# Patient Record
Sex: FEMALE | Race: WHITE | URBAN - METROPOLITAN AREA
[De-identification: names, ages, dates, MRNs, and addresses within clinical notes are randomized per-mention and may not be internally consistent; named-entity substitution may affect disease eponyms.]

---

## 2017-11-08 ENCOUNTER — INPATIENT (INPATIENT)
Facility: HOSPITAL | Age: 77
LOS: 5 days | Discharge: HOME HEALTH | End: 2017-11-14
Attending: FAMILY MEDICINE | Admitting: FAMILY MEDICINE
Payer: MEDICARE

## 2017-11-08 VITALS — WEIGHT: 195.99 LBS

## 2017-11-08 DIAGNOSIS — Z90.49 ACQUIRED ABSENCE OF OTHER SPECIFIED PARTS OF DIGESTIVE TRACT: Chronic | ICD-10-CM

## 2017-11-08 DIAGNOSIS — Z90.710 ACQUIRED ABSENCE OF BOTH CERVIX AND UTERUS: Chronic | ICD-10-CM

## 2017-11-08 LAB
ALBUMIN SERPL ELPH-MCNC: 3.5 G/DL — SIGNIFICANT CHANGE UP (ref 3.3–5)
ALP SERPL-CCNC: 82 U/L — SIGNIFICANT CHANGE UP (ref 40–120)
ALT FLD-CCNC: 14 U/L — SIGNIFICANT CHANGE UP (ref 12–78)
ANION GAP SERPL CALC-SCNC: 7 MMOL/L — SIGNIFICANT CHANGE UP (ref 5–17)
ANISOCYTOSIS BLD QL: SLIGHT — SIGNIFICANT CHANGE UP
APTT BLD: 47.7 SEC — HIGH (ref 27.5–37.4)
AST SERPL-CCNC: 19 U/L — SIGNIFICANT CHANGE UP (ref 15–37)
BASOPHILS # BLD AUTO: 0.1 K/UL — SIGNIFICANT CHANGE UP (ref 0–0.2)
BASOPHILS # BLD AUTO: 0.1 K/UL — SIGNIFICANT CHANGE UP (ref 0–0.2)
BASOPHILS NFR BLD AUTO: 1.2 % — SIGNIFICANT CHANGE UP (ref 0–2)
BASOPHILS NFR BLD AUTO: 1.4 % — SIGNIFICANT CHANGE UP (ref 0–2)
BILIRUB SERPL-MCNC: 0.6 MG/DL — SIGNIFICANT CHANGE UP (ref 0.2–1.2)
BUN SERPL-MCNC: 19 MG/DL — SIGNIFICANT CHANGE UP (ref 7–23)
CALCIUM SERPL-MCNC: 9.5 MG/DL — SIGNIFICANT CHANGE UP (ref 8.5–10.1)
CHLORIDE SERPL-SCNC: 100 MMOL/L — SIGNIFICANT CHANGE UP (ref 96–108)
CO2 SERPL-SCNC: 32 MMOL/L — HIGH (ref 22–31)
CREAT SERPL-MCNC: 1.28 MG/DL — SIGNIFICANT CHANGE UP (ref 0.5–1.3)
EOSINOPHIL # BLD AUTO: 0.2 K/UL — SIGNIFICANT CHANGE UP (ref 0–0.5)
EOSINOPHIL # BLD AUTO: 0.2 K/UL — SIGNIFICANT CHANGE UP (ref 0–0.5)
EOSINOPHIL NFR BLD AUTO: 3.2 % — SIGNIFICANT CHANGE UP (ref 0–6)
EOSINOPHIL NFR BLD AUTO: 3.6 % — SIGNIFICANT CHANGE UP (ref 0–6)
GLUCOSE SERPL-MCNC: 65 MG/DL — LOW (ref 70–99)
HCT VFR BLD CALC: 46.5 % — HIGH (ref 34.5–45)
HCT VFR BLD CALC: 46.6 % — HIGH (ref 34.5–45)
HGB BLD-MCNC: 14.8 G/DL — SIGNIFICANT CHANGE UP (ref 11.5–15.5)
HGB BLD-MCNC: 15.4 G/DL — SIGNIFICANT CHANGE UP (ref 11.5–15.5)
INR BLD: 1.64 RATIO — HIGH (ref 0.88–1.16)
LYMPHOCYTES # BLD AUTO: 2.3 K/UL — SIGNIFICANT CHANGE UP (ref 1–3.3)
LYMPHOCYTES # BLD AUTO: 2.3 K/UL — SIGNIFICANT CHANGE UP (ref 1–3.3)
LYMPHOCYTES # BLD AUTO: 34.3 % — SIGNIFICANT CHANGE UP (ref 13–44)
LYMPHOCYTES # BLD AUTO: 38 % — SIGNIFICANT CHANGE UP (ref 13–44)
MACROCYTES BLD QL: SLIGHT — SIGNIFICANT CHANGE UP
MANUAL DIF COMMENT BLD-IMP: SIGNIFICANT CHANGE UP
MCHC RBC-ENTMCNC: 31.7 GM/DL — LOW (ref 32–36)
MCHC RBC-ENTMCNC: 33 GM/DL — SIGNIFICANT CHANGE UP (ref 32–36)
MCHC RBC-ENTMCNC: 33.2 PG — SIGNIFICANT CHANGE UP (ref 27–34)
MCHC RBC-ENTMCNC: 34.1 PG — HIGH (ref 27–34)
MCV RBC AUTO: 103.3 FL — HIGH (ref 80–100)
MCV RBC AUTO: 104.9 FL — HIGH (ref 80–100)
MONOCYTES # BLD AUTO: 0.4 K/UL — SIGNIFICANT CHANGE UP (ref 0–0.9)
MONOCYTES # BLD AUTO: 0.7 K/UL — SIGNIFICANT CHANGE UP (ref 0–0.9)
MONOCYTES NFR BLD AUTO: 11.2 % — SIGNIFICANT CHANGE UP (ref 2–14)
MONOCYTES NFR BLD AUTO: 7.1 % — SIGNIFICANT CHANGE UP (ref 2–14)
NEUTROPHILS # BLD AUTO: 3 K/UL — SIGNIFICANT CHANGE UP (ref 1.8–7.4)
NEUTROPHILS # BLD AUTO: 3.3 K/UL — SIGNIFICANT CHANGE UP (ref 1.8–7.4)
NEUTROPHILS NFR BLD AUTO: 49.5 % — SIGNIFICANT CHANGE UP (ref 43–77)
NEUTROPHILS NFR BLD AUTO: 50.5 % — SIGNIFICANT CHANGE UP (ref 43–77)
NT-PROBNP SERPL-SCNC: 2386 PG/ML — HIGH (ref 0–450)
PLAT MORPH BLD: NORMAL — SIGNIFICANT CHANGE UP
PLATELET # BLD AUTO: 208 K/UL — SIGNIFICANT CHANGE UP (ref 150–400)
PLATELET # BLD AUTO: 217 K/UL — SIGNIFICANT CHANGE UP (ref 150–400)
POTASSIUM SERPL-MCNC: 4 MMOL/L — SIGNIFICANT CHANGE UP (ref 3.5–5.3)
POTASSIUM SERPL-SCNC: 4 MMOL/L — SIGNIFICANT CHANGE UP (ref 3.5–5.3)
PROT SERPL-MCNC: 7.7 GM/DL — SIGNIFICANT CHANGE UP (ref 6–8.3)
PROTHROM AB SERPL-ACNC: 17.9 SEC — HIGH (ref 9.8–12.7)
RBC # BLD: 4.44 M/UL — SIGNIFICANT CHANGE UP (ref 3.8–5.2)
RBC # BLD: 4.5 M/UL — SIGNIFICANT CHANGE UP (ref 3.8–5.2)
RBC # FLD: 11.9 % — SIGNIFICANT CHANGE UP (ref 10.3–14.5)
RBC # FLD: 12 % — SIGNIFICANT CHANGE UP (ref 10.3–14.5)
RBC BLD AUTO: (no result)
SODIUM SERPL-SCNC: 139 MMOL/L — SIGNIFICANT CHANGE UP (ref 135–145)
TROPONIN I SERPL-MCNC: <0.015 NG/ML — SIGNIFICANT CHANGE UP (ref 0.01–0.04)
TSH SERPL-MCNC: 3.19 UIU/ML — SIGNIFICANT CHANGE UP (ref 0.36–3.74)
WBC # BLD: 6 K/UL — SIGNIFICANT CHANGE UP (ref 3.8–10.5)
WBC # BLD: 6.6 K/UL — SIGNIFICANT CHANGE UP (ref 3.8–10.5)
WBC # FLD AUTO: 6 K/UL — SIGNIFICANT CHANGE UP (ref 3.8–10.5)
WBC # FLD AUTO: 6.6 K/UL — SIGNIFICANT CHANGE UP (ref 3.8–10.5)

## 2017-11-08 PROCEDURE — 71020: CPT | Mod: 26

## 2017-11-08 PROCEDURE — 93010 ELECTROCARDIOGRAM REPORT: CPT

## 2017-11-08 PROCEDURE — 99285 EMERGENCY DEPT VISIT HI MDM: CPT

## 2017-11-08 PROCEDURE — 93970 EXTREMITY STUDY: CPT | Mod: 26

## 2017-11-08 RX ORDER — SPIRONOLACTONE 25 MG/1
50 TABLET, FILM COATED ORAL DAILY
Qty: 0 | Refills: 0 | Status: DISCONTINUED | OUTPATIENT
Start: 2017-11-08 | End: 2017-11-14

## 2017-11-08 RX ORDER — PREGABALIN 225 MG/1
1000 CAPSULE ORAL DAILY
Qty: 0 | Refills: 0 | Status: DISCONTINUED | OUTPATIENT
Start: 2017-11-08 | End: 2017-11-14

## 2017-11-08 RX ORDER — APIXABAN 2.5 MG/1
5 TABLET, FILM COATED ORAL EVERY 12 HOURS
Qty: 0 | Refills: 0 | Status: DISCONTINUED | OUTPATIENT
Start: 2017-11-08 | End: 2017-11-09

## 2017-11-08 RX ORDER — CHOLECALCIFEROL (VITAMIN D3) 125 MCG
1000 CAPSULE ORAL DAILY
Qty: 0 | Refills: 0 | Status: DISCONTINUED | OUTPATIENT
Start: 2017-11-08 | End: 2017-11-14

## 2017-11-08 RX ORDER — METOPROLOL TARTRATE 50 MG
50 TABLET ORAL DAILY
Qty: 0 | Refills: 0 | Status: DISCONTINUED | OUTPATIENT
Start: 2017-11-08 | End: 2017-11-14

## 2017-11-08 RX ORDER — FOLIC ACID 0.8 MG
1 TABLET ORAL DAILY
Qty: 0 | Refills: 0 | Status: DISCONTINUED | OUTPATIENT
Start: 2017-11-08 | End: 2017-11-14

## 2017-11-08 RX ORDER — DILTIAZEM HCL 120 MG
120 CAPSULE, EXT RELEASE 24 HR ORAL DAILY
Qty: 0 | Refills: 0 | Status: DISCONTINUED | OUTPATIENT
Start: 2017-11-08 | End: 2017-11-14

## 2017-11-08 RX ORDER — FUROSEMIDE 40 MG
40 TABLET ORAL
Qty: 0 | Refills: 0 | Status: DISCONTINUED | OUTPATIENT
Start: 2017-11-08 | End: 2017-11-11

## 2017-11-08 RX ORDER — ACETAMINOPHEN 500 MG
650 TABLET ORAL EVERY 6 HOURS
Qty: 0 | Refills: 0 | Status: DISCONTINUED | OUTPATIENT
Start: 2017-11-08 | End: 2017-11-14

## 2017-11-08 RX ADMIN — APIXABAN 5 MILLIGRAM(S): 2.5 TABLET, FILM COATED ORAL at 23:00

## 2017-11-08 RX ADMIN — Medication 50 MILLIGRAM(S): at 23:00

## 2017-11-08 RX ADMIN — Medication 40 MILLIGRAM(S): at 17:45

## 2017-11-08 NOTE — H&P ADULT - NSHPPHYSICALEXAM_GEN_ALL_CORE
Vital Signs Last 24 Hrs  T(C): 36.7 (08 Nov 2017 15:19), Max: 36.7 (08 Nov 2017 12:36)  T(F): 98.1 (08 Nov 2017 15:19), Max: 98.1 (08 Nov 2017 15:19)  HR: 70 (08 Nov 2017 15:19) (70 - 78)  BP: 141/78 (08 Nov 2017 15:19) (141/78 - 148/97)  BP(mean): --  RR: 17 (08 Nov 2017 15:19) (17 - 18)  SpO2: 97% (08 Nov 2017 15:19) (96% - 97%)

## 2017-11-08 NOTE — H&P ADULT - PMH
Atrial fibrillation, unspecified type    CHF (congestive heart failure)    Hypertension    Squamous cell carcinoma

## 2017-11-08 NOTE — ED PROVIDER NOTE - OBJECTIVE STATEMENT
77 y/o F with a PMHx of HTN presents to the ED being sent in by Yoseph for possible CHF exacerbation along with L pleural effusion. Pt states that she has not been able to walk due to lower extremity edema and has recently moved in with son who is at bedside. Pt currently calm, no respiratory distress and denies fever, NVD, CP, SOB, spinning sensation or any other acute c/o at this time.

## 2017-11-08 NOTE — ED ADULT TRIAGE NOTE - CHIEF COMPLAINT QUOTE
pt sent by MD for CHF exacerbation with Left lung pleural efffusion and Afib. pt denies SOB chest pain, dizziness, pt had routine workup and these dx were discovered per pt son

## 2017-11-08 NOTE — H&P ADULT - ASSESSMENT
77 yo female with PMH of diastolic CHF, HTN, A. fib (on eliquis), depression, chronic pedal edema secondary to venous stasis, h/o compression fx L1, squamous cell skin cancer sent in to ED from PMD's office for CHF exacerbation. Pt recently moved to [place] from [place]. 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 Echo showed enlarged left atrium with preserved LVEF and pulm HTN. She was seen in PMDs office today and sent in for further management. As per pt she has felt SOB on exertion. No orthopnea She has had chronic lower ext edema which has worsened over the past few weeks. No chest pain. No orthopnea or PND. She is compliant with her medications. She has a known compression fx of L1 s/p fall earlier this year. Denies fevers, chills, headaches, chest pain, palpitations, abd pain, N/V, diarrhea.    #Acute diastolic CHF exacerbation  - admit to tele  - lasix IV BID  - continue spironolactone  - daily weights, strict I/Os  - continue toprol  - cardio consult  - outpatient echo with preserved LVEF  - CXR and outpatient CT with right moderate pleural effusion, may need thoracocentesis if not improved with lasix    #A. fib now with tachy suzette syndrome on outpatient holter  - will continue cardizem and toprol with parameters for now unless becomes bradycardic on monitor  - continue eliquis  - EP consult for possible PPM placement    #Lower extremity edema  - check dopplers  - continue diuresis    #HTN  - BP stable  - continue home meds    #h/o L1 compression fx  - outpatient follow up    #DVT prophylaxis  - on eliquis

## 2017-11-08 NOTE — H&P ADULT - HISTORY OF PRESENT ILLNESS
77 yo female with PMH of diastolic CHF, HTN, A. fib (on eliquis), depression, chronic pedal edema secondary to venous stasis, h/o compression fx L1, squamous cell skin cancer sent in to ED from PMD's office for CHF exacerbation. Pt recently moved to [place] from [place]. 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 Echo showed enlarged left atrium with preserved LVEF and pulm HTN. She was seen in PMDs office today and sent in for further management. As per pt she has felt SOB on exertion. No orthopnea She has had chronic lower ext edema which has worsened over the past few weeks. No chest pain. No orthopnea or PND. She is compliant with her medications. She has a known compression fx of L1 s/p fall earlier this year. Denies fevers, chills, headaches, chest pain, palpitations, abd pain, N/V, diarrhea.

## 2017-11-09 LAB
ANION GAP SERPL CALC-SCNC: 7 MMOL/L — SIGNIFICANT CHANGE UP (ref 5–17)
BUN SERPL-MCNC: 19 MG/DL — SIGNIFICANT CHANGE UP (ref 7–23)
CALCIUM SERPL-MCNC: 9.2 MG/DL — SIGNIFICANT CHANGE UP (ref 8.5–10.1)
CHLORIDE SERPL-SCNC: 97 MMOL/L — SIGNIFICANT CHANGE UP (ref 96–108)
CHOLEST SERPL-MCNC: 180 MG/DL — SIGNIFICANT CHANGE UP (ref 10–199)
CO2 SERPL-SCNC: 33 MMOL/L — HIGH (ref 22–31)
CREAT SERPL-MCNC: 1.06 MG/DL — SIGNIFICANT CHANGE UP (ref 0.5–1.3)
GLUCOSE SERPL-MCNC: 82 MG/DL — SIGNIFICANT CHANGE UP (ref 70–99)
HDLC SERPL-MCNC: 51 MG/DL — SIGNIFICANT CHANGE UP (ref 40–125)
LIPID PNL WITH DIRECT LDL SERPL: 116 MG/DL — SIGNIFICANT CHANGE UP
MAGNESIUM SERPL-MCNC: 1.8 MG/DL — SIGNIFICANT CHANGE UP (ref 1.6–2.6)
POTASSIUM SERPL-MCNC: 3.7 MMOL/L — SIGNIFICANT CHANGE UP (ref 3.5–5.3)
POTASSIUM SERPL-SCNC: 3.7 MMOL/L — SIGNIFICANT CHANGE UP (ref 3.5–5.3)
SODIUM SERPL-SCNC: 137 MMOL/L — SIGNIFICANT CHANGE UP (ref 135–145)
TOTAL CHOLESTEROL/HDL RATIO MEASUREMENT: 3.5 RATIO — SIGNIFICANT CHANGE UP (ref 3.3–7.1)
TRIGL SERPL-MCNC: 65 MG/DL — SIGNIFICANT CHANGE UP (ref 10–149)

## 2017-11-09 PROCEDURE — 99221 1ST HOSP IP/OBS SF/LOW 40: CPT | Mod: 25

## 2017-11-09 PROCEDURE — 93010 ELECTROCARDIOGRAM REPORT: CPT

## 2017-11-09 RX ADMIN — Medication 120 MILLIGRAM(S): at 06:46

## 2017-11-09 RX ADMIN — Medication 20 MILLIGRAM(S): at 12:20

## 2017-11-09 RX ADMIN — APIXABAN 5 MILLIGRAM(S): 2.5 TABLET, FILM COATED ORAL at 06:47

## 2017-11-09 RX ADMIN — Medication 1 MILLIGRAM(S): at 06:46

## 2017-11-09 RX ADMIN — Medication 650 MILLIGRAM(S): at 20:39

## 2017-11-09 RX ADMIN — Medication 50 MILLIGRAM(S): at 06:45

## 2017-11-09 RX ADMIN — PREGABALIN 1000 MICROGRAM(S): 225 CAPSULE ORAL at 12:21

## 2017-11-09 RX ADMIN — Medication 650 MILLIGRAM(S): at 22:14

## 2017-11-09 RX ADMIN — Medication 1000 UNIT(S): at 06:47

## 2017-11-09 RX ADMIN — Medication 40 MILLIGRAM(S): at 17:57

## 2017-11-09 RX ADMIN — SPIRONOLACTONE 50 MILLIGRAM(S): 25 TABLET, FILM COATED ORAL at 06:46

## 2017-11-09 RX ADMIN — Medication 40 MILLIGRAM(S): at 06:46

## 2017-11-09 NOTE — PROGRESS NOTE ADULT - SUBJECTIVE AND OBJECTIVE BOX
75 y/o female with PMHx of diastolic CHF, HTN, A. fib (on eliquis), depression, chronic pedal edema secondary to venous stasis, h/o compression fx L1, squamous cell skin cancer sent in to ED from PMD's office for CHF exacerbation. Pt recently moved to [place] from [place]. 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 Echo showed enlarged left atrium with preserved LVEF and pulm HTN. She was seen in PMDs office today and sent in for further management. As per pt she has felt SOB on exertion. No orthopnea. She has had chronic lower ext edema which has worsened over the past few weeks. No chest pain. No orthopnea or PND. She is compliant with her medications. She has a known compression fx of L1 s/p fall earlier this year. Denies fevers, chills, headaches, chest pain, palpitations, abd pain, N/V, diarrhea.      11/9 - Pt. seen and examined in bed, feels well. Offers no complaints. Denies SOB/CP. Afib on monitor (40s-70s).        REVIEW OF SYSTEMS: all negative except for comments above.         Physical Exam:   Vital Signs Last 24 Hrs T(C): 36.9 (09 Nov 2017 05:42), Max: 36.9 (09 Nov 2017 05:42) T(F): 98.4 (09 Nov 2017 05:42), Max: 98.4 (09 Nov 2017 05:42) HR: 72 (09 Nov 2017 06:44) (70 - 78) BP: 115/68 (09 Nov 2017 06:44) (107/54 - 141/78) BP(mean): -- RR: 17 (08 Nov 2017 21:09) (17 - 17) SpO2: 90% (09 Nov 2017 05:42) (90% - 97%)  	        PHYSICAL EXAM:    Constitutional: NAD, well-nourished, well-developed  Neck: Soft and supple  Respiratory: Breath sounds are clear bilaterally, No wheezing, rales or rhonchi  Cardiovascular: S1 and S2, regular rate and rhythm  Gastrointestinal: Bowel Sounds present, soft, nontender, nondistended  Extremities: No peripheral edema  Neurological: A/O x 3, no focal deficits  Skin: No rashes    Labs:                          14.8   6.0   )-----------( 217      ( 08 Nov 2017 19:24 )             46.6     11-09    137  |  97  |  19  ----------------------------<  82  3.7   |  33<H>  |  1.06    Ca    9.2      09 Nov 2017 06:51  Mg     1.8     11-09    TPro  7.7  /  Alb  3.5  /  TBili  0.6  /  DBili  x   /  AST  19  /  ALT  14  /  AlkPhos  82  11-08      MEDICATIONS  (STANDING):  apixaban 5 milliGRAM(s) Oral every 12 hours  cholecalciferol 1000 Unit(s) Oral daily  cyanocobalamin 1000 MICROGram(s) Oral daily  diltiazem    milliGRAM(s) Oral daily  folic acid 1 milliGRAM(s) Oral daily  furosemide   Injectable 40 milliGRAM(s) IV Push two times a day  metoprolol succinate ER 50 milliGRAM(s) Oral daily  PARoxetine 20 milliGRAM(s) Oral daily  spironolactone 50 milliGRAM(s) Oral daily    MEDICATIONS  (PRN):  acetaminophen   Tablet. 650 milliGRAM(s) Oral every 6 hours PRN Mild Pain (1 - 3)                  Assessment and Plan:       77 yo female with PMHx as above who is admitted with:     # Acute diastolic CHF exacerbation  - admit to tele  - lasix IV BID  - continue spironolactone  - daily weights, strict I/Os  - continue toprol  - cardio consult appreciated -   - BNP 2000's  - as per cardio, repeat ECHO has significantly enlarged L/R atria with somewhat preserved LV function in past  - Will consider possible ACE/ARB after ECHO  - B/L LE edema-some evidence of vascular insufficiency.    - EPS for possible PPM  - CXR and outpatient CT with right moderate pleural effusion, may need thoracocentesis if not improved with lasix    # A. fib  - hx tachy suzette syndrome on outpatient holter  - will continue cardizem and toprol with parameters for now unless becomes bradycardic on monitor  - continue eliquis  - EP consult for possible PPM placement    # Lower extremity edema/venous insuff  - dopplers neg  - continue diuresis    # HTN  - BP stable  - continue home meds    # h/o L1 compression fx  - outpatient follow up    # DVT prophylaxis  - on eliquis

## 2017-11-09 NOTE — PROGRESS NOTE ADULT - ATTENDING COMMENTS
Pt. seen and examined with SUSIE Rush. Agree with assessment and plan as above. Changes made where appropriate. Pt. seen and examined with SUSIE Rush. Agree with assessment and plan as above. Changes made where appropriate.  - plan for EP eval for possible PPM for tachy suzette  - continue IV lasix  - continue tele monitoring

## 2017-11-10 DIAGNOSIS — J90 PLEURAL EFFUSION, NOT ELSEWHERE CLASSIFIED: ICD-10-CM

## 2017-11-10 DIAGNOSIS — I48.91 UNSPECIFIED ATRIAL FIBRILLATION: ICD-10-CM

## 2017-11-10 DIAGNOSIS — R06.00 DYSPNEA, UNSPECIFIED: ICD-10-CM

## 2017-11-10 DIAGNOSIS — I50.9 HEART FAILURE, UNSPECIFIED: ICD-10-CM

## 2017-11-10 DIAGNOSIS — C44.92 SQUAMOUS CELL CARCINOMA OF SKIN, UNSPECIFIED: ICD-10-CM

## 2017-11-10 LAB
ANION GAP SERPL CALC-SCNC: 6 MMOL/L — SIGNIFICANT CHANGE UP (ref 5–17)
BUN SERPL-MCNC: 24 MG/DL — HIGH (ref 7–23)
CALCIUM SERPL-MCNC: 9.2 MG/DL — SIGNIFICANT CHANGE UP (ref 8.5–10.1)
CHLORIDE SERPL-SCNC: 95 MMOL/L — LOW (ref 96–108)
CO2 SERPL-SCNC: 34 MMOL/L — HIGH (ref 22–31)
CREAT SERPL-MCNC: 1.15 MG/DL — SIGNIFICANT CHANGE UP (ref 0.5–1.3)
GLUCOSE SERPL-MCNC: 91 MG/DL — SIGNIFICANT CHANGE UP (ref 70–99)
HCT VFR BLD CALC: 44.2 % — SIGNIFICANT CHANGE UP (ref 34.5–45)
HGB BLD-MCNC: 14.8 G/DL — SIGNIFICANT CHANGE UP (ref 11.5–15.5)
MCHC RBC-ENTMCNC: 33.6 GM/DL — SIGNIFICANT CHANGE UP (ref 32–36)
MCHC RBC-ENTMCNC: 34.5 PG — HIGH (ref 27–34)
MCV RBC AUTO: 102.8 FL — HIGH (ref 80–100)
PLATELET # BLD AUTO: 166 K/UL — SIGNIFICANT CHANGE UP (ref 150–400)
POTASSIUM SERPL-MCNC: 3.6 MMOL/L — SIGNIFICANT CHANGE UP (ref 3.5–5.3)
POTASSIUM SERPL-SCNC: 3.6 MMOL/L — SIGNIFICANT CHANGE UP (ref 3.5–5.3)
RBC # BLD: 4.3 M/UL — SIGNIFICANT CHANGE UP (ref 3.8–5.2)
RBC # FLD: 11.8 % — SIGNIFICANT CHANGE UP (ref 10.3–14.5)
SODIUM SERPL-SCNC: 135 MMOL/L — SIGNIFICANT CHANGE UP (ref 135–145)
WBC # BLD: 6.3 K/UL — SIGNIFICANT CHANGE UP (ref 3.8–10.5)
WBC # FLD AUTO: 6.3 K/UL — SIGNIFICANT CHANGE UP (ref 3.8–10.5)

## 2017-11-10 PROCEDURE — 93306 TTE W/DOPPLER COMPLETE: CPT | Mod: 26

## 2017-11-10 PROCEDURE — 71010: CPT | Mod: 26

## 2017-11-10 RX ORDER — HEPARIN SODIUM 5000 [USP'U]/ML
5000 INJECTION INTRAVENOUS; SUBCUTANEOUS EVERY 8 HOURS
Qty: 0 | Refills: 0 | Status: DISCONTINUED | OUTPATIENT
Start: 2017-11-10 | End: 2017-11-13

## 2017-11-10 RX ADMIN — Medication 650 MILLIGRAM(S): at 22:09

## 2017-11-10 RX ADMIN — Medication 1000 UNIT(S): at 13:57

## 2017-11-10 RX ADMIN — Medication 40 MILLIGRAM(S): at 17:58

## 2017-11-10 RX ADMIN — SPIRONOLACTONE 50 MILLIGRAM(S): 25 TABLET, FILM COATED ORAL at 06:20

## 2017-11-10 RX ADMIN — Medication 120 MILLIGRAM(S): at 06:20

## 2017-11-10 RX ADMIN — Medication 40 MILLIGRAM(S): at 06:20

## 2017-11-10 RX ADMIN — Medication 1 MILLIGRAM(S): at 13:57

## 2017-11-10 RX ADMIN — Medication 650 MILLIGRAM(S): at 20:07

## 2017-11-10 RX ADMIN — HEPARIN SODIUM 5000 UNIT(S): 5000 INJECTION INTRAVENOUS; SUBCUTANEOUS at 21:19

## 2017-11-10 RX ADMIN — PREGABALIN 1000 MICROGRAM(S): 225 CAPSULE ORAL at 13:59

## 2017-11-10 RX ADMIN — Medication 20 MILLIGRAM(S): at 13:57

## 2017-11-10 RX ADMIN — Medication 50 MILLIGRAM(S): at 06:20

## 2017-11-10 NOTE — CONSULT NOTE ADULT - CONSULT REASON
tachy-suzette, atrial fibrillation
CHF, arrhythmia, pleural effusion
shortness of breath; right effussion

## 2017-11-10 NOTE — PROGRESS NOTE ADULT - SUBJECTIVE AND OBJECTIVE BOX
CHIEF COMPLAINT: Patient is a 76y old  Female who presents with a chief complaint of fluid in lung (08 Nov 2017 17:05)      HPI:  75 yo female with PMH of diastolic CHF, HTN, A. fib (on eliquis), depression, chronic pedal edema secondary to venous stasis, h/o compression fx L1, squamous cell skin cancer sent in to ED from PMD's office for CHF exacerbation. Pt recently moved to [place] from [place]. 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 Echo showed enlarged left atrium with preserved LVEF and pulm HTN. She was seen in PMDs office today and sent in for further management. As per pt she has felt SOB on exertion. No orthopnea She has had chronic lower ext edema which has worsened over the past few weeks. No chest pain. No orthopnea or PND. She is compliant with her medications. She has a known compression fx of L1 s/p fall earlier this year. Denies fevers, chills, headaches, chest pain, palpitations, abd pain, N/V, diarrhea. (08 Nov 2017 17:05)    11/9-Patient started on IV lasix for edema.  Telemetry has shown HR to be between .  Holter in office with HR less than 30.  Weeping edema    11/10-significant decrease in size of legs.  EPS-consulted.  Holter from office showing significant tachy-suzette.        PMHx: PAST MEDICAL & SURGICAL HISTORY:  Squamous cell carcinoma  Hypertension  Atrial fibrillation, unspecified type  CHF (congestive heart failure)  S/P cholecystectomy  History of appendectomy  H/O total hysterectomy      Allergies: Allergies    Darvocet-N 50 (Unknown)  sulfa drugs (Other)    Intolerances          REVIEW OF SYSTEMS:    CONSTITUTIONAL: No weakness, fevers or chills  EYES/ENT: No visual changes;  No vertigo or throat pain   NECK: No pain or stiffness  RESPIRATORY: No cough, wheezing, hemoptysis; No shortness of breath  CARDIOVASCULAR: No chest pain or palpitations  GASTROINTESTINAL: No abdominal or epigastric pain. No nausea, vomiting, or hematemesis; No diarrhea or constipation. No melena or hematochezia.  GENITOURINARY: No dysuria, frequency or hematuria  NEUROLOGICAL: No numbness or weakness  SKIN: No itching, burning, rashes, or lesions   All other review of systems is negative unless indicated above    Vital Signs Last 24 Hrs  T(C): 36.4 (10 Nov 2017 05:10), Max: 36.5 (09 Nov 2017 16:48)  T(F): 97.5 (10 Nov 2017 05:10), Max: 97.7 (09 Nov 2017 16:48)  HR: 85 (10 Nov 2017 05:10) (79 - 85)  BP: 118/83 (10 Nov 2017 05:10) (118/71 - 126/71)  BP(mean): --  RR: 17 (09 Nov 2017 21:16) (17 - 17)  SpO2: 93% (10 Nov 2017 05:10) (93% - 95%)    I&O's Summary          PHYSICAL EXAM:   Constitutional: NAD, awake and alert, well-developed  HEENT: PERR, EOMI, Normal Hearing, MMM  Neck: JVD  Respiratory: Breath sounds are clear bilaterally, No wheezing, rales or rhonchi  Cardiovascular: S1 and S2, regular rate and rhythm, no Murmurs, gallops or rubs  Gastrointestinal: Bowel Sounds present, soft, nontender, nondistended, no guarding, no rebound  Extremities: peripheral edema      MEDICATIONS:  MEDICATIONS  (STANDING):  cholecalciferol 1000 Unit(s) Oral daily  cyanocobalamin 1000 MICROGram(s) Oral daily  diltiazem    milliGRAM(s) Oral daily  folic acid 1 milliGRAM(s) Oral daily  furosemide   Injectable 40 milliGRAM(s) IV Push two times a day  metoprolol succinate ER 50 milliGRAM(s) Oral daily  PARoxetine 20 milliGRAM(s) Oral daily  spironolactone 50 milliGRAM(s) Oral daily      LABS: All Labs Reviewed:                        14.8   6.3   )-----------( 166      ( 10 Nov 2017 06:09 )             44.2     11-09    137  |  97  |  19  ----------------------------<  82  3.7   |  33<H>  |  1.06    Ca    9.2      09 Nov 2017 06:51  Mg     1.8     11-09    TPro  7.7  /  Alb  3.5  /  TBili  0.6  /  DBili  x   /  AST  19  /  ALT  14  /  AlkPhos  82  11-08    PT/INR - ( 08 Nov 2017 14:13 )   PT: 17.9 sec;   INR: 1.64 ratio         PTT - ( 08 Nov 2017 14:13 )  PTT:47.7 sec  CARDIAC MARKERS ( 08 Nov 2017 12:36 )  <0.015 ng/mL / x     / x     / x     / x          Blood Culture:     lipid profile          11-08 @ 19:24  cholesterol 180 mg/dL  direct  mg/dL  HDL 51 mg/dL  HDL/total cholesterol --  Triglyceride, serum 65 mg/dL    BNP Serum Pro-Brain Natriuretic Peptide: 2386 pg/mL (11-08-17 @ 12:36)      RADIOLOGY:    EKG:      Telemetry:  afib with HR up to 130's, NSVT    ECHO:  pending

## 2017-11-10 NOTE — CONSULT NOTE ADULT - ASSESSMENT
patient with BNP 2000's with SOB, pleural effusion and severe edema.  5-YEG-wshwjz ECHO-has significantly enlarged L/R atria with somewhat preserved LV function in past.  Started IV lasix,  on spironolactone  Will consider possible ACE/ARB after ECHO  2-edema-some evidence of vascular insufficiency.    3-pleural effusion-pulmonary or IR to tap  4-EPS for possible PPM
76 yr old female admitted with SOB, right pleaural effusion, LE edema.  PMHx of atrial fibrillation on eliquis.    Tachy-suzette on outpatient echo (rates 30's to 150's)  Continue tele monitoring.  Hold eliquis, she had dose this morning, need to wait 48 hrs.  Continue cardizem and toprol for now, no significant pauses on telemetry.  Possible PPM, may have to wait until Monday.  D/W DR. Mcmahon
Patient will be scheduled for thoracentesis by interventional radiology.  Cardiology followup noted.  Continue diuresis.

## 2017-11-10 NOTE — PROGRESS NOTE ADULT - ATTENDING COMMENTS
Pt. seen and examined with SUSIE Rush. Agree with assessment and plan as above. Changes made where appropriate. Pt. seen and examined with SUSIE Rush. Agree with assessment and plan as above. Changes made where appropriate.  - cxr shows improving pl effusions and no plan for thoracentesis at this time  - plan for PPM on monday for tachy suzette  - LE dopplers negative

## 2017-11-10 NOTE — CONSULT NOTE ADULT - SUBJECTIVE AND OBJECTIVE BOX
CHIEF COMPLAINT: Patient is a 76y old  Female who presents with a chief complaint of fluid in lung (08 Nov 2017 17:05)      HPI:  77 yo female with PMH of diastolic CHF, HTN, A. fib (on eliquis), depression, chronic pedal edema secondary to venous stasis, h/o compression fx L1, squamous cell skin cancer sent in to ED from PMD's office for CHF exacerbation. Pt recently moved to [place] from [place]. 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 Echo showed enlarged left atrium with preserved LVEF and pulm HTN. She was seen in PMDs office today and sent in for further management. As per pt she has felt SOB on exertion. No orthopnea She has had chronic lower ext edema which has worsened over the past few weeks. No chest pain. No orthopnea or PND. She is compliant with her medications. She has a known compression fx of L1 s/p fall earlier this year. Denies fevers, chills, headaches, chest pain, palpitations, abd pain, N/V, diarrhea. (08 Nov 2017 17:05)    Patient started on IV lasix for edema.  Telemetry has shown HR to be between .  Holter in office with HR less than 30.  Weeping edema      PMHx: PAST MEDICAL & SURGICAL HISTORY:  Squamous cell carcinoma  Hypertension  Atrial fibrillation, unspecified type  CHF (congestive heart failure)  S/P cholecystectomy  History of appendectomy  H/O total hysterectomy        Soc Hx:       Allergies: Allergies    Darvocet-N 50 (Unknown)  sulfa drugs (Other)    Intolerances          REVIEW OF SYSTEMS:    CONSTITUTIONAL: No weakness, fevers or chills  EYES/ENT: No visual changes;  No vertigo or throat pain   NECK: No pain or stiffness  RESPIRATORY:shortness of breath  CARDIOVASCULAR: palpitations  GASTROINTESTINAL: No abdominal or epigastric pain. No nausea, vomiting, or hematemesis; No diarrhea or constipation. No melena or hematochezia.      Vital Signs Last 24 Hrs  T(C): 36.9 (09 Nov 2017 05:42), Max: 36.9 (09 Nov 2017 05:42)  T(F): 98.4 (09 Nov 2017 05:42), Max: 98.4 (09 Nov 2017 05:42)  HR: 72 (09 Nov 2017 06:44) (70 - 78)  BP: 115/68 (09 Nov 2017 06:44) (107/54 - 148/97)  BP(mean): --  RR: 17 (08 Nov 2017 21:09) (17 - 18)  SpO2: 90% (09 Nov 2017 05:42) (90% - 97%)    I&O's Summary      CAPILLARY BLOOD GLUCOSE          PHYSICAL EXAM:   Constitutional: NAD, awake and alert, well-developed  HEENT: PERR, EOMI, Normal Hearing, MMM  Neck: JVD  Respiratory: Breath sounds with decreased BS on right  Cardiovascular: S1 and S2, irregular rate and rhythm, Murmur  Gastrointestinal: Bowel Sounds present, soft, nontender, nondistended, no guarding, no rebound  Extremities: edema  Vascular: 2+ peripheral pulses      MEDICATIONS:  MEDICATIONS  (STANDING):  apixaban 5 milliGRAM(s) Oral every 12 hours  cholecalciferol 1000 Unit(s) Oral daily  cyanocobalamin 1000 MICROGram(s) Oral daily  diltiazem    milliGRAM(s) Oral daily  folic acid 1 milliGRAM(s) Oral daily  furosemide   Injectable 40 milliGRAM(s) IV Push two times a day  metoprolol succinate ER 50 milliGRAM(s) Oral daily  PARoxetine 20 milliGRAM(s) Oral daily  spironolactone 50 milliGRAM(s) Oral daily      LABS: All Labs Reviewed:                        14.8   6.0   )-----------( 217      ( 08 Nov 2017 19:24 )             46.6     11-08    139  |  100  |  19  ----------------------------<  65<L>  4.0   |  32<H>  |  1.28    Ca    9.5      08 Nov 2017 12:36    TPro  7.7  /  Alb  3.5  /  TBili  0.6  /  DBili  x   /  AST  19  /  ALT  14  /  AlkPhos  82  11-08    PT/INR - ( 08 Nov 2017 14:13 )   PT: 17.9 sec;   INR: 1.64 ratio         PTT - ( 08 Nov 2017 14:13 )  PTT:47.7 sec  CARDIAC MARKERS ( 08 Nov 2017 12:36 )  <0.015 ng/mL / x     / x     / x     / x          Serum Pro-Brain Natriuretic Peptide: 2386 pg/mL (11-08 @ 12:36)    Blood Culture:   lipid profile lipid profile                          11-08 @ 19:24  cholesterol           180 mg/dL  Direct LDL            116 mg/dL  HDL cholesterol       51 mg/dL  HDL/Total cholesterol --  Triglycerides, serum  65 mg/dL        RADIOLOGY:  < from: Xray Chest 2 Views PA/Lat (11.08.17 @ 14:07) >  The cardiomediastinal silhouette is normal. The sheng are not enlarged.   The trachea is midline. There is a moderate free flowing right pleural   effusion. The osseous structures are remarkable for degenerative changes   and severe compression fracture at the lower thoracic/upper lumbar level.    EKG:  suggestive of sinus rhythm, but difficult to see P waves, very regularized afib.  Q waves in II, III, aVF    Telemetry:    ECHO:
HPI:  77 yo female with PMH of diastolic CHF, HTN, A. fib (on eliquis), depression, chronic pedal edema secondary to venous stasis, h/o compression fx L1, squamous cell skin cancer sent in to ED from PMD's office for CHF exacerbation. Pt recently moved to [place] from [place]. 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 Echo showed enlarged left atrium with preserved LVEF and pulm HTN. She was seen in PMDs office today and sent in for further management. As per pt she has felt SOB on exertion. No orthopnea She has had chronic lower ext edema which has worsened over the past few weeks. No chest pain. No orthopnea or PND. She is compliant with her medications. She has a known compression fx of L1 s/p fall earlier this year. Denies fevers, chills, headaches, chest pain, palpitations, abd pain, N/V, diarrhea. (08 Nov 2017 17:05)    11/9/17: EP asked to consult for tachy suzette syndrome on outpatient Holter. LVEF preserved per outpatient echo.  TELE: atrial fib 40-60 bpm, no significant pauses seen    PAST MEDICAL & SURGICAL HISTORY:  Squamous cell carcinoma  Hypertension  Atrial fibrillation, unspecified type  CHF (congestive heart failure)  S/P cholecystectomy  History of appendectomy  H/O total hysterectomy      MEDICATIONS  (STANDING):  apixaban 5 milliGRAM(s) Oral every 12 hours  cholecalciferol 1000 Unit(s) Oral daily  cyanocobalamin 1000 MICROGram(s) Oral daily  diltiazem    milliGRAM(s) Oral daily  folic acid 1 milliGRAM(s) Oral daily  furosemide   Injectable 40 milliGRAM(s) IV Push two times a day  metoprolol succinate ER 50 milliGRAM(s) Oral daily  PARoxetine 20 milliGRAM(s) Oral daily  spironolactone 50 milliGRAM(s) Oral daily    MEDICATIONS  (PRN):  acetaminophen   Tablet. 650 milliGRAM(s) Oral every 6 hours PRN Mild Pain (1 - 3)      Allergies    Darvocet-N 50 (Unknown)  sulfa drugs (Other)    Intolerances        SOCIAL HISTORY: Denies tobacco, etoh abuse or illicit drug use    FAMILY HISTORY:  Family history of lung cancer (Mother)      Vital Signs Last 24 Hrs  T(C): 36.9 (09 Nov 2017 05:42), Max: 36.9 (09 Nov 2017 05:42)  T(F): 98.4 (09 Nov 2017 05:42), Max: 98.4 (09 Nov 2017 05:42)  HR: 72 (09 Nov 2017 06:44) (72 - 78)  BP: 115/68 (09 Nov 2017 06:44) (107/54 - 133/75)  BP(mean): --  RR: 17 (08 Nov 2017 21:09) (17 - 17)  SpO2: 90% (09 Nov 2017 05:42) (90% - 97%)    REVIEW OF SYSTEMS:    CONSTITUTIONAL:  As per HPI.  HEENT:  Eyes:  No diplopia or blurred vision. ENT:  No earache, sore throat or runny nose.  CARDIOVASCULAR:  No pressure, squeezing, strangling, tightness, heaviness or aching about the chest, neck, axilla or epigastrium.  RESPIRATORY:  No cough, shortness of breath, PND or orthopnea.  GASTROINTESTINAL:  No nausea, vomiting or diarrhea.  GENITOURINARY:  No dysuria, frequency or urgency.  MUSCULOSKELETAL:  As per HPI.  SKIN:  No change in skin, hair or nails.  NEUROLOGIC:  No paresthesias, fasciculations, seizures or weakness.  PSYCHIATRIC:  No disorder of thought or mood.  ENDOCRINE:  No heat or cold intolerance, polyuria or polydipsia.  HEMATOLOGICAL:  No easy bruising or bleedings:  .     PHYSICAL EXAMINATION:    GENERAL APPEARANCE:  Pt. is not currently dyspneic, in no distress. Pt. is alert, oriented, and pleasant.  HEENT:  Pupils are normal and react normally. No icterus. Mucous membranes well colored.  NECK:  Supple. No lymphadenopathy. Jugular venous pressure not elevated. Carotids equal.   HEART:   The cardiac impulse has a normal quality. There are no murmurs, rubs or gallops noted  CHEST:  Chest is clear to auscultation. Normal respiratory effort.  ABDOMEN:  Soft and nontender.   EXTREMITIES:  There is B/L LE edema, +2, redness and discoloration to LE's.   SKIN:  No rash or significant lesions are noted.        LABS:                        14.8   6.0   )-----------( 217      ( 08 Nov 2017 19:24 )             46.6     11-09    137  |  97  |  19  ----------------------------<  82  3.7   |  33<H>  |  1.06    Ca    9.2      09 Nov 2017 06:51  Mg     1.8     11-09    TPro  7.7  /  Alb  3.5  /  TBili  0.6  /  DBili  x   /  AST  19  /  ALT  14  /  AlkPhos  82  11-08    LIVER FUNCTIONS - ( 08 Nov 2017 12:36 )  Alb: 3.5 g/dL / Pro: 7.7 gm/dL / ALK PHOS: 82 U/L / ALT: 14 U/L / AST: 19 U/L / GGT: x           PT/INR - ( 08 Nov 2017 14:13 )   PT: 17.9 sec;   INR: 1.64 ratio         PTT - ( 08 Nov 2017 14:13 )  PTT:47.7 sec  CARDIAC MARKERS ( 08 Nov 2017 12:36 )  <0.015 ng/mL / x     / x     / x     / x
HPI:  77 yo female with PMH of diastolic CHF, HTN, A. fib (on eliquis), depression, chronic pedal edema secondary to venous stasis, h/o compression fx L1, squamous cell skin cancer sent in to ED from PMD's office for CHF exacerbation. Pt recently moved to [place] from [place]. 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 Echo showed enlarged left atrium with preserved LVEF and pulm HTN. She was seen in PMDs office today and sent in for further management. As per pt she has felt SOB on exertion. No orthopnea She has had chronic lower ext edema which has worsened over the past few weeks. No chest pain. No orthopnea or PND. She is compliant with her medications.      PAST MEDICAL & SURGICAL HISTORY:  Squamous cell carcinoma  Hypertension  Atrial fibrillation, unspecified type  CHF (congestive heart failure)  S/P cholecystectomy  History of appendectomy  H/O total hysterectomy      MEDICATIONS  (STANDING):  cholecalciferol 1000 Unit(s) Oral daily  cyanocobalamin 1000 MICROGram(s) Oral daily  diltiazem    milliGRAM(s) Oral daily  folic acid 1 milliGRAM(s) Oral daily  furosemide   Injectable 40 milliGRAM(s) IV Push two times a day  heparin  Injectable 5000 Unit(s) SubCutaneous every 8 hours  metoprolol succinate ER 50 milliGRAM(s) Oral daily  PARoxetine 20 milliGRAM(s) Oral daily  spironolactone 50 milliGRAM(s) Oral daily    MEDICATIONS  (PRN):  acetaminophen   Tablet. 650 milliGRAM(s) Oral every 6 hours PRN Mild Pain (1 - 3)      Allergies    Darvocet-N 50 (Unknown)  sulfa drugs (Other)    Intolerances        SOCIAL HISTORY: Denies tobacco, etoh abuse or illicit drug use    FAMILY HISTORY:  Family history of lung cancer (Mother)      Vital Signs Last 24 Hrs  T(C): 36.3 (10 Nov 2017 16:45), Max: 36.5 (09 Nov 2017 21:16)  T(F): 97.3 (10 Nov 2017 16:45), Max: 97.7 (09 Nov 2017 21:16)  HR: 76 (10 Nov 2017 16:45) (76 - 85)  BP: 130/60 (10 Nov 2017 16:45) (113/78 - 130/60)  BP(mean): --  RR: 17 (10 Nov 2017 16:45) (17 - 18)  SpO2: 96% (10 Nov 2017 16:45) (93% - 96%)    REVIEW OF SYSTEMS:    CONSTITUTIONAL:  As per HPI.  SKIN: no rashes  HEENT:  Eyes:  No diplopia or blurred vision. ENT:  No earache, sore throat or runny nose.  CARDIOVASCULAR:  No pressure, squeezing, tightness, heaviness or aching about the chest, neck, axilla or epigastrium.  RESPIRATORY:  No cough, shortness of breath, PND or orthopnea.  GASTROINTESTINAL:  No nausea, vomiting or diarrhea.  GENITOURINARY:  No dysuria, frequency or urgency.  MUSCULOSKELETAL:  As per HPI.  SKIN:  No change in skin, hair or nails.  NEUROLOGIC:  No paresthesias, fasciculations, seizures or weakness.  PSYCHIATRIC:  No disorder of thought or mood.  ENDOCRINE:  No heat or cold intolerance, polyuria or polydipsia.  HEMATOLOGICAL:  No easy bruising or bleedings:  .     PHYSICAL EXAMINATION:    GENERAL APPEARANCE:  Pt. is not currently dyspneic, in no distress. Pt. is alert, oriented, and pleasant.  HEENT:  Pupils are normal and react normally. No icterus. Mucous membranes well colored.  NECK:  Supple. No lymphadenopathy. Jugular venous pressure not elevated. Carotids equal.   HEART:   The cardiac impulse has a normal quality. Regular. Normal S1 and S2. 2/6 systolic murmur  CHEST: Diminished breath sounds right side and one half with dullness to percussion  ABDOMEN:  Soft and nontender.   EXTREMITIES:  There is no cyanosis, clubbing or edema.   SKIN:  No rash or significant lesions are noted.  CNS: Awake alert and oriented x3    LABS:                        14.8   6.3   )-----------( 166      ( 10 Nov 2017 06:09 )             44.2     11-10    135  |  95<L>  |  24<H>  ----------------------------<  91  3.6   |  34<H>  |  1.15    Ca    9.2      10 Nov 2017 06:09  Mg     1.8     11-09                    RADIOLOGY & ADDITIONAL STUDIES:

## 2017-11-10 NOTE — PROGRESS NOTE ADULT - SUBJECTIVE AND OBJECTIVE BOX
77 y/o female with PMHx of diastolic CHF, HTN, A. fib (on eliquis), depression, chronic pedal edema secondary to venous stasis, h/o compression fx L1, squamous cell skin cancer sent in to ED from PMD's office for CHF exacerbation. Pt recently moved to [place] from [place]. 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 Echo showed enlarged left atrium with preserved LVEF and pulm HTN. She was seen in PMDs office today and sent in for further management. As per pt she has felt SOB on exertion. No orthopnea. She has had chronic lower ext edema which has worsened over the past few weeks. No chest pain. No orthopnea or PND. She is compliant with her medications. She has a known compression fx of L1 s/p fall earlier this year. Denies fevers, chills, headaches, chest pain, palpitations, abd pain, N/V, diarrhea.      11/9 - Pt. seen and examined in bed, feels well. Offers no complaints. Denies SOB/CP. Afib on monitor (40s-70s).    11/10 - Pt. seen and examined in bed, feels well. Offers no complaints.         REVIEW OF SYSTEMS: all negative except for comments above.         Physical Exam:   Vital Signs Last 24 Hrs T(C): 36.5 (10 Nov 2017 09:38), Max: 36.5 (09 Nov 2017 16:48) T(F): 97.7 (10 Nov 2017 09:38), Max: 97.7 (09 Nov 2017 16:48) HR: 78 (10 Nov 2017 09:38) (78 - 85) BP: 113/78 (10 Nov 2017 09:38) (113/78 - 126/71) BP(mean): -- RR: 18 (10 Nov 2017 09:38) (17 - 18) SpO2: 94% (10 Nov 2017 09:38) (93% - 95%)  	        PHYSICAL EXAM:    Constitutional: NAD, well-nourished, well-developed  Neck: Soft and supple  Respiratory: Breath sounds are clear bilaterally, No wheezing, rales or rhonchi  Cardiovascular: S1 and S2, regular rate and rhythm  Gastrointestinal: Bowel Sounds present, soft, nontender, nondistended  Extremities: b/l LE edema with venous insuff  Neurological: A/O x 3, no focal deficits  Skin: No rashes    Labs:                                             14.8   6.3   )-----------( 166      ( 10 Nov 2017 06:09 )             44.2     11-10    135  |  95<L>  |  24<H>  ----------------------------<  91  3.6   |  34<H>  |  1.15    Ca    9.2      10 Nov 2017 06:09  Mg     1.8     11-09            MEDICATIONS  (STANDING):  apixaban 5 milliGRAM(s) Oral every 12 hours  cholecalciferol 1000 Unit(s) Oral daily  cyanocobalamin 1000 MICROGram(s) Oral daily  diltiazem    milliGRAM(s) Oral daily  folic acid 1 milliGRAM(s) Oral daily  furosemide   Injectable 40 milliGRAM(s) IV Push two times a day  metoprolol succinate ER 50 milliGRAM(s) Oral daily  PARoxetine 20 milliGRAM(s) Oral daily  spironolactone 50 milliGRAM(s) Oral daily    MEDICATIONS  (PRN):  acetaminophen   Tablet. 650 milliGRAM(s) Oral every 6 hours PRN Mild Pain (1 - 3)                  Assessment and Plan:       77 yo female with PMHx as above who is admitted with:     # Acute diastolic CHF exacerbation  - admit to tele  - lasix IV BID  - continue spironolactone  - daily weights, strict I/Os  - continue toprol  - cardio consult appreciated -   - BNP 2000's  - as per cardio, outpt ECHO has significantly enlarged L/R atria with somewhat preserved LV function in past  - Will consider possible ACE/ARB after ECHO  - echo results pending ---  - B/L LE edema-some evidence of vascular insufficiency.    - EPS for possible PPM - hold Eliquis, possible PPM on Monday  - CXR and outpatient CT with right moderate pleural effusion, may need thoracocentesis if not improved with lasix    # A. fib  - hx tachy suzette syndrome on outpatient holter  - will continue cardizem and toprol with parameters for now unless becomes bradycardic on monitor  - hold eliquis as per EP consult for possible PPM placement on Monday    # Lower extremity edema/venous insuff - IMPROVING   - dopplers neg  - continue diuresis    # HTN  - BP stable  - continue home meds    # h/o L1 compression fx  - outpatient follow up    # DVT prophylaxis  - Heparin SQ 77 y/o female with PMHx of diastolic CHF, HTN, A. fib (on eliquis), depression, chronic pedal edema secondary to venous stasis, h/o compression fx L1, squamous cell skin cancer sent in to ED from PMD's office for CHF exacerbation. Pt recently moved to [place] from [place]. 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 Echo showed enlarged left atrium with preserved LVEF and pulm HTN. She was seen in PMDs office today and sent in for further management. As per pt she has felt SOB on exertion. No orthopnea. She has had chronic lower ext edema which has worsened over the past few weeks. No chest pain. No orthopnea or PND. She is compliant with her medications. She has a known compression fx of L1 s/p fall earlier this year. Denies fevers, chills, headaches, chest pain, palpitations, abd pain, N/V, diarrhea.      11/9 - Pt. seen and examined in bed, feels well. Offers no complaints. Denies SOB/CP. Afib on monitor (40s-70s).    11/10 - Pt. seen and examined in bed, feels well. Offers no complaints.         REVIEW OF SYSTEMS: all negative except for comments above.         Physical Exam:   Vital Signs Last 24 Hrs T(C): 36.5 (10 Nov 2017 09:38), Max: 36.5 (09 Nov 2017 16:48) T(F): 97.7 (10 Nov 2017 09:38), Max: 97.7 (09 Nov 2017 16:48) HR: 78 (10 Nov 2017 09:38) (78 - 85) BP: 113/78 (10 Nov 2017 09:38) (113/78 - 126/71) BP(mean): -- RR: 18 (10 Nov 2017 09:38) (17 - 18) SpO2: 94% (10 Nov 2017 09:38) (93% - 95%)  	        PHYSICAL EXAM:    Constitutional: NAD, well-nourished, well-developed  Neck: Soft and supple  Respiratory: Breath sounds are clear bilaterally, No wheezing, rales or rhonchi  Cardiovascular: S1 and S2, regular rate and rhythm  Gastrointestinal: Bowel Sounds present, soft, nontender, nondistended  Extremities: b/l LE edema with venous insuff  Neurological: A/O x 3, no focal deficits  Skin: No rashes    Labs:                                             14.8   6.3   )-----------( 166      ( 10 Nov 2017 06:09 )             44.2     11-10    135  |  95<L>  |  24<H>  ----------------------------<  91  3.6   |  34<H>  |  1.15    Ca    9.2      10 Nov 2017 06:09  Mg     1.8     11-09            MEDICATIONS  (STANDING):  apixaban 5 milliGRAM(s) Oral every 12 hours  cholecalciferol 1000 Unit(s) Oral daily  cyanocobalamin 1000 MICROGram(s) Oral daily  diltiazem    milliGRAM(s) Oral daily  folic acid 1 milliGRAM(s) Oral daily  furosemide   Injectable 40 milliGRAM(s) IV Push two times a day  metoprolol succinate ER 50 milliGRAM(s) Oral daily  PARoxetine 20 milliGRAM(s) Oral daily  spironolactone 50 milliGRAM(s) Oral daily    MEDICATIONS  (PRN):  acetaminophen   Tablet. 650 milliGRAM(s) Oral every 6 hours PRN Mild Pain (1 - 3)                  Assessment and Plan:       77 yo female with PMHx as above who is admitted with:     # Acute diastolic CHF exacerbation  - admit to tele  - lasix IV BID  - continue spironolactone  - daily weights, strict I/Os  - continue toprol  - cardio consult appreciated -   - BNP 2000's  - as per cardio, outpt ECHO has significantly enlarged L/R atria with somewhat preserved LV function in past  - Will consider possible ACE/ARB after ECHO  - echo results pending (11/10)  - B/L LE edema-some evidence of vascular insufficiency.    - EPS for possible PPM - hold Eliquis, possible PPM on Monday  - CXR and outpatient CT with right moderate pleural effusion, may need thoracocentesis if not improved with lasix  - CXR 11/10 --- >Impression: Improving right pleural effusion and associated right basilar atelectasis    # A. fib  - hx tachy suzette syndrome on outpatient holter  - will continue cardizem and toprol with parameters for now unless becomes bradycardic on monitor  - hold eliquis as per EP consult for possible PPM placement on Monday    # Lower extremity edema/venous insuff - IMPROVING   - dopplers neg  - continue diuresis    # HTN  - BP stable  - continue home meds    # h/o L1 compression fx  - outpatient follow up    # DVT prophylaxis  - Heparin SQ

## 2017-11-11 RX ORDER — FUROSEMIDE 40 MG
40 TABLET ORAL DAILY
Qty: 0 | Refills: 0 | Status: DISCONTINUED | OUTPATIENT
Start: 2017-11-11 | End: 2017-11-14

## 2017-11-11 RX ADMIN — Medication 1000 UNIT(S): at 12:13

## 2017-11-11 RX ADMIN — Medication 120 MILLIGRAM(S): at 05:24

## 2017-11-11 RX ADMIN — Medication 50 MILLIGRAM(S): at 05:24

## 2017-11-11 RX ADMIN — PREGABALIN 1000 MICROGRAM(S): 225 CAPSULE ORAL at 12:13

## 2017-11-11 RX ADMIN — SPIRONOLACTONE 50 MILLIGRAM(S): 25 TABLET, FILM COATED ORAL at 05:23

## 2017-11-11 RX ADMIN — HEPARIN SODIUM 5000 UNIT(S): 5000 INJECTION INTRAVENOUS; SUBCUTANEOUS at 13:24

## 2017-11-11 RX ADMIN — Medication 40 MILLIGRAM(S): at 05:24

## 2017-11-11 RX ADMIN — HEPARIN SODIUM 5000 UNIT(S): 5000 INJECTION INTRAVENOUS; SUBCUTANEOUS at 05:24

## 2017-11-11 RX ADMIN — Medication 20 MILLIGRAM(S): at 12:14

## 2017-11-11 RX ADMIN — Medication 650 MILLIGRAM(S): at 22:08

## 2017-11-11 RX ADMIN — HEPARIN SODIUM 5000 UNIT(S): 5000 INJECTION INTRAVENOUS; SUBCUTANEOUS at 22:08

## 2017-11-11 RX ADMIN — Medication 40 MILLIGRAM(S): at 16:57

## 2017-11-11 RX ADMIN — Medication 1 MILLIGRAM(S): at 12:14

## 2017-11-11 NOTE — PHYSICAL THERAPY INITIAL EVALUATION ADULT - ADDITIONAL COMMENTS
Pt. stated was living in an apt attached to her son house since September/ Pt. was amb with [facility] as needed inside home and mostly for community. Pt. has Roll[employer] also and was using it when she was in [place]. Pt. has no step to enter her apt. She was Ind with most ADL's + drive

## 2017-11-11 NOTE — PHYSICAL THERAPY INITIAL EVALUATION ADULT - GENERAL OBSERVATIONS, REHAB EVAL
Pt. rece'ed sitting on bed side chair on 3N with + cardiac monitor. Pt. agreeable with PT. Transfer sit to stand and amb with  ft CG a. B/L LEs skin tear and discoloration presents.

## 2017-11-11 NOTE — PROGRESS NOTE ADULT - SUBJECTIVE AND OBJECTIVE BOX
CHIEF COMPLAINT: Patient is a 76y old  Female who presents with a chief complaint of fluid in lung (08 Nov 2017 17:05)      HPI:  77 yo female with PMH of diastolic CHF, HTN, A. fib (on eliquis), depression, chronic pedal edema secondary to venous stasis, h/o compression fx L1, squamous cell skin cancer sent in to ED from PMD's office for CHF exacerbation. Pt recently moved to [place] from [place]. 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 Echo showed enlarged left atrium with preserved LVEF and pulm HTN. She was seen in PMDs office today and sent in for further management. As per pt she has felt SOB on exertion. No orthopnea She has had chronic lower ext edema which has worsened over the past few weeks. No chest pain. No orthopnea or PND. She is compliant with her medications. She has a known compression fx of L1 s/p fall earlier this year. Denies fevers, chills, headaches, chest pain, palpitations, abd pain, N/V, diarrhea. (08 Nov 2017 17:05)    11/9-Patient started on IV lasix for edema.  Telemetry has shown HR to be between .  Holter in office with HR less than 30.  Weeping edema    11/10-significant decrease in size of legs.  EPS-consulted.  Holter from office showing significant tachy-suzette.      11/11  no acute   issues overnight    PMHx: PAST MEDICAL & SURGICAL HISTORY:  Squamous cell carcinoma  Hypertension  Atrial fibrillation, unspecified type  CHF (congestive heart failure)  S/P cholecystectomy  History of appendectomy  H/O total hysterectomy      Allergies: Allergies    Darvocet-N 50 (Unknown)  sulfa drugs (Other)    Intolerances          REVIEW OF SYSTEMS:    CONSTITUTIONAL: No weakness, fevers or chills  EYES/ENT: No visual changes;  No vertigo or throat pain   NECK: No pain or stiffness  RESPIRATORY: No cough, wheezing, hemoptysis; No shortness of breath  CARDIOVASCULAR: No chest pain or palpitations  GASTROINTESTINAL: No abdominal or epigastric pain. No nausea, vomiting, or hematemesis; No diarrhea or constipation. No melena or hematochezia.  GENITOURINARY: No dysuria, frequency or hematuria  NEUROLOGICAL: No numbness or weakness  SKIN: No itching, burning, rashes, or lesions   All other review of systems is negative unless indicated above    ICU Vital Signs Last 24 Hrs  T(C): 36.3 (11 Nov 2017 05:20), Max: 36.5 (10 Nov 2017 09:38)  T(F): 97.4 (11 Nov 2017 05:20), Max: 97.7 (10 Nov 2017 09:38)  HR: 81 (11 Nov 2017 05:20) (76 - 85)  BP: 135/79 (11 Nov 2017 05:20) (113/78 - 135/79)  BP(mean): 92 (11 Nov 2017 05:20) (92 - 92)  ABP: --  ABP(mean): --  RR: 17 (11 Nov 2017 05:20) (17 - 18)  SpO2: 95% (11 Nov 2017 05:20) (94% - 96%)        PHYSICAL EXAM:   Constitutional: NAD, awake and alert, well-developed  HEENT: PERR, EOMI, Normal Hearing, MMM  Neck: JVD  Respiratory: Breath sounds are clear bilaterally, No wheezing, rales or rhonchi  Cardiovascular: S1 and S2, regular rate and rhythm, no Murmurs, gallops or rubs  Gastrointestinal: Bowel Sounds present, soft, nontender, nondistended, no guarding, no rebound  Extremities: peripheral edema      MEDICATIONS  (STANDING):  cholecalciferol 1000 Unit(s) Oral daily  cyanocobalamin 1000 MICROGram(s) Oral daily  diltiazem    milliGRAM(s) Oral daily  folic acid 1 milliGRAM(s) Oral daily  furosemide   Injectable 40 milliGRAM(s) IV Push two times a day  heparin  Injectable 5000 Unit(s) SubCutaneous every 8 hours  metoprolol succinate ER 50 milliGRAM(s) Oral daily  PARoxetine 20 milliGRAM(s) Oral daily  spironolactone 50 milliGRAM(s) Oral daily        LABS: All Labs Reviewed:                                     14.8   6.3   )-----------( 166      ( 10 Nov 2017 06:09 )             44.2   11-10    135  |  95<L>  |  24<H>  ----------------------------<  91  3.6   |  34<H>  |  1.15    Ca    9.2      10 Nov 2017 06:09         PTT - ( 08 Nov 2017 14:13 )  PTT:47.7 sec  CARDIAC MARKERS ( 08 Nov 2017 12:36 )  <0.015 ng/mL / x     / x     / x     / x          Blood Culture:     lipid profile          11-08 @ 19:24  cholesterol 180 mg/dL  direct  mg/dL  HDL 51 mg/dL  HDL/total cholesterol --  Triglyceride, serum 65 mg/dL    BNP Serum Pro-Brain Natriuretic Peptide: 2386 pg/mL (11-08-17 @ 12:36)        Telemetry:  afib with HR up to 130's, NSVT    echo:< from: Transthoracic Echocardiogram (11.10.17 @ 11:18) >   Summary     The left ventricle is normal in size, wall thickness, wall motion and   contractility.   Estimated left ventricular ejection fraction is 55 %.   The left atrium is moderately dilated.   The right atrium appears severely dilated.   The right ventricle is mildly dilated.   Fibrocalcific changes noted to the Aortic valve leaflets with preserved   leaflet excursion.   The ascending aorta appears to be mildly dilated.   Fibrocalcific changes noted to the mitral valve leaflets withpreserved   leaflet excursion.   Moderate (2+) mitral regurgitation is present.   No evidence of pericardial effusion.     Signature     ----------------------------------------------------------------   Electronically signed by Emanuel Cuadra MD(Interpreting   physician) on 11/10/2017 11:57 AM    < end of copied text >

## 2017-11-11 NOTE — PROGRESS NOTE ADULT - SUBJECTIVE AND OBJECTIVE BOX
HPI:  77 yo female with PMH of diastolic CHF, HTN, A. fib (on eliquis), depression, chronic pedal edema secondary to venous stasis, h/o compression fx L1, squamous cell skin cancer sent in to ED from PMD's office for CHF exacerbation. Pt recently moved to [place] from [place]. 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 Echo showed enlarged left atrium with preserved LVEF and pulm HTN. She was seen in PMDs office today and sent in for further management. As per pt she has felt SOB on exertion. No orthopnea She has had chronic lower ext edema which has worsened over the past few weeks. No chest pain. No orthopnea or PND. She is compliant with her medications. She has a known compression fx of L1 s/p fall earlier this year. Denies fevers, chills, headaches, chest pain, palpitations, abd pain, N/V, diarrhea. (08 Nov 2017 17:05)    11/9/17: EP asked to consult for tachy suzette syndrome on outpatient Holter. LVEF preserved per outpatient echo.  TELE: atrial fib 40-60 bpm, no significant pauses seen    11/11/2017 : patient remains tachybrady with heart rates in the 160's with minimal exertion and heart rates in the 40's when sitting at rest.      PAST MEDICAL & SURGICAL HISTORY:  Squamous cell carcinoma  Hypertension  Atrial fibrillation, unspecified type  CHF (congestive heart failure)  S/P cholecystectomy  History of appendectomy  H/O total hysterectomy      MEDICATIONS  (STANDING):  cholecalciferol 1000 Unit(s) Oral daily  cyanocobalamin 1000 MICROGram(s) Oral daily  diltiazem    milliGRAM(s) Oral daily  folic acid 1 milliGRAM(s) Oral daily  furosemide   Injectable 40 milliGRAM(s) IV Push two times a day  heparin  Injectable 5000 Unit(s) SubCutaneous every 8 hours  metoprolol succinate ER 50 milliGRAM(s) Oral daily  PARoxetine 20 milliGRAM(s) Oral daily  spironolactone 50 milliGRAM(s) Oral daily    MEDICATIONS  (PRN):  acetaminophen   Tablet. 650 milliGRAM(s) Oral every 6 hours PRN Mild Pain (1 - 3)        Allergies    Darvocet-N 50 (Unknown)  sulfa drugs (Other)    Intolerances      Vital Signs Last 24 Hrs  T(C): 36.5 (11 Nov 2017 10:10), Max: 36.5 (11 Nov 2017 10:10)  T(F): 97.7 (11 Nov 2017 10:10), Max: 97.7 (11 Nov 2017 10:10)  HR: 84 (11 Nov 2017 10:10) (76 - 85)  BP: 122/74 (11 Nov 2017 10:10) (122/74 - 135/79)  BP(mean): 92 (11 Nov 2017 05:20) (92 - 92)  RR: 17 (11 Nov 2017 10:10) (17 - 17)  SpO2: 97% (11 Nov 2017 10:10) (95% - 97%)  REVIEW OF SYSTEMS:    .   PHYSICAL EXAMINATION:    GENERAL APPEARANCE:  Pt. is not currently dyspneic, in no distress. Pt. is alert, oriented, and pleasant.  HEENT:  Pupils are normal and react normally. No icterus. Mucous membranes well colored.  NECK:  Supple. No lymphadenopathy. Jugular venous pressure not elevated. Carotids equal.   HEART:   The cardiac impulse has a normal quality. There are no murmurs, rubs or gallops noted  CHEST:  Chest is clear to auscultation. Normal respiratory effort.  ABDOMEN:  Soft and nontender.   EXTREMITIES:  There is B/L LE edema, +2, redness and discoloration to LE's.   SKIN:  No rash or significant lesions are noted.        LABS:

## 2017-11-11 NOTE — PROGRESS NOTE ADULT - SUBJECTIVE AND OBJECTIVE BOX
75 y/o female with PMHx of diastolic CHF, HTN, A. fib (on eliquis), depression, chronic pedal edema secondary to venous stasis, h/o compression fx L1, squamous cell skin cancer sent in to ED from PMD's office for CHF exacerbation. Pt recently moved to [place] from [place]. 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 Echo showed enlarged left atrium with preserved LVEF and pulm HTN. She was seen in PMDs office today and sent in for further management. As per pt she has felt SOB on exertion. No orthopnea. She has had chronic lower ext edema which has worsened over the past few weeks. No chest pain. No orthopnea or PND. She is compliant with her medications. She has a known compression fx of L1 s/p fall earlier this year. Denies fevers, chills, headaches, chest pain, palpitations, abd pain, N/V, diarrhea.      11/9 - Pt. seen and examined in bed, feels well. Offers no complaints. Denies SOB/CP. Afib on monitor (40s-70s).    11/10 - Pt. seen and examined in bed, feels well. Offers no complaints.   11/11- Pt. seen and examined in bed, feels well. No CP, SOB. Leg edema less        REVIEW OF SYSTEMS: all negative except for comments above.         Physical Exam:   Vital Signs Last 24 Hrs T(C): 36.5 (11 Nov 2017 10:10), Max: 36.5 (11 Nov 2017 10:10) T(F): 97.7 (11 Nov 2017 10:10), Max: 97.7 (11 Nov 2017 10:10) HR: 84 (11 Nov 2017 10:10) (76 - 85) BP: 122/74 (11 Nov 2017 10:10) (122/74 - 135/79) BP(mean): 92 (11 Nov 2017 05:20) (92 - 92) RR: 17 (11 Nov 2017 10:10) (17 - 17) SpO2: 97% (11 Nov 2017 10:10) (95% - 97%)  	        PHYSICAL EXAM:    Constitutional: NAD, well-nourished, well-developed  Neck: Soft and supple  Respiratory: Breath sounds are clear bilaterally, No wheezing, rales or rhonchi  Cardiovascular: S1 and S2, regular rate and rhythm  Gastrointestinal: Bowel Sounds present, soft, nontender, nondistended  Extremities: b/l LE edema with venous insuff  Neurological: A/O x 3, no focal deficits  Skin: No rashes    Labs:                                      14.8   6.3   )-----------( 166      ( 10 Nov 2017 06:09 )             44.2       11-10    135  |  95<L>  |  24<H>  ----------------------------<  91  3.6   |  34<H>  |  1.15    Ca    9.2      10 Nov 2017 06:09            MEDICATIONS  (STANDING):  apixaban 5 milliGRAM(s) Oral every 12 hours  cholecalciferol 1000 Unit(s) Oral daily  cyanocobalamin 1000 MICROGram(s) Oral daily  diltiazem    milliGRAM(s) Oral daily  folic acid 1 milliGRAM(s) Oral daily  furosemide   Injectable 40 milliGRAM(s) IV Push two times a day  metoprolol succinate ER 50 milliGRAM(s) Oral daily  PARoxetine 20 milliGRAM(s) Oral daily  spironolactone 50 milliGRAM(s) Oral daily    MEDICATIONS  (PRN):  acetaminophen   Tablet. 650 milliGRAM(s) Oral every 6 hours PRN Mild Pain (1 - 3)                  Assessment and Plan:       77 yo female with PMHx as above who is admitted with:     # Acute diastolic CHF exacerbation  - admit to tele  - lasix IV BID  - continue spironolactone  - daily weights, strict I/Os  - continue toprol  - cardio consult appreciated -   - BNP 2000's  - as per cardio, outpt ECHO has significantly enlarged L/R atria with somewhat preserved LV function in past  - Will consider possible ACE/ARB after ECHO  - echo results--> Estimated left ventricular ejection fraction is 55 %.   The left atrium is moderately dilated.  - B/L LE edema improving-some evidence of vascular insufficiency.    - EPS for possible PPM - hold Eliquis, possible PPM on Monday  - CXR and outpatient CT with right moderate pleural effusion, Repeat CXR shows improvement  - CXR 11/10 --- >Impression: Improving right pleural effusion and associated right basilar atelectasis    # A. fib  - hx tachy suzette syndrome on outpatient holter  - will continue cardizem and toprol with parameters for now unless becomes bradycardic on monitor  - hold eliquis as per EP consult for possible PPM placement on Monday.    # Lower extremity edema/venous insuff - IMPROVING   - dopplers neg  - continue diuresis    # HTN  - BP stable  - continue home meds    # h/o L1 compression fx  - outpatient follow up    # DVT prophylaxis  - Heparin SQ 77 y/o female with PMHx of diastolic CHF, HTN, A. fib (on eliquis), depression, chronic pedal edema secondary to venous stasis, h/o compression fx L1, squamous cell skin cancer sent in to ED from PMD's office for CHF exacerbation. Pt recently moved to [place] from [place]. 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 Echo showed enlarged left atrium with preserved LVEF and pulm HTN. She was seen in PMDs office today and sent in for further management. As per pt she has felt SOB on exertion. No orthopnea. She has had chronic lower ext edema which has worsened over the past few weeks. No chest pain. No orthopnea or PND. She is compliant with her medications. She has a known compression fx of L1 s/p fall earlier this year. Denies fevers, chills, headaches, chest pain, palpitations, abd pain, N/V, diarrhea.      11/9 - Pt. seen and examined in bed, feels well. Offers no complaints. Denies SOB/CP. Afib on monitor (40s-70s).    11/10 - Pt. seen and examined in bed, feels well. Offers no complaints.   11/11- Pt. seen and examined in bed, feels well. No CP, SOB. Leg edema less        REVIEW OF SYSTEMS: all negative except for comments above.         Physical Exam:   Vital Signs Last 24 Hrs T(C): 36.5 (11 Nov 2017 10:10), Max: 36.5 (11 Nov 2017 10:10) T(F): 97.7 (11 Nov 2017 10:10), Max: 97.7 (11 Nov 2017 10:10) HR: 84 (11 Nov 2017 10:10) (76 - 85) BP: 122/74 (11 Nov 2017 10:10) (122/74 - 135/79) BP(mean): 92 (11 Nov 2017 05:20) (92 - 92) RR: 17 (11 Nov 2017 10:10) (17 - 17) SpO2: 97% (11 Nov 2017 10:10) (95% - 97%)  	        PHYSICAL EXAM:    Constitutional: NAD, well-nourished, well-developed  Neck: Soft and supple  Respiratory: Breath sounds are clear bilaterally, No wheezing, rales or rhonchi  Cardiovascular: S1 and S2, regular rate and rhythm  Gastrointestinal: Bowel Sounds present, soft, nontender, nondistended  Extremities: b/l LE edema with venous insuff  Neurological: A/O x 3, no focal deficits  Skin: No rashes    Labs:                                      14.8   6.3   )-----------( 166      ( 10 Nov 2017 06:09 )             44.2       11-10    135  |  95<L>  |  24<H>  ----------------------------<  91  3.6   |  34<H>  |  1.15    Ca    9.2      10 Nov 2017 06:09            MEDICATIONS  (STANDING):  apixaban 5 milliGRAM(s) Oral every 12 hours  cholecalciferol 1000 Unit(s) Oral daily  cyanocobalamin 1000 MICROGram(s) Oral daily  diltiazem    milliGRAM(s) Oral daily  folic acid 1 milliGRAM(s) Oral daily  furosemide   Injectable 40 milliGRAM(s) IV Push two times a day  metoprolol succinate ER 50 milliGRAM(s) Oral daily  PARoxetine 20 milliGRAM(s) Oral daily  spironolactone 50 milliGRAM(s) Oral daily    MEDICATIONS  (PRN):  acetaminophen   Tablet. 650 milliGRAM(s) Oral every 6 hours PRN Mild Pain (1 - 3)                  Assessment and Plan:       77 yo female with PMHx as above who is admitted with:     # Acute diastolic CHF exacerbation  - continue to monitor on tele   - decrease lasix to IV daily   - continue spironolactone  - daily weights, strict I/Os  - continue toprol  - cardio consult appreciated -   - BNP 2000's  - as per cardio, outpt ECHO has significantly enlarged L/R atria with somewhat preserved LV function in past  - Will consider possible ACE/ARB after ECHO  - echo results--> Estimated left ventricular ejection fraction is 55 %.   The left atrium is moderately dilated.  - B/L LE edema improving-some evidence of vascular insufficiency.    - EPS for possible PPM - hold Eliquis, possible PPM on Monday  - CXR and outpatient CT with right moderate pleural effusion, Repeat CXR shows improvement  - CXR 11/10 --- >Impression: Improving right pleural effusion and associated right basilar atelectasis    # A. fib  - hx tachy suzette syndrome on outpatient holter  - will continue cardizem and toprol with parameters for now unless becomes bradycardic on monitor  - plan for PPM monday as per dr horowitz  - NPO after MN on sunday     # Lower extremity edema/venous insuff - IMPROVING   - dopplers neg  - continue diuresis    # HTN  - BP stable  - continue home meds    # h/o L1 compression fx  - outpatient follow up    # DVT prophylaxis  - Heparin SQ

## 2017-11-11 NOTE — PROGRESS NOTE ADULT - SUBJECTIVE AND OBJECTIVE BOX
Subjective:    pat better, no new complaint.    Home Medications:  dilTIAZem 120 mg/24 hours oral capsule, extended release: 1 cap(s) orally once a day (08 Nov 2017 16:47)  Eliquis 5 mg oral tablet: 1 tab(s) orally 2 times a day (08 Nov 2017 16:47)  folic acid 1 mg oral tablet: 1 tab(s) orally once a day (08 Nov 2017 16:47)  Lasix 20 mg oral tablet: 1 tab(s) orally once a day (08 Nov 2017 16:47)  metoprolol succinate 50 mg oral tablet, extended release: 1 tab(s) orally once a day (08 Nov 2017 16:47)  PARoxetine 20 mg oral tablet: 1 tab(s) orally once a day (08 Nov 2017 16:47)  spironolactone 50 mg oral tablet: 1 tab(s) orally once a day (08 Nov 2017 16:47)  Vitamin B12 1000 mcg oral tablet: 1 tab(s) orally once a day (08 Nov 2017 16:47)  Vitamin D3 1000 intl units oral tablet: 1 tab(s) orally once a day (08 Nov 2017 16:47)    MEDICATIONS  (STANDING):  cholecalciferol 1000 Unit(s) Oral daily  cyanocobalamin 1000 MICROGram(s) Oral daily  diltiazem    milliGRAM(s) Oral daily  folic acid 1 milliGRAM(s) Oral daily  furosemide   Injectable 40 milliGRAM(s) IV Push two times a day  heparin  Injectable 5000 Unit(s) SubCutaneous every 8 hours  metoprolol succinate ER 50 milliGRAM(s) Oral daily  PARoxetine 20 milliGRAM(s) Oral daily  spironolactone 50 milliGRAM(s) Oral daily    MEDICATIONS  (PRN):  acetaminophen   Tablet. 650 milliGRAM(s) Oral every 6 hours PRN Mild Pain (1 - 3)      Allergies    Darvocet-N 50 (Unknown)  sulfa drugs (Other)    Intolerances        Vital Signs Last 24 Hrs  T(C): 36.5 (11 Nov 2017 10:10), Max: 36.5 (11 Nov 2017 10:10)  T(F): 97.7 (11 Nov 2017 10:10), Max: 97.7 (11 Nov 2017 10:10)  HR: 84 (11 Nov 2017 10:10) (76 - 85)  BP: 122/74 (11 Nov 2017 10:10) (122/74 - 135/79)  BP(mean): 92 (11 Nov 2017 05:20) (92 - 92)  RR: 17 (11 Nov 2017 10:10) (17 - 17)  SpO2: 97% (11 Nov 2017 10:10) (95% - 97%)      PHYSICAL EXAMINATION:    NECK:  Supple. No lymphadenopathy. Jugular venous pressure not elevated. Carotids equal.   HEART:   The cardiac impulse has a normal quality. Reg., Nl S1 and S2.  There are no murmurs, rubs or gallops noted  CHEST:  Chest is clear to auscultation. Normal respiratory effort.  ABDOMEN:  Soft and nontender.   EXTREMITIES:  There is no edema.       LABS:                        14.8   6.3   )-----------( 166      ( 10 Nov 2017 06:09 )             44.2     11-10    135  |  95<L>  |  24<H>  ----------------------------<  91  3.6   |  34<H>  |  1.15    Ca    9.2      10 Nov 2017 06:09

## 2017-11-11 NOTE — PHYSICAL THERAPY INITIAL EVALUATION ADULT - PERTINENT HX OF CURRENT PROBLEM, REHAB EVAL
Pt. is 77 yo female s/p CHF, arrhythmia, pleural effusion, with PMH of diastolic CHF, HTN, A. fib (on eliquis), depression, chronic pedal edema secondary to venous stasis, h/o compression fx L1, squamous cell skin cancer sent in to ED from PMD's office for CHF exacerbation. Pt recently moved to [place] from [place]. 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 is 75 yo female s/p CHF, arrhythmia, pleural effusion, with PMH of diastolic CHF, HTN, A. fib (on eliquis), depression, chronic pedal edema secondary to venous stasis, h/o compression fx L1, squamous cell skin cancer sent in to ED from PMD's office for CHF exacerbation. Pt recently moved to [place] from [place]. Pt.'s recent outpatient workup which revealed right moderate sized pleural effusion on CT scan, holter monitor revelaed tachy suzette with HR 30s-150s

## 2017-11-11 NOTE — PHYSICAL THERAPY INITIAL EVALUATION ADULT - IMPAIRMENTS FOUND, PT EVAL
neuromotor development and sensory integration/gait, locomotion, and balance/integumentary integrity/muscle strength/anthropometric characteristics/aerobic capacity/endurance

## 2017-11-11 NOTE — PHYSICAL THERAPY INITIAL EVALUATION ADULT - PLANNED THERAPY INTERVENTIONS, PT EVAL
gait training/strengthening/balance training/bed mobility training/transfer training/neuromuscular re-education

## 2017-11-12 RX ADMIN — Medication 1 MILLIGRAM(S): at 11:53

## 2017-11-12 RX ADMIN — HEPARIN SODIUM 5000 UNIT(S): 5000 INJECTION INTRAVENOUS; SUBCUTANEOUS at 05:59

## 2017-11-12 RX ADMIN — SPIRONOLACTONE 50 MILLIGRAM(S): 25 TABLET, FILM COATED ORAL at 06:00

## 2017-11-12 RX ADMIN — Medication 650 MILLIGRAM(S): at 19:04

## 2017-11-12 RX ADMIN — HEPARIN SODIUM 5000 UNIT(S): 5000 INJECTION INTRAVENOUS; SUBCUTANEOUS at 21:28

## 2017-11-12 RX ADMIN — PREGABALIN 1000 MICROGRAM(S): 225 CAPSULE ORAL at 11:53

## 2017-11-12 RX ADMIN — Medication 120 MILLIGRAM(S): at 05:59

## 2017-11-12 RX ADMIN — Medication 650 MILLIGRAM(S): at 19:02

## 2017-11-12 RX ADMIN — Medication 40 MILLIGRAM(S): at 05:59

## 2017-11-12 RX ADMIN — Medication 50 MILLIGRAM(S): at 05:59

## 2017-11-12 RX ADMIN — Medication 20 MILLIGRAM(S): at 11:53

## 2017-11-12 RX ADMIN — HEPARIN SODIUM 5000 UNIT(S): 5000 INJECTION INTRAVENOUS; SUBCUTANEOUS at 15:05

## 2017-11-12 RX ADMIN — Medication 1000 UNIT(S): at 11:53

## 2017-11-12 NOTE — PROGRESS NOTE ADULT - SUBJECTIVE AND OBJECTIVE BOX
75 y/o female with PMHx of diastolic CHF, HTN, A. fib (on eliquis), depression, chronic pedal edema secondary to venous stasis, h/o compression fx L1, squamous cell skin cancer sent in to ED from PMD's office for CHF exacerbation. Pt recently moved to [place] from [place]. 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 Echo showed enlarged left atrium with preserved LVEF and pulm HTN. She was seen in PMDs office today and sent in for further management. As per pt she has felt SOB on exertion. No orthopnea. She has had chronic lower ext edema which has worsened over the past few weeks. No chest pain. No orthopnea or PND. She is compliant with her medications. She has a known compression fx of L1 s/p fall earlier this year. Denies fevers, chills, headaches, chest pain, palpitations, abd pain, N/V, diarrhea.      11/9 - Pt. seen and examined in bed, feels well. Offers no complaints. Denies SOB/CP. Afib on monitor (40s-70s).    11/10 - Pt. seen and examined in bed, feels well. Offers no complaints.   11/11- Pt. seen and examined in bed, feels well. No CP, SOB. Leg edema less  11/12- Pt. seen and examined sitting in chair. Feels well. No CP, SOB, chills. Leg edema same as previous day. Awaiting PPM tomorrow.      REVIEW OF SYSTEMS: all negative except for comments above.         Physical Exam:   Vital Signs Last 24 Hrs T(C): 36.7 (12 Nov 2017 10:25), Max: 37 (11 Nov 2017 19:40) T(F): 98 (12 Nov 2017 10:25), Max: 98.6 (11 Nov 2017 19:40) HR: 76 (12 Nov 2017 10:25) (74 - 80) BP: 122/72 (12 Nov 2017 10:25) (122/72 - 129/90) BP(mean): -- RR: 17 (12 Nov 2017 10:25) (17 - 17) SpO2: 96% (12 Nov 2017 10:25) (95% - 96%)  	        PHYSICAL EXAM:    Constitutional: NAD, well-nourished, well-developed  Neck: Soft and supple  Respiratory: Breath sounds are clear bilaterally, No wheezing, rales or rhonchi  Cardiovascular: S1 and S2, regular rate and rhythm  Gastrointestinal: Bowel Sounds present, soft, nontender, nondistended  Extremities: b/l LE edema with venous insuff  Neurological: A/O x 3, no focal deficits  Skin: No rashes    Labs:                                      14.8   6.3   )-----------( 166      ( 10 Nov 2017 06:09 )             44.2       11-10    135  |  95<L>  |  24<H>  ----------------------------<  91  3.6   |  34<H>  |  1.15    Ca    9.2      10 Nov 2017 06:09            MEDICATIONS  (STANDING):  apixaban 5 milliGRAM(s) Oral every 12 hours  cholecalciferol 1000 Unit(s) Oral daily  cyanocobalamin 1000 MICROGram(s) Oral daily  diltiazem    milliGRAM(s) Oral daily  folic acid 1 milliGRAM(s) Oral daily  furosemide   Injectable 40 milliGRAM(s) IV Push two times a day  metoprolol succinate ER 50 milliGRAM(s) Oral daily  PARoxetine 20 milliGRAM(s) Oral daily  spironolactone 50 milliGRAM(s) Oral daily    MEDICATIONS  (PRN):  acetaminophen   Tablet. 650 milliGRAM(s) Oral every 6 hours PRN Mild Pain (1 - 3)                  Assessment and Plan:       75 yo female with PMHx as above who is admitted with:     # Acute diastolic CHF exacerbation  - continue to monitor on tele   - decrease lasix to IV daily   - continue spironolactone  - daily weights, strict I/Os  - continue toprol  - cardio consult appreciated -   - BNP 2000's  - as per cardio, outpt ECHO has significantly enlarged L/R atria with somewhat preserved LV function in past  - Will consider possible ACE/ARB after ECHO  - echo results--> Estimated left ventricular ejection fraction is 55 %.   The left atrium is moderately dilated.  - B/L LE edema improving-some evidence of vascular insufficiency.    - EPS for possible PPM - hold Eliquis, possible PPM on Monday  - CXR and outpatient CT with right moderate pleural effusion, Repeat CXR shows improvement  - CXR 11/10 --- >Impression: Improving right pleural effusion and associated right basilar atelectasis    # A. fib  - hx tachy suzette syndrome on outpatient holter  - will continue cardizem and toprol with parameters for now unless becomes bradycardic on monitor  - plan for PPM monday as per dr horowitz  - NPO after MN on sunday     # Lower extremity edema/venous insuff - IMPROVING   - dopplers neg  - continue diuresis    # HTN  - BP stable  - continue home meds    # h/o L1 compression fx  - outpatient follow up    # DVT prophylaxis  - Heparin SQ

## 2017-11-12 NOTE — PROGRESS NOTE ADULT - SUBJECTIVE AND OBJECTIVE BOX
Subjective:    pat better, no new complaint, waiting for PPM.    Home Medications:  dilTIAZem 120 mg/24 hours oral capsule, extended release: 1 cap(s) orally once a day (08 Nov 2017 16:47)  Eliquis 5 mg oral tablet: 1 tab(s) orally 2 times a day (08 Nov 2017 16:47)  folic acid 1 mg oral tablet: 1 tab(s) orally once a day (08 Nov 2017 16:47)  Lasix 20 mg oral tablet: 1 tab(s) orally once a day (08 Nov 2017 16:47)  metoprolol succinate 50 mg oral tablet, extended release: 1 tab(s) orally once a day (08 Nov 2017 16:47)  PARoxetine 20 mg oral tablet: 1 tab(s) orally once a day (08 Nov 2017 16:47)  spironolactone 50 mg oral tablet: 1 tab(s) orally once a day (08 Nov 2017 16:47)  Vitamin B12 1000 mcg oral tablet: 1 tab(s) orally once a day (08 Nov 2017 16:47)  Vitamin D3 1000 intl units oral tablet: 1 tab(s) orally once a day (08 Nov 2017 16:47)    MEDICATIONS  (STANDING):  cholecalciferol 1000 Unit(s) Oral daily  cyanocobalamin 1000 MICROGram(s) Oral daily  diltiazem    milliGRAM(s) Oral daily  folic acid 1 milliGRAM(s) Oral daily  furosemide   Injectable 40 milliGRAM(s) IV Push daily  heparin  Injectable 5000 Unit(s) SubCutaneous every 8 hours  metoprolol succinate ER 50 milliGRAM(s) Oral daily  PARoxetine 20 milliGRAM(s) Oral daily  spironolactone 50 milliGRAM(s) Oral daily    MEDICATIONS  (PRN):  acetaminophen   Tablet. 650 milliGRAM(s) Oral every 6 hours PRN Mild Pain (1 - 3)      Allergies    Darvocet-N 50 (Unknown)  sulfa drugs (Other)    Intolerances        Vital Signs Last 24 Hrs  T(C): 36.7 (12 Nov 2017 10:25), Max: 37 (11 Nov 2017 19:40)  T(F): 98 (12 Nov 2017 10:25), Max: 98.6 (11 Nov 2017 19:40)  HR: 76 (12 Nov 2017 10:25) (74 - 80)  BP: 122/72 (12 Nov 2017 10:25) (122/72 - 129/90)  BP(mean): --  RR: 17 (12 Nov 2017 10:25) (17 - 17)  SpO2: 96% (12 Nov 2017 10:25) (95% - 96%)      PHYSICAL EXAMINATION:    NECK:  Supple. No lymphadenopathy. Jugular venous pressure not elevated. Carotids equal.   HEART:   The cardiac impulse has a normal quality. Reg., Nl S1 and S2.  There are no murmurs, rubs or gallops noted  CHEST:  Chest crackles to auscultation. Normal respiratory effort.  ABDOMEN:  Soft and nontender.   EXTREMITIES:  There is no edema.       LABS:

## 2017-11-12 NOTE — PROGRESS NOTE ADULT - SUBJECTIVE AND OBJECTIVE BOX
CHIEF COMPLAINT: Patient is a 76y old  Female who presents with a chief complaint of fluid in lung (08 Nov 2017 17:05)      HPI:  75 yo female with PMH of diastolic CHF, HTN, A. fib (on eliquis), depression, chronic pedal edema secondary to venous stasis, h/o compression fx L1, squamous cell skin cancer sent in to ED from PMD's office for CHF exacerbation. Pt recently moved to [place] from [place]. 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 Echo showed enlarged left atrium with preserved LVEF and pulm HTN. She was seen in PMDs office today and sent in for further management. As per pt she has felt SOB on exertion. No orthopnea She has had chronic lower ext edema which has worsened over the past few weeks. No chest pain. No orthopnea or PND. She is compliant with her medications. She has a known compression fx of L1 s/p fall earlier this year. Denies fevers, chills, headaches, chest pain, palpitations, abd pain, N/V, diarrhea. (08 Nov 2017 17:05)    11/9-Patient started on IV lasix for edema.  Telemetry has shown HR to be between .  Holter in office with HR less than 30.  Weeping edema    11/10-significant decrease in size of legs.  EPS-consulted.  Holter from office showing significant tachy-suzette.      11/11  no acute   issues overnight    11/12 no issues overnight    PMHx: PAST MEDICAL & SURGICAL HISTORY:  Squamous cell carcinoma  Hypertension  Atrial fibrillation, unspecified type  CHF (congestive heart failure)  S/P cholecystectomy  History of appendectomy  H/O total hysterectomy      Allergies: Allergies    Darvocet-N 50 (Unknown)  sulfa drugs (Other)    Intolerances          REVIEW OF SYSTEMS:    CONSTITUTIONAL: No weakness, fevers or chills  EYES/ENT: No visual changes;  No vertigo or throat pain   NECK: No pain or stiffness  RESPIRATORY: No cough, wheezing, hemoptysis; No shortness of breath  CARDIOVASCULAR: No chest pain or palpitations  GASTROINTESTINAL: No abdominal or epigastric pain. No nausea, vomiting, or hematemesis; No diarrhea or constipation. No melena or hematochezia.  GENITOURINARY: No dysuria, frequency or hematuria  NEUROLOGICAL: No numbness or weakness  SKIN: No itching, burning, rashes, or lesions   All other review of systems is negative unless indicated above    ICU Vital Signs Last 24 Hrs  T(C): 36.3 (12 Nov 2017 05:53), Max: 37 (11 Nov 2017 19:40)  T(F): 97.3 (12 Nov 2017 05:53), Max: 98.6 (11 Nov 2017 19:40)  HR: 79 (12 Nov 2017 05:53) (74 - 84)  BP: 126/78 (12 Nov 2017 05:53) (122/74 - 129/90)  BP(mean): --  ABP: --  ABP(mean): --  RR: 17 (12 Nov 2017 05:53) (17 - 17)  SpO2: 96% (12 Nov 2017 05:53) (95% - 97%)      PHYSICAL EXAM:   Constitutional: NAD, awake and alert, well-developed  HEENT: PERR, EOMI, Normal Hearing, MMM  Neck: JVD  Respiratory: Breath sounds are clear bilaterally, No wheezing, rales or rhonchi  Cardiovascular: S1 and S2, regular rate and rhythm, no Murmurs, gallops or rubs  Gastrointestinal: Bowel Sounds present, soft, nontender, nondistended, no guarding, no rebound  Extremities: peripheral edema      MEDICATIONS  (STANDING):  cholecalciferol 1000 Unit(s) Oral daily  cyanocobalamin 1000 MICROGram(s) Oral daily  diltiazem    milliGRAM(s) Oral daily  folic acid 1 milliGRAM(s) Oral daily  furosemide   Injectable 40 milliGRAM(s) IV Push daily  heparin  Injectable 5000 Unit(s) SubCutaneous every 8 hours  metoprolol succinate ER 50 milliGRAM(s) Oral daily  PARoxetine 20 milliGRAM(s) Oral daily  spironolactone 50 milliGRAM(s) Oral daily      LABS: All Labs Reviewed:                               PTT - ( 08 Nov 2017 14:13 )  PTT:47.7 sec  CARDIAC MARKERS ( 08 Nov 2017 12:36 )  <0.015 ng/mL / x     / x     / x     / x          Blood Culture:     lipid profile          11-08 @ 19:24  cholesterol 180 mg/dL  direct  mg/dL  HDL 51 mg/dL  HDL/total cholesterol --  Triglyceride, serum 65 mg/dL    BNP Serum Pro-Brain Natriuretic Peptide: 2386 pg/mL (11-08-17 @ 12:36)        Telemetry:  afib with HR up to 130's, NSVT, SR    echo:< from: Transthoracic Echocardiogram (11.10.17 @ 11:18) >   Summary     The left ventricle is normal in size, wall thickness, wall motion and   contractility.   Estimated left ventricular ejection fraction is 55 %.   The left atrium is moderately dilated.   The right atrium appears severely dilated.   The right ventricle is mildly dilated.   Fibrocalcific changes noted to the Aortic valve leaflets with preserved   leaflet excursion.   The ascending aorta appears to be mildly dilated.   Fibrocalcific changes noted to the mitral valve leaflets withpreserved   leaflet excursion.   Moderate (2+) mitral regurgitation is present.   No evidence of pericardial effusion.     Signature     ----------------------------------------------------------------   Electronically signed by Emanuel Cuadra MD(Interpreting   physician) on 11/10/2017 11:57 AM    < end of copied text >

## 2017-11-13 LAB
ANION GAP SERPL CALC-SCNC: 5 MMOL/L — SIGNIFICANT CHANGE UP (ref 5–17)
BUN SERPL-MCNC: 37 MG/DL — HIGH (ref 7–23)
CALCIUM SERPL-MCNC: 10.1 MG/DL — SIGNIFICANT CHANGE UP (ref 8.5–10.1)
CHLORIDE SERPL-SCNC: 92 MMOL/L — LOW (ref 96–108)
CO2 SERPL-SCNC: 36 MMOL/L — HIGH (ref 22–31)
CREAT SERPL-MCNC: 1.5 MG/DL — HIGH (ref 0.5–1.3)
GLUCOSE SERPL-MCNC: 118 MG/DL — HIGH (ref 70–99)
HCT VFR BLD CALC: 52.3 % — HIGH (ref 34.5–45)
HGB BLD-MCNC: 16.5 G/DL — HIGH (ref 11.5–15.5)
MCHC RBC-ENTMCNC: 31.5 GM/DL — LOW (ref 32–36)
MCHC RBC-ENTMCNC: 32.9 PG — SIGNIFICANT CHANGE UP (ref 27–34)
MCV RBC AUTO: 104.4 FL — HIGH (ref 80–100)
PLATELET # BLD AUTO: 242 K/UL — SIGNIFICANT CHANGE UP (ref 150–400)
POTASSIUM SERPL-MCNC: 3.7 MMOL/L — SIGNIFICANT CHANGE UP (ref 3.5–5.3)
POTASSIUM SERPL-SCNC: 3.7 MMOL/L — SIGNIFICANT CHANGE UP (ref 3.5–5.3)
RBC # BLD: 5.01 M/UL — SIGNIFICANT CHANGE UP (ref 3.8–5.2)
RBC # FLD: 12.2 % — SIGNIFICANT CHANGE UP (ref 10.3–14.5)
SODIUM SERPL-SCNC: 133 MMOL/L — LOW (ref 135–145)
WBC # BLD: 8.4 K/UL — SIGNIFICANT CHANGE UP (ref 3.8–10.5)
WBC # FLD AUTO: 8.4 K/UL — SIGNIFICANT CHANGE UP (ref 3.8–10.5)

## 2017-11-13 PROCEDURE — 0387T: CPT

## 2017-11-13 PROCEDURE — 71010: CPT | Mod: 26

## 2017-11-13 RX ORDER — CEFAZOLIN SODIUM 1 G
2000 VIAL (EA) INJECTION ONCE
Qty: 0 | Refills: 0 | Status: COMPLETED | OUTPATIENT
Start: 2017-11-13 | End: 2017-11-13

## 2017-11-13 RX ORDER — CEFAZOLIN SODIUM 1 G
1000 VIAL (EA) INJECTION EVERY 8 HOURS
Qty: 0 | Refills: 0 | Status: COMPLETED | OUTPATIENT
Start: 2017-11-13 | End: 2017-11-14

## 2017-11-13 RX ORDER — APIXABAN 2.5 MG/1
5 TABLET, FILM COATED ORAL EVERY 12 HOURS
Qty: 0 | Refills: 0 | Status: DISCONTINUED | OUTPATIENT
Start: 2017-11-13 | End: 2017-11-14

## 2017-11-13 RX ORDER — CEFAZOLIN SODIUM 1 G
2000 VIAL (EA) INJECTION ONCE
Qty: 0 | Refills: 0 | Status: DISCONTINUED | OUTPATIENT
Start: 2017-11-13 | End: 2017-11-13

## 2017-11-13 RX ADMIN — PREGABALIN 1000 MICROGRAM(S): 225 CAPSULE ORAL at 12:44

## 2017-11-13 RX ADMIN — Medication 100 MILLIGRAM(S): at 17:36

## 2017-11-13 RX ADMIN — SPIRONOLACTONE 50 MILLIGRAM(S): 25 TABLET, FILM COATED ORAL at 05:30

## 2017-11-13 RX ADMIN — Medication 1000 UNIT(S): at 12:44

## 2017-11-13 RX ADMIN — Medication 650 MILLIGRAM(S): at 10:28

## 2017-11-13 RX ADMIN — Medication 50 MILLIGRAM(S): at 05:29

## 2017-11-13 RX ADMIN — Medication 20 MILLIGRAM(S): at 12:44

## 2017-11-13 RX ADMIN — Medication 120 MILLIGRAM(S): at 05:29

## 2017-11-13 RX ADMIN — HEPARIN SODIUM 5000 UNIT(S): 5000 INJECTION INTRAVENOUS; SUBCUTANEOUS at 05:29

## 2017-11-13 RX ADMIN — APIXABAN 5 MILLIGRAM(S): 2.5 TABLET, FILM COATED ORAL at 17:36

## 2017-11-13 RX ADMIN — Medication 40 MILLIGRAM(S): at 05:29

## 2017-11-13 RX ADMIN — Medication 100 MILLIGRAM(S): at 08:57

## 2017-11-13 RX ADMIN — Medication 1 MILLIGRAM(S): at 12:44

## 2017-11-13 NOTE — PACU DISCHARGE NOTE - COMMENTS
Report given to Bindu BUTT RN, verified with Jenny SIMMONS that the patient is on cardiac monitor. All belongings are with the patient. Report given to Bindu BUTT RN, verified with Jenny SIMMONS that the patient is on cardiac monitor. All belongings are with the patient. Left the unit with the transport staff back to  at 1049.

## 2017-11-13 NOTE — PROGRESS NOTE ADULT - SUBJECTIVE AND OBJECTIVE BOX
77 y/o female with PMHx of diastolic CHF, HTN, A. fib (on eliquis), depression, chronic pedal edema secondary to venous stasis, h/o compression fx L1, squamous cell skin cancer sent in to ED from PMD's office for CHF exacerbation. Pt recently moved to [place] from [place]. 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 Echo showed enlarged left atrium with preserved LVEF and pulm HTN. She was seen in PMDs office today and sent in for further management. As per pt she has felt SOB on exertion. No orthopnea. She has had chronic lower ext edema which has worsened over the past few weeks. No chest pain. No orthopnea or PND. She is compliant with her medications. She has a known compression fx of L1 s/p fall earlier this year. Denies fevers, chills, headaches, chest pain, palpitations, abd pain, N/V, diarrhea.      11/9 - Pt. seen and examined in bed, feels well. Offers no complaints. Denies SOB/CP. Afib on monitor (40s-70s).    11/10 - Pt. seen and examined in bed, feels well. Offers no complaints.   11/11- Pt. seen and examined in bed, feels well. No CP, SOB. Leg edema less  11/12- Pt. seen and examined sitting in chair. Feels well. No CP, SOB, chills. Leg edema same as previous day. Awaiting PPM tomorrow.  11/13- Pt. seen and examined in bed, just returned from PPM placement. Feels well, no c/o pain. Denies CP/SOB. Right groin site looks good.      REVIEW OF SYSTEMS: all negative except for comments above.         Physical Exam:   Vital Signs Last 24 Hrs T(C): 37 (13 Nov 2017 10:51), Max: 37 (13 Nov 2017 10:51) T(F): 98.6 (13 Nov 2017 10:51), Max: 98.6 (13 Nov 2017 10:51) HR: 78 (13 Nov 2017 10:51) (77 - 114) BP: 130/81 (13 Nov 2017 10:51) (114/98 - 143/96) BP(mean): -- RR: 18 (13 Nov 2017 10:51) (17 - 18) SpO2: 100% (13 Nov 2017 10:51) (93% - 100%)  	        PHYSICAL EXAM:    Constitutional: NAD, well-nourished, well-developed  Neck: Soft and supple  Respiratory: Breath sounds are clear bilaterally, No wheezing, rales or rhonchi  Cardiovascular: S1 and S2, regular rate and rhythm  Gastrointestinal: Bowel Sounds present, soft, nontender, nondistended  Extremities: b/l LE edema with venous insuff  Neurological: A/O x 3, no focal deficits  Skin: No rashes                          16.5   8.4   )-----------( 242      ( 13 Nov 2017 07:46 )             52.3     11-13    133<L>  |  92<L>  |  37<H>  ----------------------------<  118<H>  3.7   |  36<H>  |  1.50<H>    Ca    10.1      13 Nov 2017 07:45        MEDICATIONS  (STANDING):  apixaban 5 milliGRAM(s) Oral every 12 hours  ceFAZolin   IVPB 1000 milliGRAM(s) IV Intermittent every 8 hours  cholecalciferol 1000 Unit(s) Oral daily  cyanocobalamin 1000 MICROGram(s) Oral daily  diltiazem    milliGRAM(s) Oral daily  folic acid 1 milliGRAM(s) Oral daily  furosemide   Injectable 40 milliGRAM(s) IV Push daily  metoprolol succinate ER 50 milliGRAM(s) Oral daily  PARoxetine 20 milliGRAM(s) Oral daily  spironolactone 50 milliGRAM(s) Oral daily    MEDICATIONS  (PRN):  acetaminophen   Tablet. 650 milliGRAM(s) Oral every 6 hours PRN Mild Pain (1 - 3)              Assessment and Plan:       77 yo female with PMHx as above who is admitted with:     # Acute diastolic CHF exacerbation  - continue to monitor on tele   - decrease lasix to IV daily   - continue spironolactone  - daily weights, strict I/Os  - continue toprol  - cardio consult appreciated   - BNP 2000's  - as per cardio, outpt ECHO has significantly enlarged L/R atria with somewhat preserved LV function in past  - Will consider possible ACE/ARB after ECHO  - repeat echo results--> Estimated left ventricular ejection fraction is 55 %.The left atrium is moderately dilated.  - B/L LE edema improved-some evidence of vascular insufficiency.    - s/p PPM 11/13  - CXR and outpatient CT with right moderate pleural effusion, Repeat CXR shows improvement  - CXR 11/10 --- >Impression: Improving right pleural effusion and associated right basilar atelectasis    # A. fib  - hx tachy suzette syndrome on outpatient holter  - will continue cardizem and toprol with parameters for now unless becomes bradycardic on monitor  - PPM on 11/13  - restart Eliquis tonight    # Lower extremity edema/venous insuff - IMPROVING   - dopplers neg  - continue diuresis    # HTN  - BP stable  - continue home meds    # h/o L1 compression fx  - outpatient follow up    # DVT prophylaxis  - on Eliquis

## 2017-11-13 NOTE — PROGRESS NOTE ADULT - ATTENDING COMMENTS
Pt. seen and examined with SUSIE Rush. Agree with assessment and plan as above. Changes made where appropriate. Pt. seen and examined with SUSIE Rush. Agree with assessment and plan as above. Changes made where appropriate.  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

## 2017-11-14 ENCOUNTER — TRANSCRIPTION ENCOUNTER (OUTPATIENT)
Age: 77
End: 2017-11-14

## 2017-11-14 VITALS — WEIGHT: 185.41 LBS

## 2017-11-14 PROBLEM — Z00.00 ENCOUNTER FOR PREVENTIVE HEALTH EXAMINATION: Status: ACTIVE | Noted: 2017-11-14

## 2017-11-14 LAB
ANION GAP SERPL CALC-SCNC: 4 MMOL/L — LOW (ref 5–17)
BASOPHILS # BLD AUTO: 0.1 K/UL — SIGNIFICANT CHANGE UP (ref 0–0.2)
BASOPHILS NFR BLD AUTO: 1.2 % — SIGNIFICANT CHANGE UP (ref 0–2)
BUN SERPL-MCNC: 34 MG/DL — HIGH (ref 7–23)
CALCIUM SERPL-MCNC: 9.4 MG/DL — SIGNIFICANT CHANGE UP (ref 8.5–10.1)
CHLORIDE SERPL-SCNC: 91 MMOL/L — LOW (ref 96–108)
CO2 SERPL-SCNC: 37 MMOL/L — HIGH (ref 22–31)
CREAT SERPL-MCNC: 1.26 MG/DL — SIGNIFICANT CHANGE UP (ref 0.5–1.3)
EOSINOPHIL # BLD AUTO: 0.3 K/UL — SIGNIFICANT CHANGE UP (ref 0–0.5)
EOSINOPHIL NFR BLD AUTO: 3.8 % — SIGNIFICANT CHANGE UP (ref 0–6)
GLUCOSE SERPL-MCNC: 105 MG/DL — HIGH (ref 70–99)
HCT VFR BLD CALC: 47.1 % — HIGH (ref 34.5–45)
HGB BLD-MCNC: 15.6 G/DL — HIGH (ref 11.5–15.5)
LYMPHOCYTES # BLD AUTO: 2.3 K/UL — SIGNIFICANT CHANGE UP (ref 1–3.3)
LYMPHOCYTES # BLD AUTO: 33.5 % — SIGNIFICANT CHANGE UP (ref 13–44)
MCHC RBC-ENTMCNC: 33.1 GM/DL — SIGNIFICANT CHANGE UP (ref 32–36)
MCHC RBC-ENTMCNC: 34 PG — SIGNIFICANT CHANGE UP (ref 27–34)
MCV RBC AUTO: 102.6 FL — HIGH (ref 80–100)
MONOCYTES # BLD AUTO: 0.8 K/UL — SIGNIFICANT CHANGE UP (ref 0–0.9)
MONOCYTES NFR BLD AUTO: 11.9 % — SIGNIFICANT CHANGE UP (ref 2–14)
NEUTROPHILS # BLD AUTO: 3.4 K/UL — SIGNIFICANT CHANGE UP (ref 1.8–7.4)
NEUTROPHILS NFR BLD AUTO: 49.5 % — SIGNIFICANT CHANGE UP (ref 43–77)
PLATELET # BLD AUTO: 196 K/UL — SIGNIFICANT CHANGE UP (ref 150–400)
POTASSIUM SERPL-MCNC: 3.3 MMOL/L — LOW (ref 3.5–5.3)
POTASSIUM SERPL-SCNC: 3.3 MMOL/L — LOW (ref 3.5–5.3)
RBC # BLD: 4.59 M/UL — SIGNIFICANT CHANGE UP (ref 3.8–5.2)
RBC # FLD: 11.8 % — SIGNIFICANT CHANGE UP (ref 10.3–14.5)
SODIUM SERPL-SCNC: 132 MMOL/L — LOW (ref 135–145)
WBC # BLD: 6.9 K/UL — SIGNIFICANT CHANGE UP (ref 3.8–10.5)
WBC # FLD AUTO: 6.9 K/UL — SIGNIFICANT CHANGE UP (ref 3.8–10.5)

## 2017-11-14 PROCEDURE — 93010 ELECTROCARDIOGRAM REPORT: CPT

## 2017-11-14 RX ORDER — METOPROLOL TARTRATE 50 MG
50 TABLET ORAL DAILY
Qty: 0 | Refills: 0 | Status: COMPLETED | OUTPATIENT
Start: 2017-11-14 | End: 2017-11-14

## 2017-11-14 RX ORDER — FUROSEMIDE 40 MG
1 TABLET ORAL
Qty: 30 | Refills: 0
Start: 2017-11-14 | End: 2017-12-14

## 2017-11-14 RX ORDER — METOPROLOL TARTRATE 50 MG
100 TABLET ORAL DAILY
Qty: 0 | Refills: 0 | Status: DISCONTINUED | OUTPATIENT
Start: 2017-11-14 | End: 2017-11-14

## 2017-11-14 RX ORDER — METOPROLOL TARTRATE 50 MG
1 TABLET ORAL
Qty: 0 | Refills: 0 | COMMUNITY

## 2017-11-14 RX ORDER — METOPROLOL TARTRATE 50 MG
1 TABLET ORAL
Qty: 30 | Refills: 0
Start: 2017-11-14 | End: 2017-12-14

## 2017-11-14 RX ORDER — DILTIAZEM HCL 120 MG
1 CAPSULE, EXT RELEASE 24 HR ORAL
Qty: 0 | Refills: 0 | DISCHARGE
Start: 2017-11-14

## 2017-11-14 RX ORDER — SPIRONOLACTONE 25 MG/1
2 TABLET, FILM COATED ORAL
Qty: 0 | Refills: 0 | DISCHARGE
Start: 2017-11-14

## 2017-11-14 RX ORDER — DILTIAZEM HCL 120 MG
1 CAPSULE, EXT RELEASE 24 HR ORAL
Qty: 0 | Refills: 0 | COMMUNITY

## 2017-11-14 RX ORDER — FUROSEMIDE 40 MG
1 TABLET ORAL
Qty: 0 | Refills: 0 | COMMUNITY

## 2017-11-14 RX ORDER — SPIRONOLACTONE 25 MG/1
1 TABLET, FILM COATED ORAL
Qty: 0 | Refills: 0 | COMMUNITY

## 2017-11-14 RX ORDER — FUROSEMIDE 40 MG
80 TABLET ORAL DAILY
Qty: 0 | Refills: 0 | Status: DISCONTINUED | OUTPATIENT
Start: 2017-11-14 | End: 2017-11-14

## 2017-11-14 RX ADMIN — Medication 1 MILLIGRAM(S): at 11:35

## 2017-11-14 RX ADMIN — Medication 100 MILLIGRAM(S): at 00:14

## 2017-11-14 RX ADMIN — PREGABALIN 1000 MICROGRAM(S): 225 CAPSULE ORAL at 11:35

## 2017-11-14 RX ADMIN — Medication 50 MILLIGRAM(S): at 05:40

## 2017-11-14 RX ADMIN — Medication 40 MILLIGRAM(S): at 05:40

## 2017-11-14 RX ADMIN — Medication 1000 UNIT(S): at 11:36

## 2017-11-14 RX ADMIN — Medication 80 MILLIGRAM(S): at 08:13

## 2017-11-14 RX ADMIN — Medication 20 MILLIGRAM(S): at 11:35

## 2017-11-14 RX ADMIN — Medication 120 MILLIGRAM(S): at 05:40

## 2017-11-14 RX ADMIN — APIXABAN 5 MILLIGRAM(S): 2.5 TABLET, FILM COATED ORAL at 05:40

## 2017-11-14 RX ADMIN — Medication 50 MILLIGRAM(S): at 08:13

## 2017-11-14 RX ADMIN — Medication 650 MILLIGRAM(S): at 08:14

## 2017-11-14 RX ADMIN — SPIRONOLACTONE 50 MILLIGRAM(S): 25 TABLET, FILM COATED ORAL at 05:40

## 2017-11-14 NOTE — DISCHARGE NOTE ADULT - HOSPITAL COURSE
75 y/o female with PMHx of diastolic CHF, HTN, A. fib (on eliquis), depression, chronic pedal edema secondary to venous stasis, h/o compression fx L1, squamous cell skin cancer sent in to ED from PMD's office for CHF exacerbation. Pt recently moved to [place] from [place]. 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 Echo showed enlarged left atrium with preserved LVEF and pulm HTN. She was seen in PMDs office today and sent in for further management. As per pt she has felt SOB on exertion. No orthopnea. She has had chronic lower ext edema which has worsened over the past few weeks. No chest pain. No orthopnea or PND. She is compliant with her medications. She has a known compression fx of L1 s/p fall earlier this year. Denies fevers, chills, headaches, chest pain, palpitations, abd pain, N/V, diarrhea.    Pt. admitted with ccute diastolic CHF exacerbation, s/p IV Lasix. Will discharge on Lasix 80mg PO QD as discussed with Dr. Hameed. C/w spironolactone,  increased Toprol to 100mg QD. Echo results 11/10 ---> Estimated left ventricular ejection fraction is 55 %.The left atrium is moderately dilated.CXR and outpatient CT with right moderate pleural effusion, Repeat CXR shows improvement: CXR 11/10 --- >Impression: Improving right pleural effusion and associated right basilar atelectasis.    Pt. with history of A. fib ---> Had tachy-suzette syndrome on outpt holter, s/p PPM on 11/13. Sutures removed from right groin, no Hematoma noted, +2 pulses, PPM Interrogated. c/w cardizem, Toprol, Eliquis tonight      H/o L1 compression fx - outpatient follow up. PT consult appreciated, pt ambulates with walker.          Physical Exam:   Vital Signs Last 24 Hrs T(C): 36.4 (14 Nov 2017 05:22), Max: 36.8 (13 Nov 2017 21:33) T(F): 97.6 (14 Nov 2017 05:22), Max: 98.2 (13 Nov 2017 21:33) HR: 100 (14 Nov 2017 05:22) (72 - 100) BP: 140/92 (14 Nov 2017 05:22) (103/58 - 140/92) BP(mean): -- RR: 18 (14 Nov 2017 05:22) (18 - 18) SpO2: 92% (14 Nov 2017 05:22) (92% - 95%) 	        PHYSICAL EXAM:    Constitutional: NAD, well-nourished, well-developed  Neck: Soft and supple  Respiratory: Breath sounds are clear bilaterally, No wheezing, rales or rhonchi  Cardiovascular: S1 and S2, regular rate and rhythm  Gastrointestinal: Bowel Sounds present, soft, nontender, nondistended  Extremities: b/l LE edema with venous insuff, right groin s/p stitches removal, site with no bleeding/redness  Neurological: A/O x 3, no focal deficits  Skin: No rashes 75 y/o female with PMHx of diastolic CHF, HTN, A. fib (on eliquis), depression, chronic pedal edema secondary to venous stasis, h/o compression fx L1, squamous cell skin cancer sent in to ED from PMD's office for CHF exacerbation. Pt recently moved to [place] from [place]. 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 Echo showed enlarged left atrium with preserved LVEF and pulm HTN. She was seen in PMDs office today and sent in for further management. As per pt she has felt SOB on exertion. No orthopnea. She has had chronic lower ext edema which has worsened over the past few weeks. No chest pain. No orthopnea or PND. She is compliant with her medications. She has a known compression fx of L1 s/p fall earlier this year. Denies fevers, chills, headaches, chest pain, palpitations, abd pain, N/V, diarrhea.    Pt. admitted with ccute diastolic CHF exacerbation, s/p IV Lasix. Will discharge on Lasix 80mg PO QD as discussed with Dr. Hameed. C/w spironolactone,  increased Toprol to 100mg QD. Echo results 11/10 ---> Estimated left ventricular ejection fraction is 55 %.The left atrium is moderately dilated.CXR and outpatient CT with right moderate pleural effusion, Repeat CXR shows improvement: CXR 11/10 --- >Impression: Improving right pleural effusion and associated right basilar atelectasis.    Pt. with history of A. fib ---> Had tachy-suzette syndrome on outpt holter, s/p PPM on 11/13. Sutures removed from right groin, no Hematoma noted, +2 pulses, PPM Interrogated. c/w cardizem, Toprol, Eliquis tonight      H/o L1 compression fx - outpatient follow up. PT consult appreciated, pt ambulates with walker.     11/14/17: Patient seen and examined. She denies any complaints. Anxious to go home. Discussed with patient in length regarding d/c plan. I agree with NP Solimine assessment and plan.   Spent more than 30 minutes to prepare the discharge.          Physical Exam:   Vital Signs Last 24 Hrs T(C): 36.4 (14 Nov 2017 05:22), Max: 36.8 (13 Nov 2017 21:33) T(F): 97.6 (14 Nov 2017 05:22), Max: 98.2 (13 Nov 2017 21:33) HR: 100 (14 Nov 2017 05:22) (72 - 100) BP: 140/92 (14 Nov 2017 05:22) (103/58 - 140/92) BP(mean): -- RR: 18 (14 Nov 2017 05:22) (18 - 18) SpO2: 92% (14 Nov 2017 05:22) (92% - 95%) 	        PHYSICAL EXAM:    Constitutional: NAD, well-nourished, well-developed  Neck: Soft and supple  Respiratory: Breath sounds are clear bilaterally, No wheezing, rales or rhonchi  Cardiovascular: S1 and S2, regular rate and rhythm  Gastrointestinal: Bowel Sounds present, soft, nontender, nondistended  Extremities: b/l LE edema with venous insuff, right groin s/p stitches removal, site with no bleeding/redness  Neurological: A/O x 3, no focal deficits  Skin: No rashes 75 y/o female with PMHx of diastolic CHF, HTN, A. fib (on eliquis), depression, chronic pedal edema secondary to venous stasis, h/o compression fx L1, squamous cell skin cancer sent in to ED from PMD's office for CHF exacerbation. Pt recently moved to [place] from [place]. 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 Echo showed enlarged left atrium with preserved LVEF and pulm HTN. She was seen in PMDs office today and sent in for further management. As per pt she has felt SOB on exertion. No orthopnea. She has had chronic lower ext edema which has worsened over the past few weeks. No chest pain. No orthopnea or PND. She is compliant with her medications. She has a known compression fx of L1 s/p fall earlier this year. Denies fevers, chills, headaches, chest pain, palpitations, abd pain, N/V, diarrhea.    Pt. admitted with ccute diastolic CHF exacerbation, s/p IV Lasix. Will discharge on Lasix 80mg PO QD as discussed with Dr. Hameed. C/w spironolactone,  increased Toprol to 100mg QD. Echo results 11/10 ---> Estimated left ventricular ejection fraction is 55 %.The left atrium is moderately dilated.CXR and outpatient CT with right moderate pleural effusion, Repeat CXR shows improvement: CXR 11/10 --- >Impression: Improving right pleural effusion and associated right basilar atelectasis.    Pt. with history of A. fib ---> Had tachy-suzette syndrome on outpt holter, s/p PPM on 11/13. Sutures removed from right groin, no Hematoma noted, +2 pulses, PPM Interrogated. c/w cardizem, Toprol, Eliquis tonight      H/o L1 compression fx - outpatient follow up. PT consult appreciated, pt ambulates with walker. Pt. discharged with [facility] home care and PT at home     11/14/17: Patient seen and examined. She denies any complaints. Anxious to go home. Discussed with patient in length regarding d/c plan. I agree with NP Solimine assessment and plan.   Spent more than 30 minutes to prepare the discharge.          Physical Exam:   Vital Signs Last 24 Hrs T(C): 36.4 (14 Nov 2017 05:22), Max: 36.8 (13 Nov 2017 21:33) T(F): 97.6 (14 Nov 2017 05:22), Max: 98.2 (13 Nov 2017 21:33) HR: 100 (14 Nov 2017 05:22) (72 - 100) BP: 140/92 (14 Nov 2017 05:22) (103/58 - 140/92) BP(mean): -- RR: 18 (14 Nov 2017 05:22) (18 - 18) SpO2: 92% (14 Nov 2017 05:22) (92% - 95%) 	        PHYSICAL EXAM:    Constitutional: NAD, well-nourished, well-developed  Neck: Soft and supple  Respiratory: Breath sounds are clear bilaterally, No wheezing, rales or rhonchi  Cardiovascular: S1 and S2, regular rate and rhythm  Gastrointestinal: Bowel Sounds present, soft, nontender, nondistended  Extremities: b/l LE edema with venous insuff, right groin s/p stitches removal, site with no bleeding/redness  Neurological: A/O x 3, no focal deficits  Skin: No rashes

## 2017-11-14 NOTE — PROGRESS NOTE ADULT - SUBJECTIVE AND OBJECTIVE BOX
CHIEF COMPLAINT: Patient is a 76y old  Female who presents with a chief complaint of fluid in lung (08 Nov 2017 17:05)      HPI:  77 yo female with PMH of diastolic CHF, HTN, A. fib (on eliquis), depression, chronic pedal edema secondary to venous stasis, h/o compression fx L1, squamous cell skin cancer sent in to ED from PMD's office for CHF exacerbation. Pt recently moved to [place] from [place]. 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 Echo showed enlarged left atrium with preserved LVEF and pulm HTN. She was seen in PMDs office today and sent in for further management. As per pt she has felt SOB on exertion. No orthopnea She has had chronic lower ext edema which has worsened over the past few weeks. No chest pain. No orthopnea or PND. She is compliant with her medications. She has a known compression fx of L1 s/p fall earlier this year. Denies fevers, chills, headaches, chest pain, palpitations, abd pain, N/V, diarrhea. (08 Nov 2017 17:05)    11/9-Patient started on IV lasix for edema.  Telemetry has shown HR to be between .  Holter in office with HR less than 30.  Weeping edema    11/10-significant decrease in size of legs.  EPS-consulted.  Holter from office showing significant tachy-suzette.      11/11  no acute   issues overnight    11/12 no issues overnight    11/14-patient s/p PPM (micra)-AFob with tachy suzette.  Now with increased HR.  Need to keep HR controlled.  CHF-diuresed,     PMHx: PAST MEDICAL & SURGICAL HISTORY:  Squamous cell carcinoma  Hypertension  Atrial fibrillation, unspecified type  CHF (congestive heart failure)  S/P cholecystectomy  History of appendectomy  H/O total hysterectomy      Allergies: Allergies    Darvocet-N 50 (Unknown)  sulfa drugs (Other)    Intolerances          REVIEW OF SYSTEMS:    CONSTITUTIONAL: No weakness, fevers or chills  EYES/ENT: No visual changes;  No vertigo or throat pain   NECK: No pain or stiffness  RESPIRATORY: No cough, wheezing, hemoptysis; No shortness of breath  CARDIOVASCULAR: No chest pain or palpitations  GASTROINTESTINAL: No abdominal or epigastric pain. No nausea, vomiting, or hematemesis; No diarrhea or constipation. No melena or hematochezia.  GENITOURINARY: No dysuria, frequency or hematuria  NEUROLOGICAL: No numbness or weakness  SKIN: No itching, burning, rashes, or lesions   All other review of systems is negative unless indicated above    Vital Signs Last 24 Hrs  T(C): 36.4 (14 Nov 2017 05:22), Max: 37 (13 Nov 2017 10:51)  T(F): 97.6 (14 Nov 2017 05:22), Max: 98.6 (13 Nov 2017 10:51)  HR: 100 (14 Nov 2017 05:22) (72 - 114)  BP: 140/92 (14 Nov 2017 05:22) (103/58 - 143/96)  BP(mean): --  RR: 18 (14 Nov 2017 05:22) (18 - 18)  SpO2: 92% (14 Nov 2017 05:22) (92% - 100%)    I&O's Summary    13 Nov 2017 07:01  -  14 Nov 2017 07:00  --------------------------------------------------------  IN: 0 mL / OUT: 500 mL / NET: -500 mL            PHYSICAL EXAM:   Constitutional: NAD, awake and alert, well-developed  HEENT: PERR, EOMI, Normal Hearing, MMM  Neck: Soft and supple, JVD  Respiratory:  rales or rhonchi  Cardiovascular: S1 and S2, irregular rate and rhythm,  Murmurs, gallops or rubs  Gastrointestinal: Bowel Sounds present, soft, nontender, nondistended, no guarding, no rebound  Extremities: edema  Vascular: 2+ peripheral pulses      MEDICATIONS:  MEDICATIONS  (STANDING):  apixaban 5 milliGRAM(s) Oral every 12 hours  cholecalciferol 1000 Unit(s) Oral daily  cyanocobalamin 1000 MICROGram(s) Oral daily  diltiazem    milliGRAM(s) Oral daily  folic acid 1 milliGRAM(s) Oral daily  furosemide   Injectable 40 milliGRAM(s) IV Push daily  metoprolol succinate ER 50 milliGRAM(s) Oral daily  PARoxetine 20 milliGRAM(s) Oral daily  spironolactone 50 milliGRAM(s) Oral daily      LABS: All Labs Reviewed:                        16.5   8.4   )-----------( 242      ( 13 Nov 2017 07:46 )             52.3     11-13    133<L>  |  92<L>  |  37<H>  ----------------------------<  118<H>  3.7   |  36<H>  |  1.50<H>    Ca    10.1      13 Nov 2017 07:45            Blood Culture:       BNP     RADIOLOGY:    EKG:      Telemetry:    ECHO:

## 2017-11-14 NOTE — PROGRESS NOTE ADULT - SUBJECTIVE AND OBJECTIVE BOX
HPI:  77 yo female with PMH of diastolic CHF, HTN, A. fib (on eliquis), depression, chronic pedal edema secondary to venous stasis, h/o compression fx L1, squamous cell skin cancer sent in to ED from PMD's office for CHF exacerbation. Pt recently moved to [place] from [place]. 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 Echo showed enlarged left atrium with preserved LVEF and pulm HTN. She was seen in PMDs office  and sent in for further management. As per pt she has felt SOB on exertion. No orthopnea She has had chronic lower ext edema which has worsened over the past few weeks. No chest pain. No orthopnea or PND. She is compliant with her medications. She has a known compression fx of L1 s/p fall earlier this year. Denies fevers, chills, headaches, chest pain, palpitations, abd pain, N/V, diarrhea. (08 Nov 2017 17:05)    11/9/17: EP asked to consult for tachy suzette syndrome on outpatient Holter. LVEF preserved per outpatient echo.  TELE: atrial fib 40-60 bpm, no significant pauses seen  11/14: S/P Micra insertion    PAST MEDICAL & SURGICAL HISTORY:  Squamous cell carcinoma  Hypertension  Atrial fibrillation, unspecified type  CHF (congestive heart failure)  S/P cholecystectomy  History of appendectomy  H/O total hysterectomy    MEDICATIONS  (STANDING):  apixaban 5 milliGRAM(s) Oral every 12 hours  cholecalciferol 1000 Unit(s) Oral daily  cyanocobalamin 1000 MICROGram(s) Oral daily  diltiazem    milliGRAM(s) Oral daily  folic acid 1 milliGRAM(s) Oral daily  furosemide    Tablet 80 milliGRAM(s) Oral daily  metoprolol succinate  milliGRAM(s) Oral daily  PARoxetine 20 milliGRAM(s) Oral daily  spironolactone 50 milliGRAM(s) Oral daily    Allergies    Darvocet-N 50 (Unknown)  sulfa drugs (Other)    Intolerances        SOCIAL HISTORY: Denies tobacco, etoh abuse or illicit drug use    FAMILY HISTORY:  Family history of lung cancer (Mother)    Vital Signs Last 24 Hrs  T(C): 36.4 (11-14-17 @ 05:22), Max: 37 (11-13-17 @ 10:51)  T(F): 97.6 (11-14-17 @ 05:22), Max: 98.6 (11-13-17 @ 10:51)  HR: 100 (11-14-17 @ 05:22) (72 - 100)  BP: 140/92 (11-14-17 @ 05:22) (103/58 - 140/96)  BP(mean): --  RR: 18 (11-14-17 @ 05:22) (18 - 18)  SpO2: 92% (11-14-17 @ 05:22) (92% - 100%)      REVIEW OF SYSTEMS:    CONSTITUTIONAL:  As per HPI.  HEENT:  Eyes:  No diplopia or blurred vision. ENT:  No earache, sore throat or runny nose.  CARDIOVASCULAR:  No pressure, squeezing, strangling, tightness, heaviness or aching about the chest, neck, axilla or epigastrium.  RESPIRATORY:  No cough, shortness of breath, PND or orthopnea.  GASTROINTESTINAL:  No nausea, vomiting or diarrhea.  GENITOURINARY:  No dysuria, frequency or urgency.  MUSCULOSKELETAL:  As per HPI.  SKIN:  No change in skin, hair or nails.  NEUROLOGIC:  No paresthesias, fasciculations, seizures or weakness.  PSYCHIATRIC:  No disorder of thought or mood.  ENDOCRINE:  No heat or cold intolerance, polyuria or polydipsia.  HEMATOLOGICAL:  No easy bruising or bleedings:  .     PHYSICAL EXAMINATION:    GENERAL APPEARANCE:  Pt. is not currently dyspneic, in no distress. Pt. is alert, oriented, and pleasant.  HEENT:  Pupils are normal and react normally. No icterus. Mucous membranes well colored.  NECK:  Supple. No lymphadenopathy. Jugular venous pressure not elevated. Carotids equal.   HEART:   The cardiac impulse has a normal quality. There are no murmurs, rubs or gallops noted,   CHEST:  Chest is clear to auscultation. Normal respiratory effort.  ABDOMEN:  Soft and nontender.   EXTREMITIES:  There is B/L LE edema, +4, redness and discoloration to LE's. Flaky  SKIN:  No rash or significant lesions are noted.        LABS:                      15.6   6.9   )-----------( 196      ( 14 Nov 2017 06:27 )             47.1       11-14    132<L>  |  91<L>  |  34<H>  ----------------------------<  105<H>  3.3<L>   |  37<H>  |  1.26    Ca    9.4      14 Nov 2017 06:27                                14.8   6.0   )-----------( 217      ( 08 Nov 2017 19:24 )             46.6     11-09    137  |  97  |  19  ----------------------------<  82  3.7   |  33<H>  |  1.06    Ca    9.2      09 Nov 2017 06:51  Mg     1.8     11-09    TPro  7.7  /  Alb  3.5  /  TBili  0.6  /  DBili  x   /  AST  19  /  ALT  14  /  AlkPhos  82  11-08    LIVER FUNCTIONS - ( 08 Nov 2017 12:36 )  Alb: 3.5 g/dL / Pro: 7.7 gm/dL / ALK PHOS: 82 U/L / ALT: 14 U/L / AST: 19 U/L / GGT: x           PT/INR - ( 08 Nov 2017 14:13 )   PT: 17.9 sec;   INR: 1.64 ratio         PTT - ( 08 Nov 2017 14:13 )  PTT:47.7 sec  CARDIAC MARKERS ( 08 Nov 2017 12:36 )  <0.015 ng/mL / x     / x     / x     / x              Tele: burst of BRYON up to 150's HPI:  77 yo female with PMH of diastolic CHF, HTN, A. fib (on eliquis), depression, chronic pedal edema secondary to venous stasis, h/o compression fx L1, squamous cell skin cancer sent in to ED from PMD's office for CHF exacerbation. Pt recently moved to [place] from [place]. 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 Echo showed enlarged left atrium with preserved LVEF and pulm HTN. She was seen in PMDs office  and sent in for further management. As per pt she has felt SOB on exertion. No orthopnea She has had chronic lower ext edema which has worsened over the past few weeks. No chest pain. No orthopnea or PND. She is compliant with her medications. She has a known compression fx of L1 s/p fall earlier this year. Denies fevers, chills, headaches, chest pain, palpitations, abd pain, N/V, diarrhea. (08 Nov 2017 17:05)    11/9/17: EP asked to consult for tachy suzette syndrome on outpatient Holter. LVEF preserved per outpatient echo.  TELE: atrial fib 40-60 bpm, no significant pauses seen  11/14: S/P Micra insertion    PAST MEDICAL & SURGICAL HISTORY:  Squamous cell carcinoma  Hypertension  Atrial fibrillation, unspecified type  CHF (congestive heart failure)  S/P cholecystectomy  History of appendectomy  H/O total hysterectomy    MEDICATIONS  (STANDING):  apixaban 5 milliGRAM(s) Oral every 12 hours  cholecalciferol 1000 Unit(s) Oral daily  cyanocobalamin 1000 MICROGram(s) Oral daily  diltiazem    milliGRAM(s) Oral daily  folic acid 1 milliGRAM(s) Oral daily  furosemide    Tablet 80 milliGRAM(s) Oral daily  metoprolol succinate  milliGRAM(s) Oral daily  PARoxetine 20 milliGRAM(s) Oral daily  spironolactone 50 milliGRAM(s) Oral daily    Allergies    Darvocet-N 50 (Unknown)  sulfa drugs (Other)    Intolerances        SOCIAL HISTORY: Denies tobacco, etoh abuse or illicit drug use    FAMILY HISTORY:  Family history of lung cancer (Mother)    Vital Signs Last 24 Hrs  T(C): 36.4 (11-14-17 @ 05:22), Max: 37 (11-13-17 @ 10:51)  T(F): 97.6 (11-14-17 @ 05:22), Max: 98.6 (11-13-17 @ 10:51)  HR: 100 (11-14-17 @ 05:22) (72 - 100)  BP: 140/92 (11-14-17 @ 05:22) (103/58 - 140/96)  BP(mean): --  RR: 18 (11-14-17 @ 05:22) (18 - 18)  SpO2: 92% (11-14-17 @ 05:22) (92% - 100%)      REVIEW OF SYSTEMS:    CONSTITUTIONAL:  As per HPI.  HEENT:  Eyes:  No diplopia or blurred vision. ENT:  No earache, sore throat or runny nose.  CARDIOVASCULAR:  No pressure, squeezing, strangling, tightness, heaviness or aching about the chest, neck, axilla or epigastrium.  RESPIRATORY:  No cough, shortness of breath, PND or orthopnea.  GASTROINTESTINAL:  No nausea, vomiting or diarrhea.  GENITOURINARY:  No dysuria, frequency or urgency.  MUSCULOSKELETAL:  As per HPI.  SKIN:  No change in skin, hair or nails.  NEUROLOGIC:  No paresthesias, fasciculations, seizures or weakness.  PSYCHIATRIC:  No disorder of thought or mood.  ENDOCRINE:  No heat or cold intolerance, polyuria or polydipsia.  HEMATOLOGICAL:  No easy bruising or bleedings:  .     PHYSICAL EXAMINATION:    GENERAL APPEARANCE:  Pt. is not currently dyspneic, in no distress. Pt. is alert, oriented, and pleasant.  HEENT:  Pupils are normal and react normally. No icterus. Mucous membranes well colored.  NECK:  Supple. No lymphadenopathy. Jugular venous pressure not elevated. Carotids equal.   HEART:   The cardiac impulse has a normal quality. There are no murmurs, rubs or gallops noted,   CHEST:  Chest is clear to auscultation. Normal respiratory effort.  ABDOMEN:  Soft and nontender.   EXTREMITIES:  There is B/L LE edema, +4, redness and discoloration to LE's. Flaky  SKIN:  No rash or significant lesions are noted.        LABS:                      15.6   6.9   )-----------( 196      ( 14 Nov 2017 06:27 )             47.1       11-14    132<L>  |  91<L>  |  34<H>  ----------------------------<  105<H>  3.3<L>   |  37<H>  |  1.26    Ca    9.4      14 Nov 2017 06:27                                14.8   6.0   )-----------( 217      ( 08 Nov 2017 19:24 )             46.6     11-09    137  |  97  |  19  ----------------------------<  82  3.7   |  33<H>  |  1.06    Ca    9.2      09 Nov 2017 06:51  Mg     1.8     11-09    TPro  7.7  /  Alb  3.5  /  TBili  0.6  /  DBili  x   /  AST  19  /  ALT  14  /  AlkPhos  82  11-08    LIVER FUNCTIONS - ( 08 Nov 2017 12:36 )  Alb: 3.5 g/dL / Pro: 7.7 gm/dL / ALK PHOS: 82 U/L / ALT: 14 U/L / AST: 19 U/L / GGT: x           PT/INR - ( 08 Nov 2017 14:13 )   PT: 17.9 sec;   INR: 1.64 ratio         PTT - ( 08 Nov 2017 14:13 )  PTT:47.7 sec  CARDIAC MARKERS ( 08 Nov 2017 12:36 )  <0.015 ng/mL / x     / x     / x     / x          CXR:   The lungs are clear of infiltrates and effusions. An implanted recording   device overlies the cardiac apex. The cardiac and mediastinal contours   appear unremarkable.     Impression:  1. No evidence of acute pulmonary disease.       Tele: burst of BRYON up to 150's

## 2017-11-14 NOTE — PROGRESS NOTE ADULT - ASSESSMENT
76 yr old female admitted with SOB, right pleural effusion, LE edema.  PMHx of atrial fibrillation on eliquis. 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  cont eliquis  Continue cardizem and metoprolol Inc to 100 mg po daily by Cardio
76 yr old female admitted with SOB, right pleaural effusion, LE edema.  PMHx of atrial fibrillation on eliquis.    Tachy-suzette on outpatient echo (rates 30's to 150's)  Continue tele monitoring.  Hold eliquis, she had dose this morning, need to wait 48 hrs.  Continue cardizem and toprol for now, no significant pauses on telemetry.  Pacemaker likely MICRA on monday
PROBLEMS;    Pleural effusion on right  Atrial fibrillation, unspecified type  congestive heart failure  Squamous cell carcinoma    PLAN;    supportive care  keep neg balance  supportive care  dvt prophylasix
PROBLEMS;    Pleural effusion on right  Atrial fibrillation, unspecified type  congestive heart failure  Squamous cell carcinoma    PLAN;    waiting PPM  supportive care  keep neg balance  dvt prophylasix
patient with CHF and afib with tachy suzette.    1-AFib-s/p PM, now needs rate control.  will double toprol to 50mg BID.  If not working can add digoxin  2-CHF-will change over to Po lasix.    ambulate and consider rehab
patient with CHF presentation.  Diuresed with lasix BID  1-ECHO- preserved EF  2-NSVT-3 beats-  3-EPS-will d/w EP for possible PM
patient with CHF presentation.  Diuresed with lasix BID  1-ECHO- preserved EF  NPO past midnight for PPM tomorrow   blood thinner on hold
patient with CHF presentation.  Diuresed with lasix BID  1-ECHO-ordered-pending  2-NSVT-3 beats-awaiting ECHO findings  3-EPS-will d/w EP for possible PMM  4-pleural effusion-repeat CXR and if significant fluid-consider tap

## 2017-11-14 NOTE — DISCHARGE NOTE ADULT - PLAN OF CARE
To continue with medication regimen - Increased the dose of your Metoprolol from 50mg to 100mg  - Increased the dose of your Lasix from 20mg to 80mg  - Please follow up with Dr. Hameed in 1-2 weeks.

## 2017-11-14 NOTE — DISCHARGE NOTE ADULT - PATIENT PORTAL LINK FT
“You can access the Follow[facility] Patient Portal, offered by [facility], by registering with the following website: [email or web address]”

## 2017-11-14 NOTE — DISCHARGE NOTE ADULT - CARE PLAN
Principal Discharge DX:	CHF (congestive heart failure)  Goal:	To continue with medication regimen  Instructions for follow-up, activity and diet:	- Increased the dose of your Metoprolol from 50mg to 100mg  - Increased the dose of your Lasix from 20mg to 80mg  - Please follow up with Dr. Hameed in 1-2 weeks.

## 2017-11-14 NOTE — DISCHARGE NOTE ADULT - MEDICATION SUMMARY - MEDICATIONS TO STOP TAKING
I will STOP taking the medications listed below when I get home from the hospital:    metoprolol succinate 50 mg oral tablet, extended release  -- 1 tab(s) by mouth once a day    Lasix 20 mg oral tablet  -- 1 tab(s) by mouth once a day

## 2017-11-14 NOTE — DISCHARGE NOTE ADULT - CARE PROVIDER_API CALL
Mau Hameed), Cardiovascular Disease; Internal Medicine  [address] [place] [address]  Phone: [phone]  Fax: [phone]

## 2017-11-14 NOTE — DISCHARGE NOTE ADULT - MEDICATION SUMMARY - MEDICATIONS TO TAKE
I will START or STAY ON the medications listed below when I get home from the hospital:    spironolactone 25 mg oral tablet  -- 2 tab(s) by mouth once a day  -- Indication: For Diuretic    dilTIAZem 120 mg/24 hours oral capsule, extended release  -- 1 cap(s) by mouth once a day  -- Indication: For for your heart    Eliquis 5 mg oral tablet  -- 1 tab(s) by mouth 2 times a day  -- Indication: For blood thinner    PARoxetine 20 mg oral tablet  -- 1 tab(s) by mouth once a day  -- Indication: For Antidepressant    metoprolol succinate 100 mg oral tablet, extended release  -- 1 tab(s) by mouth once a day  -- Indication: For for your heart    furosemide 80 mg oral tablet  -- 1 tab(s) by mouth once a day  -- Indication: For Diuretic    Vitamin D3 1000 intl units oral tablet  -- 1 tab(s) by mouth once a day  -- Indication: For vitamin D    folic acid 1 mg oral tablet  -- 1 tab(s) by mouth once a day  -- Indication: For folic acid    Vitamin B12 1000 mcg oral tablet  -- 1 tab(s) by mouth once a day  -- Indication: For vitamin I will START or STAY ON the medications listed below when I get home from the hospital:    spironolactone 25 mg oral tablet  -- 2 tab(s) by mouth once a day  -- Indication: For Diuretic    dilTIAZem 120 mg/24 hours oral capsule, extended release  -- 1 cap(s) by mouth once a day  -- Indication: For for your heart    Eliquis 5 mg oral tablet  -- 1 tab(s) by mouth 2 times a day  -- Indication: For blood thinner    PARoxetine 20 mg oral tablet  -- 1 tab(s) by mouth once a day  -- Indication: For Anti depressant    metoprolol succinate 100 mg oral tablet, extended release  -- 1 tab(s) by mouth once a day  -- Indication: For for your heart    furosemide 80 mg oral tablet  -- 1 tab(s) by mouth once a day  -- Indication: For Diuretic    Vitamin D3 1000 intl units oral tablet  -- 1 tab(s) by mouth once a day  -- Indication: For vitamin    folic acid 1 mg oral tablet  -- 1 tab(s) by mouth once a day  -- Indication: For folic acid    Vitamin B12 1000 mcg oral tablet  -- 1 tab(s) by mouth once a day  -- Indication: For vitamin

## 2017-11-16 DIAGNOSIS — I48.91 UNSPECIFIED ATRIAL FIBRILLATION: ICD-10-CM

## 2017-11-16 DIAGNOSIS — I11.0 HYPERTENSIVE HEART DISEASE WITH HEART FAILURE: ICD-10-CM

## 2017-11-16 DIAGNOSIS — I50.33 ACUTE ON CHRONIC DIASTOLIC (CONGESTIVE) HEART FAILURE: ICD-10-CM

## 2017-11-16 DIAGNOSIS — Z85.828 PERSONAL HISTORY OF OTHER MALIGNANT NEOPLASM OF SKIN: ICD-10-CM

## 2017-11-16 DIAGNOSIS — I49.5 SICK SINUS SYNDROME: ICD-10-CM

## 2017-11-16 DIAGNOSIS — M48.56XA COLLAPSED VERTEBRA, NOT ELSEWHERE CLASSIFIED, LUMBAR REGION, INITIAL ENCOUNTER FOR FRACTURE: ICD-10-CM

## 2017-11-16 DIAGNOSIS — I87.8 OTHER SPECIFIED DISORDERS OF VEINS: ICD-10-CM

## 2017-11-21 ENCOUNTER — APPOINTMENT (OUTPATIENT)
Dept: ELECTROPHYSIOLOGY | Facility: CLINIC | Age: 77
End: 2017-11-21
Payer: MEDICARE

## 2017-11-21 VITALS — SYSTOLIC BLOOD PRESSURE: 126 MMHG | DIASTOLIC BLOOD PRESSURE: 77 MMHG | OXYGEN SATURATION: 95 % | HEART RATE: 89 BPM

## 2017-11-21 PROCEDURE — 0389T: CPT | Mod: 26

## 2018-01-16 ENCOUNTER — EMERGENCY (EMERGENCY)
Facility: HOSPITAL | Age: 78
LOS: 0 days | Discharge: HOME | End: 2018-01-16
Attending: EMERGENCY MEDICINE | Admitting: EMERGENCY MEDICINE
Payer: MEDICARE

## 2018-01-16 VITALS
RESPIRATION RATE: 17 BRPM | OXYGEN SATURATION: 95 % | HEIGHT: 65 IN | WEIGHT: 164.91 LBS | TEMPERATURE: 98 F | DIASTOLIC BLOOD PRESSURE: 86 MMHG | SYSTOLIC BLOOD PRESSURE: 139 MMHG | HEART RATE: 81 BPM

## 2018-01-16 VITALS
TEMPERATURE: 98 F | SYSTOLIC BLOOD PRESSURE: 135 MMHG | RESPIRATION RATE: 17 BRPM | OXYGEN SATURATION: 98 % | HEART RATE: 80 BPM | DIASTOLIC BLOOD PRESSURE: 85 MMHG

## 2018-01-16 DIAGNOSIS — I11.0 HYPERTENSIVE HEART DISEASE WITH HEART FAILURE: ICD-10-CM

## 2018-01-16 DIAGNOSIS — Z90.49 ACQUIRED ABSENCE OF OTHER SPECIFIED PARTS OF DIGESTIVE TRACT: Chronic | ICD-10-CM

## 2018-01-16 DIAGNOSIS — S51.011A LACERATION WITHOUT FOREIGN BODY OF RIGHT ELBOW, INITIAL ENCOUNTER: ICD-10-CM

## 2018-01-16 DIAGNOSIS — I50.9 HEART FAILURE, UNSPECIFIED: ICD-10-CM
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
## 2018-01-16 DIAGNOSIS — Z79.02 LONG TERM (CURRENT) USE OF ANTITHROMBOTICS/ANTIPLATELETS: ICD-10-CM
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
## 2018-01-16 DIAGNOSIS — Z90.710 ACQUIRED ABSENCE OF BOTH CERVIX AND UTERUS: Chronic | ICD-10-CM

## 2018-01-16 DIAGNOSIS — I48.91 UNSPECIFIED ATRIAL FIBRILLATION: ICD-10-CM

## 2018-01-16 PROCEDURE — 99283 EMERGENCY DEPT VISIT LOW MDM: CPT

## 2018-01-16 PROCEDURE — 73080 X-RAY EXAM OF ELBOW: CPT | Mod: 26,RT

## 2018-01-16 PROCEDURE — 73090 X-RAY EXAM OF FOREARM: CPT | Mod: 26,RT

## 2018-01-16 NOTE — ED ADULT NURSE REASSESSMENT NOTE - NS ED NURSE REASSESS COMMENT FT1
Wound care provided to right elbow. dressing dry and intact. patient discharged to home. Wheel chair provided. family provided transportation to home

## 2018-01-16 NOTE — ED ADULT NURSE NOTE - NS ED NURSE DC INFO COMPLEXITY
Patient asked questions/Straightforward: Basic instructions, no meds, no home treatment/Simple: Patient demonstrates quick and easy understanding/Returned Demonstration/Verbalized Understanding

## 2018-01-16 NOTE — ED ADULT TRIAGE NOTE - CHIEF COMPLAINT QUOTE
Pt alert and oriented x3. Pt brought in to ED s/p fall at home. Pt denies LOC or hitting head, pt on Eliquis. Laceration noted to R arm. Bleeding controlled, dressing noted to R arm. C/o R arm pain.

## 2018-01-16 NOTE — ED ADULT NURSE NOTE - OBJECTIVE STATEMENT
patient presents in ED s/p fall at home. patient states " I slipped in the kitchen". I did not pass out. injury to right elbow. skin tear present. bleeding controlled

## 2018-01-16 NOTE — ED PROVIDER NOTE - OBJECTIVE STATEMENT
76 y/o female with pmhx of Afib on Eliquis, HTN, CHF; pshx of appendectomy, hysterectomy, cholecystectomy  presents to ED s/p unwitnessed fall at home c/o R arm laceration. Reports that her alarm kept saying "fire, fire" while she was cooking dinner on the stove and she fell down. Denies LOC and is unsure of what exactly occurred. Bleeding controlled presently and dressing noted on R arm. Denies LOC or head injury. Pt has no other complaints and denies SOB, CP, fever/chills, n/v/d and HA.

## 2018-01-16 NOTE — ED PROVIDER NOTE - SKIN, MLM
Skin normal color for race, warm, dry and intact. No evidence of rash. +8cm skin tear to right elbow, no bony tenderness

## 2018-01-16 NOTE — ED PROVIDER NOTE - MEDICAL DECISION MAKING DETAILS
No head trauma, no LOC, no presyncopal symptoms, tripped while cooking dinner.  Only skin tear to right elbow.  No signs of fracture.  Okay for d/c home.  Return precautions given.

## 2018-02-28 ENCOUNTER — APPOINTMENT (OUTPATIENT)
Dept: ELECTROPHYSIOLOGY | Facility: CLINIC | Age: 78
End: 2018-02-28

## 2018-03-30 ENCOUNTER — APPOINTMENT (OUTPATIENT)
Dept: ELECTROPHYSIOLOGY | Facility: CLINIC | Age: 78
End: 2018-03-30
Payer: MEDICARE

## 2018-03-30 PROCEDURE — 93296 REM INTERROG EVL PM/IDS: CPT

## 2018-03-30 PROCEDURE — 93294 REM INTERROG EVL PM/LDLS PM: CPT

## 2018-07-03 ENCOUNTER — APPOINTMENT (OUTPATIENT)
Dept: ELECTROPHYSIOLOGY | Facility: CLINIC | Age: 78
End: 2018-07-03
Payer: MEDICARE

## 2018-07-03 VITALS
BODY MASS INDEX: 29.51 KG/M2 | DIASTOLIC BLOOD PRESSURE: 72 MMHG | WEIGHT: 188 LBS | SYSTOLIC BLOOD PRESSURE: 130 MMHG | HEART RATE: 82 BPM | HEIGHT: 67 IN

## 2018-07-03 PROCEDURE — 0389T: CPT | Mod: Q0

## 2018-10-04 ENCOUNTER — APPOINTMENT (OUTPATIENT)
Dept: ELECTROPHYSIOLOGY | Facility: CLINIC | Age: 78
End: 2018-10-04
Payer: MEDICARE

## 2018-10-04 DIAGNOSIS — I48.2 CHRONIC ATRIAL FIBRILLATION: ICD-10-CM

## 2018-10-04 PROCEDURE — 93296 REM INTERROG EVL PM/IDS: CPT

## 2018-10-04 PROCEDURE — 93294 REM INTERROG EVL PM/LDLS PM: CPT

## 2018-10-11 PROBLEM — I48.2 ATRIAL FIBRILLATION, CHRONIC: Status: ACTIVE | Noted: 2017-11-21

## 2018-11-13 ENCOUNTER — EMERGENCY (EMERGENCY)
Facility: HOSPITAL | Age: 78
LOS: 0 days | Discharge: HOME | End: 2018-11-13
Attending: EMERGENCY MEDICINE | Admitting: EMERGENCY MEDICINE
Payer: MEDICARE

## 2018-11-13 VITALS
RESPIRATION RATE: 19 BRPM | DIASTOLIC BLOOD PRESSURE: 88 MMHG | WEIGHT: 179.9 LBS | HEIGHT: 65 IN | HEART RATE: 110 BPM | SYSTOLIC BLOOD PRESSURE: 128 MMHG | TEMPERATURE: 98 F | OXYGEN SATURATION: 96 %

## 2018-11-13 DIAGNOSIS — L76.22 POSTPROCEDURAL HEMORRHAGE OF SKIN AND SUBCUTANEOUS TISSUE FOLLOWING OTHER PROCEDURE: ICD-10-CM

## 2018-11-13 DIAGNOSIS — I48.91 UNSPECIFIED ATRIAL FIBRILLATION: ICD-10-CM

## 2018-11-13 DIAGNOSIS — Z90.49 ACQUIRED ABSENCE OF OTHER SPECIFIED PARTS OF DIGESTIVE TRACT: Chronic | ICD-10-CM
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
## 2018-11-13 DIAGNOSIS — I11.0 HYPERTENSIVE HEART DISEASE WITH HEART FAILURE: ICD-10-CM

## 2018-11-13 DIAGNOSIS — C44.90 UNSPECIFIED MALIGNANT NEOPLASM OF SKIN, UNSPECIFIED: ICD-10-CM

## 2018-11-13 DIAGNOSIS — Z79.01 LONG TERM (CURRENT) USE OF ANTICOAGULANTS: ICD-10-CM

## 2018-11-13 DIAGNOSIS — I50.9 HEART FAILURE, UNSPECIFIED: ICD-10-CM
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
## 2018-11-13 DIAGNOSIS — Z90.710 ACQUIRED ABSENCE OF BOTH CERVIX AND UTERUS: Chronic | ICD-10-CM

## 2018-11-13 PROBLEM — C44.92 SQUAMOUS CELL CARCINOMA OF SKIN, UNSPECIFIED: Chronic | Status: ACTIVE | Noted: 2017-11-08

## 2018-11-13 PROBLEM — I10 ESSENTIAL (PRIMARY) HYPERTENSION: Chronic | Status: ACTIVE | Noted: 2017-11-08

## 2018-11-13 PROCEDURE — 99284 EMERGENCY DEPT VISIT MOD MDM: CPT

## 2018-11-13 RX ORDER — TRANEXAMIC ACID 100 MG/ML
1000 INJECTION, SOLUTION INTRAVENOUS ONCE
Qty: 0 | Refills: 0 | Status: COMPLETED | OUTPATIENT
Start: 2018-11-13 | End: 2018-11-13

## 2018-11-13 RX ADMIN — TRANEXAMIC ACID 22 MILLIGRAM(S): 100 INJECTION, SOLUTION INTRAVENOUS at 14:40

## 2018-11-13 RX ADMIN — TRANEXAMIC ACID 1000 MILLIGRAM(S): 100 INJECTION, SOLUTION INTRAVENOUS at 14:40

## 2018-11-13 NOTE — ED PROVIDER NOTE - PROGRESS NOTE DETAILS
Edda PGY-4: applied TXA soaked gauze, which improved the bleed but pt continued bleeding. Applied hemostatic gauze with additional slight relief but still with punctate oozing. Cauterized the wound with silver nitrate stick, bleeding has resolved. Pt ready for discharge, will follow with dr in the morning. JW Minor remnant of venous oozing cauterized with silver nitrate.  PT feels better.  Will discharge to follow up with PMD this morning.  I reviewed the alarm symptoms of this patient's diagnosis and discussed criteria for their return to the emergency department.  I instructed the patient to return to the emergency department with any alarm symptoms for their specific diagnosis including bleeding, pain, any worsening symptoms, and any other concerns.  I instructed this patient to call their primary doctor today, to inform them of their visit to the emergency department, and to obtain a repeat evaluation in the next 24 hours.  This patient understood and agreed with our plan for follow up.  At the time of discharge this patient remained in stable condition, in no acute distress, with stable vital signs.

## 2018-11-13 NOTE — ED PROVIDER NOTE - PLAN OF CARE
- Follow up with your doctor within 2-3 days.   - We applied tranexamic acid to your wound, then applied a hemostatic gauze, and finally cauterized the wound using silver nitrate sticks.   - Return to the ED for any new or worsening symptoms.

## 2018-11-13 NOTE — ED ADULT TRIAGE NOTE - CHIEF COMPLAINT QUOTE
Pt presents to the ED c/o bleeding from left thigh wound. Pt had a biopsy in July and a skin scrapping on Thursday at [facility]. Pt to follow up tomorrow. C/o continued bleeding. Pt currently on Eliquis.

## 2018-11-13 NOTE — ED PROVIDER NOTE - SKIN, MLM
Skin normal color for race, warm, dry +2 cm wound, cauterized around edges, slowly oozing blood from center

## 2018-11-13 NOTE — ED ADULT NURSE NOTE - CHIEF COMPLAINT QUOTE
Pt presents to the ED c/o bleeding from left thigh wound. Pt had a biopsy in July and a skin scrapping on Thursday at [facility]. Pt to follow up tomorrow. C/o continued bleeding. Pt currently on Eliquis.

## 2018-11-13 NOTE — ED PROVIDER NOTE - SHIFT CHANGE DETAILS
Pt signed out to me in stable condition with mild bleeding from left leg wound now 95% hemostatic.  Bleeding described as slight venous oozing intermittent for four days.  No chest pain, SOB, pallor, decreased exercise tolerance or other Sx.  TX placed on wound will reassess.

## 2018-11-13 NOTE — ED ADULT NURSE NOTE - OBJECTIVE STATEMENT
Pt had wound scraping performed at [facility] today on left thigh, c/o left thigh bleeding since 12 today.  Pt states on eliquis at home.  Pt states "I did not take my medication today".  Area currently bleeding, pressure wound care applied.  Will continue to monitor

## 2018-11-13 NOTE — ED PROVIDER NOTE - ATTENDING CONTRIBUTION TO CARE
SNEHAL Inman MD,  performed the initial face to face bedside interview with this patient regarding history of present illness, review of symptoms and relevant past medical, social and family history.  I completed an independent physical examination.  I was the initial provider who evaluated this patient. I have signed out the follow up of any pending tests (i.e. labs, radiological studies) to the resident.  I have communicated the patient’s plan of care and disposition with the resident.

## 2018-11-13 NOTE — ED PROVIDER NOTE - CARE PLAN
Principal Discharge DX:	Bleeding  Assessment and plan of treatment:	- Follow up with your doctor within 2-3 days.   - We applied tranexamic acid to your wound, then applied a hemostatic gauze, and finally cauterized the wound using silver nitrate sticks.   - Return to the ED for any new or worsening symptoms.

## 2018-11-13 NOTE — ED PROVIDER NOTE - OBJECTIVE STATEMENT
76 y/o female with a PMHx of Skin CA, Afib on Eliquis, CHF, HTN presents to the ED for wound check. Pt had a scraping therapy done 5 days ago at [facility] on the left leg. The wound started to bleed 4 days ago. Pt states that scraping has been bleeding intermittently since 4 days ago. Denies CP, SOB, numbness or tingling.

## 2019-01-06 ENCOUNTER — APPOINTMENT (OUTPATIENT)
Dept: ELECTROPHYSIOLOGY | Facility: CLINIC | Age: 79
End: 2019-01-06
Payer: MEDICARE

## 2019-01-06 PROCEDURE — 93294 REM INTERROG EVL PM/LDLS PM: CPT

## 2019-01-06 PROCEDURE — 93296 REM INTERROG EVL PM/IDS: CPT

## 2019-01-22 NOTE — ED ADULT NURSE NOTE - NSSISCREENINGQ3_ED_A_ED
Detail Level: Detailed Quality 110: Preventive Care And Screening: Influenza Immunization: Influenza Immunization previously received during influenza season No

## 2019-03-25 ENCOUNTER — EMERGENCY (EMERGENCY)
Facility: HOSPITAL | Age: 79
LOS: 0 days | Discharge: HOME | End: 2019-03-25
Attending: EMERGENCY MEDICINE | Admitting: EMERGENCY MEDICINE
Payer: MEDICARE

## 2019-03-25 VITALS
SYSTOLIC BLOOD PRESSURE: 178 MMHG | DIASTOLIC BLOOD PRESSURE: 108 MMHG | TEMPERATURE: 97 F | OXYGEN SATURATION: 97 % | RESPIRATION RATE: 15 BRPM | HEIGHT: 66 IN | HEART RATE: 94 BPM | WEIGHT: 117.95 LBS

## 2019-03-25 VITALS
HEART RATE: 92 BPM | DIASTOLIC BLOOD PRESSURE: 100 MMHG | SYSTOLIC BLOOD PRESSURE: 150 MMHG | RESPIRATION RATE: 17 BRPM | OXYGEN SATURATION: 98 %

## 2019-03-25 DIAGNOSIS — Z90.710 ACQUIRED ABSENCE OF BOTH CERVIX AND UTERUS: Chronic | ICD-10-CM

## 2019-03-25 DIAGNOSIS — Z90.49 ACQUIRED ABSENCE OF OTHER SPECIFIED PARTS OF DIGESTIVE TRACT: Chronic | ICD-10-CM

## 2019-03-25 DIAGNOSIS — Z88.8 ALLERGY STATUS TO OTHER DRUGS, MEDICAMENTS AND BIOLOGICAL SUBSTANCES STATUS: ICD-10-CM

## 2019-03-25 DIAGNOSIS — L76.21 POSTPROCEDURAL HEMORRHAGE OF SKIN AND SUBCUTANEOUS TISSUE FOLLOWING A DERMATOLOGIC PROCEDURE: ICD-10-CM

## 2019-03-25 DIAGNOSIS — I11.0 HYPERTENSIVE HEART DISEASE WITH HEART FAILURE: ICD-10-CM

## 2019-03-25 DIAGNOSIS — I50.9 HEART FAILURE, UNSPECIFIED: ICD-10-CM

## 2019-03-25 DIAGNOSIS — Z79.01 LONG TERM (CURRENT) USE OF ANTICOAGULANTS: ICD-10-CM

## 2019-03-25 DIAGNOSIS — C44.321 SQUAMOUS CELL CARCINOMA OF SKIN OF NOSE: ICD-10-CM
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
## 2019-03-25 DIAGNOSIS — I48.91 UNSPECIFIED ATRIAL FIBRILLATION: ICD-10-CM

## 2019-03-25 DIAGNOSIS — R53.1 WEAKNESS: ICD-10-CM

## 2019-03-25 DIAGNOSIS — T86.828 OTHER COMPLICATIONS OF SKIN GRAFT (ALLOGRAFT) (AUTOGRAFT): ICD-10-CM

## 2019-03-25 DIAGNOSIS — Z79.899 OTHER LONG TERM (CURRENT) DRUG THERAPY: ICD-10-CM

## 2019-03-25 LAB
ABO RH CONFIRMATION: SIGNIFICANT CHANGE UP
ALBUMIN SERPL ELPH-MCNC: 3.8 G/DL — SIGNIFICANT CHANGE UP (ref 3.3–5)
ALP SERPL-CCNC: 95 U/L — SIGNIFICANT CHANGE UP (ref 40–120)
ALT FLD-CCNC: 14 U/L — SIGNIFICANT CHANGE UP (ref 12–78)
ANION GAP SERPL CALC-SCNC: 8 MMOL/L — SIGNIFICANT CHANGE UP (ref 5–17)
APTT BLD: 41.7 SEC — HIGH (ref 27.5–36.3)
AST SERPL-CCNC: 16 U/L — SIGNIFICANT CHANGE UP (ref 15–37)
BASOPHILS # BLD AUTO: 0.06 K/UL — SIGNIFICANT CHANGE UP (ref 0–0.2)
BASOPHILS NFR BLD AUTO: 0.6 % — SIGNIFICANT CHANGE UP (ref 0–2)
BILIRUB SERPL-MCNC: 0.9 MG/DL — SIGNIFICANT CHANGE UP (ref 0.2–1.2)
BLD GP AB SCN SERPL QL: SIGNIFICANT CHANGE UP
BUN SERPL-MCNC: 29 MG/DL — HIGH (ref 7–23)
CALCIUM SERPL-MCNC: 9.8 MG/DL — SIGNIFICANT CHANGE UP (ref 8.5–10.1)
CHLORIDE SERPL-SCNC: 101 MMOL/L — SIGNIFICANT CHANGE UP (ref 96–108)
CO2 SERPL-SCNC: 28 MMOL/L — SIGNIFICANT CHANGE UP (ref 22–31)
CREAT SERPL-MCNC: 1.53 MG/DL — HIGH (ref 0.5–1.3)
EOSINOPHIL # BLD AUTO: 0.43 K/UL — SIGNIFICANT CHANGE UP (ref 0–0.5)
EOSINOPHIL NFR BLD AUTO: 4.5 % — SIGNIFICANT CHANGE UP (ref 0–6)
GLUCOSE SERPL-MCNC: 92 MG/DL — SIGNIFICANT CHANGE UP (ref 70–99)
HCT VFR BLD CALC: 42.6 % — SIGNIFICANT CHANGE UP (ref 34.5–45)
HGB BLD-MCNC: 14.5 G/DL — SIGNIFICANT CHANGE UP (ref 11.5–15.5)
IMM GRANULOCYTES NFR BLD AUTO: 0.3 % — SIGNIFICANT CHANGE UP (ref 0–1.5)
INR BLD: 1.43 RATIO — HIGH (ref 0.88–1.16)
LYMPHOCYTES # BLD AUTO: 2.93 K/UL — SIGNIFICANT CHANGE UP (ref 1–3.3)
LYMPHOCYTES # BLD AUTO: 30.4 % — SIGNIFICANT CHANGE UP (ref 13–44)
MCHC RBC-ENTMCNC: 30.3 PG — SIGNIFICANT CHANGE UP (ref 27–34)
MCHC RBC-ENTMCNC: 34 GM/DL — SIGNIFICANT CHANGE UP (ref 32–36)
MCV RBC AUTO: 89.1 FL — SIGNIFICANT CHANGE UP (ref 80–100)
MONOCYTES # BLD AUTO: 1 K/UL — HIGH (ref 0–0.9)
MONOCYTES NFR BLD AUTO: 10.4 % — SIGNIFICANT CHANGE UP (ref 2–14)
NEUTROPHILS # BLD AUTO: 5.18 K/UL — SIGNIFICANT CHANGE UP (ref 1.8–7.4)
NEUTROPHILS NFR BLD AUTO: 53.8 % — SIGNIFICANT CHANGE UP (ref 43–77)
NRBC # BLD: 0 /100 WBCS — SIGNIFICANT CHANGE UP (ref 0–0)
PLATELET # BLD AUTO: 220 K/UL — SIGNIFICANT CHANGE UP (ref 150–400)
POTASSIUM SERPL-MCNC: 3.9 MMOL/L — SIGNIFICANT CHANGE UP (ref 3.5–5.3)
POTASSIUM SERPL-SCNC: 3.9 MMOL/L — SIGNIFICANT CHANGE UP (ref 3.5–5.3)
PROT SERPL-MCNC: 7.8 GM/DL — SIGNIFICANT CHANGE UP (ref 6–8.3)
PROTHROM AB SERPL-ACNC: 16.1 SEC — HIGH (ref 10–12.9)
RBC # BLD: 4.78 M/UL — SIGNIFICANT CHANGE UP (ref 3.8–5.2)
RBC # FLD: 14.6 % — HIGH (ref 10.3–14.5)
SODIUM SERPL-SCNC: 137 MMOL/L — SIGNIFICANT CHANGE UP (ref 135–145)
TYPE + AB SCN PNL BLD: SIGNIFICANT CHANGE UP
WBC # BLD: 9.63 K/UL — SIGNIFICANT CHANGE UP (ref 3.8–10.5)
WBC # FLD AUTO: 9.63 K/UL — SIGNIFICANT CHANGE UP (ref 3.8–10.5)

## 2019-03-25 PROCEDURE — 93010 ELECTROCARDIOGRAM REPORT: CPT

## 2019-03-25 PROCEDURE — 71045 X-RAY EXAM CHEST 1 VIEW: CPT | Mod: 26

## 2019-03-25 PROCEDURE — 99284 EMERGENCY DEPT VISIT MOD MDM: CPT

## 2019-03-25 NOTE — ED PROVIDER NOTE - OTHER FINDINGS
ST elevation in inferior leads 2, 3 and avf, more significant in 3 and avf. ST depression and twave inversion in 1, avl, and precordial leads

## 2019-03-25 NOTE — ED STATDOCS - PROGRESS NOTE DETAILS
Serina AS for Dr. Siddiqui: 77 y/o male with a PMHx of Skin CA, Afib on Eliquis, CHF, HTN, s/p pacemaker presents to the ED c/o bleeding from outside of nose x 2 weeks. Pt recently had skin graft done on the right side of her nose by Dr. Schneider ([id number]). Has been bleeding from the site of the graft ever since. Pt states that she has bleed before from a previous skin graft. +generalized weakness, decreased PO intake due to the bleeding. Pt states that every time she gets up, the area starts bleeding. Denies chest pain, SOB. Plastic Surgeon: Dr. Schneider. Will send pt to main ED for further evaluation.

## 2019-03-25 NOTE — ED PROVIDER NOTE - ATTENDING CONTRIBUTION TO CARE
SNEHAL Inman MD,  performed the initial face to face bedside interview with this patient regarding history of present illness, review of symptoms and relevant past medical, social and family history.  I completed an independent physical examination.  I was the initial provider who evaluated this patient. I have signed out the follow up of any pending tests (i.e. labs, radiological studies) to the ACP.  I have communicated the patient’s plan of care and disposition with the ACP.

## 2019-03-25 NOTE — ED PROVIDER NOTE - PROGRESS NOTE DETAILS
79 y/o M with PMH of skin CA, a-fib on elquis, CHF, HTN presents with bleeding from outside nose. Had recent skin graft performed by Dr. Schneider. Sutures removed on 3/21/19 and bleeding since that time. PE: Not bleeding at this time. Labs assessed with no acute findings. Reviewed with patient. Plan for d/c at this time with outpatient follow up tomorrow with Dr. Schneider. - Raymond Donato PA-C 78 YOF with PMH of skin CA, a-fib on elquis, CHF, HTN presents with bleeding from outside nose. Had recent skin graft performed by Dr. Schneider. Sutures removed on 3/21/19 and bleeding since that time. PE: Not bleeding at this time. Labs assessed with no acute findings. Reviewed with patient. Plan for d/c at this time with outpatient follow up tomorrow with Dr. Schneider. - Raymond Donato PA-C ROSSI H/H stable.  No active bleeding in the ED. PT will follow up with dermatologist tomorrow.

## 2019-03-25 NOTE — ED PROVIDER NOTE - SKIN, MLM
Skin normal color for race, warm, dry. No evidence of rash. +post surgical wound on right nare with intact eschar and no visible bleeding.

## 2019-03-25 NOTE — ED ADULT NURSE NOTE - CHIEF COMPLAINT QUOTE
bleeding wound from nose x2 weeks. seen in md office this morning and had it cauterized but it continues to ooze, ems put quick clot on wound, which has now come off.

## 2019-03-25 NOTE — ED PROVIDER NOTE - OBJECTIVE STATEMENT
79 y/o male with a PMHx of Skin CA, Afib on Eliquis, CHF, HTN, s/p pacemaker presenting to the ED c/o bleeding from outside of nose x 2 weeks. Pt recently had skin graft done on the right side of her nose by Dr. Schneider ([id number]); bleeding began 5 days later after stitches were removed. Pt states that she has a hx of similar sx when she had a skin graft in the past. +generalized weakness, +decreased PO intake secondary to bleeding. States that every time she gets up, the area starts bleeding. Denies chest pain, SOB. Plastic Surgeon: Dr. Schneider. 77 y/o male with a PMHx of Skin CA, Afib on Eliquis, CHF, HTN, s/p pacemaker presenting to the ED c/o bleeding from outside of nose x 2 weeks. Pt recently had skin graft done on the right side of her nose by Dr. Schneider on 3/12 ([id number]); bleeding began after stitches were removed 3/21. Pt states that she has a hx of similar sx when she had a skin graft in the past. +generalized weakness, +decreased PO intake secondary to bleeding. States that every time she gets up, the area starts bleeding. Denies chest pain, SOB. Plastic Surgeon: Dr. Schneider.

## 2019-03-25 NOTE — ED PROVIDER NOTE - CLINICAL SUMMARY MEDICAL DECISION MAKING FREE TEXT BOX
Pt presented following complication with recent skin graft. Bleeding stopped upon evaluation. Labs unremarkable for acute findings. Pt to have outpatient follow up with Dr. Schneider tomorrow who performed procedure.

## 2019-03-25 NOTE — ED ADULT NURSE NOTE - NSIMPLEMENTINTERV_GEN_ALL_ED
Implemented All Fall with Harm Risk Interventions:  [place] to call system. Call bell, personal items and telephone within reach. Instruct patient to call for assistance. Room bathroom lighting operational. Non-slip footwear when patient is off stretcher. Physically safe environment: no spills, clutter or unnecessary equipment. Stretcher in lowest position, wheels locked, appropriate side rails in place. Provide visual cue, wrist band, yellow gown, etc. Monitor gait and stability. Monitor for mental status changes and reorient to person, place, and time. Review medications for side effects contributing to fall risk. Reinforce activity limits and safety measures with patient and family. Provide visual clues: red socks.

## 2019-03-25 NOTE — ED PROVIDER NOTE - NS_ ATTENDINGSCRIBEDETAILS _ED_A_ED_FT
The scribe's documentation has been prepared under my direction and personally reviewed by me in its entirety.  I confirm that the note above accurately reflects all my work, treatment, procedures, and decision making except where otherwise noted or amended by me.  Yevgeniy Inman M.D.

## 2019-04-10 ENCOUNTER — APPOINTMENT (OUTPATIENT)
Dept: ELECTROPHYSIOLOGY | Facility: CLINIC | Age: 79
End: 2019-04-10
Payer: MEDICARE

## 2019-04-10 DIAGNOSIS — Z95.0 PRESENCE OF CARDIAC PACEMAKER: ICD-10-CM

## 2019-04-10 DIAGNOSIS — I49.5 SICK SINUS SYNDROME: ICD-10-CM

## 2019-04-10 PROCEDURE — 93294 REM INTERROG EVL PM/LDLS PM: CPT

## 2019-04-10 PROCEDURE — 93296 REM INTERROG EVL PM/IDS: CPT

## 2019-04-29 PROBLEM — Z95.0 CARDIAC PACEMAKER IN SITU: Status: ACTIVE | Noted: 2018-04-09

## 2019-04-29 PROBLEM — I49.5 SICK SINUS SYNDROME: Status: ACTIVE | Noted: 2018-04-09

## 2019-09-19 ENCOUNTER — APPOINTMENT (OUTPATIENT)
Dept: UROLOGY | Facility: CLINIC | Age: 79
End: 2019-09-19
Payer: MEDICARE

## 2019-09-19 VITALS
HEART RATE: 98 BPM | OXYGEN SATURATION: 96 % | BODY MASS INDEX: 29.51 KG/M2 | WEIGHT: 188 LBS | DIASTOLIC BLOOD PRESSURE: 109 MMHG | SYSTOLIC BLOOD PRESSURE: 176 MMHG | HEIGHT: 67 IN | RESPIRATION RATE: 16 BRPM

## 2019-09-19 DIAGNOSIS — Z87.891 PERSONAL HISTORY OF NICOTINE DEPENDENCE: ICD-10-CM

## 2019-09-19 DIAGNOSIS — Z63.4 DISAPPEARANCE AND DEATH OF FAMILY MEMBER: ICD-10-CM

## 2019-09-19 DIAGNOSIS — Z80.0 FAMILY HISTORY OF MALIGNANT NEOPLASM OF DIGESTIVE ORGANS: ICD-10-CM

## 2019-09-19 DIAGNOSIS — Z85.820 PERSONAL HISTORY OF MALIGNANT MELANOMA OF SKIN: ICD-10-CM

## 2019-09-19 DIAGNOSIS — N39.0 URINARY TRACT INFECTION, SITE NOT SPECIFIED: ICD-10-CM

## 2019-09-19 DIAGNOSIS — Z80.1 FAMILY HISTORY OF MALIGNANT NEOPLASM OF TRACHEA, BRONCHUS AND LUNG: ICD-10-CM

## 2019-09-19 PROCEDURE — 99204 OFFICE O/P NEW MOD 45 MIN: CPT

## 2019-09-19 SDOH — SOCIAL STABILITY - SOCIAL INSECURITY: DISSAPEARANCE AND DEATH OF FAMILY MEMBER: Z63.4

## 2019-09-19 NOTE — REVIEW OF SYSTEMS
[Feeling Tired] : feeling tired [Skin Lesions] : skin lesion [Skin Wound] : skin wound [Itching] : itching [see HPI] : see HPI [Difficulty Walking] : difficulty walking [Negative] : Heme/Lymph

## 2019-09-23 PROBLEM — Z87.891 FORMER SMOKER: Status: ACTIVE | Noted: 2019-09-19

## 2019-09-23 PROBLEM — Z63.4 WIDOWED: Status: ACTIVE | Noted: 2019-09-19

## 2019-09-23 PROBLEM — Z80.1 FAMILY HISTORY OF LUNG CANCER: Status: ACTIVE | Noted: 2019-09-19

## 2019-09-23 PROBLEM — Z80.0 FAMILY HISTORY OF MALIGNANT NEOPLASM OF STOMACH: Status: ACTIVE | Noted: 2019-09-19

## 2019-09-23 PROBLEM — Z85.820 HISTORY OF SKIN CANCER (MELANOMA): Status: RESOLVED | Noted: 2019-09-19 | Resolved: 2019-09-23

## 2019-09-23 NOTE — HISTORY OF PRESENT ILLNESS
[FreeTextEntry1] : 78 year old woman with PMH of SCC skin ca seen 09/19/2019 presents with asymptomatic  bacteriuria. This began 1 month ago. Patient has been treated with antibiotics by her PCP for UTI but continues to have bacteria in her urine as well as a positive culture for the past month. Patient denies any current symptoms and states she feels well. Reports uses diapers to void because of poor mobility\par It is mild in severity. Nothing makes the symptoms better, nothing makes sx worse. \par It is associated with nothing.\par No hematuria, no dysuria, no frequency, no urgency, no hesitancy, no straining. No incontinence. \par No fevers, no chills, no nausea, no vomiting, no flank pain. \par \par No family history contributory to asymptomatic  bacteriuria.\par

## 2019-09-23 NOTE — PHYSICAL EXAM
[General Appearance - Well Developed] : well developed [General Appearance - Well Nourished] : well nourished [Normal Appearance] : normal appearance [Well Groomed] : well groomed [General Appearance - In No Acute Distress] : no acute distress [] : no respiratory distress [Respiration, Rhythm And Depth] : normal respiratory rhythm and effort [Exaggerated Use Of Accessory Muscles For Inspiration] : no accessory muscle use [Abdomen Soft] : soft [Abdomen Tenderness] : non-tender [Costovertebral Angle Tenderness] : no ~M costovertebral angle tenderness [Urinary Bladder Findings] : the bladder was normal on palpation [No Focal Deficits] : no focal deficits [Oriented To Time, Place, And Person] : oriented to person, place, and time [Affect] : the affect was normal [Mood] : the mood was normal [Not Anxious] : not anxious [No Palpable Adenopathy] : no palpable adenopathy [Edema] : no peripheral edema [FreeTextEntry1] : noted with some lesions on nose

## 2019-09-23 NOTE — ASSESSMENT
[FreeTextEntry1] : 78 year old woman with PMH of SCC skin ca presents with asymptomatic bacteriuria. Patient currently does not have any symptoms therefore will refrain from prescribing Abx at this time. Patient with PVR of 5cc. Will obtain UA and Urine Cx. Patient to RTO in 1 month or PRN. WIll obtain renal US to ensure this does nto represent upper tract pathology.

## 2019-12-17 NOTE — ED ADULT NURSE NOTE - NS ED NURSE REPORT GIVEN TO FT
The patient has been examined and the H&P has been reviewed:    I concur with the findings and no changes have occurred since H&P was written.    Anesthesia/Surgery risks, benefits and alternative options discussed and understood by patient/family.          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     
Cristina

## 2020-05-08 ENCOUNTER — APPOINTMENT (OUTPATIENT)
Dept: ELECTROPHYSIOLOGY | Facility: CLINIC | Age: 80
End: 2020-05-08
Payer: MEDICARE

## 2020-05-08 PROCEDURE — 93296 REM INTERROG EVL PM/IDS: CPT

## 2020-05-08 PROCEDURE — 93294 REM INTERROG EVL PM/LDLS PM: CPT

## 2020-08-11 ENCOUNTER — APPOINTMENT (OUTPATIENT)
Dept: ELECTROPHYSIOLOGY | Facility: CLINIC | Age: 80
End: 2020-08-11

## 2020-08-25 ENCOUNTER — APPOINTMENT (OUTPATIENT)
Dept: ELECTROPHYSIOLOGY | Facility: CLINIC | Age: 80
End: 2020-08-25
Payer: MEDICARE

## 2020-08-25 PROCEDURE — 93296 REM INTERROG EVL PM/IDS: CPT

## 2020-08-25 PROCEDURE — 93294 REM INTERROG EVL PM/LDLS PM: CPT

## 2020-11-29 ENCOUNTER — APPOINTMENT (OUTPATIENT)
Dept: ELECTROPHYSIOLOGY | Facility: CLINIC | Age: 80
End: 2020-11-29
Payer: MEDICARE

## 2020-11-30 PROCEDURE — 93294 REM INTERROG EVL PM/LDLS PM: CPT

## 2020-11-30 PROCEDURE — 93296 REM INTERROG EVL PM/IDS: CPT

## 2021-01-01 ENCOUNTER — APPOINTMENT (OUTPATIENT)
Dept: ELECTROPHYSIOLOGY | Facility: CLINIC | Age: 81
End: 2021-01-01

## 2021-01-01 ENCOUNTER — APPOINTMENT (OUTPATIENT)
Dept: NEUROSURGERY | Facility: CLINIC | Age: 81
End: 2021-01-01

## 2021-01-01 ENCOUNTER — INPATIENT (INPATIENT)
Facility: HOSPITAL | Age: 81
LOS: 15 days | Discharge: NURSING, REHAB OR LONG-TERM CARE | DRG: 871 | End: 2021-12-08
Attending: FAMILY MEDICINE | Admitting: FAMILY MEDICINE
Payer: MEDICARE

## 2021-01-01 ENCOUNTER — NON-APPOINTMENT (OUTPATIENT)
Age: 81
End: 2021-01-01

## 2021-01-01 ENCOUNTER — INPATIENT (INPATIENT)
Facility: HOSPITAL | Age: 81
LOS: 6 days | Discharge: NURSING, REHAB OR LONG-TERM CARE | DRG: 83 | End: 2021-11-04
Attending: SPECIALIST | Admitting: SPECIALIST
Payer: MEDICARE

## 2021-01-01 ENCOUNTER — TRANSCRIPTION ENCOUNTER (OUTPATIENT)
Age: 81
End: 2021-01-01

## 2021-01-01 ENCOUNTER — RESULT REVIEW (OUTPATIENT)
Age: 81
End: 2021-01-01

## 2021-01-01 ENCOUNTER — INPATIENT (INPATIENT)
Facility: HOSPITAL | Age: 81
LOS: 0 days | DRG: 871 | End: 2021-12-15
Attending: FAMILY MEDICINE | Admitting: INTERNAL MEDICINE
Payer: MEDICARE

## 2021-01-01 ENCOUNTER — APPOINTMENT (OUTPATIENT)
Dept: OBGYN | Facility: CLINIC | Age: 81
End: 2021-01-01
Payer: MEDICARE

## 2021-01-01 ENCOUNTER — APPOINTMENT (OUTPATIENT)
Dept: ELECTROPHYSIOLOGY | Facility: CLINIC | Age: 81
End: 2021-01-01
Payer: MEDICARE

## 2021-01-01 VITALS
DIASTOLIC BLOOD PRESSURE: 60 MMHG | OXYGEN SATURATION: 97 % | SYSTOLIC BLOOD PRESSURE: 124 MMHG | RESPIRATION RATE: 18 BRPM | WEIGHT: 151.9 LBS | HEART RATE: 54 BPM | TEMPERATURE: 98 F

## 2021-01-01 VITALS
DIASTOLIC BLOOD PRESSURE: 63 MMHG | TEMPERATURE: 98 F | SYSTOLIC BLOOD PRESSURE: 138 MMHG | OXYGEN SATURATION: 100 % | HEART RATE: 67 BPM

## 2021-01-01 VITALS
SYSTOLIC BLOOD PRESSURE: 147 MMHG | DIASTOLIC BLOOD PRESSURE: 77 MMHG | RESPIRATION RATE: 18 BRPM | OXYGEN SATURATION: 100 % | TEMPERATURE: 98 F | HEIGHT: 66 IN | HEART RATE: 93 BPM | WEIGHT: 139.99 LBS

## 2021-01-01 VITALS
OXYGEN SATURATION: 82 % | SYSTOLIC BLOOD PRESSURE: 154 MMHG | RESPIRATION RATE: 20 BRPM | DIASTOLIC BLOOD PRESSURE: 72 MMHG | HEIGHT: 66 IN | HEART RATE: 91 BPM | WEIGHT: 119.93 LBS | TEMPERATURE: 99 F

## 2021-01-01 VITALS
HEIGHT: 66 IN | WEIGHT: 175.05 LBS | TEMPERATURE: 101 F | DIASTOLIC BLOOD PRESSURE: 46 MMHG | RESPIRATION RATE: 27 BRPM | HEART RATE: 60 BPM | SYSTOLIC BLOOD PRESSURE: 105 MMHG | OXYGEN SATURATION: 96 %

## 2021-01-01 VITALS
DIASTOLIC BLOOD PRESSURE: 80 MMHG | SYSTOLIC BLOOD PRESSURE: 130 MMHG | HEART RATE: 82 BPM | HEIGHT: 67 IN | RESPIRATION RATE: 16 BRPM

## 2021-01-01 VITALS — RESPIRATION RATE: 23 BRPM | DIASTOLIC BLOOD PRESSURE: 36 MMHG | SYSTOLIC BLOOD PRESSURE: 89 MMHG | HEART RATE: 63 BPM

## 2021-01-01 DIAGNOSIS — I50.32 CHRONIC DIASTOLIC (CONGESTIVE) HEART FAILURE: ICD-10-CM

## 2021-01-01 DIAGNOSIS — R29.6 REPEATED FALLS: ICD-10-CM

## 2021-01-01 DIAGNOSIS — S06.6X9A TRAUMATIC SUBARACHNOID HEMORRHAGE WITH LOSS OF CONSCIOUSNESS OF UNSPECIFIED DURATION, INITIAL ENCOUNTER: ICD-10-CM

## 2021-01-01 DIAGNOSIS — Z90.49 ACQUIRED ABSENCE OF OTHER SPECIFIED PARTS OF DIGESTIVE TRACT: Chronic | ICD-10-CM

## 2021-01-01 DIAGNOSIS — J69.0 PNEUMONITIS DUE TO INHALATION OF FOOD AND VOMIT: ICD-10-CM

## 2021-01-01 DIAGNOSIS — I48.20 CHRONIC ATRIAL FIBRILLATION, UNSPECIFIED: ICD-10-CM

## 2021-01-01 DIAGNOSIS — G93.41 METABOLIC ENCEPHALOPATHY: ICD-10-CM

## 2021-01-01 DIAGNOSIS — I87.8 OTHER SPECIFIED DISORDERS OF VEINS: ICD-10-CM

## 2021-01-01 DIAGNOSIS — Z86.73 PERSONAL HISTORY OF TRANSIENT ISCHEMIC ATTACK (TIA), AND CEREBRAL INFARCTION WITHOUT RESIDUAL DEFICITS: ICD-10-CM

## 2021-01-01 DIAGNOSIS — E43 UNSPECIFIED SEVERE PROTEIN-CALORIE MALNUTRITION: ICD-10-CM

## 2021-01-01 DIAGNOSIS — Z91.81 HISTORY OF FALLING: ICD-10-CM

## 2021-01-01 DIAGNOSIS — J98.11 ATELECTASIS: ICD-10-CM

## 2021-01-01 DIAGNOSIS — Z85.828 PERSONAL HISTORY OF OTHER MALIGNANT NEOPLASM OF SKIN: ICD-10-CM

## 2021-01-01 DIAGNOSIS — Z90.710 ACQUIRED ABSENCE OF BOTH CERVIX AND UTERUS: Chronic | ICD-10-CM

## 2021-01-01 DIAGNOSIS — B96.20 UNSPECIFIED ESCHERICHIA COLI [E. COLI] AS THE CAUSE OF DISEASES CLASSIFIED ELSEWHERE: ICD-10-CM
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
## 2021-01-01 DIAGNOSIS — E86.0 DEHYDRATION: ICD-10-CM

## 2021-01-01 DIAGNOSIS — I24.8 OTHER FORMS OF ACUTE ISCHEMIC HEART DISEASE: ICD-10-CM

## 2021-01-01 DIAGNOSIS — M54.9 DORSALGIA, UNSPECIFIED: ICD-10-CM

## 2021-01-01 DIAGNOSIS — N17.9 ACUTE KIDNEY FAILURE, UNSPECIFIED: ICD-10-CM

## 2021-01-01 DIAGNOSIS — Z79.82 LONG TERM (CURRENT) USE OF ASPIRIN: ICD-10-CM
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
## 2021-01-01 DIAGNOSIS — I11.0 HYPERTENSIVE HEART DISEASE WITH HEART FAILURE: ICD-10-CM
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
## 2021-01-01 DIAGNOSIS — Z88.2 ALLERGY STATUS TO SULFONAMIDES: ICD-10-CM

## 2021-01-01 DIAGNOSIS — E87.6 HYPOKALEMIA: ICD-10-CM

## 2021-01-01 DIAGNOSIS — R41.82 ALTERED MENTAL STATUS, UNSPECIFIED: ICD-10-CM

## 2021-01-01 DIAGNOSIS — R65.20 SEVERE SEPSIS WITHOUT SEPTIC SHOCK: ICD-10-CM

## 2021-01-01 DIAGNOSIS — S81.812A LACERATION WITHOUT FOREIGN BODY, LEFT LOWER LEG, INITIAL ENCOUNTER: ICD-10-CM

## 2021-01-01 DIAGNOSIS — F32.9 MAJOR DEPRESSIVE DISORDER, SINGLE EPISODE, UNSPECIFIED: ICD-10-CM
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
## 2021-01-01 DIAGNOSIS — Z51.5 ENCOUNTER FOR PALLIATIVE CARE: ICD-10-CM

## 2021-01-01 DIAGNOSIS — A41.9 SEPSIS, UNSPECIFIED ORGANISM: ICD-10-CM

## 2021-01-01 DIAGNOSIS — L25.9 UNSPECIFIED CONTACT DERMATITIS, UNSPECIFIED CAUSE: ICD-10-CM

## 2021-01-01 DIAGNOSIS — C44.92 SQUAMOUS CELL CARCINOMA OF SKIN, UNSPECIFIED: ICD-10-CM

## 2021-01-01 DIAGNOSIS — I47.1 SUPRAVENTRICULAR TACHYCARDIA: ICD-10-CM

## 2021-01-01 DIAGNOSIS — S01.81XA LACERATION WITHOUT FOREIGN BODY OF OTHER PART OF HEAD, INITIAL ENCOUNTER: ICD-10-CM

## 2021-01-01 DIAGNOSIS — R26.81 UNSTEADINESS ON FEET: ICD-10-CM

## 2021-01-01 DIAGNOSIS — Z79.01 LONG TERM (CURRENT) USE OF ANTICOAGULANTS: ICD-10-CM

## 2021-01-01 DIAGNOSIS — I50.23 ACUTE ON CHRONIC SYSTOLIC (CONGESTIVE) HEART FAILURE: ICD-10-CM

## 2021-01-01 DIAGNOSIS — Z88.8 ALLERGY STATUS TO OTHER DRUGS, MEDICAMENTS AND BIOLOGICAL SUBSTANCES STATUS: ICD-10-CM

## 2021-01-01 DIAGNOSIS — B95.2 ENTEROCOCCUS AS THE CAUSE OF DISEASES CLASSIFIED ELSEWHERE: ICD-10-CM
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
## 2021-01-01 DIAGNOSIS — J91.8 PLEURAL EFFUSION IN OTHER CONDITIONS CLASSIFIED ELSEWHERE: ICD-10-CM

## 2021-01-01 DIAGNOSIS — Z95.810 PRESENCE OF AUTOMATIC (IMPLANTABLE) CARDIAC DEFIBRILLATOR: ICD-10-CM

## 2021-01-01 DIAGNOSIS — I07.1 RHEUMATIC TRICUSPID INSUFFICIENCY: ICD-10-CM

## 2021-01-01 DIAGNOSIS — N39.0 URINARY TRACT INFECTION, SITE NOT SPECIFIED: ICD-10-CM

## 2021-01-01 DIAGNOSIS — Z90.710 ACQUIRED ABSENCE OF BOTH CERVIX AND UTERUS: ICD-10-CM

## 2021-01-01 DIAGNOSIS — E87.3 ALKALOSIS: ICD-10-CM

## 2021-01-01 DIAGNOSIS — Z66 DO NOT RESUSCITATE: ICD-10-CM
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
## 2021-01-01 DIAGNOSIS — I27.20 PULMONARY HYPERTENSION, UNSPECIFIED: ICD-10-CM

## 2021-01-01 DIAGNOSIS — Z90.49 ACQUIRED ABSENCE OF OTHER SPECIFIED PARTS OF DIGESTIVE TRACT: ICD-10-CM

## 2021-01-01 DIAGNOSIS — I25.10 ATHEROSCLEROTIC HEART DISEASE OF NATIVE CORONARY ARTERY WITHOUT ANGINA PECTORIS: ICD-10-CM

## 2021-01-01 DIAGNOSIS — F03.90 UNSPECIFIED DEMENTIA WITHOUT BEHAVIORAL DISTURBANCE: ICD-10-CM

## 2021-01-01 DIAGNOSIS — I10 ESSENTIAL (PRIMARY) HYPERTENSION: ICD-10-CM

## 2021-01-01 DIAGNOSIS — Z95.0 PRESENCE OF CARDIAC PACEMAKER: ICD-10-CM

## 2021-01-01 DIAGNOSIS — J96.01 ACUTE RESPIRATORY FAILURE WITH HYPOXIA: ICD-10-CM

## 2021-01-01 DIAGNOSIS — I60.9 NONTRAUMATIC SUBARACHNOID HEMORRHAGE, UNSPECIFIED: ICD-10-CM

## 2021-01-01 DIAGNOSIS — I95.1 ORTHOSTATIC HYPOTENSION: ICD-10-CM

## 2021-01-01 DIAGNOSIS — I50.33 ACUTE ON CHRONIC DIASTOLIC (CONGESTIVE) HEART FAILURE: ICD-10-CM

## 2021-01-01 LAB
-  AMIKACIN: SIGNIFICANT CHANGE UP
-  AMOXICILLIN/CLAVULANIC ACID: SIGNIFICANT CHANGE UP
-  AMPICILLIN/SULBACTAM: SIGNIFICANT CHANGE UP
-  AMPICILLIN: SIGNIFICANT CHANGE UP
-  AMPICILLIN: SIGNIFICANT CHANGE UP
-  AZTREONAM: SIGNIFICANT CHANGE UP
-  CEFAZOLIN: SIGNIFICANT CHANGE UP
-  CEFEPIME: SIGNIFICANT CHANGE UP
-  CEFOXITIN: SIGNIFICANT CHANGE UP
-  CEFTRIAXONE: SIGNIFICANT CHANGE UP
-  CIPROFLOXACIN: SIGNIFICANT CHANGE UP
-  CIPROFLOXACIN: SIGNIFICANT CHANGE UP
-  ERTAPENEM: SIGNIFICANT CHANGE UP
-  GENTAMICIN: SIGNIFICANT CHANGE UP
-  IMIPENEM: SIGNIFICANT CHANGE UP
-  LEVOFLOXACIN: SIGNIFICANT CHANGE UP
-  LEVOFLOXACIN: SIGNIFICANT CHANGE UP
-  MEROPENEM: SIGNIFICANT CHANGE UP
-  NITROFURANTOIN: SIGNIFICANT CHANGE UP
-  NITROFURANTOIN: SIGNIFICANT CHANGE UP
-  PIPERACILLIN/TAZOBACTAM: SIGNIFICANT CHANGE UP
-  TETRACYCLINE: SIGNIFICANT CHANGE UP
-  TIGECYCLINE: SIGNIFICANT CHANGE UP
-  TOBRAMYCIN: SIGNIFICANT CHANGE UP
-  TRIMETHOPRIM/SULFAMETHOXAZOLE: SIGNIFICANT CHANGE UP
-  VANCOMYCIN: SIGNIFICANT CHANGE UP
ADD ON TEST-SPECIMEN IN LAB: SIGNIFICANT CHANGE UP
ALBUMIN FLD-MCNC: 1.6 G/DL — SIGNIFICANT CHANGE UP
ALBUMIN SERPL ELPH-MCNC: 1.5 G/DL — LOW (ref 3.3–5)
ALBUMIN SERPL ELPH-MCNC: 1.6 G/DL — LOW (ref 3.3–5)
ALBUMIN SERPL ELPH-MCNC: 1.9 G/DL — LOW (ref 3.3–5)
ALBUMIN SERPL ELPH-MCNC: 2.1 G/DL — LOW (ref 3.3–5)
ALBUMIN SERPL ELPH-MCNC: 2.6 G/DL — LOW (ref 3.3–5)
ALBUMIN SERPL ELPH-MCNC: 2.7 G/DL — LOW (ref 3.3–5)
ALBUMIN SERPL ELPH-MCNC: 3.5 G/DL — SIGNIFICANT CHANGE UP (ref 3.3–5)
ALP SERPL-CCNC: 101 U/L — SIGNIFICANT CHANGE UP (ref 40–120)
ALP SERPL-CCNC: 105 U/L — SIGNIFICANT CHANGE UP (ref 40–120)
ALP SERPL-CCNC: 126 U/L — HIGH (ref 40–120)
ALP SERPL-CCNC: 130 U/L — HIGH (ref 40–120)
ALP SERPL-CCNC: 215 U/L — HIGH (ref 40–120)
ALP SERPL-CCNC: 95 U/L — SIGNIFICANT CHANGE UP (ref 40–120)
ALT FLD-CCNC: 12 U/L — SIGNIFICANT CHANGE UP (ref 12–78)
ALT FLD-CCNC: 14 U/L — SIGNIFICANT CHANGE UP (ref 12–78)
ALT FLD-CCNC: 15 U/L — SIGNIFICANT CHANGE UP (ref 12–78)
ALT FLD-CCNC: 25 U/L — SIGNIFICANT CHANGE UP (ref 12–78)
ALT FLD-CCNC: 28 U/L — SIGNIFICANT CHANGE UP (ref 12–78)
ALT FLD-CCNC: 60 U/L — SIGNIFICANT CHANGE UP (ref 12–78)
ANION GAP SERPL CALC-SCNC: 10 MMOL/L — SIGNIFICANT CHANGE UP (ref 5–17)
ANION GAP SERPL CALC-SCNC: 15 MMOL/L — SIGNIFICANT CHANGE UP (ref 5–17)
ANION GAP SERPL CALC-SCNC: 3 MMOL/L — LOW (ref 5–17)
ANION GAP SERPL CALC-SCNC: 4 MMOL/L — LOW (ref 5–17)
ANION GAP SERPL CALC-SCNC: 5 MMOL/L — SIGNIFICANT CHANGE UP (ref 5–17)
ANION GAP SERPL CALC-SCNC: 6 MMOL/L — SIGNIFICANT CHANGE UP (ref 5–17)
ANION GAP SERPL CALC-SCNC: 7 MMOL/L — SIGNIFICANT CHANGE UP (ref 5–17)
ANION GAP SERPL CALC-SCNC: 8 MMOL/L — SIGNIFICANT CHANGE UP (ref 5–17)
ANION GAP SERPL CALC-SCNC: 9 MMOL/L — SIGNIFICANT CHANGE UP (ref 5–17)
APPEARANCE UR: ABNORMAL
APPEARANCE UR: ABNORMAL
APPEARANCE UR: CLEAR — SIGNIFICANT CHANGE UP
APTT BLD: 35.7 SEC — HIGH (ref 27.5–35.5)
APTT BLD: 38.4 SEC — HIGH (ref 27.5–35.5)
APTT BLD: 39.5 SEC — HIGH (ref 27.5–35.5)
AST SERPL-CCNC: 124 U/L — HIGH (ref 15–37)
AST SERPL-CCNC: 16 U/L — SIGNIFICANT CHANGE UP (ref 15–37)
AST SERPL-CCNC: 24 U/L — SIGNIFICANT CHANGE UP (ref 15–37)
AST SERPL-CCNC: 26 U/L — SIGNIFICANT CHANGE UP (ref 15–37)
AST SERPL-CCNC: 40 U/L — HIGH (ref 15–37)
AST SERPL-CCNC: 46 U/L — HIGH (ref 15–37)
B PERT IGG+IGM PNL SER: ABNORMAL
BASE EXCESS BLDV CALC-SCNC: -4.6 MMOL/L — SIGNIFICANT CHANGE UP
BASOPHILS # BLD AUTO: 0 K/UL — SIGNIFICANT CHANGE UP (ref 0–0.2)
BASOPHILS # BLD AUTO: 0 K/UL — SIGNIFICANT CHANGE UP (ref 0–0.2)
BASOPHILS # BLD AUTO: 0.04 K/UL — SIGNIFICANT CHANGE UP (ref 0–0.2)
BASOPHILS # BLD AUTO: 0.05 K/UL — SIGNIFICANT CHANGE UP (ref 0–0.2)
BASOPHILS # BLD AUTO: 0.07 K/UL — SIGNIFICANT CHANGE UP (ref 0–0.2)
BASOPHILS NFR BLD AUTO: 0 % — SIGNIFICANT CHANGE UP (ref 0–2)
BASOPHILS NFR BLD AUTO: 0 % — SIGNIFICANT CHANGE UP (ref 0–2)
BASOPHILS NFR BLD AUTO: 0.2 % — SIGNIFICANT CHANGE UP (ref 0–2)
BASOPHILS NFR BLD AUTO: 0.5 % — SIGNIFICANT CHANGE UP (ref 0–2)
BASOPHILS NFR BLD AUTO: 0.6 % — SIGNIFICANT CHANGE UP (ref 0–2)
BILIRUB SERPL-MCNC: 0.6 MG/DL — SIGNIFICANT CHANGE UP (ref 0.2–1.2)
BILIRUB SERPL-MCNC: 0.6 MG/DL — SIGNIFICANT CHANGE UP (ref 0.2–1.2)
BILIRUB SERPL-MCNC: 0.7 MG/DL — SIGNIFICANT CHANGE UP (ref 0.2–1.2)
BILIRUB SERPL-MCNC: 0.8 MG/DL — SIGNIFICANT CHANGE UP (ref 0.2–1.2)
BILIRUB SERPL-MCNC: 0.9 MG/DL — SIGNIFICANT CHANGE UP (ref 0.2–1.2)
BILIRUB SERPL-MCNC: 1 MG/DL — SIGNIFICANT CHANGE UP (ref 0.2–1.2)
BILIRUB UR-MCNC: ABNORMAL
BILIRUB UR-MCNC: NEGATIVE — SIGNIFICANT CHANGE UP
BILIRUB UR-MCNC: NEGATIVE — SIGNIFICANT CHANGE UP
BUN SERPL-MCNC: 17 MG/DL — SIGNIFICANT CHANGE UP (ref 7–23)
BUN SERPL-MCNC: 17 MG/DL — SIGNIFICANT CHANGE UP (ref 7–23)
BUN SERPL-MCNC: 18 MG/DL — SIGNIFICANT CHANGE UP (ref 7–23)
BUN SERPL-MCNC: 19 MG/DL — SIGNIFICANT CHANGE UP (ref 7–23)
BUN SERPL-MCNC: 21 MG/DL — SIGNIFICANT CHANGE UP (ref 7–23)
BUN SERPL-MCNC: 21 MG/DL — SIGNIFICANT CHANGE UP (ref 7–23)
BUN SERPL-MCNC: 22 MG/DL — SIGNIFICANT CHANGE UP (ref 7–23)
BUN SERPL-MCNC: 23 MG/DL — SIGNIFICANT CHANGE UP (ref 7–23)
BUN SERPL-MCNC: 24 MG/DL — HIGH (ref 7–23)
BUN SERPL-MCNC: 26 MG/DL — HIGH (ref 7–23)
BUN SERPL-MCNC: 26 MG/DL — HIGH (ref 7–23)
BUN SERPL-MCNC: 27 MG/DL — HIGH (ref 7–23)
BUN SERPL-MCNC: 28 MG/DL — HIGH (ref 7–23)
BUN SERPL-MCNC: 33 MG/DL — HIGH (ref 7–23)
BUN SERPL-MCNC: 33 MG/DL — HIGH (ref 7–23)
BUN SERPL-MCNC: 49 MG/DL — HIGH (ref 7–23)
CALCIUM SERPL-MCNC: 8 MG/DL — LOW (ref 8.5–10.1)
CALCIUM SERPL-MCNC: 8.2 MG/DL — LOW (ref 8.5–10.1)
CALCIUM SERPL-MCNC: 8.3 MG/DL — LOW (ref 8.5–10.1)
CALCIUM SERPL-MCNC: 8.3 MG/DL — LOW (ref 8.5–10.1)
CALCIUM SERPL-MCNC: 8.4 MG/DL — LOW (ref 8.5–10.1)
CALCIUM SERPL-MCNC: 8.5 MG/DL — SIGNIFICANT CHANGE UP (ref 8.5–10.1)
CALCIUM SERPL-MCNC: 8.6 MG/DL — SIGNIFICANT CHANGE UP (ref 8.5–10.1)
CALCIUM SERPL-MCNC: 8.7 MG/DL — SIGNIFICANT CHANGE UP (ref 8.5–10.1)
CALCIUM SERPL-MCNC: 8.7 MG/DL — SIGNIFICANT CHANGE UP (ref 8.5–10.1)
CALCIUM SERPL-MCNC: 8.8 MG/DL — SIGNIFICANT CHANGE UP (ref 8.5–10.1)
CALCIUM SERPL-MCNC: 9.6 MG/DL — SIGNIFICANT CHANGE UP (ref 8.5–10.1)
CHLORIDE SERPL-SCNC: 100 MMOL/L — SIGNIFICANT CHANGE UP (ref 96–108)
CHLORIDE SERPL-SCNC: 101 MMOL/L — SIGNIFICANT CHANGE UP (ref 96–108)
CHLORIDE SERPL-SCNC: 101 MMOL/L — SIGNIFICANT CHANGE UP (ref 96–108)
CHLORIDE SERPL-SCNC: 102 MMOL/L — SIGNIFICANT CHANGE UP (ref 96–108)
CHLORIDE SERPL-SCNC: 103 MMOL/L — SIGNIFICANT CHANGE UP (ref 96–108)
CHLORIDE SERPL-SCNC: 104 MMOL/L — SIGNIFICANT CHANGE UP (ref 96–108)
CHLORIDE SERPL-SCNC: 105 MMOL/L — SIGNIFICANT CHANGE UP (ref 96–108)
CHLORIDE SERPL-SCNC: 106 MMOL/L — SIGNIFICANT CHANGE UP (ref 96–108)
CHLORIDE SERPL-SCNC: 107 MMOL/L — SIGNIFICANT CHANGE UP (ref 96–108)
CHLORIDE SERPL-SCNC: 108 MMOL/L — SIGNIFICANT CHANGE UP (ref 96–108)
CHLORIDE SERPL-SCNC: 108 MMOL/L — SIGNIFICANT CHANGE UP (ref 96–108)
CHLORIDE SERPL-SCNC: 109 MMOL/L — HIGH (ref 96–108)
CHLORIDE SERPL-SCNC: 96 MMOL/L — SIGNIFICANT CHANGE UP (ref 96–108)
CHLORIDE SERPL-SCNC: 97 MMOL/L — SIGNIFICANT CHANGE UP (ref 96–108)
CHLORIDE SERPL-SCNC: 98 MMOL/L — SIGNIFICANT CHANGE UP (ref 96–108)
CHLORIDE SERPL-SCNC: 98 MMOL/L — SIGNIFICANT CHANGE UP (ref 96–108)
CHLORIDE SERPL-SCNC: 99 MMOL/L — SIGNIFICANT CHANGE UP (ref 96–108)
CK SERPL-CCNC: 333 U/L — HIGH (ref 26–192)
CO2 BLDV-SCNC: 25 MMOL/L — SIGNIFICANT CHANGE UP (ref 22–26)
CO2 SERPL-SCNC: 21 MMOL/L — LOW (ref 22–31)
CO2 SERPL-SCNC: 23 MMOL/L — SIGNIFICANT CHANGE UP (ref 22–31)
CO2 SERPL-SCNC: 24 MMOL/L — SIGNIFICANT CHANGE UP (ref 22–31)
CO2 SERPL-SCNC: 24 MMOL/L — SIGNIFICANT CHANGE UP (ref 22–31)
CO2 SERPL-SCNC: 25 MMOL/L — SIGNIFICANT CHANGE UP (ref 22–31)
CO2 SERPL-SCNC: 26 MMOL/L — SIGNIFICANT CHANGE UP (ref 22–31)
CO2 SERPL-SCNC: 27 MMOL/L — SIGNIFICANT CHANGE UP (ref 22–31)
CO2 SERPL-SCNC: 27 MMOL/L — SIGNIFICANT CHANGE UP (ref 22–31)
CO2 SERPL-SCNC: 28 MMOL/L — SIGNIFICANT CHANGE UP (ref 22–31)
CO2 SERPL-SCNC: 28 MMOL/L — SIGNIFICANT CHANGE UP (ref 22–31)
CO2 SERPL-SCNC: 29 MMOL/L — SIGNIFICANT CHANGE UP (ref 22–31)
CO2 SERPL-SCNC: 30 MMOL/L — SIGNIFICANT CHANGE UP (ref 22–31)
CO2 SERPL-SCNC: 31 MMOL/L — SIGNIFICANT CHANGE UP (ref 22–31)
CO2 SERPL-SCNC: 31 MMOL/L — SIGNIFICANT CHANGE UP (ref 22–31)
CO2 SERPL-SCNC: 32 MMOL/L — HIGH (ref 22–31)
CO2 SERPL-SCNC: 33 MMOL/L — HIGH (ref 22–31)
CO2 SERPL-SCNC: 36 MMOL/L — HIGH (ref 22–31)
CO2 SERPL-SCNC: 37 MMOL/L — HIGH (ref 22–31)
CO2 SERPL-SCNC: 38 MMOL/L — HIGH (ref 22–31)
COLOR FLD: SIGNIFICANT CHANGE UP
COLOR SPEC: YELLOW — SIGNIFICANT CHANGE UP
COVID-19 NUCLEOCAPSID GAM AB INTERP: NEGATIVE — SIGNIFICANT CHANGE UP
COVID-19 NUCLEOCAPSID GAM AB INTERP: NEGATIVE — SIGNIFICANT CHANGE UP
COVID-19 NUCLEOCAPSID TOTAL GAM ANTIBODY RESULT: 0.08 INDEX — SIGNIFICANT CHANGE UP
COVID-19 NUCLEOCAPSID TOTAL GAM ANTIBODY RESULT: 0.09 INDEX — SIGNIFICANT CHANGE UP
COVID-19 SPIKE DOMAIN AB INTERP: POSITIVE
COVID-19 SPIKE DOMAIN AB INTERP: POSITIVE
COVID-19 SPIKE DOMAIN ANTIBODY RESULT: 11.7 U/ML — HIGH
COVID-19 SPIKE DOMAIN ANTIBODY RESULT: 13.1 U/ML — HIGH
CREAT SERPL-MCNC: 0.86 MG/DL — SIGNIFICANT CHANGE UP (ref 0.5–1.3)
CREAT SERPL-MCNC: 0.9 MG/DL — SIGNIFICANT CHANGE UP (ref 0.5–1.3)
CREAT SERPL-MCNC: 0.91 MG/DL — SIGNIFICANT CHANGE UP (ref 0.5–1.3)
CREAT SERPL-MCNC: 0.92 MG/DL — SIGNIFICANT CHANGE UP (ref 0.5–1.3)
CREAT SERPL-MCNC: 0.94 MG/DL — SIGNIFICANT CHANGE UP (ref 0.5–1.3)
CREAT SERPL-MCNC: 0.95 MG/DL — SIGNIFICANT CHANGE UP (ref 0.5–1.3)
CREAT SERPL-MCNC: 0.96 MG/DL — SIGNIFICANT CHANGE UP (ref 0.5–1.3)
CREAT SERPL-MCNC: 0.98 MG/DL — SIGNIFICANT CHANGE UP (ref 0.5–1.3)
CREAT SERPL-MCNC: 1.02 MG/DL — SIGNIFICANT CHANGE UP (ref 0.5–1.3)
CREAT SERPL-MCNC: 1.04 MG/DL — SIGNIFICANT CHANGE UP (ref 0.5–1.3)
CREAT SERPL-MCNC: 1.08 MG/DL — SIGNIFICANT CHANGE UP (ref 0.5–1.3)
CREAT SERPL-MCNC: 1.09 MG/DL — SIGNIFICANT CHANGE UP (ref 0.5–1.3)
CREAT SERPL-MCNC: 1.09 MG/DL — SIGNIFICANT CHANGE UP (ref 0.5–1.3)
CREAT SERPL-MCNC: 1.11 MG/DL — SIGNIFICANT CHANGE UP (ref 0.5–1.3)
CREAT SERPL-MCNC: 1.13 MG/DL — SIGNIFICANT CHANGE UP (ref 0.5–1.3)
CREAT SERPL-MCNC: 1.16 MG/DL — SIGNIFICANT CHANGE UP (ref 0.5–1.3)
CREAT SERPL-MCNC: 1.22 MG/DL — SIGNIFICANT CHANGE UP (ref 0.5–1.3)
CREAT SERPL-MCNC: 1.37 MG/DL — HIGH (ref 0.5–1.3)
CREAT SERPL-MCNC: 1.74 MG/DL — HIGH (ref 0.5–1.3)
CREAT SERPL-MCNC: 3.18 MG/DL — HIGH (ref 0.5–1.3)
CULTURE RESULTS: NO GROWTH — SIGNIFICANT CHANGE UP
CULTURE RESULTS: SIGNIFICANT CHANGE UP
DIFF PNL FLD: ABNORMAL
DIGOXIN SERPL-MCNC: 0.66 NG/ML — LOW (ref 0.8–2)
EOSINOPHIL # BLD AUTO: 0 K/UL — SIGNIFICANT CHANGE UP (ref 0–0.5)
EOSINOPHIL # BLD AUTO: 0.04 K/UL — SIGNIFICANT CHANGE UP (ref 0–0.5)
EOSINOPHIL # BLD AUTO: 0.17 K/UL — SIGNIFICANT CHANGE UP (ref 0–0.5)
EOSINOPHIL # FLD: 0 % — SIGNIFICANT CHANGE UP
EOSINOPHIL NFR BLD AUTO: 0 % — SIGNIFICANT CHANGE UP (ref 0–6)
EOSINOPHIL NFR BLD AUTO: 0.3 % — SIGNIFICANT CHANGE UP (ref 0–6)
EOSINOPHIL NFR BLD AUTO: 2.1 % — SIGNIFICANT CHANGE UP (ref 0–6)
FLUID INTAKE SUBSTANCE CLASS: SIGNIFICANT CHANGE UP
FLUID SEGMENTED GRANULOCYTES: 13 % — SIGNIFICANT CHANGE UP
FOLATE+VIT B12 SERBLD-IMP: 0 % — SIGNIFICANT CHANGE UP
GLUCOSE FLD-MCNC: 88 MG/DL — SIGNIFICANT CHANGE UP
GLUCOSE SERPL-MCNC: 100 MG/DL — HIGH (ref 70–99)
GLUCOSE SERPL-MCNC: 101 MG/DL — HIGH (ref 70–99)
GLUCOSE SERPL-MCNC: 101 MG/DL — HIGH (ref 70–99)
GLUCOSE SERPL-MCNC: 102 MG/DL — HIGH (ref 70–99)
GLUCOSE SERPL-MCNC: 109 MG/DL — HIGH (ref 70–99)
GLUCOSE SERPL-MCNC: 111 MG/DL — HIGH (ref 70–99)
GLUCOSE SERPL-MCNC: 112 MG/DL — HIGH (ref 70–99)
GLUCOSE SERPL-MCNC: 115 MG/DL — HIGH (ref 70–99)
GLUCOSE SERPL-MCNC: 122 MG/DL — HIGH (ref 70–99)
GLUCOSE SERPL-MCNC: 124 MG/DL — HIGH (ref 70–99)
GLUCOSE SERPL-MCNC: 126 MG/DL — HIGH (ref 70–99)
GLUCOSE SERPL-MCNC: 144 MG/DL — HIGH (ref 70–99)
GLUCOSE SERPL-MCNC: 155 MG/DL — HIGH (ref 70–99)
GLUCOSE SERPL-MCNC: 156 MG/DL — HIGH (ref 70–99)
GLUCOSE SERPL-MCNC: 179 MG/DL — HIGH (ref 70–99)
GLUCOSE SERPL-MCNC: 72 MG/DL — SIGNIFICANT CHANGE UP (ref 70–99)
GLUCOSE SERPL-MCNC: 81 MG/DL — SIGNIFICANT CHANGE UP (ref 70–99)
GLUCOSE SERPL-MCNC: 82 MG/DL — SIGNIFICANT CHANGE UP (ref 70–99)
GLUCOSE SERPL-MCNC: 84 MG/DL — SIGNIFICANT CHANGE UP (ref 70–99)
GLUCOSE SERPL-MCNC: 85 MG/DL — SIGNIFICANT CHANGE UP (ref 70–99)
GLUCOSE SERPL-MCNC: 93 MG/DL — SIGNIFICANT CHANGE UP (ref 70–99)
GLUCOSE SERPL-MCNC: 95 MG/DL — SIGNIFICANT CHANGE UP (ref 70–99)
GLUCOSE SERPL-MCNC: 97 MG/DL — SIGNIFICANT CHANGE UP (ref 70–99)
GLUCOSE SERPL-MCNC: 99 MG/DL — SIGNIFICANT CHANGE UP (ref 70–99)
GLUCOSE UR QL: NEGATIVE MG/DL — SIGNIFICANT CHANGE UP
GLUCOSE UR QL: NEGATIVE MG/DL — SIGNIFICANT CHANGE UP
GLUCOSE UR QL: NEGATIVE — SIGNIFICANT CHANGE UP
GRAM STN FLD: SIGNIFICANT CHANGE UP
HCO3 BLDV-SCNC: 23 MMOL/L — SIGNIFICANT CHANGE UP (ref 22–29)
HCT VFR BLD CALC: 30.5 % — LOW (ref 34.5–45)
HCT VFR BLD CALC: 30.6 % — LOW (ref 34.5–45)
HCT VFR BLD CALC: 30.7 % — LOW (ref 34.5–45)
HCT VFR BLD CALC: 32 % — LOW (ref 34.5–45)
HCT VFR BLD CALC: 32.1 % — LOW (ref 34.5–45)
HCT VFR BLD CALC: 32.2 % — LOW (ref 34.5–45)
HCT VFR BLD CALC: 32.8 % — LOW (ref 34.5–45)
HCT VFR BLD CALC: 33 % — LOW (ref 34.5–45)
HCT VFR BLD CALC: 34.3 % — LOW (ref 34.5–45)
HCT VFR BLD CALC: 34.6 % — SIGNIFICANT CHANGE UP (ref 34.5–45)
HCT VFR BLD CALC: 35.1 % — SIGNIFICANT CHANGE UP (ref 34.5–45)
HCT VFR BLD CALC: 35.3 % — SIGNIFICANT CHANGE UP (ref 34.5–45)
HCT VFR BLD CALC: 35.5 % — SIGNIFICANT CHANGE UP (ref 34.5–45)
HCT VFR BLD CALC: 35.7 % — SIGNIFICANT CHANGE UP (ref 34.5–45)
HCT VFR BLD CALC: 36.5 % — SIGNIFICANT CHANGE UP (ref 34.5–45)
HCT VFR BLD CALC: 36.6 % — SIGNIFICANT CHANGE UP (ref 34.5–45)
HCT VFR BLD CALC: 36.7 % — SIGNIFICANT CHANGE UP (ref 34.5–45)
HCT VFR BLD CALC: 37 % — SIGNIFICANT CHANGE UP (ref 34.5–45)
HCT VFR BLD CALC: 37.3 % — SIGNIFICANT CHANGE UP (ref 34.5–45)
HCT VFR BLD CALC: 38.1 % — SIGNIFICANT CHANGE UP (ref 34.5–45)
HCT VFR BLD CALC: 38.4 % — SIGNIFICANT CHANGE UP (ref 34.5–45)
HCT VFR BLD CALC: 42.8 % — SIGNIFICANT CHANGE UP (ref 34.5–45)
HCT VFR BLD CALC: 43.1 % — SIGNIFICANT CHANGE UP (ref 34.5–45)
HCT VFR BLD CALC: 44.2 % — SIGNIFICANT CHANGE UP (ref 34.5–45)
HGB BLD-MCNC: 10.2 G/DL — LOW (ref 11.5–15.5)
HGB BLD-MCNC: 10.3 G/DL — LOW (ref 11.5–15.5)
HGB BLD-MCNC: 10.5 G/DL — LOW (ref 11.5–15.5)
HGB BLD-MCNC: 10.5 G/DL — LOW (ref 11.5–15.5)
HGB BLD-MCNC: 10.9 G/DL — LOW (ref 11.5–15.5)
HGB BLD-MCNC: 11.1 G/DL — LOW (ref 11.5–15.5)
HGB BLD-MCNC: 11.4 G/DL — LOW (ref 11.5–15.5)
HGB BLD-MCNC: 11.5 G/DL — SIGNIFICANT CHANGE UP (ref 11.5–15.5)
HGB BLD-MCNC: 11.6 G/DL — SIGNIFICANT CHANGE UP (ref 11.5–15.5)
HGB BLD-MCNC: 11.7 G/DL — SIGNIFICANT CHANGE UP (ref 11.5–15.5)
HGB BLD-MCNC: 12.2 G/DL — SIGNIFICANT CHANGE UP (ref 11.5–15.5)
HGB BLD-MCNC: 12.3 G/DL — SIGNIFICANT CHANGE UP (ref 11.5–15.5)
HGB BLD-MCNC: 12.4 G/DL — SIGNIFICANT CHANGE UP (ref 11.5–15.5)
HGB BLD-MCNC: 12.9 G/DL — SIGNIFICANT CHANGE UP (ref 11.5–15.5)
HGB BLD-MCNC: 12.9 G/DL — SIGNIFICANT CHANGE UP (ref 11.5–15.5)
HGB BLD-MCNC: 13.1 G/DL — SIGNIFICANT CHANGE UP (ref 11.5–15.5)
HGB BLD-MCNC: 13.7 G/DL — SIGNIFICANT CHANGE UP (ref 11.5–15.5)
HGB BLD-MCNC: 14.9 G/DL — SIGNIFICANT CHANGE UP (ref 11.5–15.5)
HGB BLD-MCNC: 9.9 G/DL — LOW (ref 11.5–15.5)
IMM GRANULOCYTES NFR BLD AUTO: 0.2 % — SIGNIFICANT CHANGE UP (ref 0–1.5)
IMM GRANULOCYTES NFR BLD AUTO: 0.3 % — SIGNIFICANT CHANGE UP (ref 0–1.5)
IMM GRANULOCYTES NFR BLD AUTO: 1.5 % — SIGNIFICANT CHANGE UP (ref 0–1.5)
INR BLD: 1.36 RATIO — HIGH (ref 0.88–1.16)
INR BLD: 1.4 RATIO — HIGH (ref 0.88–1.16)
INR BLD: 1.72 RATIO — HIGH (ref 0.88–1.16)
INR BLD: 1.86 RATIO — HIGH (ref 0.88–1.16)
KETONES UR-MCNC: ABNORMAL
KETONES UR-MCNC: ABNORMAL
KETONES UR-MCNC: NEGATIVE — SIGNIFICANT CHANGE UP
LACTATE SERPL-SCNC: 1.9 MMOL/L — SIGNIFICANT CHANGE UP (ref 0.7–2)
LACTATE SERPL-SCNC: 11.3 MMOL/L — CRITICAL HIGH (ref 0.7–2)
LACTATE SERPL-SCNC: 3.2 MMOL/L — HIGH (ref 0.7–2)
LACTATE SERPL-SCNC: 8.7 MMOL/L — CRITICAL HIGH (ref 0.7–2)
LACTATE SERPL-SCNC: 9.1 MMOL/L — CRITICAL HIGH (ref 0.7–2)
LDH SERPL L TO P-CCNC: 213 U/L — SIGNIFICANT CHANGE UP
LDH SERPL L TO P-CCNC: 287 U/L — HIGH (ref 84–241)
LEUKOCYTE ESTERASE UR-ACNC: ABNORMAL
LYMPHOCYTES # BLD AUTO: 0.69 K/UL — LOW (ref 1–3.3)
LYMPHOCYTES # BLD AUTO: 1.21 K/UL — SIGNIFICANT CHANGE UP (ref 1–3.3)
LYMPHOCYTES # BLD AUTO: 1.35 K/UL — SIGNIFICANT CHANGE UP (ref 1–3.3)
LYMPHOCYTES # BLD AUTO: 1.84 K/UL — SIGNIFICANT CHANGE UP (ref 1–3.3)
LYMPHOCYTES # BLD AUTO: 1.99 K/UL — SIGNIFICANT CHANGE UP (ref 1–3.3)
LYMPHOCYTES # BLD AUTO: 24.1 % — SIGNIFICANT CHANGE UP (ref 13–44)
LYMPHOCYTES # BLD AUTO: 3.3 % — LOW (ref 13–44)
LYMPHOCYTES # BLD AUTO: 7.9 % — LOW (ref 13–44)
LYMPHOCYTES # BLD AUTO: 8 % — LOW (ref 13–44)
LYMPHOCYTES # BLD AUTO: 9 % — LOW (ref 13–44)
LYMPHOCYTES # FLD: 51 % — SIGNIFICANT CHANGE UP
MAGNESIUM SERPL-MCNC: 1.9 MG/DL — SIGNIFICANT CHANGE UP (ref 1.6–2.6)
MAGNESIUM SERPL-MCNC: 2 MG/DL — SIGNIFICANT CHANGE UP (ref 1.6–2.6)
MAGNESIUM SERPL-MCNC: 2.2 MG/DL — SIGNIFICANT CHANGE UP (ref 1.6–2.6)
MAGNESIUM SERPL-MCNC: 2.3 MG/DL — SIGNIFICANT CHANGE UP (ref 1.6–2.6)
MAGNESIUM SERPL-MCNC: 4 MG/DL — HIGH (ref 1.6–2.6)
MCHC RBC-ENTMCNC: 29.7 PG — SIGNIFICANT CHANGE UP (ref 27–34)
MCHC RBC-ENTMCNC: 29.9 PG — SIGNIFICANT CHANGE UP (ref 27–34)
MCHC RBC-ENTMCNC: 30.1 GM/DL — LOW (ref 32–36)
MCHC RBC-ENTMCNC: 30.1 PG — SIGNIFICANT CHANGE UP (ref 27–34)
MCHC RBC-ENTMCNC: 30.2 PG — SIGNIFICANT CHANGE UP (ref 27–34)
MCHC RBC-ENTMCNC: 30.3 PG — SIGNIFICANT CHANGE UP (ref 27–34)
MCHC RBC-ENTMCNC: 30.6 PG — SIGNIFICANT CHANGE UP (ref 27–34)
MCHC RBC-ENTMCNC: 30.7 PG — SIGNIFICANT CHANGE UP (ref 27–34)
MCHC RBC-ENTMCNC: 30.8 PG — SIGNIFICANT CHANGE UP (ref 27–34)
MCHC RBC-ENTMCNC: 30.9 PG — SIGNIFICANT CHANGE UP (ref 27–34)
MCHC RBC-ENTMCNC: 31 GM/DL — LOW (ref 32–36)
MCHC RBC-ENTMCNC: 31.2 GM/DL — LOW (ref 32–36)
MCHC RBC-ENTMCNC: 31.3 GM/DL — LOW (ref 32–36)
MCHC RBC-ENTMCNC: 31.3 GM/DL — LOW (ref 32–36)
MCHC RBC-ENTMCNC: 31.3 PG — SIGNIFICANT CHANGE UP (ref 27–34)
MCHC RBC-ENTMCNC: 31.4 GM/DL — LOW (ref 32–36)
MCHC RBC-ENTMCNC: 31.4 PG — SIGNIFICANT CHANGE UP (ref 27–34)
MCHC RBC-ENTMCNC: 31.4 PG — SIGNIFICANT CHANGE UP (ref 27–34)
MCHC RBC-ENTMCNC: 31.5 PG — SIGNIFICANT CHANGE UP (ref 27–34)
MCHC RBC-ENTMCNC: 31.6 PG — SIGNIFICANT CHANGE UP (ref 27–34)
MCHC RBC-ENTMCNC: 32 PG — SIGNIFICANT CHANGE UP (ref 27–34)
MCHC RBC-ENTMCNC: 32.1 GM/DL — SIGNIFICANT CHANGE UP (ref 32–36)
MCHC RBC-ENTMCNC: 32.1 GM/DL — SIGNIFICANT CHANGE UP (ref 32–36)
MCHC RBC-ENTMCNC: 32.1 PG — SIGNIFICANT CHANGE UP (ref 27–34)
MCHC RBC-ENTMCNC: 32.2 GM/DL — SIGNIFICANT CHANGE UP (ref 32–36)
MCHC RBC-ENTMCNC: 32.2 PG — SIGNIFICANT CHANGE UP (ref 27–34)
MCHC RBC-ENTMCNC: 32.4 GM/DL — SIGNIFICANT CHANGE UP (ref 32–36)
MCHC RBC-ENTMCNC: 32.4 GM/DL — SIGNIFICANT CHANGE UP (ref 32–36)
MCHC RBC-ENTMCNC: 32.5 GM/DL — SIGNIFICANT CHANGE UP (ref 32–36)
MCHC RBC-ENTMCNC: 32.5 GM/DL — SIGNIFICANT CHANGE UP (ref 32–36)
MCHC RBC-ENTMCNC: 32.6 GM/DL — SIGNIFICANT CHANGE UP (ref 32–36)
MCHC RBC-ENTMCNC: 32.7 GM/DL — SIGNIFICANT CHANGE UP (ref 32–36)
MCHC RBC-ENTMCNC: 33 GM/DL — SIGNIFICANT CHANGE UP (ref 32–36)
MCHC RBC-ENTMCNC: 33.2 GM/DL — SIGNIFICANT CHANGE UP (ref 32–36)
MCHC RBC-ENTMCNC: 33.3 GM/DL — SIGNIFICANT CHANGE UP (ref 32–36)
MCHC RBC-ENTMCNC: 33.4 GM/DL — SIGNIFICANT CHANGE UP (ref 32–36)
MCHC RBC-ENTMCNC: 33.5 GM/DL — SIGNIFICANT CHANGE UP (ref 32–36)
MCHC RBC-ENTMCNC: 33.6 GM/DL — SIGNIFICANT CHANGE UP (ref 32–36)
MCHC RBC-ENTMCNC: 33.9 GM/DL — SIGNIFICANT CHANGE UP (ref 32–36)
MCHC RBC-ENTMCNC: 34.1 GM/DL — SIGNIFICANT CHANGE UP (ref 32–36)
MCHC RBC-ENTMCNC: 34.6 GM/DL — SIGNIFICANT CHANGE UP (ref 32–36)
MCV RBC AUTO: 91.9 FL — SIGNIFICANT CHANGE UP (ref 80–100)
MCV RBC AUTO: 92.4 FL — SIGNIFICANT CHANGE UP (ref 80–100)
MCV RBC AUTO: 93.1 FL — SIGNIFICANT CHANGE UP (ref 80–100)
MCV RBC AUTO: 93.4 FL — SIGNIFICANT CHANGE UP (ref 80–100)
MCV RBC AUTO: 93.9 FL — SIGNIFICANT CHANGE UP (ref 80–100)
MCV RBC AUTO: 94.1 FL — SIGNIFICANT CHANGE UP (ref 80–100)
MCV RBC AUTO: 94.2 FL — SIGNIFICANT CHANGE UP (ref 80–100)
MCV RBC AUTO: 94.3 FL — SIGNIFICANT CHANGE UP (ref 80–100)
MCV RBC AUTO: 94.5 FL — SIGNIFICANT CHANGE UP (ref 80–100)
MCV RBC AUTO: 94.7 FL — SIGNIFICANT CHANGE UP (ref 80–100)
MCV RBC AUTO: 95 FL — SIGNIFICANT CHANGE UP (ref 80–100)
MCV RBC AUTO: 95.2 FL — SIGNIFICANT CHANGE UP (ref 80–100)
MCV RBC AUTO: 95.2 FL — SIGNIFICANT CHANGE UP (ref 80–100)
MCV RBC AUTO: 95.6 FL — SIGNIFICANT CHANGE UP (ref 80–100)
MCV RBC AUTO: 95.9 FL — SIGNIFICANT CHANGE UP (ref 80–100)
MCV RBC AUTO: 96.1 FL — SIGNIFICANT CHANGE UP (ref 80–100)
MCV RBC AUTO: 96.7 FL — SIGNIFICANT CHANGE UP (ref 80–100)
MCV RBC AUTO: 96.8 FL — SIGNIFICANT CHANGE UP (ref 80–100)
MCV RBC AUTO: 97.1 FL — SIGNIFICANT CHANGE UP (ref 80–100)
MCV RBC AUTO: 98.4 FL — SIGNIFICANT CHANGE UP (ref 80–100)
MESOTHL CELL # FLD: 2 % — SIGNIFICANT CHANGE UP
METHOD TYPE: SIGNIFICANT CHANGE UP
METHOD TYPE: SIGNIFICANT CHANGE UP
MONOCYTES # BLD AUTO: 0.9 K/UL — SIGNIFICANT CHANGE UP (ref 0–0.9)
MONOCYTES # BLD AUTO: 1.11 K/UL — HIGH (ref 0–0.9)
MONOCYTES # BLD AUTO: 1.35 K/UL — HIGH (ref 0–0.9)
MONOCYTES # BLD AUTO: 1.49 K/UL — HIGH (ref 0–0.9)
MONOCYTES # BLD AUTO: 2.25 K/UL — HIGH (ref 0–0.9)
MONOCYTES NFR BLD AUTO: 11 % — SIGNIFICANT CHANGE UP (ref 2–14)
MONOCYTES NFR BLD AUTO: 13.5 % — SIGNIFICANT CHANGE UP (ref 2–14)
MONOCYTES NFR BLD AUTO: 5.9 % — SIGNIFICANT CHANGE UP (ref 2–14)
MONOCYTES NFR BLD AUTO: 7.1 % — SIGNIFICANT CHANGE UP (ref 2–14)
MONOCYTES NFR BLD AUTO: 8 % — SIGNIFICANT CHANGE UP (ref 2–14)
MONOS+MACROS # FLD: 34 % — SIGNIFICANT CHANGE UP
NEUTROPHILS # BLD AUTO: 13.05 K/UL — HIGH (ref 1.8–7.4)
NEUTROPHILS # BLD AUTO: 13.84 K/UL — HIGH (ref 1.8–7.4)
NEUTROPHILS # BLD AUTO: 16.38 K/UL — HIGH (ref 1.8–7.4)
NEUTROPHILS # BLD AUTO: 18.37 K/UL — HIGH (ref 1.8–7.4)
NEUTROPHILS # BLD AUTO: 4.91 K/UL — SIGNIFICANT CHANGE UP (ref 1.8–7.4)
NEUTROPHILS NFR BLD AUTO: 59.5 % — SIGNIFICANT CHANGE UP (ref 43–77)
NEUTROPHILS NFR BLD AUTO: 80 % — HIGH (ref 43–77)
NEUTROPHILS NFR BLD AUTO: 81 % — HIGH (ref 43–77)
NEUTROPHILS NFR BLD AUTO: 85.1 % — HIGH (ref 43–77)
NEUTROPHILS NFR BLD AUTO: 87.9 % — HIGH (ref 43–77)
NITRITE UR-MCNC: NEGATIVE — SIGNIFICANT CHANGE UP
NITRITE UR-MCNC: NEGATIVE — SIGNIFICANT CHANGE UP
NITRITE UR-MCNC: POSITIVE
NRBC # BLD: SIGNIFICANT CHANGE UP /100 WBCS (ref 0–0)
NRBC # BLD: SIGNIFICANT CHANGE UP /100 WBCS (ref 0–0)
NRBC # FLD: 0 — SIGNIFICANT CHANGE UP
NT-PROBNP SERPL-SCNC: 1552 PG/ML — HIGH (ref 0–450)
NT-PROBNP SERPL-SCNC: 6741 PG/ML — HIGH (ref 0–450)
NT-PROBNP SERPL-SCNC: 7022 PG/ML — HIGH (ref 0–450)
NT-PROBNP SERPL-SCNC: 7069 PG/ML — HIGH (ref 0–450)
NT-PROBNP SERPL-SCNC: HIGH PG/ML (ref 0–450)
NT-PROBNP SERPL-SCNC: HIGH PG/ML (ref 0–450)
ORGANISM # SPEC MICROSCOPIC CNT: SIGNIFICANT CHANGE UP
OTHER CELLS FLD MANUAL: 0 % — SIGNIFICANT CHANGE UP
PCO2 BLDV: 53 MMHG — HIGH (ref 39–42)
PH BLDV: 7.25 — LOW (ref 7.32–7.43)
PH FLD: 7.7 — SIGNIFICANT CHANGE UP
PH UR: 5 — SIGNIFICANT CHANGE UP (ref 5–8)
PH UR: 5 — SIGNIFICANT CHANGE UP (ref 5–8)
PH UR: 6 — SIGNIFICANT CHANGE UP (ref 5–8)
PHOSPHATE SERPL-MCNC: 2.4 MG/DL — LOW (ref 2.5–4.5)
PHOSPHATE SERPL-MCNC: 2.6 MG/DL — SIGNIFICANT CHANGE UP (ref 2.5–4.5)
PHOSPHATE SERPL-MCNC: 2.6 MG/DL — SIGNIFICANT CHANGE UP (ref 2.5–4.5)
PHOSPHATE SERPL-MCNC: 5.1 MG/DL — HIGH (ref 2.5–4.5)
PLATELET # BLD AUTO: 232 K/UL — SIGNIFICANT CHANGE UP (ref 150–400)
PLATELET # BLD AUTO: 234 K/UL — SIGNIFICANT CHANGE UP (ref 150–400)
PLATELET # BLD AUTO: 234 K/UL — SIGNIFICANT CHANGE UP (ref 150–400)
PLATELET # BLD AUTO: 240 K/UL — SIGNIFICANT CHANGE UP (ref 150–400)
PLATELET # BLD AUTO: 247 K/UL — SIGNIFICANT CHANGE UP (ref 150–400)
PLATELET # BLD AUTO: 262 K/UL — SIGNIFICANT CHANGE UP (ref 150–400)
PLATELET # BLD AUTO: 265 K/UL — SIGNIFICANT CHANGE UP (ref 150–400)
PLATELET # BLD AUTO: 272 K/UL — SIGNIFICANT CHANGE UP (ref 150–400)
PLATELET # BLD AUTO: 293 K/UL — SIGNIFICANT CHANGE UP (ref 150–400)
PLATELET # BLD AUTO: 295 K/UL — SIGNIFICANT CHANGE UP (ref 150–400)
PLATELET # BLD AUTO: 361 K/UL — SIGNIFICANT CHANGE UP (ref 150–400)
PLATELET # BLD AUTO: 388 K/UL — SIGNIFICANT CHANGE UP (ref 150–400)
PLATELET # BLD AUTO: 390 K/UL — SIGNIFICANT CHANGE UP (ref 150–400)
PLATELET # BLD AUTO: 400 K/UL — SIGNIFICANT CHANGE UP (ref 150–400)
PLATELET # BLD AUTO: 425 K/UL — HIGH (ref 150–400)
PLATELET # BLD AUTO: 425 K/UL — HIGH (ref 150–400)
PLATELET # BLD AUTO: 470 K/UL — HIGH (ref 150–400)
PLATELET # BLD AUTO: 484 K/UL — HIGH (ref 150–400)
PLATELET # BLD AUTO: 502 K/UL — HIGH (ref 150–400)
PLATELET # BLD AUTO: 504 K/UL — HIGH (ref 150–400)
PLATELET # BLD AUTO: 559 K/UL — HIGH (ref 150–400)
PLATELET # BLD AUTO: 561 K/UL — HIGH (ref 150–400)
PLATELET # BLD AUTO: 564 K/UL — HIGH (ref 150–400)
PLATELET # BLD AUTO: 565 K/UL — HIGH (ref 150–400)
PO2 BLDV: 60 MMHG — SIGNIFICANT CHANGE UP
POTASSIUM SERPL-MCNC: 3.3 MMOL/L — LOW (ref 3.5–5.3)
POTASSIUM SERPL-MCNC: 3.3 MMOL/L — LOW (ref 3.5–5.3)
POTASSIUM SERPL-MCNC: 3.4 MMOL/L — LOW (ref 3.5–5.3)
POTASSIUM SERPL-MCNC: 3.4 MMOL/L — LOW (ref 3.5–5.3)
POTASSIUM SERPL-MCNC: 3.5 MMOL/L — SIGNIFICANT CHANGE UP (ref 3.5–5.3)
POTASSIUM SERPL-MCNC: 3.6 MMOL/L — SIGNIFICANT CHANGE UP (ref 3.5–5.3)
POTASSIUM SERPL-MCNC: 3.7 MMOL/L — SIGNIFICANT CHANGE UP (ref 3.5–5.3)
POTASSIUM SERPL-MCNC: 3.8 MMOL/L — SIGNIFICANT CHANGE UP (ref 3.5–5.3)
POTASSIUM SERPL-MCNC: 3.8 MMOL/L — SIGNIFICANT CHANGE UP (ref 3.5–5.3)
POTASSIUM SERPL-MCNC: 3.9 MMOL/L — SIGNIFICANT CHANGE UP (ref 3.5–5.3)
POTASSIUM SERPL-MCNC: 4 MMOL/L — SIGNIFICANT CHANGE UP (ref 3.5–5.3)
POTASSIUM SERPL-MCNC: 4.2 MMOL/L — SIGNIFICANT CHANGE UP (ref 3.5–5.3)
POTASSIUM SERPL-MCNC: 4.3 MMOL/L — SIGNIFICANT CHANGE UP (ref 3.5–5.3)
POTASSIUM SERPL-MCNC: 4.3 MMOL/L — SIGNIFICANT CHANGE UP (ref 3.5–5.3)
POTASSIUM SERPL-MCNC: 4.5 MMOL/L — SIGNIFICANT CHANGE UP (ref 3.5–5.3)
POTASSIUM SERPL-MCNC: 5.1 MMOL/L — SIGNIFICANT CHANGE UP (ref 3.5–5.3)
POTASSIUM SERPL-SCNC: 3.3 MMOL/L — LOW (ref 3.5–5.3)
POTASSIUM SERPL-SCNC: 3.3 MMOL/L — LOW (ref 3.5–5.3)
POTASSIUM SERPL-SCNC: 3.4 MMOL/L — LOW (ref 3.5–5.3)
POTASSIUM SERPL-SCNC: 3.4 MMOL/L — LOW (ref 3.5–5.3)
POTASSIUM SERPL-SCNC: 3.5 MMOL/L — SIGNIFICANT CHANGE UP (ref 3.5–5.3)
POTASSIUM SERPL-SCNC: 3.6 MMOL/L — SIGNIFICANT CHANGE UP (ref 3.5–5.3)
POTASSIUM SERPL-SCNC: 3.7 MMOL/L — SIGNIFICANT CHANGE UP (ref 3.5–5.3)
POTASSIUM SERPL-SCNC: 3.8 MMOL/L — SIGNIFICANT CHANGE UP (ref 3.5–5.3)
POTASSIUM SERPL-SCNC: 3.8 MMOL/L — SIGNIFICANT CHANGE UP (ref 3.5–5.3)
POTASSIUM SERPL-SCNC: 3.9 MMOL/L — SIGNIFICANT CHANGE UP (ref 3.5–5.3)
POTASSIUM SERPL-SCNC: 4 MMOL/L — SIGNIFICANT CHANGE UP (ref 3.5–5.3)
POTASSIUM SERPL-SCNC: 4.2 MMOL/L — SIGNIFICANT CHANGE UP (ref 3.5–5.3)
POTASSIUM SERPL-SCNC: 4.3 MMOL/L — SIGNIFICANT CHANGE UP (ref 3.5–5.3)
POTASSIUM SERPL-SCNC: 4.3 MMOL/L — SIGNIFICANT CHANGE UP (ref 3.5–5.3)
POTASSIUM SERPL-SCNC: 4.5 MMOL/L — SIGNIFICANT CHANGE UP (ref 3.5–5.3)
POTASSIUM SERPL-SCNC: 5.1 MMOL/L — SIGNIFICANT CHANGE UP (ref 3.5–5.3)
PROCALCITONIN SERPL-MCNC: 5.74 NG/ML — HIGH (ref 0.02–0.1)
PROT FLD-MCNC: 2.9 G/DL — SIGNIFICANT CHANGE UP
PROT SERPL-MCNC: 5.8 GM/DL — LOW (ref 6–8.3)
PROT SERPL-MCNC: 6.1 GM/DL — SIGNIFICANT CHANGE UP (ref 6–8.3)
PROT SERPL-MCNC: 6.3 GM/DL — SIGNIFICANT CHANGE UP (ref 6–8.3)
PROT SERPL-MCNC: 6.3 GM/DL — SIGNIFICANT CHANGE UP (ref 6–8.3)
PROT SERPL-MCNC: 6.4 GM/DL — SIGNIFICANT CHANGE UP (ref 6–8.3)
PROT SERPL-MCNC: 6.8 GM/DL — SIGNIFICANT CHANGE UP (ref 6–8.3)
PROT SERPL-MCNC: 7.5 GM/DL — SIGNIFICANT CHANGE UP (ref 6–8.3)
PROT UR-MCNC: 30 MG/DL
PROT UR-MCNC: 30 MG/DL
PROT UR-MCNC: SIGNIFICANT CHANGE UP MG/DL
PROTHROM AB SERPL-ACNC: 15.6 SEC — HIGH (ref 10.6–13.6)
PROTHROM AB SERPL-ACNC: 16.1 SEC — HIGH (ref 10.6–13.6)
PROTHROM AB SERPL-ACNC: 19.4 SEC — HIGH (ref 10.6–13.6)
PROTHROM AB SERPL-ACNC: 21 SEC — HIGH (ref 10.6–13.6)
RBC # BLD: 3.23 M/UL — LOW (ref 3.8–5.2)
RBC # BLD: 3.3 M/UL — LOW (ref 3.8–5.2)
RBC # BLD: 3.33 M/UL — LOW (ref 3.8–5.2)
RBC # BLD: 3.34 M/UL — LOW (ref 3.8–5.2)
RBC # BLD: 3.34 M/UL — LOW (ref 3.8–5.2)
RBC # BLD: 3.41 M/UL — LOW (ref 3.8–5.2)
RBC # BLD: 3.42 M/UL — LOW (ref 3.8–5.2)
RBC # BLD: 3.48 M/UL — LOW (ref 3.8–5.2)
RBC # BLD: 3.61 M/UL — LOW (ref 3.8–5.2)
RBC # BLD: 3.66 M/UL — LOW (ref 3.8–5.2)
RBC # BLD: 3.67 M/UL — LOW (ref 3.8–5.2)
RBC # BLD: 3.68 M/UL — LOW (ref 3.8–5.2)
RBC # BLD: 3.73 M/UL — LOW (ref 3.8–5.2)
RBC # BLD: 3.75 M/UL — LOW (ref 3.8–5.2)
RBC # BLD: 3.81 M/UL — SIGNIFICANT CHANGE UP (ref 3.8–5.2)
RBC # BLD: 3.84 M/UL — SIGNIFICANT CHANGE UP (ref 3.8–5.2)
RBC # BLD: 3.88 M/UL — SIGNIFICANT CHANGE UP (ref 3.8–5.2)
RBC # BLD: 3.92 M/UL — SIGNIFICANT CHANGE UP (ref 3.8–5.2)
RBC # BLD: 3.94 M/UL — SIGNIFICANT CHANGE UP (ref 3.8–5.2)
RBC # BLD: 4.08 M/UL — SIGNIFICANT CHANGE UP (ref 3.8–5.2)
RBC # BLD: 4.08 M/UL — SIGNIFICANT CHANGE UP (ref 3.8–5.2)
RBC # BLD: 4.35 M/UL — SIGNIFICANT CHANGE UP (ref 3.8–5.2)
RBC # BLD: 4.55 M/UL — SIGNIFICANT CHANGE UP (ref 3.8–5.2)
RBC # BLD: 4.63 M/UL — SIGNIFICANT CHANGE UP (ref 3.8–5.2)
RBC # FLD: 12.8 % — SIGNIFICANT CHANGE UP (ref 10.3–14.5)
RBC # FLD: 12.9 % — SIGNIFICANT CHANGE UP (ref 10.3–14.5)
RBC # FLD: 12.9 % — SIGNIFICANT CHANGE UP (ref 10.3–14.5)
RBC # FLD: 13 % — SIGNIFICANT CHANGE UP (ref 10.3–14.5)
RBC # FLD: 14.6 % — HIGH (ref 10.3–14.5)
RBC # FLD: 14.6 % — HIGH (ref 10.3–14.5)
RBC # FLD: 14.9 % — HIGH (ref 10.3–14.5)
RBC # FLD: 14.9 % — HIGH (ref 10.3–14.5)
RBC # FLD: 15 % — HIGH (ref 10.3–14.5)
RBC # FLD: 15.1 % — HIGH (ref 10.3–14.5)
RBC # FLD: 15.4 % — HIGH (ref 10.3–14.5)
RBC # FLD: 15.4 % — HIGH (ref 10.3–14.5)
RBC # FLD: 15.5 % — HIGH (ref 10.3–14.5)
RBC # FLD: 15.6 % — HIGH (ref 10.3–14.5)
RBC # FLD: 15.7 % — HIGH (ref 10.3–14.5)
RBC # FLD: 15.8 % — HIGH (ref 10.3–14.5)
RBC # FLD: 16.2 % — HIGH (ref 10.3–14.5)
RBC # FLD: 16.2 % — HIGH (ref 10.3–14.5)
RCV VOL RI: HIGH /UL (ref 0–0)
SAO2 % BLDV: 88.9 % — SIGNIFICANT CHANGE UP
SARS-COV-2 IGG+IGM SERPL QL IA: 0.08 INDEX — SIGNIFICANT CHANGE UP
SARS-COV-2 IGG+IGM SERPL QL IA: 0.09 INDEX — SIGNIFICANT CHANGE UP
SARS-COV-2 IGG+IGM SERPL QL IA: 11.7 U/ML — HIGH
SARS-COV-2 IGG+IGM SERPL QL IA: 13.1 U/ML — HIGH
SARS-COV-2 IGG+IGM SERPL QL IA: NEGATIVE — SIGNIFICANT CHANGE UP
SARS-COV-2 IGG+IGM SERPL QL IA: NEGATIVE — SIGNIFICANT CHANGE UP
SARS-COV-2 IGG+IGM SERPL QL IA: POSITIVE
SARS-COV-2 IGG+IGM SERPL QL IA: POSITIVE
SARS-COV-2 RNA SPEC QL NAA+PROBE: SIGNIFICANT CHANGE UP
SODIUM SERPL-SCNC: 134 MMOL/L — LOW (ref 135–145)
SODIUM SERPL-SCNC: 135 MMOL/L — SIGNIFICANT CHANGE UP (ref 135–145)
SODIUM SERPL-SCNC: 136 MMOL/L — SIGNIFICANT CHANGE UP (ref 135–145)
SODIUM SERPL-SCNC: 137 MMOL/L — SIGNIFICANT CHANGE UP (ref 135–145)
SODIUM SERPL-SCNC: 137 MMOL/L — SIGNIFICANT CHANGE UP (ref 135–145)
SODIUM SERPL-SCNC: 138 MMOL/L — SIGNIFICANT CHANGE UP (ref 135–145)
SODIUM SERPL-SCNC: 139 MMOL/L — SIGNIFICANT CHANGE UP (ref 135–145)
SODIUM SERPL-SCNC: 140 MMOL/L — SIGNIFICANT CHANGE UP (ref 135–145)
SODIUM SERPL-SCNC: 141 MMOL/L — SIGNIFICANT CHANGE UP (ref 135–145)
SODIUM SERPL-SCNC: 142 MMOL/L — SIGNIFICANT CHANGE UP (ref 135–145)
SODIUM SERPL-SCNC: 143 MMOL/L — SIGNIFICANT CHANGE UP (ref 135–145)
SP GR SPEC: 1.02 — SIGNIFICANT CHANGE UP (ref 1.01–1.02)
SPECIMEN SOURCE: SIGNIFICANT CHANGE UP
TOTAL NUCLEATED CELL COUNT, BODY FLUID: 2939 /UL — SIGNIFICANT CHANGE UP
TROPONIN I, HIGH SENSITIVITY RESULT: 1217.5 NG/L — HIGH
TROPONIN I, HIGH SENSITIVITY RESULT: 1282.14 NG/L — HIGH
TROPONIN I, HIGH SENSITIVITY RESULT: 148.91 NG/L — HIGH
TROPONIN I, HIGH SENSITIVITY RESULT: 16.18 NG/L — SIGNIFICANT CHANGE UP
TROPONIN I, HIGH SENSITIVITY RESULT: 196.08 NG/L — HIGH
TROPONIN I, HIGH SENSITIVITY RESULT: 46.6 NG/L — SIGNIFICANT CHANGE UP
TUBE TYPE: SIGNIFICANT CHANGE UP
UROBILINOGEN FLD QL: 1
UROBILINOGEN FLD QL: 1 MG/DL
UROBILINOGEN FLD QL: NEGATIVE MG/DL — SIGNIFICANT CHANGE UP
WBC # BLD: 10.55 K/UL — HIGH (ref 3.8–10.5)
WBC # BLD: 11.22 K/UL — HIGH (ref 3.8–10.5)
WBC # BLD: 11.76 K/UL — HIGH (ref 3.8–10.5)
WBC # BLD: 11.98 K/UL — HIGH (ref 3.8–10.5)
WBC # BLD: 12.33 K/UL — HIGH (ref 3.8–10.5)
WBC # BLD: 12.42 K/UL — HIGH (ref 3.8–10.5)
WBC # BLD: 13.07 K/UL — HIGH (ref 3.8–10.5)
WBC # BLD: 13.6 K/UL — HIGH (ref 3.8–10.5)
WBC # BLD: 13.76 K/UL — HIGH (ref 3.8–10.5)
WBC # BLD: 13.78 K/UL — HIGH (ref 3.8–10.5)
WBC # BLD: 14.45 K/UL — HIGH (ref 3.8–10.5)
WBC # BLD: 14.54 K/UL — HIGH (ref 3.8–10.5)
WBC # BLD: 15.25 K/UL — HIGH (ref 3.8–10.5)
WBC # BLD: 15.32 K/UL — HIGH (ref 3.8–10.5)
WBC # BLD: 15.52 K/UL — HIGH (ref 3.8–10.5)
WBC # BLD: 16.88 K/UL — HIGH (ref 3.8–10.5)
WBC # BLD: 17.86 K/UL — HIGH (ref 3.8–10.5)
WBC # BLD: 18.5 K/UL — HIGH (ref 3.8–10.5)
WBC # BLD: 20.47 K/UL — HIGH (ref 3.8–10.5)
WBC # BLD: 20.91 K/UL — HIGH (ref 3.8–10.5)
WBC # BLD: 8.25 K/UL — SIGNIFICANT CHANGE UP (ref 3.8–10.5)
WBC # BLD: 8.91 K/UL — SIGNIFICANT CHANGE UP (ref 3.8–10.5)
WBC # BLD: 9.05 K/UL — SIGNIFICANT CHANGE UP (ref 3.8–10.5)
WBC # BLD: 9.33 K/UL — SIGNIFICANT CHANGE UP (ref 3.8–10.5)
WBC # FLD AUTO: 10.55 K/UL — HIGH (ref 3.8–10.5)
WBC # FLD AUTO: 11.22 K/UL — HIGH (ref 3.8–10.5)
WBC # FLD AUTO: 11.76 K/UL — HIGH (ref 3.8–10.5)
WBC # FLD AUTO: 11.98 K/UL — HIGH (ref 3.8–10.5)
WBC # FLD AUTO: 12.33 K/UL — HIGH (ref 3.8–10.5)
WBC # FLD AUTO: 12.42 K/UL — HIGH (ref 3.8–10.5)
WBC # FLD AUTO: 13.07 K/UL — HIGH (ref 3.8–10.5)
WBC # FLD AUTO: 13.6 K/UL — HIGH (ref 3.8–10.5)
WBC # FLD AUTO: 13.76 K/UL — HIGH (ref 3.8–10.5)
WBC # FLD AUTO: 13.78 K/UL — HIGH (ref 3.8–10.5)
WBC # FLD AUTO: 14.45 K/UL — HIGH (ref 3.8–10.5)
WBC # FLD AUTO: 14.54 K/UL — HIGH (ref 3.8–10.5)
WBC # FLD AUTO: 15.25 K/UL — HIGH (ref 3.8–10.5)
WBC # FLD AUTO: 15.32 K/UL — HIGH (ref 3.8–10.5)
WBC # FLD AUTO: 15.52 K/UL — HIGH (ref 3.8–10.5)
WBC # FLD AUTO: 16.88 K/UL — HIGH (ref 3.8–10.5)
WBC # FLD AUTO: 17.86 K/UL — HIGH (ref 3.8–10.5)
WBC # FLD AUTO: 18.5 K/UL — HIGH (ref 3.8–10.5)
WBC # FLD AUTO: 20.47 K/UL — HIGH (ref 3.8–10.5)
WBC # FLD AUTO: 20.91 K/UL — HIGH (ref 3.8–10.5)
WBC # FLD AUTO: 8.25 K/UL — SIGNIFICANT CHANGE UP (ref 3.8–10.5)
WBC # FLD AUTO: 8.91 K/UL — SIGNIFICANT CHANGE UP (ref 3.8–10.5)
WBC # FLD AUTO: 9.05 K/UL — SIGNIFICANT CHANGE UP (ref 3.8–10.5)
WBC # FLD AUTO: 9.33 K/UL — SIGNIFICANT CHANGE UP (ref 3.8–10.5)

## 2021-01-01 PROCEDURE — 99232 SBSQ HOSP IP/OBS MODERATE 35: CPT

## 2021-01-01 PROCEDURE — 71045 X-RAY EXAM CHEST 1 VIEW: CPT | Mod: 26

## 2021-01-01 PROCEDURE — 83605 ASSAY OF LACTIC ACID: CPT

## 2021-01-01 PROCEDURE — 99233 SBSQ HOSP IP/OBS HIGH 50: CPT

## 2021-01-01 PROCEDURE — 93005 ELECTROCARDIOGRAM TRACING: CPT

## 2021-01-01 PROCEDURE — U0005: CPT

## 2021-01-01 PROCEDURE — 81001 URINALYSIS AUTO W/SCOPE: CPT

## 2021-01-01 PROCEDURE — 99498 ADVNCD CARE PLAN ADDL 30 MIN: CPT

## 2021-01-01 PROCEDURE — 85027 COMPLETE CBC AUTOMATED: CPT

## 2021-01-01 PROCEDURE — 92526 ORAL FUNCTION THERAPY: CPT | Mod: GN

## 2021-01-01 PROCEDURE — 93010 ELECTROCARDIOGRAM REPORT: CPT

## 2021-01-01 PROCEDURE — 99497 ADVNCD CARE PLAN 30 MIN: CPT

## 2021-01-01 PROCEDURE — U0003: CPT

## 2021-01-01 PROCEDURE — 99231 SBSQ HOSP IP/OBS SF/LOW 25: CPT

## 2021-01-01 PROCEDURE — 80162 ASSAY OF DIGOXIN TOTAL: CPT

## 2021-01-01 PROCEDURE — 76770 US EXAM ABDO BACK WALL COMP: CPT | Mod: 26

## 2021-01-01 PROCEDURE — 70450 CT HEAD/BRAIN W/O DYE: CPT | Mod: MA

## 2021-01-01 PROCEDURE — 80048 BASIC METABOLIC PNL TOTAL CA: CPT

## 2021-01-01 PROCEDURE — 93296 REM INTERROG EVL PM/IDS: CPT

## 2021-01-01 PROCEDURE — 86769 SARS-COV-2 COVID-19 ANTIBODY: CPT

## 2021-01-01 PROCEDURE — 93308 TTE F-UP OR LMTD: CPT

## 2021-01-01 PROCEDURE — 74176 CT ABD & PELVIS W/O CONTRAST: CPT | Mod: 26

## 2021-01-01 PROCEDURE — 93294 REM INTERROG EVL PM/LDLS PM: CPT

## 2021-01-01 PROCEDURE — 97116 GAIT TRAINING THERAPY: CPT | Mod: GP

## 2021-01-01 PROCEDURE — 74176 CT ABD & PELVIS W/O CONTRAST: CPT

## 2021-01-01 PROCEDURE — 99291 CRITICAL CARE FIRST HOUR: CPT

## 2021-01-01 PROCEDURE — 99221 1ST HOSP IP/OBS SF/LOW 40: CPT

## 2021-01-01 PROCEDURE — 32557 INSERT CATH PLEURA W/ IMAGE: CPT

## 2021-01-01 PROCEDURE — 93306 TTE W/DOPPLER COMPLETE: CPT

## 2021-01-01 PROCEDURE — 36415 COLL VENOUS BLD VENIPUNCTURE: CPT

## 2021-01-01 PROCEDURE — 84484 ASSAY OF TROPONIN QUANT: CPT

## 2021-01-01 PROCEDURE — 90715 TDAP VACCINE 7 YRS/> IM: CPT

## 2021-01-01 PROCEDURE — 87070 CULTURE OTHR SPECIMN AEROBIC: CPT

## 2021-01-01 PROCEDURE — 92523 SPEECH SOUND LANG COMPREHEN: CPT | Mod: GN

## 2021-01-01 PROCEDURE — 89051 BODY FLUID CELL COUNT: CPT

## 2021-01-01 PROCEDURE — G1004: CPT

## 2021-01-01 PROCEDURE — 97530 THERAPEUTIC ACTIVITIES: CPT | Mod: GP

## 2021-01-01 PROCEDURE — 70450 CT HEAD/BRAIN W/O DYE: CPT | Mod: 26

## 2021-01-01 PROCEDURE — 70450 CT HEAD/BRAIN W/O DYE: CPT | Mod: 26,ME

## 2021-01-01 PROCEDURE — 71250 CT THORAX DX C-: CPT

## 2021-01-01 PROCEDURE — 88108 CYTOPATH CONCENTRATE TECH: CPT

## 2021-01-01 PROCEDURE — 70450 CT HEAD/BRAIN W/O DYE: CPT

## 2021-01-01 PROCEDURE — 80053 COMPREHEN METABOLIC PANEL: CPT

## 2021-01-01 PROCEDURE — 99285 EMERGENCY DEPT VISIT HI MDM: CPT | Mod: CS

## 2021-01-01 PROCEDURE — 99223 1ST HOSP IP/OBS HIGH 75: CPT

## 2021-01-01 PROCEDURE — 83735 ASSAY OF MAGNESIUM: CPT

## 2021-01-01 PROCEDURE — 85025 COMPLETE CBC W/AUTO DIFF WBC: CPT

## 2021-01-01 PROCEDURE — 99222 1ST HOSP IP/OBS MODERATE 55: CPT

## 2021-01-01 PROCEDURE — 87086 URINE CULTURE/COLONY COUNT: CPT

## 2021-01-01 PROCEDURE — 84155 ASSAY OF PROTEIN SERUM: CPT

## 2021-01-01 PROCEDURE — 99291 CRITICAL CARE FIRST HOUR: CPT | Mod: CS

## 2021-01-01 PROCEDURE — 84100 ASSAY OF PHOSPHORUS: CPT

## 2021-01-01 PROCEDURE — 74176 CT ABD & PELVIS W/O CONTRAST: CPT | Mod: 26,MG

## 2021-01-01 PROCEDURE — 71045 X-RAY EXAM CHEST 1 VIEW: CPT

## 2021-01-01 PROCEDURE — 93010 ELECTROCARDIOGRAM REPORT: CPT | Mod: 76

## 2021-01-01 PROCEDURE — 84157 ASSAY OF PROTEIN OTHER: CPT

## 2021-01-01 PROCEDURE — 93970 EXTREMITY STUDY: CPT

## 2021-01-01 PROCEDURE — 88108 CYTOPATH CONCENTRATE TECH: CPT | Mod: 26

## 2021-01-01 PROCEDURE — 83880 ASSAY OF NATRIURETIC PEPTIDE: CPT

## 2021-01-01 PROCEDURE — 85610 PROTHROMBIN TIME: CPT

## 2021-01-01 PROCEDURE — 87075 CULTR BACTERIA EXCEPT BLOOD: CPT

## 2021-01-01 PROCEDURE — 87493 C DIFF AMPLIFIED PROBE: CPT

## 2021-01-01 PROCEDURE — 84145 PROCALCITONIN (PCT): CPT

## 2021-01-01 PROCEDURE — 99233 SBSQ HOSP IP/OBS HIGH 50: CPT | Mod: 25

## 2021-01-01 PROCEDURE — 99213 OFFICE O/P EST LOW 20 MIN: CPT

## 2021-01-01 PROCEDURE — 83615 LACTATE (LD) (LDH) ENZYME: CPT

## 2021-01-01 PROCEDURE — 93970 EXTREMITY STUDY: CPT | Mod: 26

## 2021-01-01 PROCEDURE — 97161 PT EVAL LOW COMPLEX 20 MIN: CPT | Mod: GP

## 2021-01-01 PROCEDURE — 92610 EVALUATE SWALLOWING FUNCTION: CPT | Mod: GN

## 2021-01-01 PROCEDURE — 82040 ASSAY OF SERUM ALBUMIN: CPT

## 2021-01-01 PROCEDURE — 71250 CT THORAX DX C-: CPT | Mod: 26

## 2021-01-01 PROCEDURE — 93308 TTE F-UP OR LMTD: CPT | Mod: 26

## 2021-01-01 PROCEDURE — 93280 PM DEVICE PROGR EVAL DUAL: CPT | Mod: 26

## 2021-01-01 PROCEDURE — 76770 US EXAM ABDO BACK WALL COMP: CPT

## 2021-01-01 PROCEDURE — 83986 ASSAY PH BODY FLUID NOS: CPT

## 2021-01-01 PROCEDURE — 87040 BLOOD CULTURE FOR BACTERIA: CPT

## 2021-01-01 PROCEDURE — 73030 X-RAY EXAM OF SHOULDER: CPT | Mod: 26,LT

## 2021-01-01 PROCEDURE — 73000 X-RAY EXAM OF COLLAR BONE: CPT | Mod: 26,LT

## 2021-01-01 PROCEDURE — 73060 X-RAY EXAM OF HUMERUS: CPT | Mod: 26,LT

## 2021-01-01 PROCEDURE — 93306 TTE W/DOPPLER COMPLETE: CPT | Mod: 26

## 2021-01-01 PROCEDURE — 71250 CT THORAX DX C-: CPT | Mod: 26,MG

## 2021-01-01 PROCEDURE — 87507 IADNA-DNA/RNA PROBE TQ 12-25: CPT

## 2021-01-01 PROCEDURE — 72125 CT NECK SPINE W/O DYE: CPT | Mod: 26,MG

## 2021-01-01 PROCEDURE — 73590 X-RAY EXAM OF LOWER LEG: CPT | Mod: 26,LT

## 2021-01-01 PROCEDURE — 97162 PT EVAL MOD COMPLEX 30 MIN: CPT | Mod: GP

## 2021-01-01 PROCEDURE — 99239 HOSP IP/OBS DSCHRG MGMT >30: CPT

## 2021-01-01 PROCEDURE — 82042 OTHER SOURCE ALBUMIN QUAN EA: CPT

## 2021-01-01 PROCEDURE — 92507 TX SP LANG VOICE COMM INDIV: CPT | Mod: GN

## 2021-01-01 PROCEDURE — 99292 CRITICAL CARE ADDL 30 MIN: CPT

## 2021-01-01 PROCEDURE — 70450 CT HEAD/BRAIN W/O DYE: CPT | Mod: 26,MG,77

## 2021-01-01 PROCEDURE — 82945 GLUCOSE OTHER FLUID: CPT

## 2021-01-01 PROCEDURE — 87102 FUNGUS ISOLATION CULTURE: CPT

## 2021-01-01 RX ORDER — SODIUM CHLORIDE 9 MG/ML
1000 INJECTION, SOLUTION INTRAVENOUS ONCE
Refills: 0 | Status: COMPLETED | OUTPATIENT
Start: 2021-01-01 | End: 2021-01-01

## 2021-01-01 RX ORDER — METOPROLOL TARTRATE 50 MG
100 TABLET ORAL DAILY
Refills: 0 | Status: DISCONTINUED | OUTPATIENT
Start: 2021-01-01 | End: 2021-01-01

## 2021-01-01 RX ORDER — SPIRONOLACTONE 25 MG/1
12.5 TABLET, FILM COATED ORAL DAILY
Refills: 0 | Status: DISCONTINUED | OUTPATIENT
Start: 2021-01-01 | End: 2021-01-01

## 2021-01-01 RX ORDER — FUROSEMIDE 40 MG
40 TABLET ORAL EVERY 12 HOURS
Refills: 0 | Status: DISCONTINUED | OUTPATIENT
Start: 2021-01-01 | End: 2021-01-01

## 2021-01-01 RX ORDER — VANCOMYCIN HCL 1 G
750 VIAL (EA) INTRAVENOUS ONCE
Refills: 0 | Status: COMPLETED | OUTPATIENT
Start: 2021-01-01 | End: 2021-01-01

## 2021-01-01 RX ORDER — ACETAMINOPHEN 500 MG
650 TABLET ORAL ONCE
Refills: 0 | Status: COMPLETED | OUTPATIENT
Start: 2021-01-01 | End: 2021-01-01

## 2021-01-01 RX ORDER — CEFTRIAXONE 500 MG/1
1000 INJECTION, POWDER, FOR SOLUTION INTRAMUSCULAR; INTRAVENOUS ONCE
Refills: 0 | Status: COMPLETED | OUTPATIENT
Start: 2021-01-01 | End: 2021-01-01

## 2021-01-01 RX ORDER — MAGNESIUM OXIDE 400 MG ORAL TABLET 241.3 MG
1 TABLET ORAL
Qty: 0 | Refills: 0 | DISCHARGE
Start: 2021-01-01

## 2021-01-01 RX ORDER — OXYBUTYNIN CHLORIDE 5 MG
5 TABLET ORAL DAILY
Refills: 0 | Status: DISCONTINUED | OUTPATIENT
Start: 2021-01-01 | End: 2021-01-01

## 2021-01-01 RX ORDER — FUROSEMIDE 40 MG
1 TABLET ORAL
Qty: 0 | Refills: 0 | DISCHARGE

## 2021-01-01 RX ORDER — ACETAMINOPHEN 500 MG
650 TABLET ORAL EVERY 6 HOURS
Refills: 0 | Status: DISCONTINUED | OUTPATIENT
Start: 2021-01-01 | End: 2021-01-01

## 2021-01-01 RX ORDER — DILTIAZEM HCL 120 MG
120 CAPSULE, EXT RELEASE 24 HR ORAL DAILY
Refills: 0 | Status: DISCONTINUED | OUTPATIENT
Start: 2021-01-01 | End: 2021-01-01

## 2021-01-01 RX ORDER — POTASSIUM CHLORIDE 20 MEQ
10 PACKET (EA) ORAL
Refills: 0 | Status: COMPLETED | OUTPATIENT
Start: 2021-01-01 | End: 2021-01-01

## 2021-01-01 RX ORDER — NYSTATIN AND TRIAMCINOLONE ACETONIDE 100000; 1 [USP'U]/G; MG/G
100000-0.1 OINTMENT TOPICAL TWICE DAILY
Qty: 1 | Refills: 2 | Status: ACTIVE | COMMUNITY
Start: 2021-01-01 | End: 1900-01-01

## 2021-01-01 RX ORDER — APIXABAN 2.5 MG/1
1 TABLET, FILM COATED ORAL
Qty: 0 | Refills: 0 | DISCHARGE
Start: 2021-01-01

## 2021-01-01 RX ORDER — CEFTRIAXONE 500 MG/1
1000 INJECTION, POWDER, FOR SOLUTION INTRAMUSCULAR; INTRAVENOUS EVERY 24 HOURS
Refills: 0 | Status: DISCONTINUED | OUTPATIENT
Start: 2021-01-01 | End: 2021-01-01

## 2021-01-01 RX ORDER — ACETAMINOPHEN 500 MG
975 TABLET ORAL ONCE
Refills: 0 | Status: COMPLETED | OUTPATIENT
Start: 2021-01-01 | End: 2021-01-01

## 2021-01-01 RX ORDER — CHOLECALCIFEROL (VITAMIN D3) 125 MCG
1000 CAPSULE ORAL ONCE
Refills: 0 | Status: COMPLETED | OUTPATIENT
Start: 2021-01-01 | End: 2021-01-01

## 2021-01-01 RX ORDER — DILTIAZEM HCL 120 MG
60 CAPSULE, EXT RELEASE 24 HR ORAL EVERY 8 HOURS
Refills: 0 | Status: DISCONTINUED | OUTPATIENT
Start: 2021-01-01 | End: 2021-01-01

## 2021-01-01 RX ORDER — METOPROLOL TARTRATE 50 MG
50 TABLET ORAL DAILY
Refills: 0 | Status: DISCONTINUED | OUTPATIENT
Start: 2021-01-01 | End: 2021-01-01

## 2021-01-01 RX ORDER — OXYBUTYNIN CHLORIDE 5 MG
1 TABLET ORAL
Qty: 0 | Refills: 0 | DISCHARGE

## 2021-01-01 RX ORDER — APIXABAN 2.5 MG/1
2.5 TABLET, FILM COATED ORAL EVERY 12 HOURS
Refills: 0 | Status: DISCONTINUED | OUTPATIENT
Start: 2021-01-01 | End: 2021-01-01

## 2021-01-01 RX ORDER — DILTIAZEM HCL 120 MG
5 CAPSULE, EXT RELEASE 24 HR ORAL
Qty: 125 | Refills: 0 | Status: DISCONTINUED | OUTPATIENT
Start: 2021-01-01 | End: 2021-01-01

## 2021-01-01 RX ORDER — HYDROXYZINE HCL 10 MG
1 TABLET ORAL
Qty: 0 | Refills: 0 | DISCHARGE

## 2021-01-01 RX ORDER — SPIRONOLACTONE 25 MG/1
0.5 TABLET, FILM COATED ORAL
Qty: 0 | Refills: 0 | DISCHARGE

## 2021-01-01 RX ORDER — PIPERACILLIN AND TAZOBACTAM 4; .5 G/20ML; G/20ML
3.38 INJECTION, POWDER, LYOPHILIZED, FOR SOLUTION INTRAVENOUS EVERY 8 HOURS
Refills: 0 | Status: COMPLETED | OUTPATIENT
Start: 2021-01-01 | End: 2021-01-01

## 2021-01-01 RX ORDER — POLYETHYLENE GLYCOL 3350 17 G/17G
17 POWDER, FOR SOLUTION ORAL DAILY
Refills: 0 | Status: DISCONTINUED | OUTPATIENT
Start: 2021-01-01 | End: 2021-01-01

## 2021-01-01 RX ORDER — PREGABALIN 225 MG/1
1 CAPSULE ORAL
Qty: 0 | Refills: 0 | DISCHARGE

## 2021-01-01 RX ORDER — BACITRACIN ZINC 500 UNIT/G
1 OINTMENT IN PACKET (EA) TOPICAL
Qty: 0 | Refills: 0 | DISCHARGE
End: 2021-12-16

## 2021-01-01 RX ORDER — TRIAMCINOLONE ACETONIDE 1 MG/G
0.1 OINTMENT TOPICAL
Qty: 30 | Refills: 1 | Status: ACTIVE | COMMUNITY
Start: 2021-01-01 | End: 1900-01-01

## 2021-01-01 RX ORDER — ESCITALOPRAM OXALATE 10 MG/1
1 TABLET, FILM COATED ORAL
Qty: 0 | Refills: 0 | DISCHARGE

## 2021-01-01 RX ORDER — MAGNESIUM HYDROXIDE 400 MG/1
30 TABLET, CHEWABLE ORAL AT BEDTIME
Refills: 0 | Status: DISCONTINUED | OUTPATIENT
Start: 2021-01-01 | End: 2021-01-01

## 2021-01-01 RX ORDER — APIXABAN 2.5 MG/1
1 TABLET, FILM COATED ORAL
Qty: 0 | Refills: 0 | DISCHARGE

## 2021-01-01 RX ORDER — LIDOCAINE 4 G/100G
1 CREAM TOPICAL DAILY
Refills: 0 | Status: DISCONTINUED | OUTPATIENT
Start: 2021-01-01 | End: 2021-01-01

## 2021-01-01 RX ORDER — LANOLIN ALCOHOL/MO/W.PET/CERES
3 CREAM (GRAM) TOPICAL AT BEDTIME
Refills: 0 | Status: DISCONTINUED | OUTPATIENT
Start: 2021-01-01 | End: 2021-01-01

## 2021-01-01 RX ORDER — FOLIC ACID 0.8 MG
1 TABLET ORAL DAILY
Refills: 0 | Status: DISCONTINUED | OUTPATIENT
Start: 2021-01-01 | End: 2021-01-01

## 2021-01-01 RX ORDER — SODIUM CHLORIDE 9 MG/ML
500 INJECTION INTRAMUSCULAR; INTRAVENOUS; SUBCUTANEOUS ONCE
Refills: 0 | Status: DISCONTINUED | OUTPATIENT
Start: 2021-01-01 | End: 2021-01-01

## 2021-01-01 RX ORDER — FOLIC ACID 0.8 MG
1 TABLET ORAL
Qty: 0 | Refills: 0 | DISCHARGE

## 2021-01-01 RX ORDER — MULTIVIT-MIN/FERROUS GLUCONATE 9 MG/15 ML
1 LIQUID (ML) ORAL
Qty: 0 | Refills: 0 | DISCHARGE

## 2021-01-01 RX ORDER — CEFTRIAXONE 500 MG/1
1000 INJECTION, POWDER, FOR SOLUTION INTRAMUSCULAR; INTRAVENOUS EVERY 24 HOURS
Refills: 0 | Status: COMPLETED | OUTPATIENT
Start: 2021-01-01 | End: 2021-01-01

## 2021-01-01 RX ORDER — FUROSEMIDE 40 MG
80 TABLET ORAL DAILY
Refills: 0 | Status: DISCONTINUED | OUTPATIENT
Start: 2021-01-01 | End: 2021-01-01

## 2021-01-01 RX ORDER — SPIRONOLACTONE 25 MG/1
25 TABLET, FILM COATED ORAL DAILY
Refills: 0 | Status: DISCONTINUED | OUTPATIENT
Start: 2021-01-01 | End: 2021-01-01

## 2021-01-01 RX ORDER — ONDANSETRON 8 MG/1
1 TABLET, FILM COATED ORAL
Qty: 0 | Refills: 0 | DISCHARGE

## 2021-01-01 RX ORDER — SODIUM CHLORIDE 9 MG/ML
1700 INJECTION INTRAMUSCULAR; INTRAVENOUS; SUBCUTANEOUS ONCE
Refills: 0 | Status: COMPLETED | OUTPATIENT
Start: 2021-01-01 | End: 2021-01-01

## 2021-01-01 RX ORDER — ASPIRIN/CALCIUM CARB/MAGNESIUM 324 MG
81 TABLET ORAL DAILY
Refills: 0 | Status: DISCONTINUED | OUTPATIENT
Start: 2021-01-01 | End: 2021-01-01

## 2021-01-01 RX ORDER — DIGOXIN 250 MCG
250 TABLET ORAL DAILY
Refills: 0 | Status: DISCONTINUED | OUTPATIENT
Start: 2021-01-01 | End: 2021-01-01

## 2021-01-01 RX ORDER — LANOLIN/MINERAL OIL
1 LOTION (ML) TOPICAL
Qty: 0 | Refills: 0 | DISCHARGE

## 2021-01-01 RX ORDER — NYSTATIN 100000 U/G
100000 OINTMENT TOPICAL 3 TIMES DAILY
Qty: 1 | Refills: 1 | Status: ACTIVE | COMMUNITY
Start: 2021-01-01 | End: 1900-01-01

## 2021-01-01 RX ORDER — ZINC OXIDE 200 MG/G
1 OINTMENT TOPICAL
Refills: 0 | Status: DISCONTINUED | OUTPATIENT
Start: 2021-01-01 | End: 2021-01-01

## 2021-01-01 RX ORDER — FOLIC ACID 0.8 MG
1 TABLET ORAL
Qty: 0 | Refills: 0 | DISCHARGE
Start: 2021-01-01

## 2021-01-01 RX ORDER — FUROSEMIDE 40 MG
1 TABLET ORAL
Qty: 0 | Refills: 0 | DISCHARGE
Start: 2021-01-01

## 2021-01-01 RX ORDER — MORPHINE SULFATE 50 MG/1
1 CAPSULE, EXTENDED RELEASE ORAL
Refills: 0 | Status: DISCONTINUED | OUTPATIENT
Start: 2021-01-01 | End: 2021-01-01

## 2021-01-01 RX ORDER — PIPERACILLIN AND TAZOBACTAM 4; .5 G/20ML; G/20ML
3.38 INJECTION, POWDER, LYOPHILIZED, FOR SOLUTION INTRAVENOUS EVERY 12 HOURS
Refills: 0 | Status: DISCONTINUED | OUTPATIENT
Start: 2021-01-01 | End: 2021-01-01

## 2021-01-01 RX ORDER — ACETAMINOPHEN 500 MG
2 TABLET ORAL
Qty: 0 | Refills: 0 | DISCHARGE

## 2021-01-01 RX ORDER — LIDOCAINE 4 G/100G
1 CREAM TOPICAL
Qty: 0 | Refills: 0 | DISCHARGE
Start: 2021-01-01

## 2021-01-01 RX ORDER — ASPIRIN/CALCIUM CARB/MAGNESIUM 324 MG
300 TABLET ORAL ONCE
Refills: 0 | Status: COMPLETED | OUTPATIENT
Start: 2021-01-01 | End: 2021-01-01

## 2021-01-01 RX ORDER — FUROSEMIDE 40 MG
40 TABLET ORAL DAILY
Refills: 0 | Status: DISCONTINUED | OUTPATIENT
Start: 2021-01-01 | End: 2021-01-01

## 2021-01-01 RX ORDER — SENNA PLUS 8.6 MG/1
2 TABLET ORAL ONCE
Refills: 0 | Status: COMPLETED | OUTPATIENT
Start: 2021-01-01 | End: 2021-01-01

## 2021-01-01 RX ORDER — PIPERACILLIN AND TAZOBACTAM 4; .5 G/20ML; G/20ML
3.38 INJECTION, POWDER, LYOPHILIZED, FOR SOLUTION INTRAVENOUS ONCE
Refills: 0 | Status: COMPLETED | OUTPATIENT
Start: 2021-01-01 | End: 2021-01-01

## 2021-01-01 RX ORDER — PREGABALIN 225 MG/1
1000 CAPSULE ORAL DAILY
Refills: 0 | Status: DISCONTINUED | OUTPATIENT
Start: 2021-01-01 | End: 2021-01-01

## 2021-01-01 RX ORDER — DILTIAZEM HCL 120 MG
10 CAPSULE, EXT RELEASE 24 HR ORAL ONCE
Refills: 0 | Status: COMPLETED | OUTPATIENT
Start: 2021-01-01 | End: 2021-01-01

## 2021-01-01 RX ORDER — ASPIRIN/CALCIUM CARB/MAGNESIUM 324 MG
1 TABLET ORAL
Qty: 0 | Refills: 0 | DISCHARGE
Start: 2021-01-01

## 2021-01-01 RX ORDER — MORPHINE SULFATE 50 MG/1
2 CAPSULE, EXTENDED RELEASE ORAL
Refills: 0 | Status: DISCONTINUED | OUTPATIENT
Start: 2021-01-01 | End: 2021-01-01

## 2021-01-01 RX ORDER — SODIUM CHLORIDE 9 MG/ML
1000 INJECTION, SOLUTION INTRAVENOUS
Refills: 0 | Status: DISCONTINUED | OUTPATIENT
Start: 2021-01-01 | End: 2021-01-01

## 2021-01-01 RX ORDER — ESCITALOPRAM OXALATE 10 MG/1
10 TABLET, FILM COATED ORAL AT BEDTIME
Refills: 0 | Status: DISCONTINUED | OUTPATIENT
Start: 2021-01-01 | End: 2021-01-01

## 2021-01-01 RX ORDER — PREGABALIN 225 MG/1
1000 CAPSULE ORAL ONCE
Refills: 0 | Status: COMPLETED | OUTPATIENT
Start: 2021-01-01 | End: 2021-01-01

## 2021-01-01 RX ORDER — DILTIAZEM HCL 120 MG
60 CAPSULE, EXT RELEASE 24 HR ORAL ONCE
Refills: 0 | Status: COMPLETED | OUTPATIENT
Start: 2021-01-01 | End: 2021-01-01

## 2021-01-01 RX ORDER — INFLUENZA VIRUS VACCINE 15; 15; 15; 15 UG/.5ML; UG/.5ML; UG/.5ML; UG/.5ML
0.7 SUSPENSION INTRAMUSCULAR ONCE
Refills: 0 | Status: DISCONTINUED | OUTPATIENT
Start: 2021-01-01 | End: 2021-01-01

## 2021-01-01 RX ORDER — SODIUM CHLORIDE 9 MG/ML
2000 INJECTION INTRAMUSCULAR; INTRAVENOUS; SUBCUTANEOUS ONCE
Refills: 0 | Status: COMPLETED | OUTPATIENT
Start: 2021-01-01 | End: 2021-01-01

## 2021-01-01 RX ORDER — ONDANSETRON 8 MG/1
4 TABLET, FILM COATED ORAL EVERY 8 HOURS
Refills: 0 | Status: DISCONTINUED | OUTPATIENT
Start: 2021-01-01 | End: 2021-01-01

## 2021-01-01 RX ORDER — HYDROXYZINE HCL 10 MG
25 TABLET ORAL DAILY
Refills: 0 | Status: DISCONTINUED | OUTPATIENT
Start: 2021-01-01 | End: 2021-01-01

## 2021-01-01 RX ORDER — ACETAMINOPHEN 500 MG
2 TABLET ORAL
Qty: 0 | Refills: 0 | DISCHARGE
Start: 2021-01-01 | End: 2021-12-22

## 2021-01-01 RX ORDER — NITROFURANTOIN MACROCRYSTAL 50 MG
100 CAPSULE ORAL
Refills: 0 | Status: DISCONTINUED | OUTPATIENT
Start: 2021-01-01 | End: 2021-01-01

## 2021-01-01 RX ORDER — ACETAMINOPHEN 500 MG
1000 TABLET ORAL ONCE
Refills: 0 | Status: DISCONTINUED | OUTPATIENT
Start: 2021-01-01 | End: 2021-01-01

## 2021-01-01 RX ORDER — TETANUS TOXOID, REDUCED DIPHTHERIA TOXOID AND ACELLULAR PERTUSSIS VACCINE, ADSORBED 5; 2.5; 8; 8; 2.5 [IU]/.5ML; [IU]/.5ML; UG/.5ML; UG/.5ML; UG/.5ML
0.5 SUSPENSION INTRAMUSCULAR ONCE
Refills: 0 | Status: COMPLETED | OUTPATIENT
Start: 2021-01-01 | End: 2021-01-01

## 2021-01-01 RX ORDER — SODIUM CHLORIDE 9 MG/ML
1000 INJECTION INTRAMUSCULAR; INTRAVENOUS; SUBCUTANEOUS
Refills: 0 | Status: DISCONTINUED | OUTPATIENT
Start: 2021-01-01 | End: 2021-01-01

## 2021-01-01 RX ORDER — SPIRONOLACTONE 25 MG/1
50 TABLET, FILM COATED ORAL DAILY
Refills: 0 | Status: DISCONTINUED | OUTPATIENT
Start: 2021-01-01 | End: 2021-01-01

## 2021-01-01 RX ORDER — HEPARIN SODIUM 5000 [USP'U]/ML
5000 INJECTION INTRAVENOUS; SUBCUTANEOUS EVERY 12 HOURS
Refills: 0 | Status: DISCONTINUED | OUTPATIENT
Start: 2021-01-01 | End: 2021-01-01

## 2021-01-01 RX ORDER — MICONAZOLE NITRATE 2 %
1 CREAM (GRAM) TOPICAL
Qty: 0 | Refills: 0 | DISCHARGE

## 2021-01-01 RX ORDER — POTASSIUM CHLORIDE 20 MEQ
40 PACKET (EA) ORAL EVERY 4 HOURS
Refills: 0 | Status: DISCONTINUED | OUTPATIENT
Start: 2021-01-01 | End: 2021-01-01

## 2021-01-01 RX ORDER — VANCOMYCIN HCL 1 G
1000 VIAL (EA) INTRAVENOUS ONCE
Refills: 0 | Status: COMPLETED | OUTPATIENT
Start: 2021-01-01 | End: 2021-01-01

## 2021-01-01 RX ORDER — DIGOXIN 250 MCG
125 TABLET ORAL EVERY 8 HOURS
Refills: 0 | Status: COMPLETED | OUTPATIENT
Start: 2021-01-01 | End: 2021-01-01

## 2021-01-01 RX ORDER — SPIRONOLACTONE 25 MG/1
1 TABLET, FILM COATED ORAL
Qty: 0 | Refills: 0 | DISCHARGE

## 2021-01-01 RX ORDER — MORPHINE SULFATE 50 MG/1
1 CAPSULE, EXTENDED RELEASE ORAL
Qty: 100 | Refills: 0 | Status: DISCONTINUED | OUTPATIENT
Start: 2021-01-01 | End: 2021-01-01

## 2021-01-01 RX ORDER — CHOLECALCIFEROL (VITAMIN D3) 125 MCG
1 CAPSULE ORAL
Qty: 0 | Refills: 0 | DISCHARGE

## 2021-01-01 RX ORDER — HEPARIN SODIUM 5000 [USP'U]/ML
5000 INJECTION INTRAVENOUS; SUBCUTANEOUS EVERY 8 HOURS
Refills: 0 | Status: DISCONTINUED | OUTPATIENT
Start: 2021-01-01 | End: 2021-01-01

## 2021-01-01 RX ORDER — DILTIAZEM HCL 120 MG
1 CAPSULE, EXT RELEASE 24 HR ORAL
Qty: 0 | Refills: 0 | DISCHARGE

## 2021-01-01 RX ORDER — DIGOXIN 250 MCG
1 TABLET ORAL
Qty: 0 | Refills: 0 | DISCHARGE
Start: 2021-01-01

## 2021-01-01 RX ORDER — MAGNESIUM OXIDE 400 MG ORAL TABLET 241.3 MG
400 TABLET ORAL DAILY
Refills: 0 | Status: DISCONTINUED | OUTPATIENT
Start: 2021-01-01 | End: 2021-01-01

## 2021-01-01 RX ORDER — AZITHROMYCIN 500 MG/1
500 TABLET, FILM COATED ORAL ONCE
Refills: 0 | Status: COMPLETED | OUTPATIENT
Start: 2021-01-01 | End: 2021-01-01

## 2021-01-01 RX ORDER — POTASSIUM CHLORIDE 20 MEQ
40 PACKET (EA) ORAL ONCE
Refills: 0 | Status: COMPLETED | OUTPATIENT
Start: 2021-01-01 | End: 2021-01-01

## 2021-01-01 RX ORDER — PIPERACILLIN AND TAZOBACTAM 4; .5 G/20ML; G/20ML
3.38 INJECTION, POWDER, LYOPHILIZED, FOR SOLUTION INTRAVENOUS EVERY 8 HOURS
Refills: 0 | Status: DISCONTINUED | OUTPATIENT
Start: 2021-01-01 | End: 2021-01-01

## 2021-01-01 RX ORDER — MULTIVIT-MIN/FERROUS GLUCONATE 9 MG/15 ML
1 LIQUID (ML) ORAL DAILY
Refills: 0 | Status: DISCONTINUED | OUTPATIENT
Start: 2021-01-01 | End: 2021-01-01

## 2021-01-01 RX ORDER — POLYETHYLENE GLYCOL 3350 17 G/17G
17 POWDER, FOR SOLUTION ORAL
Qty: 0 | Refills: 0 | DISCHARGE
Start: 2021-01-01

## 2021-01-01 RX ORDER — MAGNESIUM HYDROXIDE 400 MG/1
30 TABLET, CHEWABLE ORAL
Qty: 0 | Refills: 0 | DISCHARGE

## 2021-01-01 RX ADMIN — Medication 1 TABLET(S): at 11:19

## 2021-01-01 RX ADMIN — SODIUM CHLORIDE 2000 MILLILITER(S): 9 INJECTION, SOLUTION INTRAVENOUS at 05:44

## 2021-01-01 RX ADMIN — PIPERACILLIN AND TAZOBACTAM 200 GRAM(S): 4; .5 INJECTION, POWDER, LYOPHILIZED, FOR SOLUTION INTRAVENOUS at 11:59

## 2021-01-01 RX ADMIN — APIXABAN 2.5 MILLIGRAM(S): 2.5 TABLET, FILM COATED ORAL at 21:55

## 2021-01-01 RX ADMIN — Medication 60 MILLIGRAM(S): at 13:37

## 2021-01-01 RX ADMIN — Medication 120 MILLIGRAM(S): at 08:40

## 2021-01-01 RX ADMIN — SODIUM CHLORIDE 75 MILLILITER(S): 9 INJECTION INTRAMUSCULAR; INTRAVENOUS; SUBCUTANEOUS at 22:34

## 2021-01-01 RX ADMIN — Medication 975 MILLIGRAM(S): at 16:13

## 2021-01-01 RX ADMIN — Medication 100 MILLIGRAM(S): at 09:57

## 2021-01-01 RX ADMIN — Medication 60 MILLIGRAM(S): at 05:59

## 2021-01-01 RX ADMIN — ZINC OXIDE 1 APPLICATION(S): 200 OINTMENT TOPICAL at 09:43

## 2021-01-01 RX ADMIN — Medication 60 MILLIGRAM(S): at 14:59

## 2021-01-01 RX ADMIN — SODIUM CHLORIDE 75 MILLILITER(S): 9 INJECTION INTRAMUSCULAR; INTRAVENOUS; SUBCUTANEOUS at 15:24

## 2021-01-01 RX ADMIN — Medication 1 TABLET(S): at 10:59

## 2021-01-01 RX ADMIN — APIXABAN 2.5 MILLIGRAM(S): 2.5 TABLET, FILM COATED ORAL at 09:57

## 2021-01-01 RX ADMIN — LIDOCAINE 1 PATCH: 4 CREAM TOPICAL at 10:58

## 2021-01-01 RX ADMIN — Medication 25 MILLIGRAM(S): at 09:38

## 2021-01-01 RX ADMIN — Medication 81 MILLIGRAM(S): at 09:38

## 2021-01-01 RX ADMIN — ZINC OXIDE 1 APPLICATION(S): 200 OINTMENT TOPICAL at 09:27

## 2021-01-01 RX ADMIN — PIPERACILLIN AND TAZOBACTAM 25 GRAM(S): 4; .5 INJECTION, POWDER, LYOPHILIZED, FOR SOLUTION INTRAVENOUS at 22:13

## 2021-01-01 RX ADMIN — Medication 1 MILLIGRAM(S): at 10:53

## 2021-01-01 RX ADMIN — Medication 100 MILLIGRAM(S): at 11:11

## 2021-01-01 RX ADMIN — Medication 100 MILLIGRAM(S): at 10:15

## 2021-01-01 RX ADMIN — Medication 1 MILLIGRAM(S): at 10:15

## 2021-01-01 RX ADMIN — SPIRONOLACTONE 25 MILLIGRAM(S): 25 TABLET, FILM COATED ORAL at 09:57

## 2021-01-01 RX ADMIN — Medication 81 MILLIGRAM(S): at 09:25

## 2021-01-01 RX ADMIN — Medication 5 MILLIGRAM(S): at 09:27

## 2021-01-01 RX ADMIN — PREGABALIN 1000 MICROGRAM(S): 225 CAPSULE ORAL at 09:25

## 2021-01-01 RX ADMIN — Medication 250 MICROGRAM(S): at 11:08

## 2021-01-01 RX ADMIN — HEPARIN SODIUM 5000 UNIT(S): 5000 INJECTION INTRAVENOUS; SUBCUTANEOUS at 22:04

## 2021-01-01 RX ADMIN — ZINC OXIDE 1 APPLICATION(S): 200 OINTMENT TOPICAL at 21:38

## 2021-01-01 RX ADMIN — Medication 60 MILLIGRAM(S): at 21:38

## 2021-01-01 RX ADMIN — APIXABAN 2.5 MILLIGRAM(S): 2.5 TABLET, FILM COATED ORAL at 22:13

## 2021-01-01 RX ADMIN — APIXABAN 2.5 MILLIGRAM(S): 2.5 TABLET, FILM COATED ORAL at 09:38

## 2021-01-01 RX ADMIN — Medication 81 MILLIGRAM(S): at 10:52

## 2021-01-01 RX ADMIN — PREGABALIN 1000 MICROGRAM(S): 225 CAPSULE ORAL at 10:53

## 2021-01-01 RX ADMIN — Medication 250 MICROGRAM(S): at 09:19

## 2021-01-01 RX ADMIN — SODIUM CHLORIDE 2000 MILLILITER(S): 9 INJECTION INTRAMUSCULAR; INTRAVENOUS; SUBCUTANEOUS at 10:43

## 2021-01-01 RX ADMIN — Medication 1 TABLET(S): at 09:38

## 2021-01-01 RX ADMIN — PREGABALIN 1000 MICROGRAM(S): 225 CAPSULE ORAL at 11:11

## 2021-01-01 RX ADMIN — Medication 60 MILLIGRAM(S): at 22:13

## 2021-01-01 RX ADMIN — PIPERACILLIN AND TAZOBACTAM 25 GRAM(S): 4; .5 INJECTION, POWDER, LYOPHILIZED, FOR SOLUTION INTRAVENOUS at 14:43

## 2021-01-01 RX ADMIN — POLYETHYLENE GLYCOL 3350 17 GRAM(S): 17 POWDER, FOR SOLUTION ORAL at 12:55

## 2021-01-01 RX ADMIN — PIPERACILLIN AND TAZOBACTAM 25 GRAM(S): 4; .5 INJECTION, POWDER, LYOPHILIZED, FOR SOLUTION INTRAVENOUS at 13:12

## 2021-01-01 RX ADMIN — Medication 3 MILLIGRAM(S): at 22:13

## 2021-01-01 RX ADMIN — Medication 60 MILLIGRAM(S): at 21:47

## 2021-01-01 RX ADMIN — Medication 1 TABLET(S): at 09:58

## 2021-01-01 RX ADMIN — APIXABAN 2.5 MILLIGRAM(S): 2.5 TABLET, FILM COATED ORAL at 09:41

## 2021-01-01 RX ADMIN — Medication 5 MILLIGRAM(S): at 10:16

## 2021-01-01 RX ADMIN — Medication 5 MILLIGRAM(S): at 09:21

## 2021-01-01 RX ADMIN — Medication 81 MILLIGRAM(S): at 09:57

## 2021-01-01 RX ADMIN — Medication 250 MICROGRAM(S): at 12:54

## 2021-01-01 RX ADMIN — HEPARIN SODIUM 5000 UNIT(S): 5000 INJECTION INTRAVENOUS; SUBCUTANEOUS at 05:59

## 2021-01-01 RX ADMIN — Medication 250 MICROGRAM(S): at 09:38

## 2021-01-01 RX ADMIN — Medication 5 MILLIGRAM(S): at 09:42

## 2021-01-01 RX ADMIN — HEPARIN SODIUM 5000 UNIT(S): 5000 INJECTION INTRAVENOUS; SUBCUTANEOUS at 22:15

## 2021-01-01 RX ADMIN — PREGABALIN 1000 MICROGRAM(S): 225 CAPSULE ORAL at 09:17

## 2021-01-01 RX ADMIN — Medication 1 TABLET(S): at 11:11

## 2021-01-01 RX ADMIN — SPIRONOLACTONE 50 MILLIGRAM(S): 25 TABLET, FILM COATED ORAL at 12:02

## 2021-01-01 RX ADMIN — Medication 60 MILLIGRAM(S): at 21:55

## 2021-01-01 RX ADMIN — APIXABAN 2.5 MILLIGRAM(S): 2.5 TABLET, FILM COATED ORAL at 10:16

## 2021-01-01 RX ADMIN — Medication 650 MILLIGRAM(S): at 11:21

## 2021-01-01 RX ADMIN — Medication 250 MICROGRAM(S): at 09:42

## 2021-01-01 RX ADMIN — PIPERACILLIN AND TAZOBACTAM 25 GRAM(S): 4; .5 INJECTION, POWDER, LYOPHILIZED, FOR SOLUTION INTRAVENOUS at 15:15

## 2021-01-01 RX ADMIN — Medication 60 MILLIGRAM(S): at 22:33

## 2021-01-01 RX ADMIN — Medication 100 MILLIGRAM(S): at 11:08

## 2021-01-01 RX ADMIN — Medication 1 MILLIGRAM(S): at 11:16

## 2021-01-01 RX ADMIN — Medication 250 MICROGRAM(S): at 10:58

## 2021-01-01 RX ADMIN — PIPERACILLIN AND TAZOBACTAM 25 GRAM(S): 4; .5 INJECTION, POWDER, LYOPHILIZED, FOR SOLUTION INTRAVENOUS at 06:32

## 2021-01-01 RX ADMIN — PIPERACILLIN AND TAZOBACTAM 25 GRAM(S): 4; .5 INJECTION, POWDER, LYOPHILIZED, FOR SOLUTION INTRAVENOUS at 15:00

## 2021-01-01 RX ADMIN — Medication 5 MILLIGRAM(S): at 10:15

## 2021-01-01 RX ADMIN — Medication 40 MILLIGRAM(S): at 09:56

## 2021-01-01 RX ADMIN — POLYETHYLENE GLYCOL 3350 17 GRAM(S): 17 POWDER, FOR SOLUTION ORAL at 09:21

## 2021-01-01 RX ADMIN — SENNA PLUS 2 TABLET(S): 8.6 TABLET ORAL at 05:59

## 2021-01-01 RX ADMIN — HEPARIN SODIUM 5000 UNIT(S): 5000 INJECTION INTRAVENOUS; SUBCUTANEOUS at 21:41

## 2021-01-01 RX ADMIN — Medication 1 MILLIGRAM(S): at 11:19

## 2021-01-01 RX ADMIN — Medication 60 MILLIGRAM(S): at 05:44

## 2021-01-01 RX ADMIN — Medication 100 MILLIGRAM(S): at 13:45

## 2021-01-01 RX ADMIN — LIDOCAINE 1 PATCH: 4 CREAM TOPICAL at 21:57

## 2021-01-01 RX ADMIN — Medication 25 MILLIGRAM(S): at 09:58

## 2021-01-01 RX ADMIN — PREGABALIN 1000 MICROGRAM(S): 225 CAPSULE ORAL at 11:19

## 2021-01-01 RX ADMIN — Medication 1 TABLET(S): at 09:42

## 2021-01-01 RX ADMIN — Medication 40 MILLIGRAM(S): at 10:05

## 2021-01-01 RX ADMIN — ZINC OXIDE 1 APPLICATION(S): 200 OINTMENT TOPICAL at 22:01

## 2021-01-01 RX ADMIN — Medication 250 MICROGRAM(S): at 10:05

## 2021-01-01 RX ADMIN — Medication 5 MILLIGRAM(S): at 11:19

## 2021-01-01 RX ADMIN — Medication 250 MILLIGRAM(S): at 18:34

## 2021-01-01 RX ADMIN — Medication 5 MILLIGRAM(S): at 09:38

## 2021-01-01 RX ADMIN — Medication 40 MILLIGRAM(S): at 18:50

## 2021-01-01 RX ADMIN — PREGABALIN 1000 MICROGRAM(S): 225 CAPSULE ORAL at 12:54

## 2021-01-01 RX ADMIN — APIXABAN 2.5 MILLIGRAM(S): 2.5 TABLET, FILM COATED ORAL at 22:28

## 2021-01-01 RX ADMIN — Medication 40 MILLIGRAM(S): at 17:17

## 2021-01-01 RX ADMIN — Medication 40 MILLIGRAM(S): at 17:28

## 2021-01-01 RX ADMIN — ZINC OXIDE 1 APPLICATION(S): 200 OINTMENT TOPICAL at 17:19

## 2021-01-01 RX ADMIN — Medication 1 MILLIGRAM(S): at 09:33

## 2021-01-01 RX ADMIN — Medication 1 MILLIGRAM(S): at 08:33

## 2021-01-01 RX ADMIN — Medication 5 MILLIGRAM(S): at 14:52

## 2021-01-01 RX ADMIN — Medication 300 MILLIGRAM(S): at 15:30

## 2021-01-01 RX ADMIN — Medication 100 MILLIGRAM(S): at 10:58

## 2021-01-01 RX ADMIN — PIPERACILLIN AND TAZOBACTAM 25 GRAM(S): 4; .5 INJECTION, POWDER, LYOPHILIZED, FOR SOLUTION INTRAVENOUS at 13:57

## 2021-01-01 RX ADMIN — ZINC OXIDE 1 APPLICATION(S): 200 OINTMENT TOPICAL at 10:12

## 2021-01-01 RX ADMIN — LIDOCAINE 1 PATCH: 4 CREAM TOPICAL at 22:00

## 2021-01-01 RX ADMIN — PIPERACILLIN AND TAZOBACTAM 25 GRAM(S): 4; .5 INJECTION, POWDER, LYOPHILIZED, FOR SOLUTION INTRAVENOUS at 06:10

## 2021-01-01 RX ADMIN — Medication 250 MICROGRAM(S): at 09:25

## 2021-01-01 RX ADMIN — Medication 100 MILLIGRAM(S): at 10:53

## 2021-01-01 RX ADMIN — Medication 60 MILLIGRAM(S): at 05:24

## 2021-01-01 RX ADMIN — Medication 100 MILLIEQUIVALENT(S): at 14:59

## 2021-01-01 RX ADMIN — Medication 81 MILLIGRAM(S): at 12:54

## 2021-01-01 RX ADMIN — Medication 1 MILLIGRAM(S): at 10:57

## 2021-01-01 RX ADMIN — Medication 40 MILLIGRAM(S): at 17:39

## 2021-01-01 RX ADMIN — Medication 60 MILLIGRAM(S): at 22:16

## 2021-01-01 RX ADMIN — Medication 60 MILLIGRAM(S): at 14:43

## 2021-01-01 RX ADMIN — PIPERACILLIN AND TAZOBACTAM 3.38 GRAM(S): 4; .5 INJECTION, POWDER, LYOPHILIZED, FOR SOLUTION INTRAVENOUS at 12:36

## 2021-01-01 RX ADMIN — PREGABALIN 1000 MICROGRAM(S): 225 CAPSULE ORAL at 09:21

## 2021-01-01 RX ADMIN — MORPHINE SULFATE 1 MG/HR: 50 CAPSULE, EXTENDED RELEASE ORAL at 13:55

## 2021-01-01 RX ADMIN — Medication 100 MILLIEQUIVALENT(S): at 18:53

## 2021-01-01 RX ADMIN — Medication 100 MILLIGRAM(S): at 09:18

## 2021-01-01 RX ADMIN — Medication 25 MILLIGRAM(S): at 10:05

## 2021-01-01 RX ADMIN — CEFTRIAXONE 1000 MILLIGRAM(S): 500 INJECTION, POWDER, FOR SOLUTION INTRAMUSCULAR; INTRAVENOUS at 14:45

## 2021-01-01 RX ADMIN — SPIRONOLACTONE 25 MILLIGRAM(S): 25 TABLET, FILM COATED ORAL at 10:06

## 2021-01-01 RX ADMIN — Medication 81 MILLIGRAM(S): at 11:11

## 2021-01-01 RX ADMIN — PIPERACILLIN AND TAZOBACTAM 25 GRAM(S): 4; .5 INJECTION, POWDER, LYOPHILIZED, FOR SOLUTION INTRAVENOUS at 00:35

## 2021-01-01 RX ADMIN — Medication 5 MILLIGRAM(S): at 09:18

## 2021-01-01 RX ADMIN — Medication 60 MILLIGRAM(S): at 22:00

## 2021-01-01 RX ADMIN — PREGABALIN 1000 MICROGRAM(S): 225 CAPSULE ORAL at 10:15

## 2021-01-01 RX ADMIN — Medication 60 MILLIGRAM(S): at 15:16

## 2021-01-01 RX ADMIN — Medication 81 MILLIGRAM(S): at 10:15

## 2021-01-01 RX ADMIN — LIDOCAINE 1 PATCH: 4 CREAM TOPICAL at 20:06

## 2021-01-01 RX ADMIN — Medication 1 TABLET(S): at 12:54

## 2021-01-01 RX ADMIN — HEPARIN SODIUM 5000 UNIT(S): 5000 INJECTION INTRAVENOUS; SUBCUTANEOUS at 15:52

## 2021-01-01 RX ADMIN — SODIUM CHLORIDE 1700 MILLILITER(S): 9 INJECTION INTRAMUSCULAR; INTRAVENOUS; SUBCUTANEOUS at 15:52

## 2021-01-01 RX ADMIN — PREGABALIN 1000 MICROGRAM(S): 225 CAPSULE ORAL at 09:38

## 2021-01-01 RX ADMIN — LIDOCAINE 1 PATCH: 4 CREAM TOPICAL at 22:36

## 2021-01-01 RX ADMIN — Medication 100 MILLIGRAM(S): at 09:42

## 2021-01-01 RX ADMIN — Medication 40 MILLIGRAM(S): at 10:58

## 2021-01-01 RX ADMIN — Medication 81 MILLIGRAM(S): at 09:21

## 2021-01-01 RX ADMIN — Medication 1 TABLET(S): at 09:56

## 2021-01-01 RX ADMIN — LIDOCAINE 1 PATCH: 4 CREAM TOPICAL at 01:00

## 2021-01-01 RX ADMIN — Medication 25 MILLIGRAM(S): at 11:11

## 2021-01-01 RX ADMIN — APIXABAN 2.5 MILLIGRAM(S): 2.5 TABLET, FILM COATED ORAL at 10:58

## 2021-01-01 RX ADMIN — PREGABALIN 1000 MICROGRAM(S): 225 CAPSULE ORAL at 10:05

## 2021-01-01 RX ADMIN — Medication 3 MILLIGRAM(S): at 22:17

## 2021-01-01 RX ADMIN — Medication 40 MILLIEQUIVALENT(S): at 15:53

## 2021-01-01 RX ADMIN — Medication 40 MILLIGRAM(S): at 09:21

## 2021-01-01 RX ADMIN — Medication 60 MILLIGRAM(S): at 14:14

## 2021-01-01 RX ADMIN — MAGNESIUM OXIDE 400 MG ORAL TABLET 400 MILLIGRAM(S): 241.3 TABLET ORAL at 09:41

## 2021-01-01 RX ADMIN — Medication 1 MILLIGRAM(S): at 09:26

## 2021-01-01 RX ADMIN — Medication 81 MILLIGRAM(S): at 10:57

## 2021-01-01 RX ADMIN — HEPARIN SODIUM 5000 UNIT(S): 5000 INJECTION INTRAVENOUS; SUBCUTANEOUS at 05:44

## 2021-01-01 RX ADMIN — Medication 100 MILLIGRAM(S): at 09:21

## 2021-01-01 RX ADMIN — Medication 60 MILLIGRAM(S): at 22:30

## 2021-01-01 RX ADMIN — Medication 1 MILLIGRAM(S): at 09:42

## 2021-01-01 RX ADMIN — Medication 5 MILLIGRAM(S): at 10:53

## 2021-01-01 RX ADMIN — SPIRONOLACTONE 12.5 MILLIGRAM(S): 25 TABLET, FILM COATED ORAL at 12:54

## 2021-01-01 RX ADMIN — Medication 60 MILLIGRAM(S): at 05:53

## 2021-01-01 RX ADMIN — Medication 60 MILLIGRAM(S): at 05:35

## 2021-01-01 RX ADMIN — Medication 1 TABLET(S): at 10:15

## 2021-01-01 RX ADMIN — HEPARIN SODIUM 5000 UNIT(S): 5000 INJECTION INTRAVENOUS; SUBCUTANEOUS at 06:20

## 2021-01-01 RX ADMIN — Medication 25 MILLIGRAM(S): at 11:19

## 2021-01-01 RX ADMIN — APIXABAN 2.5 MILLIGRAM(S): 2.5 TABLET, FILM COATED ORAL at 22:33

## 2021-01-01 RX ADMIN — Medication 5 MILLIGRAM(S): at 09:58

## 2021-01-01 RX ADMIN — Medication 100 MILLIEQUIVALENT(S): at 12:38

## 2021-01-01 RX ADMIN — HEPARIN SODIUM 5000 UNIT(S): 5000 INJECTION INTRAVENOUS; SUBCUTANEOUS at 21:47

## 2021-01-01 RX ADMIN — Medication 60 MILLIGRAM(S): at 06:20

## 2021-01-01 RX ADMIN — Medication 60 MILLIGRAM(S): at 05:30

## 2021-01-01 RX ADMIN — PREGABALIN 1000 MICROGRAM(S): 225 CAPSULE ORAL at 09:57

## 2021-01-01 RX ADMIN — ZINC OXIDE 1 APPLICATION(S): 200 OINTMENT TOPICAL at 21:47

## 2021-01-01 RX ADMIN — CEFTRIAXONE 1000 MILLIGRAM(S): 500 INJECTION, POWDER, FOR SOLUTION INTRAMUSCULAR; INTRAVENOUS at 14:51

## 2021-01-01 RX ADMIN — APIXABAN 2.5 MILLIGRAM(S): 2.5 TABLET, FILM COATED ORAL at 21:12

## 2021-01-01 RX ADMIN — Medication 1 MILLIGRAM(S): at 09:17

## 2021-01-01 RX ADMIN — Medication 60 MILLIGRAM(S): at 22:04

## 2021-01-01 RX ADMIN — Medication 3 MILLIGRAM(S): at 21:26

## 2021-01-01 RX ADMIN — Medication 5 MG/HR: at 11:44

## 2021-01-01 RX ADMIN — Medication 60 MILLIGRAM(S): at 05:42

## 2021-01-01 RX ADMIN — Medication 1 TABLET(S): at 09:21

## 2021-01-01 RX ADMIN — HEPARIN SODIUM 5000 UNIT(S): 5000 INJECTION INTRAVENOUS; SUBCUTANEOUS at 09:17

## 2021-01-01 RX ADMIN — HEPARIN SODIUM 5000 UNIT(S): 5000 INJECTION INTRAVENOUS; SUBCUTANEOUS at 13:42

## 2021-01-01 RX ADMIN — Medication 1 MILLIGRAM(S): at 10:58

## 2021-01-01 RX ADMIN — Medication 40 MILLIGRAM(S): at 17:57

## 2021-01-01 RX ADMIN — Medication 50 MILLIGRAM(S): at 09:15

## 2021-01-01 RX ADMIN — Medication 60 MILLIGRAM(S): at 14:03

## 2021-01-01 RX ADMIN — SODIUM CHLORIDE 2000 MILLILITER(S): 9 INJECTION INTRAMUSCULAR; INTRAVENOUS; SUBCUTANEOUS at 12:31

## 2021-01-01 RX ADMIN — POLYETHYLENE GLYCOL 3350 17 GRAM(S): 17 POWDER, FOR SOLUTION ORAL at 10:16

## 2021-01-01 RX ADMIN — MORPHINE SULFATE 2 MILLIGRAM(S): 50 CAPSULE, EXTENDED RELEASE ORAL at 13:04

## 2021-01-01 RX ADMIN — APIXABAN 2.5 MILLIGRAM(S): 2.5 TABLET, FILM COATED ORAL at 22:30

## 2021-01-01 RX ADMIN — PIPERACILLIN AND TAZOBACTAM 25 GRAM(S): 4; .5 INJECTION, POWDER, LYOPHILIZED, FOR SOLUTION INTRAVENOUS at 06:35

## 2021-01-01 RX ADMIN — TETANUS TOXOID, REDUCED DIPHTHERIA TOXOID AND ACELLULAR PERTUSSIS VACCINE, ADSORBED 0.5 MILLILITER(S): 5; 2.5; 8; 8; 2.5 SUSPENSION INTRAMUSCULAR at 12:14

## 2021-01-01 RX ADMIN — Medication 40 MILLIGRAM(S): at 09:26

## 2021-01-01 RX ADMIN — PIPERACILLIN AND TAZOBACTAM 25 GRAM(S): 4; .5 INJECTION, POWDER, LYOPHILIZED, FOR SOLUTION INTRAVENOUS at 06:34

## 2021-01-01 RX ADMIN — Medication 100 MILLIEQUIVALENT(S): at 19:57

## 2021-01-01 RX ADMIN — Medication 5 MILLIGRAM(S): at 11:11

## 2021-01-01 RX ADMIN — Medication 100 MILLIEQUIVALENT(S): at 13:47

## 2021-01-01 RX ADMIN — Medication 60 MILLIGRAM(S): at 22:02

## 2021-01-01 RX ADMIN — Medication 60 MILLIGRAM(S): at 15:03

## 2021-01-01 RX ADMIN — Medication 100 MILLIGRAM(S): at 09:33

## 2021-01-01 RX ADMIN — Medication 60 MILLIGRAM(S): at 05:48

## 2021-01-01 RX ADMIN — Medication 650 MILLIGRAM(S): at 21:26

## 2021-01-01 RX ADMIN — Medication 5 MILLIGRAM(S): at 09:56

## 2021-01-01 RX ADMIN — Medication 250 MICROGRAM(S): at 10:15

## 2021-01-01 RX ADMIN — ZINC OXIDE 1 APPLICATION(S): 200 OINTMENT TOPICAL at 22:30

## 2021-01-01 RX ADMIN — POLYETHYLENE GLYCOL 3350 17 GRAM(S): 17 POWDER, FOR SOLUTION ORAL at 14:28

## 2021-01-01 RX ADMIN — Medication 100 MILLIEQUIVALENT(S): at 22:15

## 2021-01-01 RX ADMIN — PIPERACILLIN AND TAZOBACTAM 25 GRAM(S): 4; .5 INJECTION, POWDER, LYOPHILIZED, FOR SOLUTION INTRAVENOUS at 05:31

## 2021-01-01 RX ADMIN — Medication 60 MILLIGRAM(S): at 05:15

## 2021-01-01 RX ADMIN — Medication 5 MILLIGRAM(S): at 11:08

## 2021-01-01 RX ADMIN — SPIRONOLACTONE 25 MILLIGRAM(S): 25 TABLET, FILM COATED ORAL at 09:21

## 2021-01-01 RX ADMIN — Medication 60 MILLIGRAM(S): at 16:39

## 2021-01-01 RX ADMIN — MAGNESIUM OXIDE 400 MG ORAL TABLET 400 MILLIGRAM(S): 241.3 TABLET ORAL at 09:57

## 2021-01-01 RX ADMIN — Medication 60 MILLIGRAM(S): at 13:39

## 2021-01-01 RX ADMIN — HEPARIN SODIUM 5000 UNIT(S): 5000 INJECTION INTRAVENOUS; SUBCUTANEOUS at 21:14

## 2021-01-01 RX ADMIN — SPIRONOLACTONE 25 MILLIGRAM(S): 25 TABLET, FILM COATED ORAL at 09:42

## 2021-01-01 RX ADMIN — Medication 1 TABLET(S): at 10:05

## 2021-01-01 RX ADMIN — POLYETHYLENE GLYCOL 3350 17 GRAM(S): 17 POWDER, FOR SOLUTION ORAL at 10:59

## 2021-01-01 RX ADMIN — PIPERACILLIN AND TAZOBACTAM 25 GRAM(S): 4; .5 INJECTION, POWDER, LYOPHILIZED, FOR SOLUTION INTRAVENOUS at 05:24

## 2021-01-01 RX ADMIN — Medication 81 MILLIGRAM(S): at 11:19

## 2021-01-01 RX ADMIN — Medication 1000 MILLIGRAM(S): at 12:30

## 2021-01-01 RX ADMIN — ZINC OXIDE 1 APPLICATION(S): 200 OINTMENT TOPICAL at 10:23

## 2021-01-01 RX ADMIN — Medication 1 MILLIGRAM(S): at 11:08

## 2021-01-01 RX ADMIN — Medication 1 TABLET(S): at 09:18

## 2021-01-01 RX ADMIN — Medication 60 MILLIGRAM(S): at 13:41

## 2021-01-01 RX ADMIN — Medication 1 MILLIGRAM(S): at 09:29

## 2021-01-01 RX ADMIN — PIPERACILLIN AND TAZOBACTAM 25 GRAM(S): 4; .5 INJECTION, POWDER, LYOPHILIZED, FOR SOLUTION INTRAVENOUS at 21:12

## 2021-01-01 RX ADMIN — Medication 125 MICROGRAM(S): at 21:43

## 2021-01-01 RX ADMIN — Medication 1 TABLET(S): at 10:53

## 2021-01-01 RX ADMIN — Medication 81 MILLIGRAM(S): at 11:09

## 2021-01-01 RX ADMIN — ZINC OXIDE 1 APPLICATION(S): 200 OINTMENT TOPICAL at 22:28

## 2021-01-01 RX ADMIN — SODIUM CHLORIDE 1000 MILLILITER(S): 9 INJECTION, SOLUTION INTRAVENOUS at 07:21

## 2021-01-01 RX ADMIN — Medication 5 MILLIGRAM(S): at 08:33

## 2021-01-01 RX ADMIN — Medication 1 TABLET(S): at 09:27

## 2021-01-01 RX ADMIN — Medication 1 MILLIGRAM(S): at 09:15

## 2021-01-01 RX ADMIN — Medication 1000 UNIT(S): at 06:57

## 2021-01-01 RX ADMIN — Medication 40 MILLIGRAM(S): at 09:33

## 2021-01-01 RX ADMIN — Medication 60 MILLIGRAM(S): at 21:14

## 2021-01-01 RX ADMIN — Medication 60 MILLIGRAM(S): at 13:14

## 2021-01-01 RX ADMIN — Medication 60 MILLIGRAM(S): at 22:17

## 2021-01-01 RX ADMIN — Medication 10 MILLIGRAM(S): at 11:44

## 2021-01-01 RX ADMIN — Medication 50 MILLIGRAM(S): at 11:44

## 2021-01-01 RX ADMIN — APIXABAN 2.5 MILLIGRAM(S): 2.5 TABLET, FILM COATED ORAL at 21:26

## 2021-01-01 RX ADMIN — Medication 1 MILLIGRAM(S): at 11:11

## 2021-01-01 RX ADMIN — Medication 1 MILLIGRAM(S): at 10:05

## 2021-01-01 RX ADMIN — Medication 60 MILLIGRAM(S): at 06:35

## 2021-01-01 RX ADMIN — Medication 650 MILLIGRAM(S): at 21:56

## 2021-01-01 RX ADMIN — APIXABAN 2.5 MILLIGRAM(S): 2.5 TABLET, FILM COATED ORAL at 22:02

## 2021-01-01 RX ADMIN — Medication 5 MILLIGRAM(S): at 12:54

## 2021-01-01 RX ADMIN — Medication 250 MICROGRAM(S): at 10:53

## 2021-01-01 RX ADMIN — Medication 100 MILLIGRAM(S): at 21:40

## 2021-01-01 RX ADMIN — LIDOCAINE 1 PATCH: 4 CREAM TOPICAL at 13:39

## 2021-01-01 RX ADMIN — LIDOCAINE 1 PATCH: 4 CREAM TOPICAL at 19:40

## 2021-01-01 RX ADMIN — Medication 60 MILLIGRAM(S): at 06:03

## 2021-01-01 RX ADMIN — POLYETHYLENE GLYCOL 3350 17 GRAM(S): 17 POWDER, FOR SOLUTION ORAL at 09:27

## 2021-01-01 RX ADMIN — Medication 1 MILLIGRAM(S): at 09:58

## 2021-01-01 RX ADMIN — Medication 81 MILLIGRAM(S): at 11:12

## 2021-01-01 RX ADMIN — Medication 100 MILLIGRAM(S): at 11:19

## 2021-01-01 RX ADMIN — Medication 120 MILLIGRAM(S): at 11:58

## 2021-01-01 RX ADMIN — Medication 60 MILLIGRAM(S): at 21:12

## 2021-01-01 RX ADMIN — SPIRONOLACTONE 25 MILLIGRAM(S): 25 TABLET, FILM COATED ORAL at 10:53

## 2021-01-01 RX ADMIN — LIDOCAINE 1 PATCH: 4 CREAM TOPICAL at 19:00

## 2021-01-01 RX ADMIN — CEFTRIAXONE 1000 MILLIGRAM(S): 500 INJECTION, POWDER, FOR SOLUTION INTRAMUSCULAR; INTRAVENOUS at 14:32

## 2021-01-01 RX ADMIN — Medication 40 MILLIGRAM(S): at 18:31

## 2021-01-01 RX ADMIN — ZINC OXIDE 1 APPLICATION(S): 200 OINTMENT TOPICAL at 09:26

## 2021-01-01 RX ADMIN — SPIRONOLACTONE 25 MILLIGRAM(S): 25 TABLET, FILM COATED ORAL at 09:27

## 2021-01-01 RX ADMIN — PREGABALIN 1000 MICROGRAM(S): 225 CAPSULE ORAL at 09:42

## 2021-01-01 RX ADMIN — Medication 40 MILLIGRAM(S): at 10:53

## 2021-01-01 RX ADMIN — APIXABAN 2.5 MILLIGRAM(S): 2.5 TABLET, FILM COATED ORAL at 22:17

## 2021-01-01 RX ADMIN — Medication 60 MILLIGRAM(S): at 21:26

## 2021-01-01 RX ADMIN — Medication 250 MICROGRAM(S): at 10:57

## 2021-01-01 RX ADMIN — Medication 80 MILLIGRAM(S): at 05:49

## 2021-01-01 RX ADMIN — Medication 5 MILLIGRAM(S): at 10:58

## 2021-01-01 RX ADMIN — ZINC OXIDE 1 APPLICATION(S): 200 OINTMENT TOPICAL at 11:13

## 2021-01-01 RX ADMIN — Medication 81 MILLIGRAM(S): at 09:17

## 2021-01-01 RX ADMIN — ZINC OXIDE 1 APPLICATION(S): 200 OINTMENT TOPICAL at 21:13

## 2021-01-01 RX ADMIN — PREGABALIN 1000 MICROGRAM(S): 225 CAPSULE ORAL at 06:57

## 2021-01-01 RX ADMIN — POLYETHYLENE GLYCOL 3350 17 GRAM(S): 17 POWDER, FOR SOLUTION ORAL at 09:56

## 2021-01-01 RX ADMIN — Medication 25 MILLIGRAM(S): at 12:55

## 2021-01-01 RX ADMIN — LIDOCAINE 1 PATCH: 4 CREAM TOPICAL at 09:26

## 2021-01-01 RX ADMIN — Medication 60 MILLIGRAM(S): at 15:01

## 2021-01-01 RX ADMIN — POLYETHYLENE GLYCOL 3350 17 GRAM(S): 17 POWDER, FOR SOLUTION ORAL at 11:21

## 2021-01-01 RX ADMIN — Medication 60 MILLIGRAM(S): at 06:15

## 2021-01-01 RX ADMIN — Medication 5 MILLIGRAM(S): at 10:05

## 2021-01-01 RX ADMIN — HEPARIN SODIUM 5000 UNIT(S): 5000 INJECTION INTRAVENOUS; SUBCUTANEOUS at 13:23

## 2021-01-01 RX ADMIN — SPIRONOLACTONE 25 MILLIGRAM(S): 25 TABLET, FILM COATED ORAL at 10:58

## 2021-01-01 RX ADMIN — Medication 100 MILLIGRAM(S): at 10:05

## 2021-01-01 RX ADMIN — Medication 60 MILLIGRAM(S): at 06:36

## 2021-01-01 RX ADMIN — Medication 60 MILLIGRAM(S): at 06:34

## 2021-01-01 RX ADMIN — Medication 250 MICROGRAM(S): at 11:11

## 2021-01-01 RX ADMIN — CEFTRIAXONE 1000 MILLIGRAM(S): 500 INJECTION, POWDER, FOR SOLUTION INTRAMUSCULAR; INTRAVENOUS at 11:44

## 2021-01-01 RX ADMIN — Medication 60 MILLIGRAM(S): at 21:41

## 2021-01-01 RX ADMIN — Medication 250 MILLIGRAM(S): at 12:35

## 2021-01-01 RX ADMIN — APIXABAN 2.5 MILLIGRAM(S): 2.5 TABLET, FILM COATED ORAL at 21:47

## 2021-01-01 RX ADMIN — PIPERACILLIN AND TAZOBACTAM 25 GRAM(S): 4; .5 INJECTION, POWDER, LYOPHILIZED, FOR SOLUTION INTRAVENOUS at 21:26

## 2021-01-01 RX ADMIN — Medication 250 MICROGRAM(S): at 11:19

## 2021-01-01 RX ADMIN — PIPERACILLIN AND TAZOBACTAM 200 GRAM(S): 4; .5 INJECTION, POWDER, LYOPHILIZED, FOR SOLUTION INTRAVENOUS at 17:41

## 2021-01-01 RX ADMIN — APIXABAN 2.5 MILLIGRAM(S): 2.5 TABLET, FILM COATED ORAL at 09:25

## 2021-01-01 RX ADMIN — Medication 25 MILLIGRAM(S): at 09:30

## 2021-01-01 RX ADMIN — POLYETHYLENE GLYCOL 3350 17 GRAM(S): 17 POWDER, FOR SOLUTION ORAL at 11:08

## 2021-01-01 RX ADMIN — Medication 125 MICROGRAM(S): at 14:28

## 2021-01-01 RX ADMIN — Medication 60 MILLIGRAM(S): at 13:57

## 2021-01-01 RX ADMIN — Medication 650 MILLIGRAM(S): at 10:43

## 2021-01-01 RX ADMIN — Medication 40 MILLIGRAM(S): at 10:31

## 2021-01-01 RX ADMIN — ZINC OXIDE 1 APPLICATION(S): 200 OINTMENT TOPICAL at 16:33

## 2021-01-01 RX ADMIN — Medication 5 MILLIGRAM(S): at 11:16

## 2021-01-01 RX ADMIN — Medication 60 MILLIGRAM(S): at 06:32

## 2021-01-01 RX ADMIN — ZINC OXIDE 1 APPLICATION(S): 200 OINTMENT TOPICAL at 22:33

## 2021-01-01 RX ADMIN — AZITHROMYCIN 255 MILLIGRAM(S): 500 TABLET, FILM COATED ORAL at 11:44

## 2021-01-01 RX ADMIN — Medication 120 MILLIGRAM(S): at 08:31

## 2021-01-01 RX ADMIN — PIPERACILLIN AND TAZOBACTAM 25 GRAM(S): 4; .5 INJECTION, POWDER, LYOPHILIZED, FOR SOLUTION INTRAVENOUS at 21:54

## 2021-01-01 RX ADMIN — Medication 60 MILLIGRAM(S): at 15:53

## 2021-01-01 RX ADMIN — Medication 81 MILLIGRAM(S): at 09:56

## 2021-01-01 RX ADMIN — Medication 1 MILLIGRAM(S): at 09:21

## 2021-01-01 RX ADMIN — Medication 81 MILLIGRAM(S): at 10:05

## 2021-01-01 RX ADMIN — Medication 60 MILLIGRAM(S): at 06:10

## 2021-01-01 RX ADMIN — Medication 100 MILLIGRAM(S): at 09:38

## 2021-01-01 RX ADMIN — PIPERACILLIN AND TAZOBACTAM 25 GRAM(S): 4; .5 INJECTION, POWDER, LYOPHILIZED, FOR SOLUTION INTRAVENOUS at 22:04

## 2021-01-01 RX ADMIN — Medication 5 MILLIGRAM(S): at 09:33

## 2021-01-01 RX ADMIN — Medication 25 MILLIGRAM(S): at 10:15

## 2021-01-01 RX ADMIN — Medication 100 MILLIGRAM(S): at 08:31

## 2021-01-01 RX ADMIN — LIDOCAINE 1 PATCH: 4 CREAM TOPICAL at 11:14

## 2021-01-01 RX ADMIN — SODIUM CHLORIDE 80 MILLILITER(S): 9 INJECTION INTRAMUSCULAR; INTRAVENOUS; SUBCUTANEOUS at 05:44

## 2021-01-01 RX ADMIN — Medication 60 MILLIGRAM(S): at 05:40

## 2021-01-01 RX ADMIN — Medication 60 MILLIGRAM(S): at 13:13

## 2021-01-01 RX ADMIN — PIPERACILLIN AND TAZOBACTAM 25 GRAM(S): 4; .5 INJECTION, POWDER, LYOPHILIZED, FOR SOLUTION INTRAVENOUS at 13:14

## 2021-01-01 RX ADMIN — Medication 1 MILLIGRAM(S): at 09:38

## 2021-01-01 RX ADMIN — ZINC OXIDE 1 APPLICATION(S): 200 OINTMENT TOPICAL at 21:26

## 2021-01-01 RX ADMIN — Medication 60 MILLIGRAM(S): at 22:28

## 2021-01-01 RX ADMIN — Medication 100 MILLIGRAM(S): at 09:58

## 2021-01-01 RX ADMIN — Medication 5 MILLIGRAM(S): at 09:15

## 2021-01-01 RX ADMIN — LIDOCAINE 1 PATCH: 4 CREAM TOPICAL at 23:52

## 2021-01-01 RX ADMIN — Medication 40 MILLIGRAM(S): at 09:42

## 2021-01-01 RX ADMIN — Medication 60 MILLIGRAM(S): at 05:25

## 2021-01-01 RX ADMIN — POLYETHYLENE GLYCOL 3350 17 GRAM(S): 17 POWDER, FOR SOLUTION ORAL at 10:58

## 2021-01-01 RX ADMIN — Medication 1 MILLIGRAM(S): at 12:53

## 2021-01-01 RX ADMIN — PIPERACILLIN AND TAZOBACTAM 25 GRAM(S): 4; .5 INJECTION, POWDER, LYOPHILIZED, FOR SOLUTION INTRAVENOUS at 22:34

## 2021-01-01 RX ADMIN — Medication 1 MILLIGRAM(S): at 09:57

## 2021-01-01 RX ADMIN — PREGABALIN 1000 MICROGRAM(S): 225 CAPSULE ORAL at 10:58

## 2021-01-01 RX ADMIN — Medication 100 MILLIGRAM(S): at 09:29

## 2021-01-01 RX ADMIN — LIDOCAINE 1 PATCH: 4 CREAM TOPICAL at 10:16

## 2021-01-01 RX ADMIN — Medication 250 MICROGRAM(S): at 09:57

## 2021-01-01 RX ADMIN — HEPARIN SODIUM 5000 UNIT(S): 5000 INJECTION INTRAVENOUS; SUBCUTANEOUS at 06:03

## 2021-01-01 RX ADMIN — PIPERACILLIN AND TAZOBACTAM 25 GRAM(S): 4; .5 INJECTION, POWDER, LYOPHILIZED, FOR SOLUTION INTRAVENOUS at 22:18

## 2021-01-01 RX ADMIN — HEPARIN SODIUM 5000 UNIT(S): 5000 INJECTION INTRAVENOUS; SUBCUTANEOUS at 21:12

## 2021-01-01 RX ADMIN — Medication 250 MICROGRAM(S): at 09:21

## 2021-01-01 RX ADMIN — HEPARIN SODIUM 5000 UNIT(S): 5000 INJECTION INTRAVENOUS; SUBCUTANEOUS at 12:54

## 2021-01-01 RX ADMIN — APIXABAN 2.5 MILLIGRAM(S): 2.5 TABLET, FILM COATED ORAL at 22:00

## 2021-01-01 RX ADMIN — Medication 100 MILLIGRAM(S): at 09:27

## 2021-01-01 RX ADMIN — Medication 250 MICROGRAM(S): at 09:58

## 2021-01-01 RX ADMIN — HEPARIN SODIUM 5000 UNIT(S): 5000 INJECTION INTRAVENOUS; SUBCUTANEOUS at 11:11

## 2021-01-01 RX ADMIN — HEPARIN SODIUM 5000 UNIT(S): 5000 INJECTION INTRAVENOUS; SUBCUTANEOUS at 15:03

## 2021-01-01 RX ADMIN — SODIUM CHLORIDE 80 MILLILITER(S): 9 INJECTION INTRAMUSCULAR; INTRAVENOUS; SUBCUTANEOUS at 15:42

## 2021-01-01 RX ADMIN — POLYETHYLENE GLYCOL 3350 17 GRAM(S): 17 POWDER, FOR SOLUTION ORAL at 09:18

## 2021-01-01 RX ADMIN — PIPERACILLIN AND TAZOBACTAM 25 GRAM(S): 4; .5 INJECTION, POWDER, LYOPHILIZED, FOR SOLUTION INTRAVENOUS at 22:05

## 2021-01-01 RX ADMIN — Medication 60 MILLIGRAM(S): at 13:23

## 2021-01-01 RX ADMIN — PIPERACILLIN AND TAZOBACTAM 25 GRAM(S): 4; .5 INJECTION, POWDER, LYOPHILIZED, FOR SOLUTION INTRAVENOUS at 14:04

## 2021-01-01 RX ADMIN — MORPHINE SULFATE 1 MILLIGRAM(S): 50 CAPSULE, EXTENDED RELEASE ORAL at 11:49

## 2021-01-01 RX ADMIN — Medication 60 MILLIGRAM(S): at 17:55

## 2021-01-01 RX ADMIN — APIXABAN 2.5 MILLIGRAM(S): 2.5 TABLET, FILM COATED ORAL at 11:18

## 2021-01-14 PROBLEM — L25.9 CONTACT DERMATITIS OF FEMALE GENITALIA: Status: ACTIVE | Noted: 2021-01-01

## 2021-01-14 NOTE — HISTORY OF PRESENT ILLNESS
[FreeTextEntry1] : Diaz is an 79 y/o who presents today with c/o external vaginal irritation and itching. She is incontinent of urine and wears incontinence pads.\par \par She does not recall her list of medications.\par \par She is accompanied by her daughter in law.

## 2021-01-14 NOTE — DISCUSSION/SUMMARY
[FreeTextEntry1] : Rx for Nystatin/Triamcinolone issued. Advised changing pads frequently and checking to see if they are soaked as patient does not always recognize that she has urinated\par \par Apply aquaphor prophylactically a.m. and p.m. once erythema and itching has alleviated.\par \par Return to office prn

## 2021-01-14 NOTE — PHYSICAL EXAM
[Appropriately responsive] : appropriately responsive [No Acute Distress] : no acute distress [Labia Majora Erythema] : erythema [Labia Minora Erythema] : erythema [Normal] : normal [Erythematous] : erythema

## 2021-10-28 NOTE — ED PROVIDER NOTE - PROGRESS NOTE DETAILS
Flaco COBIAN: NSG team consulted- SAH on CT scan; Dr. Urbina's team to admit; b/l ground glass opacities on CT scan- patient with leukocytosis- ?stress response/infectious- blood cultures/lactate, antibiotics ordered; spoke with Dr. John Lester on call for plastics regarding left lower extremity skin tear/subcut tissue- dressing placed- will continue to see patient in hospital.

## 2021-10-28 NOTE — ED PROVIDER NOTE - SKIN, MLM
Skin warm, dry. No evidence of rash. Ecchymosis to left forehead with hematoma. Extensive skin tear to left lateral tibia with exposed subcutaneous tissue

## 2021-10-28 NOTE — H&P ADULT - NSHPPHYSICALEXAM_GEN_ALL_CORE
Vital Signs Last 24 Hrs  T(C): 36.5 (28 Oct 2021 07:56), Max: 36.5 (28 Oct 2021 07:56)  T(F): 97.7 (28 Oct 2021 07:56), Max: 97.7 (28 Oct 2021 07:56)  HR: 88 (28 Oct 2021 09:00) (88 - 93)  BP: 147/84 (28 Oct 2021 09:00) (147/77 - 147/84)  BP(mean): 105 (28 Oct 2021 09:00) (105 - 105)  RR: 19 (28 Oct 2021 09:00) (18 - 19)  SpO2: 98% (28 Oct 2021 09:00) (98% - 100%)    Constitutional: awake and alert  HEENT: PERRLA, EOMI, large hematoma left forehead and abrasion to posterior head   Neck: Supple  Respiratory: Breath sounds are clear bilaterally  Cardiovascular: S1 and S2, regular rhythm  Gastrointestinal: soft, nontender  Extremities: + b/l lower ext edema with chronic skin changes   Vascular: Carotid Bruit - no  Musculoskeletal: no joint swelling/tenderness, no abnormal movements  Skin: skin tear LLE     Neurological exam:  HF: A x O x 2. Appropriately interactive, normal affect. Speech fluent, No Aphasia or paraphasic errors. Naming /repetition intact   CN: ASAEL, EOMI, VFF, facial sensation normal, no NLFD, tongue midline  Motor: 4+/5 strenght b/l upper ext with no drift, 3/5 b/l lower ext   Sens: Intact to light touch  Reflexes: Symmetric and normal, downgoing toes b/l  Coord:  No FNFA, dysmetria, ISHA intact   Gait/Balance: Not tested

## 2021-10-28 NOTE — ED PROVIDER NOTE - OBJECTIVE STATEMENT
79 y/o female with a PMHx of Afib on Eliquis, CHF, HTN, SCC presents to the ED s/p fall. Pt's son put her to bed around 9pm last night. Pt was found laying on the floor next to her bed this morning. Unknown time on the ground. Pt unable to describe circumstances of the fall. +skin tear.

## 2021-10-28 NOTE — ED PROVIDER NOTE - NSICDXFAMILYHX_GEN_ALL_CORE_FT
DATE OF ADMISSION:  08/29/2019    DATE OF DISCHARGE:  09/03/2019    DISCHARGE DIAGNOSIS LIST:    1. Bilateral pneumonia, lower lobes.  2. Pharyngitis, etiology unclear.  3. Atrial fibrillation.  4. Thrombocytopenia.  5. Acute kidney injury, improved.  6. History of Bartonella infection.  7. Obstructive sleep apnea, on CPAP.  8. Hypertension.  9. History of epilepsy.    HOSPITAL COURSE:  The patient is a 70-year-old  male, who presented to the emergency room on the 29th with concerns of altered mental status, fever, and a significant sore throat, which was impairing his oral intake.  The patient had been evaluated for strep both in our primary care office as well as in the ER, results of which were negative.  An influenza swab was also done in the office and found to be negative.  The patient had been prescribed oral amoxicillin therapy and was sent for chest x-ray evaluation because of oxygen desaturation and a reported cough.  That had shown worsened atelectasis of the left lung base.  However, the patient's symptoms progressed and his throat pain became worse to the point where he was unable to swallow or eat much of anything.  Fevers were reportedly up to 102.8, thus he came to the emergency room.  He was noted to have ongoing mild temperature elevation and a low platelet count at 78.  Chest x-ray was repeated which did show an increasing opacification of the left lung base and thus the patient was admitted for IV antibiotics for community-acquired pneumonia.  He had been started on IV Rocephin and IV azithromycin.  He had cultures done of the blood and urine, all of which were reportedly negative.  The patient was seen at the request of his wife by Dr. Glass for Infectious Disease management and adjustment of his antibiotics.  He was continued on IV Rocephin and given oral doxycycline, through today when his antibiotics were adjusted to oral cefdinir and oral doxycycline.  The patient was also  prescribed acyclovir which had improved some reported right anterior neck, submandibular pain.  The patient was also evaluated by the Hematology Service as with ongoing thrombocytopenia.  He has been advised to take Tylenol as needed for over-the-counter analgesic therapy instead of an NSAID.  The patient does also of note have a history of atrial fibrillation, and had been on Tikosyn at some point, but this was discontinued secondary to it being determined as ineffective in this patient.  He was put on IV Cardizem, which was eventually switched to oral formulation.  Cardiology has recommended consideration of a possible ablation type of procedure down the road.  Finally, the patient has had a followup chest x-ray done this morning which does show ongoing bilateral lower lung infiltrates, which are stated to be mild, however slightly worse today than the chest x-ray done on the 29th, along with minimal pleural effusions.  The patient will be discharged on the oral antimicrobial therapies as outlined above.  Physical therapy also did visit with and evaluate the patient and has determined him to not have any further physical therapy needs at this time.     The patient was seen and examined in his room this morning, sitting upright at his bedside in his chair.  He reports that overall he is feeling a little better, states that his cough is mild and that his throat discomfort is improving.  He was able to finish his entire breakfast tray, which is notable as he states he has not finished an entire meal in quite some time.  He did express some concern about weakness, although was also reporting the ability to walk up and down the hallway with his walker several times.  He denies any shortness of breath or chest pain.  He has been looking forward to being discharged home when approved.    PHYSICAL EXAMINATION:  VITAL SIGNS:  Temperature is 98.6, pulse is 89, respirations 20, blood pressure 137/85, pulse ox is 91% on room  air, mean arterial pressure is 102.     GENERAL:  The patient is awake, alert, and oriented.  He is in no acute distress.  Interactive and pleasant.     HEENT:  Head is normocephalic, atraumatic.  Bilateral sclerae anicteric.  Bilateral conjunctivae pink.  Oral membranes are moist.  Neck veins are flat.  Trachea is midline.     LUNGS:  Auscultated to be clear bilaterally anteriorly.     HEART:  S1, S2 are auscultated.     ABDOMEN:  Soft, nontender to palpation with positive bowel sounds heard throughout.     EXTREMITIES:  Palpated and found to be without edema x4.     NEUROLOGIC:  Grossly intact without any focal deficits identified.    LABORATORY DATA:  Labs done today show a WBC of 5.6, hemoglobin 13.9, hematocrit 42, platelet 88 up from 58 yesterday.    MEDICATIONS:  Reviewed as per MAR.    DIAGNOSTIC STUDIES:  As outlined above.  As well, the patient did have a CT scan of his neck as he complained of right anterior neck and submandibular pain, which did show an unremarkable airway and no abscess as well as unremarkable submandibular glands.  He also did have a CT angiogram of the chest done on the 31st, which showed no evidence of pulmonary embolism.  An unremarkable thoracic aorta.  Noted prominent elevation of the left hemidiaphragm.  A small left pleural effusion.  Moderate to prominent changes of infiltrates in both lower lobes posteriorly.    DISCHARGE INSTRUCTIONS:    1. The patient may be discharged home on the oral antimicrobial regimen as outlined by the Infectious Disease specialist, Dr. Glass.  2. To follow up with him as directed as well as with his Cardiology specialist as advised.  3. Lab work should be followed in the outpatient setting with respect especially to his thrombocytopenia.  He is advised to follow up with hematologist as recommended, noted a comment in the Hematology consultation note about consideration of a possible bone marrow biopsy for possible myelodysplastic syndrome.  4. The  patient is asked to follow up with us in the office in 1-2 weeks time as well, and to call sooner should any further concerns or questions present.   Time spent on this patient's discharge encounter in total is about 45 minutes.     The patient was discussed with Dr. Antony, he agrees with the above assessment and plan.        ______________________________  AYDEE Grant  [id number]      D:  09/03/2019 16:35:15  T:  09/04/2019 04:48:19   MORENA/diane   Job:  [id number]              Electronically Signed On 09.05.2019 07:15  ___________________________________________________   Pankaj Antony MD     FAMILY HISTORY:  Mother  Still living? Unknown  Family history of lung cancer, Age at diagnosis: Age Unknown

## 2021-10-28 NOTE — PHARMACOTHERAPY INTERVENTION NOTE - COMMENTS
Medication reconciliation completed.  Patient brought in list of medications, confirmed list with Dr. First Medowen.

## 2021-10-28 NOTE — H&P ADULT - HISTORY OF PRESENT ILLNESS
Pt is an 80 year old woman with a PMH of Dementia, HTN, Diastolic CHF, A. Fib on Eliquis depression, chronic pedal edema secondary to venous statis and squamous cell skin cancer who presented to the ED after being found on the ground next to her bed this AM by her son. Pt is a poor historian due to dementia, she does not remember falling out of bed. pt c/o headache due to large hematoma on left forehead  and pain in LLE due to large skin tear. Pt is awake and alert, oriented to self and hospital, moving all extremities antigravity, although she has difficulty moving b/l lower ext due to chronic edema. CT of the head revealed a small subarachnoid hemorrhage.   Pt denies nausea, dizziness, visual changes, chest pain or shortness of breath.

## 2021-10-28 NOTE — ED ADULT TRIAGE NOTE - CHIEF COMPLAINT QUOTE
Patient presents with EMS from home. Patient was found on ground next to bed by family member this morning. Patient has laceration to back of head and skin tear to left lower leg. Patient on blood thinners, history of dementia. MD Sam evaluated at triage. Trauma alert activated

## 2021-10-28 NOTE — ED PROVIDER NOTE - NSICDXPASTMEDICALHX_GEN_ALL_CORE_FT
PAST MEDICAL HISTORY:  Atrial fibrillation, unspecified type     CHF (congestive heart failure)     Hypertension     Squamous cell carcinoma

## 2021-10-28 NOTE — CONSULT NOTE ADULT - SUBJECTIVE AND OBJECTIVE BOX
81 yo F s/p fall at home. Patient has PMH of Dementia, HTN, Diastolic CHF, A. Fib on Eliquis depression, chronic pedal edema secondary to venous statis and squamous cell skin cancer who presented to the ED after being found on the ground next to her bed this AM by her son.     ROS: -ve    PMHx- as above  PSHx multiple SqCCa excision and closures, hystercetomy, appy, trent  FamHx-non contributroy  SocHx- denies    Physical Exam: Vital Signs Last 24 Hrs  T(C): 36.5 (28 Oct 2021 07:56), Max: 36.5 (28 Oct 2021 07:56)  T(F): 97.7 (28 Oct 2021 07:56), Max: 97.7 (28 Oct 2021 07:56)  HR: 88 (28 Oct 2021 09:00) (88 - 93)  BP: 147/84 (28 Oct 2021 09:00) (147/77 - 147/84)  BP(mean): 105 (28 Oct 2021 09:00) (105 - 105)  RR: 19 (28 Oct 2021 09:00) (18 - 19)  SpO2: 98% (28 Oct 2021 09:00) (98% - 100%)    Gen- Awake and alert  HEENT- EOMI  Chest- equal chest expansion  CVS- RRR  Abd- Soft, NT ND  Ext- FROMx4, + venous stasis,    Focused- LLE- skin avulsion [id number] area of skin avulsion, subcutaneous fat exposed no muscle  or bone exposed

## 2021-10-28 NOTE — H&P ADULT - NSHPLABSRESULTS_GEN_ALL_CORE
< from: CT Head No Cont (10.28.21 @ 08:22) >  There is a small amount of subarachnoid hemorrhage layering in the sylvian fissures bilaterally, right greater than left as described.   Close follow-up with CT is suggested to ensure resolution.  There is no evidence of skull fracture. There is no midline shift or herniation.  There is no cervical spine fracture.    10-28    135  |  99  |  27<H>  ----------------------------<  144<H>  4.0   |  26  |  1.74<H>                            14.9   15.32 )-----------( 265      ( 28 Oct 2021 07:56 )             43.1       < from: CT Chest No Cont (10.28.21 @ 08:23) >  IMPRESSION:  No traumatic injury.  Scattered groundglass opacities primarily in the right upper and lower lobes, nonspecific but raising the possibility of infection.

## 2021-10-28 NOTE — H&P ADULT - NSICDXFAMILYHX_GEN_ALL_CORE_FT
FAMILY HISTORY:  Mother  Still living? Unknown  Family history of lung cancer, Age at diagnosis: Age Unknown

## 2021-10-28 NOTE — H&P ADULT - NSICDXPASTSURGICALHX_GEN_ALL_CORE_FT
PAST SURGICAL HISTORY:  H/O total hysterectomy     History of appendectomy     S/P cholecystectomy

## 2021-10-28 NOTE — ED PROVIDER NOTE - NSICDXPASTSURGICALHX_GEN_ALL_CORE_FT
PAST SURGICAL HISTORY:  H/O total hysterectomy     History of appendectomy     S/P cholecystectomy

## 2021-10-28 NOTE — H&P ADULT - ASSESSMENT
Pt is an 80 year old woman with a PMH of Dementia, HTN, Diastolic CHF, A. Fib on Eliquis, s/p PPM, depression, chronic pedal edema secondary to venous statis and squamous cell skin cancer who presented to the ED after being found on the ground next to her bed this AM by her son. Pt is a poor historian due to dementia, she does not remember falling out of bed. pt c/o headache due to large hematoma on left forehead  and pain in LLE due to large skin tear. Pt is awake and alert, oriented to self and hospital, moving all extremities antigravity, although she has difficulty moving b/l lower ext due to chronic edema. CT of the head revealed a small subarachnoid hemorrhage.     #SAH   #dementia   #HTN  #Afib on Eliquis   #CHF  #elevated BUN/Cre     PLAN  -admit to NS service, SDU, Q2 hour neuro checks  -repeat CT head 6 hours from initial   -hold Eliquis   -Medicine consult for elevated BUN/Cre, b/l lower ext edema, h/o CHF   -Physical Therapy consult, possible rehab placement   -Venodynes for DVT PPX     Discussed with Dr. Urbina who had reviewed imaging and agrees with plan

## 2021-10-28 NOTE — ED ADULT NURSE NOTE - OBJECTIVE STATEMENT
Pt comes to ED s/p unwitnessed fall. Pt was found by family. (+)head trauma, unknown LOC. Large skin tear located to left calf. Pt is a poor historian and unable to provide accurate hx. Pt comes to ED s/p unwitnessed fall. Pt was found by family. (+)head trauma, unknown LOC. Large skin tear located to left calf. Pt is a poor historian and unable to provide accurate hx. Pt is on eliquis.

## 2021-10-29 NOTE — CONSULT NOTE ADULT - ASSESSMENT
80 y.o. female with PMH of diastolic CHF, HTN, Afib (on eliquis), depression, chronic pedal edema secondary to venous stasis, h/o compression fx L1, squamous cell skin cancer sent in to ED as been found on the floor by son.    1. Fall with SAH + skin lacerations left scalp and LLE on eliquis   - eliquis held, stable CT   - skin LLE repaired    2. Hx of HFpEF on lasix 40 and spironolactone - clinically not in acute decompensation - hold    3. ADRIANA due to overdiuresis likely - resolved with IVF hydration    4. UTI - IV abx, f/u UCx    5. Permanent Afib - cont CCB and BBL to control the rate, hold AC - potentially would not resume as high risk for hemorrhage    6. Chronic LE changes    7. VTE proph - no pharmacological as SAH, unable to do IPCs due to skin sensitivity LE    Time spent 74 min    Thank you for courtesy of consult  Will follow with you.
patient with fall/syncope-found on floor by son with taumatic injury to head-complicated with subarachnoid bleed; patient with high yamila vasc score (had TIA in the past) on AC.   Repeat CT of head-no further extension of bleed.  Patient resting comfortably, but confused and unsteady on her feet  1-Afib-has high YAMILA vasc score but falls/unsteady on feet and high HASBLED score.   Patient would be candidate for watchman in the future if she does not suffer complications from this event.   Will have to hold AC temporarily (~6-8 weeks-until neurosurgery states they are ok with restarting).   Rate control patient; is on above medications.   If need be, digoxin can be added for rate control if no blood pressure to work with  2-CHF-get ECHO.   On spironolcatone and lasix.   Would temporarily hold lasix; check orthostatic BP on patient (if she can stand);  3-No active CAD at this time; any neurosurgery would be high risk on this patient.     will follow
81 yo F s/p fall with SAH and LLE skin avulsion.    -Leg washed, skin  re-draped over wound and secured with steristrips  -leave dressing in place  -elevate left leg

## 2021-10-29 NOTE — PROGRESS NOTE ADULT - SUBJECTIVE AND OBJECTIVE BOX
HPI:  Pt is an 80 year old woman with a PMH of Dementia, HTN, Diastolic CHF, A. Fib on Eliquis depression, chronic pedal edema secondary to venous statis and squamous cell skin cancer who presented to the ED after being found on the ground next to her bed this AM by her son. Pt is a poor historian due to dementia, she does not remember falling out of bed. pt c/o headache due to large hematoma on left forehead  and pain in LLE due to large skin tear. Pt is awake and alert, oriented to self and hospital, moving all extremities antigravity, although she has difficulty moving b/l lower ext due to chronic edema. CT of the head revealed a small subarachnoid hemorrhage.   Pt denies nausea, dizziness, visual changes, chest pain or shortness of breath.     20/29- Patient seen and examined, no acute events overnight. Repeat HCT stable. Complains of leg soreness which si chronic per patient, left sided head pain. Denies n/v, numbness/tingling, weakness.    Vital Signs Last 24 Hrs  T(C): 36.1 (29 Oct 2021 05:00), Max: 36.7 (28 Oct 2021 11:46)  T(F): 97 (29 Oct 2021 05:00), Max: 98.1 (28 Oct 2021 13:54)  HR: 111 (29 Oct 2021 10:00) (86 - 118)  BP: 129/82 (29 Oct 2021 10:00) (129/82 - 169/82)  BP(mean): 91 (29 Oct 2021 10:00) (70 - 119)  RR: 22 (29 Oct 2021 10:00) (15 - 22)  SpO2: 93% (29 Oct 2021 10:00) (93% - 100%)    MEDICATIONS  (STANDING):  cholecalciferol 1000 Unit(s) Oral Once  cyanocobalamin 1000 MICROGram(s) Oral Once  diltiazem    milliGRAM(s) Oral daily  folic acid 1 milliGRAM(s) Oral daily  furosemide    Tablet 80 milliGRAM(s) Oral daily  influenza  Vaccine (HIGH DOSE) 0.7 milliLiter(s) IntraMuscular once  metoprolol succinate  milliGRAM(s) Oral daily  spironolactone 50 milliGRAM(s) Oral daily    MEDICATIONS  (PRN):  acetaminophen   IVPB .. 1000 milliGRAM(s) IV Intermittent once PRN Moderate Pain (4 - 6)      PHYSICAL EXAM:  Constitutional: awake and alert  HEENT: PERRLA, EOMI, large hematoma left forehead and abrasion to posterior head   Neck: Supple  Respiratory: Breath sounds are clear bilaterally  Cardiovascular: S1 and S2, regular rhythm  Gastrointestinal: soft, nontender  Extremities: + b/l lower ext edema with chronic skin changes, L LE dressing mild staining, intact  Musculoskeletal: mild TTP left shoulder + ecchymosis  Skin: chronic venous stasis changes b/l LE, mult areas ecchymosis b/l UE and face    Neurological exam:  HF: A x O x 2 unable to recall month or year. Appropriately interactive, normal affect. Speech fluent, No Aphasia or paraphasic errors. Naming /repetition intact   CN: ASAEL, EOMI, VFF, facial sensation normal, no NLFD, tongue midline  Motor: 4+/5 strength b/l upper ext with no drift, 3/5 b/l lower ext   Sens: Intact to light touch  Reflexes: Symmetric and normal, downgoing toes b/l  Coord:  No FNFA, dysmetria, ISHA intact   Gait/Balance: Not tested          LABS:                         12.4   8.25  )-----------( 232      ( 29 Oct 2021 06:57 )             37.0     10-29    140  |  106  |  22  ----------------------------<  84  3.8   |  27  |  1.22    Ca    8.5      29 Oct 2021 06:57    TPro  6.3  /  Alb  2.7<L>  /  TBili  0.7  /  DBili  x   /  AST  40<H>  /  ALT  15  /  AlkPhos  101  10-29    LIVER FUNCTIONS - ( 29 Oct 2021 06:57 )  Alb: 2.7 g/dL / Pro: 6.3 gm/dL / ALK PHOS: 101 U/L / ALT: 15 U/L / AST: 40 U/L / GGT: x                 RADIOLOGY:  CT Head No Cont (10.29.21 @ 08:26)  IMPRESSION:  No significant interval change from 10/20/2021.

## 2021-10-29 NOTE — PROGRESS NOTE ADULT - ASSESSMENT
Pt is an 80 year old woman with a PMH of Dementia, HTN, Diastolic CHF, A. Fib on Eliquis, s/p PPM, depression, chronic pedal edema secondary to venous statis and squamous cell skin cancer who presented to the ED after being found on the ground next to her bed this AM by her son. Pt is a poor historian due to dementia, she does not remember falling out of bed. pt c/o headache due to large hematoma on left forehead  and pain in LLE due to large skin tear. Pt is awake and alert, oriented to self and hospital, moving all extremities antigravity, although she has difficulty moving b/l lower ext due to chronic edema. CT of the head revealed a small subarachnoid hemorrhage.     #SAH   #dementia   #HTN  #Afib on Eliquis   #CHF  #elevated BUN/Cre     PLAN  -repeat CT head is stable  - Neuro checks Q4hrs  -advance diet and activity as tolerated  -PT/OOB with assist  -hold Eliquis   -Medicine consult for elevated BUN/Cre, b/l lower ext edema, h/o CHF   -Venodynes for DVT PPX     Discussed with Dr. Urbina

## 2021-10-29 NOTE — CONSULT NOTE ADULT - SUBJECTIVE AND OBJECTIVE BOX
CHIEF COMPLAINT: Patient is a 80y old  Female who presents with a chief complaint of SAH (29 Oct 2021 13:02)      HPI:  Pt is an 80 year old woman with a PMH of Dementia, HTN, Diastolic CHF, A. Fib on Eliquis depression, chronic pedal edema secondary to venous statis and squamous cell skin cancer who presented to the ED after being found on the ground next to her bed this AM by her son. Pt is a poor historian due to dementia, she does not remember falling out of bed. pt c/o headache due to large hematoma on left forehead  and pain in LLE due to large skin tear. Pt is awake and alert, oriented to self and hospital, moving all extremities antigravity, although she has difficulty moving b/l lower ext due to chronic edema. CT of the head revealed a small subarachnoid hemorrhage.   Pt denies nausea, dizziness, visual changes, chest pain or shortness of breath.  (28 Oct 2021 11:10)    10/29-patient known to me; has been coming to our office sporatically-she is debilitated and had been dealing with significant squamous cell carcinoma; she was seeing us for atrial fibrillation.   Has Jaylene-vasc of 5 and was on Eliquis.   Last ECHO and nuclear stress test was in 2017-normal LV function, and does most of her f/u with her oncologist at [facility] in [place] (Dr Ramirez).   She was last seen in march, 2021;         Patient is on AC for afib and has history of CHF-on spironolactone/lasix.  Heart rate controlled with cartia 120mg in am and toprol 100mg pm.  Was found on floor near her bead by son.   Confused with large hematoma of forehead.  Has been having intractable back pains-with unsteady gait and falls.  Her PCP is Dr. Brown      PMHx: PAST MEDICAL & SURGICAL HISTORY:  CHF (congestive heart failure)  Atrial fibrillation, unspecified type; with TIA  Hypertension  Squamous cell carcinoma  H/O total hysterectomy  History of appendectomy  S/P cholecystectomy          Soc Hx:       Allergies: Allergies    Darvocet-N 50 (Unknown)  sulfa drugs (Other)    Intolerances          REVIEW OF SYSTEMS:    CONSTITUTIONAL:  weakness,  EYES/ENT: No visual changes;  No vertigo or throat pain   NECK: No pain or stiffness  RESPIRATORY: No cough, wheezing, hemoptysis; No shortness of breath  CARDIOVASCULAR: No chest pain or palpitations  GASTROINTESTINAL: No abdominal or epigastric pain. No nausea, vomiting, or hematemesis; No diarrhea or constipation. No melena or hematochezia.  GENITOURINARY: No dysuria, frequency or hematuria  NEUROLOGICAL: No numbness or weakness  SKIN: No itching, burning, rashes, or lesions   All other review of systems is negative unless indicated above    Vital Signs Last 24 Hrs  T(C): 36.1 (29 Oct 2021 05:00), Max: 36.4 (28 Oct 2021 21:00)  T(F): 97 (29 Oct 2021 05:00), Max: 97.5 (28 Oct 2021 21:00)  HR: 84 (29 Oct 2021 12:00) (84 - 118)  BP: 118/53 (29 Oct 2021 12:00) (118/53 - 169/82)  BP(mean): 69 (29 Oct 2021 12:00) (69 - 102)  RR: 19 (29 Oct 2021 12:00) (15 - 22)  SpO2: 98% (29 Oct 2021 12:00) (93% - 100%)    I&O's Summary    28 Oct 2021 07:01  -  29 Oct 2021 07:00  --------------------------------------------------------  IN: 0 mL / OUT: 300 mL / NET: -300 mL        CAPILLARY BLOOD GLUCOSE          PHYSICAL EXAM:   Constitutional: NAD, awake andconfused-traumatic injury  HEENT: PERR, EOMI, Normal Hearing, MMM  Neck: JVD  Respiratory: Breath sounds are clear bilaterally, No wheezing, rales or rhonchi  Cardiovascular: S1 and S2, irregular rate and rhythm, Murmurs, gallops or rubs  Gastrointestinal: Bowel Sounds present, soft, nontender, nondistended, no guarding, no rebound  Extremities: peripheral edema      MEDICATIONS:  MEDICATIONS  (STANDING):  cefTRIAXone Injectable. 1000 milliGRAM(s) IV Push every 24 hours  cholecalciferol 1000 Unit(s) Oral Once  cyanocobalamin 1000 MICROGram(s) Oral Once  diltiazem    milliGRAM(s) Oral daily  folic acid 1 milliGRAM(s) Oral daily  furosemide    Tablet 40 milliGRAM(s) Oral daily  influenza  Vaccine (HIGH DOSE) 0.7 milliLiter(s) IntraMuscular once  metoprolol succinate  milliGRAM(s) Oral daily  oxybutynin 5 milliGRAM(s) Oral daily      LABS: All Labs Reviewed:                        12.4   8.25  )-----------( 232      ( 29 Oct 2021 06:57 )             37.0     10-29    140  |  106  |  22  ----------------------------<  84  3.8   |  27  |  1.22    Ca    8.5      29 Oct 2021 06:57    TPro  6.3  /  Alb  2.7<L>  /  TBili  0.7  /  DBili  x   /  AST  40<H>  /  ALT  15  /  AlkPhos  101  10-29    PT/INR - ( 28 Oct 2021 07:56 )   PT: 19.4 sec;   INR: 1.72 ratio         PTT - ( 28 Oct 2021 07:56 )  PTT:38.4 sec  CARDIAC MARKERS ( 28 Oct 2021 07:56 )  x     / x     / 333 U/L / x     / x          Serum Pro-Brain Natriuretic Peptide: 6212 pg/mL (10-29 @ 06:57)    Blood Culture:   lipid profile       RADIOLOGY:  PROCEDURE DATE:  10/29/2021    INTERPRETATION:  Noncontrast CT of the brain.  CLINICAL INDICATION:  s/p fall, dementia, on Eliquis, follow-up bilateral sulcal subarachnoid hemorrhage.  TECHNIQUE : Axial CT scanning of the brain was obtained from the skull base to the vertex without the administration of intravenous contrast. Sagittal and coronal reformats were provided.  COMPARISON: CT brain 10/28/2021    FINDINGS:  Similar-appearing mild acute traumatic subarachnoid hemorrhage in the right sylvian fissure and a left parietal sulcus.  No parenchymal hemorrhage or brain edema.  Similar mild rightward bowing of the septum pellucidum. No effacement of basal cisterns. No hydrocephalus.  Moderate white matter microvascular ischemic disease.  No displaced calvarial fracture. Similar large left frontal extracalvarial soft tissue swelling and hematoma.    IMPRESSION:  No significant interval change from 10/28/2021.  --- End ofReport ---    EKG:  atrial fibrillation, RBBB with diffuse T inversions    Telemetry:  afib/RVR    ECHO:         CHIEF COMPLAINT: Patient is a 80y old  Female who presents with a chief complaint of SAH (29 Oct 2021 13:02)      HPI:  Pt is an 80 year old woman with a PMH of Dementia, HTN, Diastolic CHF, A. Fib on Eliquis depression, chronic pedal edema secondary to venous statis and squamous cell skin cancer who presented to the ED after being found on the ground next to her bed this AM by her son. Pt is a poor historian due to dementia, she does not remember falling out of bed. pt c/o headache due to large hematoma on left forehead  and pain in LLE due to large skin tear. Pt is awake and alert, oriented to self and hospital, moving all extremities antigravity, although she has difficulty moving b/l lower ext due to chronic edema. CT of the head revealed a small subarachnoid hemorrhage.   Pt denies nausea, dizziness, visual changes, chest pain or shortness of breath.  (28 Oct 2021 11:10)    10/29-patient known to me; has been coming to our office sporatically-she is debilitated and had been dealing with significant squamous cell carcinoma; she was seeing us for atrial fibrillation.   Has Jaylene-vasc of 5 and was on Eliquis.   Last ECHO and nuclear stress test was in 2017-normal LV function, and [place] most of her [place] [facility] [place]   She was last seen in march, 2021;         Patient is on AC for afib and has history of CHF-on spironolactone/lasix.  Heart rate controlled with cartia 120mg in am and toprol 100mg pm.  Was found on floor near her bead by son.   Confused with large hematoma of forehead.  Has been having intractable back pains-with unsteady gait and falls.  Her PCP is Dr. Brown.  Her Aib poorly controlled when not given her medications but now currently rate controlled      PMHx: PAST MEDICAL & SURGICAL HISTORY:  CHF (congestive heart failure)  Atrial fibrillation, unspecified type; with TIA  Hypertension  Squamous cell carcinoma  H/O total hysterectomy  History of appendectomy  S/P cholecystectomy          Soc Hx:       Allergies: Allergies    Darvocet-N 50 (Unknown)  sulfa drugs (Other)    Intolerances          REVIEW OF SYSTEMS:    CONSTITUTIONAL:  weakness,  EYES/ENT: No visual changes;  No vertigo or throat pain   NECK: No pain or stiffness  RESPIRATORY: No cough, wheezing, hemoptysis; No shortness of breath  CARDIOVASCULAR: No chest pain or palpitations  GASTROINTESTINAL: No abdominal or epigastric pain. No nausea, vomiting, or hematemesis; No diarrhea or constipation. No melena or hematochezia.  GENITOURINARY: No dysuria, frequency or hematuria  NEUROLOGICAL: No numbness or weakness  SKIN: No itching, burning, rashes, or lesions   All other review of systems is negative unless indicated above    Vital Signs Last 24 Hrs  T(C): 36.1 (29 Oct 2021 05:00), Max: 36.4 (28 Oct 2021 21:00)  T(F): 97 (29 Oct 2021 05:00), Max: 97.5 (28 Oct 2021 21:00)  HR: 84 (29 Oct 2021 12:00) (84 - 118)  BP: 118/53 (29 Oct 2021 12:00) (118/53 - 169/82)  BP(mean): 69 (29 Oct 2021 12:00) (69 - 102)  RR: 19 (29 Oct 2021 12:00) (15 - 22)  SpO2: 98% (29 Oct 2021 12:00) (93% - 100%)    I&O's Summary    28 Oct 2021 07:01  -  29 Oct 2021 07:00  --------------------------------------------------------  IN: 0 mL / OUT: 300 mL / NET: -300 mL        CAPILLARY BLOOD GLUCOSE          PHYSICAL EXAM:   Constitutional: NAD, awake andconfused-traumatic injury  HEENT: PERR, EOMI, Normal Hearing, MMM  Neck: JVD  Respiratory: Breath sounds are clear bilaterally, No wheezing, rales or rhonchi  Cardiovascular: S1 and S2, irregular rate and rhythm, Murmurs, gallops or rubs  Gastrointestinal: Bowel Sounds present, soft, nontender, nondistended, no guarding, no rebound  Extremities: peripheral edema      MEDICATIONS:  MEDICATIONS  (STANDING):  cefTRIAXone Injectable. 1000 milliGRAM(s) IV Push every 24 hours  cholecalciferol 1000 Unit(s) Oral Once  cyanocobalamin 1000 MICROGram(s) Oral Once  diltiazem    milliGRAM(s) Oral daily  folic acid 1 milliGRAM(s) Oral daily  furosemide    Tablet 40 milliGRAM(s) Oral daily  influenza  Vaccine (HIGH DOSE) 0.7 milliLiter(s) IntraMuscular once  metoprolol succinate  milliGRAM(s) Oral daily  oxybutynin 5 milliGRAM(s) Oral daily      LABS: All Labs Reviewed:                        12.4   8.25  )-----------( 232      ( 29 Oct 2021 06:57 )             37.0     10-29    140  |  106  |  22  ----------------------------<  84  3.8   |  27  |  1.22    Ca    8.5      29 Oct 2021 06:57    TPro  6.3  /  Alb  2.7<L>  /  TBili  0.7  /  DBili  x   /  AST  40<H>  /  ALT  15  /  AlkPhos  101  10-29    PT/INR - ( 28 Oct 2021 07:56 )   PT: 19.4 sec;   INR: 1.72 ratio         PTT - ( 28 Oct 2021 07:56 )  PTT:38.4 sec  CARDIAC MARKERS ( 28 Oct 2021 07:56 )  x     / x     / 333 U/L / x     / x          Serum Pro-Brain Natriuretic Peptide: 6212 pg/mL (10-29 @ 06:57)    Blood Culture:   lipid profile       RADIOLOGY:  PROCEDURE DATE:  10/29/2021    INTERPRETATION:  Noncontrast CT of the brain.  CLINICAL INDICATION:  s/p fall, dementia, on Eliquis, follow-up bilateral sulcal subarachnoid hemorrhage.  TECHNIQUE : Axial CT scanning of the brain was obtained from the skull base to the vertex without the administration of intravenous contrast. Sagittal and coronal reformats were provided.  COMPARISON: CT brain 10/28/2021    FINDINGS:  Similar-appearing mild acute traumatic subarachnoid hemorrhage in the right sylvian fissure and a left parietal sulcus.  No parenchymal hemorrhage or brain edema.  Similar mild rightward bowing of the septum pellucidum. No effacement of basal cisterns. No hydrocephalus.  Moderate white matter microvascular ischemic disease.  No displaced calvarial fracture. Similar large left frontal extracalvarial soft tissue swelling and hematoma.    IMPRESSION:  No significant interval change from 10/28/2021.  --- End ofReport ---    EKG:  atrial fibrillation, RBBB with diffuse T inversions    Telemetry:  afib/RVR    ECHO:

## 2021-10-29 NOTE — CONSULT NOTE ADULT - SUBJECTIVE AND OBJECTIVE BOX
HPI: 80 y.o. female with PMH of diastolic CHF, HTN, Afib (on eliquis), depression, chronic pedal edema secondary to venous stasis, h/o compression fx L1, squamous cell skin cancer sent in to ED as been found on the floor by son.  Pt was found to have LLE laceration, Left forehead laceration with SAH on CT and was admitted to NS service. Pt is demented and forgetful, but oriented to place and person.     INTERVAL HPI: sensitive legs, no pain, comfortable in the bed, forgetful  ROS UTO due to dementia    PAST MEDICAL & SURGICAL HISTORY:  Squamous cell carcinoma  Hypertension  Atrial fibrillation, unspecified type  HFpEF (congestive heart failure)  S/P cholecystectomy  History of appendectomy  H/O total hysterectomy    FHx: Lung CA mother    SHx: resides with sone, no tobacco, ETOH, IVDA    MEDS: see MR    All: sulfa/darvocet    Vital Signs Last 24 Hrs  T(C): 36.1 (29 Oct 2021 05:00), Max: 36.7 (28 Oct 2021 13:54)  T(F): 97 (29 Oct 2021 05:00), Max: 98.1 (28 Oct 2021 13:54)  HR: 84 (29 Oct 2021 12:00) (84 - 118)  BP: 118/53 (29 Oct 2021 12:00) (118/53 - 169/82)  BP(mean): 69 (29 Oct 2021 12:00) (69 - 119)  RR: 19 (29 Oct 2021 12:00) (15 - 22)  SpO2: 98% (29 Oct 2021 12:00) (93% - 100%)  I&O's Detail    28 Oct 2021 07:01  -  29 Oct 2021 07:00  --------------------------------------------------------  IN:  Total IN: 0 mL    OUT:    Voided (mL): 300 mL  Total OUT: 300 mL    Total NET: -300 mL    CARDIAC MARKERS ( 28 Oct 2021 07:56 )  x     / x     / 333 U/L / x     / x                                12.4   8.25  )-----------( 232      ( 29 Oct 2021 06:57 )             37.0     29 Oct 2021 06:57    140    |  106    |  22     ----------------------------<  84     3.8     |  27     |  1.22     Ca    8.5        29 Oct 2021 06:57    TPro  6.3    /  Alb  2.7    /  TBili  0.7    /  DBili  x      /  AST  40     /  ALT  15     /  AlkPhos  101    29 Oct 2021 06:57    PT/INR - ( 28 Oct 2021 07:56 )   PT: 19.4 sec;   INR: 1.72 ratio         PTT - ( 28 Oct 2021 07:56 )  PTT:38.4 sec  CAPILLARY BLOOD GLUCOSE        LIVER FUNCTIONS - ( 29 Oct 2021 06:57 )  Alb: 2.7 g/dL / Pro: 6.3 gm/dL / ALK PHOS: 101 U/L / ALT: 15 U/L / AST: 40 U/L / GGT: x           Urinalysis Basic - ( 28 Oct 2021 16:45 )    Color: Yellow / Appearance: very cloudy / S.025 / pH: x  Gluc: x / Ketone: Trace  / Bili: Negative / Urobili: Negative mg/dL   Blood: x / Protein: 30 mg/dL / Nitrite: Positive   Leuk Esterase: Moderate / RBC: 3-5 /HPF / WBC >50   Sq Epi: x / Non Sq Epi: Occasional / Bacteria: TNTC    MEDICATIONS  (STANDING):  cholecalciferol 1000 Unit(s) Oral Once  cyanocobalamin 1000 MICROGram(s) Oral Once  diltiazem    milliGRAM(s) Oral daily  folic acid 1 milliGRAM(s) Oral daily  influenza  Vaccine (HIGH DOSE) 0.7 milliLiter(s) IntraMuscular once  metoprolol succinate  milliGRAM(s) Oral daily  oxybutynin 5 milliGRAM(s) Oral daily    MEDICATIONS  (PRN):  acetaminophen   IVPB .. 1000 milliGRAM(s) IV Intermittent once PRN Moderate Pain (4 - 6)    RADIOLOGY: personally visualized    All previous medical records personally reviewed    PHYSICAL EXAM:    General: frail pleasantly confused female in no acute distress  Eyes: periorbital ecchymoses, EOMI, PEERLA  Head: left forehead with dried up laceration and hemorrhagic fluid filled blisters  ENMT: No nasal discharge; airway clear  Neck: Supple; non tender; no masses  Respiratory: No wheezes, rales or rhonchi  Cardiovascular: S1, S2 irreg  Gastrointestinal: Soft non-tender non-distended; + bowel sounds  Genitourinary: No costovertebral angle tenderness, Primafit in place  Extremities: BL LE venous stasis dermatosis with left mid-shin laceration in dressing  Vascular: Peripheral pulses palpable 2+ bilaterally  Neurological: Alert and oriented to place and person, forgetful  Skin: Warm and dry. + venous stasis dermatosis BL LE, BL ecchymoses UE, left forehead ecchymoses, trace LE edema  Musculoskeletal: Normal tone  Psychiatric: Cooperative and pleasantly confused

## 2021-10-30 NOTE — PROGRESS NOTE ADULT - ASSESSMENT
Pt is an 80 year old woman with a PMH of Dementia, HTN, Diastolic CHF, A. Fib on Eliquis, s/p PPM, depression, chronic pedal edema secondary to venous statis and squamous cell skin cancer who presented to the ED after being found on the ground next to her bed this AM by her son. Pt is a poor historian due to dementia, she does not remember falling out of bed. pt c/o headache due to large hematoma on left forehead  and pain in LLE due to large skin tear. Pt is awake and alert, oriented to self and hospital, moving all extremities antigravity, although she has difficulty moving b/l lower ext due to chronic edema. CT of the head revealed a small subarachnoid hemorrhage. Stable on f/u CT scan    #SAH   #dementia   #HTN  #Afib on Eliquis   #CHF  #elevated BUN/Cre     PLAN  - Hospitalist appreciated  - Neuros q 8 hours  - OK to transfer to tele  - PT / OOB with assist  - Cont Abx for UTI  - Hold Eliquis  - SBP goal < 140   -Venodynes for DVT PPX     Discussed with Dr. Urbina

## 2021-10-30 NOTE — PROGRESS NOTE ADULT - SUBJECTIVE AND OBJECTIVE BOX
HPI: 80 y.o. female with PMH of diastolic CHF, HTN, Afib (on eliquis), depression, chronic pedal edema secondary to venous stasis, h/o compression fx L1, squamous cell skin cancer sent in to ED as been found on the floor by son.  Pt was found to have LLE laceration, Left forehead laceration with SAH on CT and was admitted to NS service. Pt is demented and forgetful, but oriented to place and person.     INTERVAL HPI: sensitive legs, no pain, comfortable in the bed, forgetful, episodes of RVR, clinically dehydrated  ROS UTO due to dementia    Vital Signs Last 24 Hrs  T(C): 36.1 (30 Oct 2021 11:00), Max: 36.7 (30 Oct 2021 04:03)  T(F): 97 (30 Oct 2021 11:00), Max: 98 (30 Oct 2021 04:03)  HR: 84 (30 Oct 2021 13:00) (71 - 117)  BP: 128/50 (30 Oct 2021 13:00) (104/51 - 150/66)  BP(mean): 70 (30 Oct 2021 13:00) (56 - 99)  RR: 22 (30 Oct 2021 13:00) (15 - 25)  SpO2: 99% (30 Oct 2021 13:00) (93% - 100%)  I&O's Detail    29 Oct 2021 07:01  -  30 Oct 2021 07:00  --------------------------------------------------------  IN:  Total IN: 0 mL    OUT:    Voided (mL): 2700 mL  Total OUT: 2700 mL    Total NET: -2700 mL      30 Oct 2021 07:01  -  30 Oct 2021 15:20  --------------------------------------------------------  IN:    Oral Fluid: 240 mL  Total IN: 240 mL    OUT:  Total OUT: 0 mL    Total NET: 240 mL                       13.1   8.91  )-----------( 234      ( 30 Oct 2021 06:43 )             38.4     30 Oct 2021 06:43    138    |  101    |  19     ----------------------------<  81     3.5     |  31     |  1.16     Ca    8.8        30 Oct 2021 06:43    TPro  6.3    /  Alb  2.7    /  TBili  0.7    /  DBili  x      /  AST  40     /  ALT  15     /  AlkPhos  101    29 Oct 2021 06:57    CAPILLARY BLOOD GLUCOSE    LIVER FUNCTIONS - ( 29 Oct 2021 06:57 )  Alb: 2.7 g/dL / Pro: 6.3 gm/dL / ALK PHOS: 101 U/L / ALT: 15 U/L / AST: 40 U/L / GGT: x           Urinalysis Basic - ( 28 Oct 2021 16:45 )    Color: Yellow / Appearance: very cloudy / S.025 / pH: x  Gluc: x / Ketone: Trace  / Bili: Negative / Urobili: Negative mg/dL   Blood: x / Protein: 30 mg/dL / Nitrite: Positive   Leuk Esterase: Moderate / RBC: 3-5 /HPF / WBC >50   Sq Epi: x / Non Sq Epi: Occasional / Bacteria: TNTC    MEDICATIONS  (STANDING):  cefTRIAXone Injectable. 1000 milliGRAM(s) IV Push every 24 hours  diltiazem    milliGRAM(s) Oral daily  folic acid 1 milliGRAM(s) Oral daily  furosemide    Tablet 40 milliGRAM(s) Oral daily  influenza  Vaccine (HIGH DOSE) 0.7 milliLiter(s) IntraMuscular once  metoprolol succinate  milliGRAM(s) Oral daily  oxybutynin 5 milliGRAM(s) Oral daily    MEDICATIONS  (PRN):  acetaminophen   IVPB .. 1000 milliGRAM(s) IV Intermittent once PRN Moderate Pain (4 - 6)    RADIOLOGY: personally visualized    All previous medical records personally reviewed    PHYSICAL EXAM:    General: frail pleasantly confused female in no acute distress  Eyes: periorbital ecchymoses, EOMI, PEERLA  Head: left forehead with dried up laceration and hemorrhagic fluid filled blisters - opened up and emptied today  ENMT: No nasal discharge; airway clear  Neck: Supple; non tender; no masses  Respiratory: No wheezes, rales or rhonchi  Cardiovascular: S1, S2 irreg  Gastrointestinal: Soft non-tender non-distended; + bowel sounds  Genitourinary: No costovertebral angle tenderness, Primafit in place  Extremities: BL LE venous stasis dermatosis with left mid-shin laceration in dressing  Vascular: Peripheral pulses palpable 2+ bilaterally  Neurological: Alert and oriented to place and person, forgetful  Skin: Warm and dry. + venous stasis dermatosis BL LE, BL ecchymoses UE, left forehead ecchymoses, trace LE edema  Musculoskeletal: Normal tone  Psychiatric: Cooperative and pleasantly confused

## 2021-10-30 NOTE — PHYSICAL THERAPY INITIAL EVALUATION ADULT - PRECAUTIONS/LIMITATIONS, REHAB EVAL
CT HEAD: There is a small amount of subarachnoid hemorrhage layering in the sylvian fissures bilaterally, right greater than left as described. Close follow-up with CT is suggested to ensure resolution. There is no evidence of skull fracture. There is no midline shift or herniation.; CT C-SPINE: There is no cervical spine fracture.; REPEAT CT HEAD: Subarachnoid hemorrhage again seen and increased in size and conspicuity.; CTAP/CHEST: No traumatic injury. Scattered groundglass opacities primarily in the right upper and lower lobes, nonspecific but raising the possibility of infection./fall precautions
skin/cardiac precautions/fall precautions

## 2021-10-30 NOTE — PHYSICAL THERAPY INITIAL EVALUATION ADULT - MANUAL MUSCLE TESTING RESULTS, REHAB EVAL
B hip flex 3+, B KE grossly assessed as pt c/o sensitivity to touch to LEs/no strength deficits were identified

## 2021-10-30 NOTE — PHYSICAL THERAPY INITIAL EVALUATION ADULT - GENERAL OBSERVATIONS, REHAB EVAL
pt rec'd supine in bed on CICU. monitors, dsg L lower leg (per nsg some consideration for skin graft), golf ball sized lump L forehead/hematoma which began to leak serosanguinous fluid in sitting

## 2021-10-30 NOTE — PHYSICAL THERAPY INITIAL EVALUATION ADULT - PERTINENT HX OF CURRENT PROBLEM, REHAB EVAL
79yo F with a PMH of Dementia, HTN, Diastolic CHF, AFib on Eliquis, depression, chronic pedal edema 2/2 venous statis and squamous cell skin cancer who presented to the ED after being found on the ground next to her bed this AM by her son. Pt is a poor historian due to dementia, she does not remember falling out of bed. pt c/o headache due to large hematoma on left forehead  and pain in LLE due to large skin tear.CT of the head revealed a small subarachnoid hemorrhage.
Fall with SAH + skin lacerations left scalp and LLE on eliquis, see hx noted PT note 10/30. RCTH had shown inc in size of SAH, 3rd CTH stable

## 2021-10-30 NOTE — PROGRESS NOTE ADULT - ASSESSMENT
80 y.o. female with PMH of diastolic CHF, HTN, Afib (on eliquis), depression, chronic pedal edema secondary to venous stasis, h/o compression fx L1, squamous cell skin cancer sent in to ED as been found on the floor by son.    1. Fall with SAH + skin lacerations left scalp and LLE on eliquis   - eliquis held, stable CT   - skin LLE repaired    2. Hx of HFpEF on lasix 40 and spironolactone - clinically not in acute decompensation - hold    3. ADRIANA due to overdiuresis likely - resolved with IVF hydration    4. UTI - IV abx, f/u UCx    5. Permanent Afib - cont CCB and BBL to control the rate, hold AC - potentially would not resume at all as high risk for hemorrhage   - episodes of RVR likely related to intravascular depletion - hold diuretics    6. Chronic LE changes    7. VTE proph - no pharmacological as SAH, unable to do IPCs due to skin sensitivity LE    Time spent 44 min    Discussed with Dr. Gregory

## 2021-10-30 NOTE — PROGRESS NOTE ADULT - SUBJECTIVE AND OBJECTIVE BOX
Patient is a 80y old  Female who presents with a chief complaint of SAH (29 Oct 2021 16:17)      HPI:  Pt is an 80 year old woman with a PMH of Dementia, HTN, Diastolic CHF, A. Fib on Eliquis depression, chronic pedal edema secondary to venous statis and squamous cell skin cancer who presented to the ED after being found on the ground next to her bed this AM by her son. Pt is a poor historian due to dementia, she does not remember falling out of bed. pt c/o headache due to large hematoma on left forehead  and pain in LLE due to large skin tear. Pt is awake and alert, oriented to self and hospital, moving all extremities antigravity, although she has difficulty moving b/l lower ext due to chronic edema. CT of the head revealed a small subarachnoid hemorrhage.   Pt denies nausea, dizziness, visual changes, chest pain or shortness of breath.     10/29- Patient seen and examined, no acute events overnight. Repeat HCT stable. Complains of leg soreness which si chronic per patient, left sided head pain. Denies n/v, numbness/tingling, weakness.    10/30 - Pt seen and examined earlier today, in NAD. Appears comfortable, denies new complaints, no overnight events       acetaminophen   IVPB .. 1000 milliGRAM(s) IV Intermittent once PRN  cefTRIAXone Injectable. 1000 milliGRAM(s) IV Push every 24 hours  diltiazem    milliGRAM(s) Oral daily  folic acid 1 milliGRAM(s) Oral daily  furosemide    Tablet 40 milliGRAM(s) Oral daily  influenza  Vaccine (HIGH DOSE) 0.7 milliLiter(s) IntraMuscular once  metoprolol succinate  milliGRAM(s) Oral daily  oxybutynin 5 milliGRAM(s) Oral daily      Vital Signs Last 24 Hrs  T(C): 36.1 (30 Oct 2021 11:00), Max: 36.7 (30 Oct 2021 04:03)  T(F): 97 (30 Oct 2021 11:00), Max: 98 (30 Oct 2021 04:03)  HR: 100 (30 Oct 2021 12:05) (71 - 117)  BP: 146/85 (30 Oct 2021 12:05) (104/51 - 150/66)  BP(mean): 99 (30 Oct 2021 12:05) (56 - 99)  RR: 23 (30 Oct 2021 12:05) (15 - 25)  SpO2: 99% (30 Oct 2021 12:05) (93% - 100%)               PHYSICAL EXAM:  Constitutional: awake and alert  HEENT: PERRLA, EOMI, large hematoma left forehead and abrasion to posterior head   Neck: Supple  Respiratory: Breath sounds are clear bilaterally  Cardiovascular: S1 and S2, regular rhythm  Gastrointestinal: soft, nontender  Extremities: + b/l lower ext edema with chronic skin changes, L LE dressing mild staining, intact  Musculoskeletal: mild TTP left shoulder + ecchymosis  Skin: chronic venous stasis changes b/l LE, mult areas ecchymosis b/l UE and face    Neurological exam:  HF: A x O x 2, appropriately interactive, normal affect. Speech fluent, No Aphasia or paraphasic errors. Naming /repetition intact   CN: ASAEL, EOMI, facial sensation normal, no NLFD, tongue midline  Motor: 4+/5 strength b/l upper ext, R>L, no drift, 3/5 b/l lower ext   Sens: Intact to light touch  Reflexes: Symmetric and normal, downgoing toes b/l  Gait/Balance: Not tested                 13.1   8.91  )-----------( 234      ( 30 Oct 2021 06:43 )             38.4     138  |  101  |  19  ----------------------------<  81  3.5   |  31  |  1.16    Ca    8.8      30 Oct 2021 06:43    TPro  6.3  /  Alb  2.7<L>  /  TBili  0.7  /  DBili  x   /  AST  40<H>  /  ALT  15  /  AlkPhos  101  10-29

## 2021-10-30 NOTE — PROGRESS NOTE ADULT - ASSESSMENT
patient with fall/syncope-found on floor by son with taumatic injury to head-complicated with subarachnoid bleed; patient with high yamila vasc score (had TIA in the past) on AC.   Repeat CT of head-no further extension of bleed.  Patient resting comfortably, but confused and unsteady on her feet  1-Afib-has high YAMILA vasc score but falls/unsteady on feet and high HASBLED score.   Patient would be candidate for watchman in the future if she does not suffer complications from this event.   Will have to hold AC temporarily (~6-8 weeks-until neurosurgery states they are ok with restarting).   Rate control patient; is on above medications, now at goal after hydration.   2-CHF-TTE ordered but pending, appears euvolemic (if not slightly hypovolemic).   On spironolactone and lasix at home, currently holding aldactone but back on her lasix. Encourage PO intake C/W BB.  3-No active CAD at this time; any neurosurgery would be high risk on this patient.

## 2021-10-30 NOTE — PROGRESS NOTE ADULT - SUBJECTIVE AND OBJECTIVE BOX
CHIEF COMPLAINT: Patient is a 80y old  Female who presents with a chief complaint of being found down next to bed      HPI:  Pt is an 80 year old woman with a PMH of Dementia, HTN, Diastolic CHF, A. Fib on Eliquis depression, chronic pedal edema secondary to venous statis and squamous cell skin cancer who presented to the ED after being found on the ground next to her bed this AM by her son. Pt is a poor historian due to dementia, she does not remember falling out of bed. pt c/o headache due to large hematoma on left forehead  and pain in LLE due to large skin tear. Pt is awake and alert, oriented to self and hospital, moving all extremities antigravity, although she has difficulty moving b/l lower ext due to chronic edema. CT of the head revealed a small subarachnoid hemorrhage.   Pt denies nausea, dizziness, visual changes, chest pain or shortness of breath.  (28 Oct 2021 11:10)    10/29-patient has been coming to our office sporatically-she is debilitated and had been dealing with significant squamous cell carcinoma; she was seeing us for atrial fibrillation.   Has Jaylene-vasc of 5 and was on Eliquis.   Last ECHO and nuclear stress test was in 2017-normal LV function, and [place] [facility] [place]   She was last seen in march, 2021;         Patient is on AC for afib and has history of CHF-on spironolactone/lasix.  Heart rate controlled with cartia 120mg in am and toprol 100mg pm.  Was found on floor near her bead by son.   Confused with large hematoma of forehead.  Has been having intractable back pains-with unsteady gait and falls.  Her PCP is Dr. Brown.  Her Aib poorly controlled when not given her medications but now currently rate controlled    She was given her lasix and aldactone but then realized she was hypovolemic so she was given 2L IVF and renal function improved.    10/30 - Patient has some diffuse body pains. She feels her lips/mouth is dry but otherwise no complaints. She has chronic severe pain when her legs are touched.      PMHx: PAST MEDICAL & SURGICAL HISTORY:  CHF (congestive heart failure)  Atrial fibrillation, unspecified type; with TIA  Hypertension  Squamous cell carcinoma  H/O total hysterectomy  History of appendectomy  S/P cholecystectomy    Soc Hx:       Allergies: Allergies    Darvocet-N 50 (Unknown)  sulfa drugs (Other)    Intolerances          REVIEW OF SYSTEMS:    CONSTITUTIONAL:  weakness,  EYES/ENT: No visual changes;  No vertigo or throat pain   NECK: No pain or stiffness  RESPIRATORY: No cough, wheezing, hemoptysis; No shortness of breath  CARDIOVASCULAR: No chest pain or palpitations  GASTROINTESTINAL: No abdominal or epigastric pain. No nausea, vomiting, or hematemesis; No diarrhea or constipation. No melena or hematochezia.  GENITOURINARY: No dysuria, frequency or hematuria  NEUROLOGICAL: No numbness or weakness  SKIN: No itching, burning, rashes, or lesions   All other review of systems is negative unless indicated above    Vital Signs Last 24 Hrs  T(C): 36.1 (10-30-21 @ 11:00), Max: 36.7 (10-30-21 @ 04:03)  T(F): 97 (10-30-21 @ 11:00), Max: 98 (10-30-21 @ 04:03)  HR: 84 (10-30-21 @ 13:00) (71 - 117)  BP: 128/50 (10-30-21 @ 13:00) (104/51 - 150/66)  BP(mean): 70 (10-30-21 @ 13:00) (56 - 99)  RR: 22 (10-30-21 @ 13:00) (15 - 25)  SpO2: 99% (10-30-21 @ 13:00) (93% - 100%)      PHYSICAL EXAM:   Constitutional: NAD, awake and confused-traumatic injury  HEENT: slightly dry MM, large wound on left side of forehead  Neck: No JVD  Respiratory: Breath sounds are clear bilaterally, No wheezing, rales or rhonchi  Cardiovascular: S1 and S2, irregular rate and rhythm, soft systolic murmur at LSB.  Gastrointestinal: Bowel Sounds present, soft, nontender, nondistended, no guarding, no rebound  Extremities: peripheral edema    MEDICATIONS  (STANDING):  cefTRIAXone Injectable. 1000 milliGRAM(s) IV Push every 24 hours  diltiazem    milliGRAM(s) Oral daily  folic acid 1 milliGRAM(s) Oral daily  influenza  Vaccine (HIGH DOSE) 0.7 milliLiter(s) IntraMuscular once  metoprolol succinate  milliGRAM(s) Oral daily  oxybutynin 5 milliGRAM(s) Oral daily    MEDICATIONS  (PRN):  acetaminophen   IVPB .. 1000 milliGRAM(s) IV Intermittent once PRN Moderate Pain (4 - 6)      LABS: All Labs Reviewed:                        13.1   8.91  )-----------( 234      ( 30 Oct 2021 06:43 )             38.4                         12.4   8.25  )-----------( 232      ( 29 Oct 2021 06:57 )             37.0     10-30    138  |  101  |  19  ----------------------------<  81  3.5   |  31  |  1.16    Ca    8.8      30 Oct 2021 06:43    TPro  6.3  /  Alb  2.7<L>  /  TBili  0.7  /  DBili  x   /  AST  40<H>  /  ALT  15  /  AlkPhos  101  10-29    10-29    140  |  106  |  22  ----------------------------<  84  3.8   |  27  |  1.22    Ca    8.5      29 Oct 2021 06:57    TPro  6.3  /  Alb  2.7<L>  /  TBili  0.7  /  DBili  x   /  AST  40<H>  /  ALT  15  /  AlkPhos  101  10-29    PT/INR - ( 28 Oct 2021 07:56 )   PT: 19.4 sec;   INR: 1.72 ratio         PTT - ( 28 Oct 2021 07:56 )  PTT:38.4 sec  CARDIAC MARKERS ( 28 Oct 2021 07:56 )  x     / x     / 333 U/L / x     / x          Serum Pro-Brain Natriuretic Peptide: 6212 pg/mL (10-29 @ 06:57)    Blood Culture:   lipid profile       RADIOLOGY:  PROCEDURE DATE:  10/29/2021    INTERPRETATION:  Noncontrast CT of the brain.  CLINICAL INDICATION:  s/p fall, dementia, on Eliquis, follow-up bilateral sulcal subarachnoid hemorrhage.  TECHNIQUE : Axial CT scanning of the brain was obtained from the skull base to the vertex without the administration of intravenous contrast. Sagittal and coronal reformats were provided.  COMPARISON: CT brain 10/28/2021    FINDINGS:  Similar-appearing mild acute traumatic subarachnoid hemorrhage in the right sylvian fissure and a left parietal sulcus.  No parenchymal hemorrhage or brain edema.  Similar mild rightward bowing of the septum pellucidum. No effacement of basal cisterns. No hydrocephalus.  Moderate white matter microvascular ischemic disease.  No displaced calvarial fracture. Similar large left frontal extracalvarial soft tissue swelling and hematoma.    IMPRESSION:  No significant interval change from 10/28/2021.  --- End ofReport ---    EKG:  atrial fibrillation, RBBB with diffuse T inversions    Telemetry:  afib HR 70-80s with some PVCs    ECHO:  Ordered and pending

## 2021-10-30 NOTE — PHYSICAL THERAPY INITIAL EVALUATION ADULT - ACTIVE RANGE OF MOTION EXAMINATION, REHAB EVAL
except B hip/knee flex somewhat ltd in supine (L hip/knee ROM <R), B shld flex 100/bilateral upper extremity Active ROM was WFL (within functional limits)/bilateral  lower extremity Active ROM was WFL (within functional limits)

## 2021-10-30 NOTE — PHYSICAL THERAPY INITIAL EVALUATION ADULT - MODALITIES TREATMENT COMMENTS
ecchymosis BUE/LE, face. pt tachy to 140s in sitting, pt sitting EOB ~3 min and HR remained high, returned to supine and HR to 90s-100s, nsg aware/present

## 2021-10-30 NOTE — PHYSICAL THERAPY INITIAL EVALUATION ADULT - CRITERIA FOR SKILLED THERAPEUTIC INTERVENTIONS
Date of Service: 04/02/2019    PULMONARY CLINIC VISIT NOTE    The patient was seen and examined.  The patient came to our clinic to discuss the CPAP download data.  The data was reviewed with the patient.  The patient's compliance is about 57%, average use of the machine is 3 hours 26 minutes.  The patient's apnea-hypopnea index is 4.3 events per hour on CPAP pressure support of 13 cm of water.  The patient told me that he cannot sleep at night because he has to take care of young children at home.  Sometimes he goes to bed between 9:00 and 11:00 p.m.  He gets out of bed between 4:00 to 8:00 in the morning and there is no fixed time.  He is also using his medications, especially pain management medications and:  Atarax.  Lisinopril.  Atorvastatin.  Hydroxyzine.  Clonazepam.  Ambien.  Methylphenidate.  Baclofen.  Metoprolol.    He also has a pain pump.    The patient's medication list reviewed as above.    ALLERGIES:  Reviewed as above.    PHYSICAL EXAMINATION:  VITAL SIGNS:  At the time of my evaluation, the patient's blood pressure is 108/64 with a pulse of 97 beats per minute, respiratory rate is 18 breaths per minute.  Oxygen saturation is 97%.  LUNGS:  Breath sounds are equal bilaterally.  HEART:  Sound S1, S2 normal with regular rate and rhythm.  ABDOMEN:  Soft and nontender.  EXTREMITIES:  No edema, no calf muscle tenderness.  NECK:  Supple.  Trachea is central.  HEENT:  Pupils are equal and reactive.    IMPRESSION:  1.  Obstructive sleep apnea syndrome, poor compliance.  2.  Poor sleep hygiene due to lifestyle.  3.  Active smoker.  4.  Multiple pains and medications which interfere with sleep architecture.    PLAN:  Plan of care is as follows:  1.  I have instructed the patient to be more compliant with the CPAP machine.  2.  The patient has CPAP pressure support of 13 cm of water with apnea-hypopnea index of 4.3 events per hour.  3.  Pain management issues as per other specialists.  4.  The patient needs to  improve the compliance with the machine.    5.  Lifestyle changes discussed and sleep hygiene issues were reviewed.  Avoid driving or operating machinery pending further evaluation.      Dictated By: Liz Turcios MD  Signing Provider: Liz Turcios MD    AA/carito ([id number])  DD: 04/02/2019 12:30:01 TD: 04/02/2019 12:50:56    Copy Sent To:     cc: Jeffy Verduzco MD   impairments found/functional limitations in following categories

## 2021-10-31 NOTE — PROGRESS NOTE ADULT - ASSESSMENT
patient with fall/syncope-found on floor by son with taumatic injury to head-complicated with subarachnoid bleed; patient with high yamila vasc score (had TIA in the past) on AC.   Repeat CT of head-no further extension of bleed.  Patient resting comfortably, but confused and unsteady on her feet  1-Afib-has high YAMILA vasc score but falls/unsteady on feet and high HASBLED score.   Patient would be candidate for watchman in the future if she does not suffer complications from this event.   Will have to hold AC temporarily (~6-8 weeks-until neurosurgery states they are ok with restarting).   Rate control patient; is on above medications, now at goal after hydration.   2-CHF-TTE ordered but pending, appears euvolemic (if not slightly hypovolemic).   On spironolactone and lasix at home, currently holding aldactone but back on her lasix. Encourage PO intake C/W BB. Renal function continues to improve on her home lasix, continue to monitor.  3-No active CAD at this time; any neurosurgery would be high risk on this patient.

## 2021-10-31 NOTE — PROGRESS NOTE ADULT - SUBJECTIVE AND OBJECTIVE BOX
CHIEF COMPLAINT: Patient is a 80y old  Female who presents with a chief complaint of being found down next to bed      HPI:  Pt is an 80 year old woman with a PMH of Dementia, HTN, Diastolic CHF, A. Fib on Eliquis depression, chronic pedal edema secondary to venous statis and squamous cell skin cancer who presented to the ED after being found on the ground next to her bed this AM by her son. Pt is a poor historian due to dementia, she does not remember falling out of bed. pt c/o headache due to large hematoma on left forehead  and pain in LLE due to large skin tear. Pt is awake and alert, oriented to self and hospital, moving all extremities antigravity, although she has difficulty moving b/l lower ext due to chronic edema. CT of the head revealed a small subarachnoid hemorrhage.   Pt denies nausea, dizziness, visual changes, chest pain or shortness of breath.  (28 Oct 2021 11:10)    10/29-patient has been coming to our office sporatically-she is debilitated and had been dealing with significant squamous cell carcinoma; she was seeing us for atrial fibrillation.   Has Jaylene-vasc of 5 and was on Eliquis.   Last ECHO and nuclear stress test was in 2017-normal LV function, and does most of her f/[place] with [place] [facility] [place] (Dr Ramirez).   She was last seen in march, 2021;         Patient is on AC for afib and has history of CHF-on spironolactone/lasix.  Heart rate controlled with cartia 120mg in am and toprol 100mg pm.  Was found on floor near her bead by son.   Confused with large hematoma of forehead.  Has been having intractable back pains-with unsteady gait and falls.  Her PCP is Dr. Brown.  Her Aib poorly controlled when not given her medications but now currently rate controlled    She was given her lasix and aldactone but then realized she was hypovolemic so she was given 2L IVF and renal function improved.    10/30 - Patient has some diffuse body pains. She feels her lips/mouth is dry but otherwise no complaints. She has chronic severe pain when her legs are touched.  10/31 - No acute events O/N. no current complaints, still feels a little dry.      PMHx: PAST MEDICAL & SURGICAL HISTORY:  CHF (congestive heart failure)  Atrial fibrillation, unspecified type; with TIA  Hypertension  Squamous cell carcinoma  H/O total hysterectomy  History of appendectomy  S/P cholecystectomy    Soc Hx:       Allergies: Allergies    Darvocet-N 50 (Unknown)  sulfa drugs (Other)    Intolerances          REVIEW OF SYSTEMS:    CONSTITUTIONAL:  weakness,  EYES/ENT: No visual changes;  No vertigo or throat pain   NECK: No pain or stiffness  RESPIRATORY: No cough, wheezing, hemoptysis; No shortness of breath  CARDIOVASCULAR: No chest pain or palpitations  GASTROINTESTINAL: No abdominal or epigastric pain. No nausea, vomiting, or hematemesis; No diarrhea or constipation. No melena or hematochezia.  GENITOURINARY: No dysuria, frequency or hematuria  NEUROLOGICAL: No numbness or weakness  SKIN: No itching, burning, rashes, or lesions   All other review of systems is negative unless indicated above    Vital Signs Last 24 Hrs  T(C): 36.7 (31 Oct 2021 11:57), Max: 36.7 (31 Oct 2021 05:00)  T(F): 98 (31 Oct 2021 11:57), Max: 98 (31 Oct 2021 05:00)  HR: 86 (31 Oct 2021 10:00) (78 - 113)  BP: 106/62 (31 Oct 2021 10:00) (106/62 - 137/69)  BP(mean): 72 (31 Oct 2021 10:00) (72 - 90)  RR: 22 (31 Oct 2021 10:00) (18 - 30)  SpO2: 95% (31 Oct 2021 10:00) (94% - 99%)    PHYSICAL EXAM:   Constitutional: NAD, awake and confused-traumatic injury  HEENT: slightly dry MM, large wound on left side of forehead  Neck: No JVD  Respiratory: Breath sounds are clear bilaterally, No wheezing, rales or rhonchi  Cardiovascular: S1 and S2, irregular rate and rhythm, soft systolic murmur at LSB.  Gastrointestinal: Bowel Sounds present, soft, nontender, nondistended, no guarding, no rebound  Extremities: peripheral edema    MEDICATIONS  (STANDING):  diltiazem    milliGRAM(s) Oral daily  folic acid 1 milliGRAM(s) Oral daily  influenza  Vaccine (HIGH DOSE) 0.7 milliLiter(s) IntraMuscular once  metoprolol succinate  milliGRAM(s) Oral daily  oxybutynin 5 milliGRAM(s) Oral daily    MEDICATIONS  (PRN):  acetaminophen   IVPB .. 1000 milliGRAM(s) IV Intermittent once PRN Moderate Pain (4 - 6)        LABS: All Labs Reviewed:                         12.9   10.55 )-----------( 240      ( 31 Oct 2021 05:39 )             38.1                        13.1   8.91  )-----------( 234      ( 30 Oct 2021 06:43 )             38.4                         12.4   8.25  )-----------( 232      ( 29 Oct 2021 06:57 )             37.0     10-31    134<L>  |  98  |  22  ----------------------------<  101<H>  3.4<L>   |  29  |  1.09    Ca    8.6      31 Oct 2021 05:39      10-30    138  |  101  |  19  ----------------------------<  81  3.5   |  31  |  1.16    Ca    8.8      30 Oct 2021 06:43    TPro  6.3  /  Alb  2.7<L>  /  TBili  0.7  /  DBili  x   /  AST  40<H>  /  ALT  15  /  AlkPhos  101  10-29    10-29    140  |  106  |  22  ----------------------------<  84  3.8   |  27  |  1.22    Ca    8.5      29 Oct 2021 06:57    TPro  6.3  /  Alb  2.7<L>  /  TBili  0.7  /  DBili  x   /  AST  40<H>  /  ALT  15  /  AlkPhos  101  10-29    PT/INR - ( 28 Oct 2021 07:56 )   PT: 19.4 sec;   INR: 1.72 ratio         PTT - ( 28 Oct 2021 07:56 )  PTT:38.4 sec  CARDIAC MARKERS ( 28 Oct 2021 07:56 )  x     / x     / 333 U/L / x     / x        Serum Pro-Brain Natriuretic Peptide: 6212 pg/mL (10-29 @ 06:57)    Blood Culture:   lipid profile       RADIOLOGY:  PROCEDURE DATE:  10/29/2021    INTERPRETATION:  Noncontrast CT of the brain.  CLINICAL INDICATION:  s/p fall, dementia, on Eliquis, follow-up bilateral sulcal subarachnoid hemorrhage.  TECHNIQUE : Axial CT scanning of the brain was obtained from the skull base to the vertex without the administration of intravenous contrast. Sagittal and coronal reformats were provided.  COMPARISON: CT brain 10/28/2021    FINDINGS:  Similar-appearing mild acute traumatic subarachnoid hemorrhage in the right sylvian fissure and a left parietal sulcus.  No parenchymal hemorrhage or brain edema.  Similar mild rightward bowing of the septum pellucidum. No effacement of basal cisterns. No hydrocephalus.  Moderate white matter microvascular ischemic disease.  No displaced calvarial fracture. Similar large left frontal extracalvarial soft tissue swelling and hematoma.    IMPRESSION:  No significant interval change from 10/28/2021.  --- End ofReport ---    EKG:  atrial fibrillation, RBBB with diffuse T inversions    Telemetry:  afib HR 70-80s with some PVCs with short episode of RVR    ECHO:  Ordered and pending

## 2021-10-31 NOTE — PROGRESS NOTE ADULT - ASSESSMENT
Pt is an 80 year old woman with a PMH of Dementia, HTN, Diastolic CHF, A. Fib on Eliquis, s/p PPM, depression, chronic pedal edema secondary to venous statis and squamous cell skin cancer who presented to the ED after being found on the ground next to her bed this AM by her son. Pt is a poor historian due to dementia, she does not remember falling out of bed. pt c/o headache due to large hematoma on left forehead  and pain in LLE due to large skin tear. Pt is awake and alert, oriented to self and hospital, moving all extremities antigravity, although she has difficulty moving b/l lower ext due to chronic edema. CT of the head revealed a small subarachnoid hemorrhage. Stable on f/u CT scan    #SAH   #dementia   #HTN  #Afib on Eliquis   #CHF  #elevated BUN/Cre     PLAN  - Hospitalist note appreciated  - Neuros q 8 hours  - PT / OOB with assist  - Cont Abx for UTI  - Continue to hold Eliquis  - SBP goal < 140   - unable to use venodynes due to skin issues not over 48 hours since fall, ok with SQ Heparin for DVT PPX  - Physical Therapy Evaluation appreciated, will need rehab placement      Discussed with Dr. Urbina

## 2021-10-31 NOTE — PROGRESS NOTE ADULT - SUBJECTIVE AND OBJECTIVE BOX
HPI: Pt is an 80 year old woman with a PMH of Dementia, HTN, Diastolic CHF, A. Fib on Eliquis depression, chronic pedal edema secondary to venous statis and squamous cell skin cancer who presented to the ED after being found on the ground next to her bed this AM by her son. Pt is a poor historian due to dementia, she does not remember falling out of bed. pt c/o headache due to large hematoma on left forehead  and pain in LLE due to large skin tear. Pt is awake and alert, oriented to self and hospital, moving all extremities antigravity, although she has difficulty moving b/l lower ext due to chronic edema. CT of the head revealed a small subarachnoid hemorrhage.   Pt denies nausea, dizziness, visual changes, chest pain or shortness of breath.     10/29- Patient seen and examined, no acute events overnight. Repeat HCT stable. Complains of leg soreness which si chronic per patient, left sided head pain. Denies n/v, numbness/tingling, weakness.    10/30 - Pt seen and examined earlier today, in NAD. Appears comfortable, denies new complaints, no overnight events     10/31- Pt seen and examined, no events overnight, pt awake and alert, denies headache, c/o b/l lower ext pain L>R     Vital Signs Last 24 Hrs  T(C): 36.7 (31 Oct 2021 11:57), Max: 36.7 (31 Oct 2021 05:00)  T(F): 98 (31 Oct 2021 11:57), Max: 98 (31 Oct 2021 05:00)  HR: 86 (31 Oct 2021 10:00) (78 - 113)  BP: 106/62 (31 Oct 2021 10:00) (106/62 - 137/69)  BP(mean): 72 (31 Oct 2021 10:00) (72 - 90)  RR: 22 (31 Oct 2021 10:00) (18 - 30)  SpO2: 95% (31 Oct 2021 10:00) (94% - 99%)    MEDICATIONS  (STANDING):  cefTRIAXone Injectable. 1000 milliGRAM(s) IV Push every 24 hours  diltiazem    milliGRAM(s) Oral daily  folic acid 1 milliGRAM(s) Oral daily  influenza  Vaccine (HIGH DOSE) 0.7 milliLiter(s) IntraMuscular once  metoprolol succinate  milliGRAM(s) Oral daily  oxybutynin 5 milliGRAM(s) Oral daily    MEDICATIONS  (PRN):  acetaminophen   IVPB .. 1000 milliGRAM(s) IV Intermittent once PRN Moderate Pain (4 - 6)    ROS: pertinent positives in HPI, all other ROS were reviewed and are negative     PHYSICAL EXAM:  Constitutional: awake and alert  HEENT: PERRLA, EOMI, large hematoma left forehead and abrasion to posterior head   Neck: Supple  Respiratory: Breath sounds are clear bilaterally  Cardiovascular: S1 and S2, regular rhythm  Gastrointestinal: soft, nontender  Extremities: + b/l lower ext edema with chronic skin changes, L LE dressing mild staining, intact  Musculoskeletal: mild TTP left shoulder +ecchymosis  Skin: chronic venous stasis changes b/l LE, mult areas ecchymosis b/l UE and face    Neurological exam:  HF: A x O x 2, appropriately interactive, normal affect. Speech fluent, No Aphasia or paraphasic errors. Naming /repetition intact   CN: ASAEL, EOMI, facial sensation normal, no NLFD, tongue midline  Motor: 4+/5 strength b/l upper ext, R>L, no drift, 3/5 b/l lower ext   Sens: Intact to light touch  Reflexes: Symmetric and normal, downgoing toes b/l  Gait/Balance: Not tested    LABS:                         12.9   10.55 )-----------( 240      ( 31 Oct 2021 05:39 )             38.1     10-31    134<L>  |  98  |  22  ----------------------------<  101<H>  3.4<L>   |  29  |  1.09    Ca    8.6      31 Oct 2021 05:39    RADIOLOGY:  < from: CT Head No Cont (10.29.21 @ 08:26) >  Similar-appearing mildacute traumatic subarachnoid hemorrhage in the right sylvian fissure and a left parietal sulcus.  No parenchymal hemorrhage or brain edema.  Similar mild rightward bowing of the septum pellucidum. No effacement of basal cisterns. No hydrocephalus.  Moderate white matter microvascular ischemic disease.  No displaced calvarial fracture. Similar large left frontal extracalvarial soft tissue swelling and hematoma.    IMPRESSION:  No significant interval change from 10/20/2021.

## 2021-11-01 NOTE — PROGRESS NOTE ADULT - SUBJECTIVE AND OBJECTIVE BOX
80 y.o. female with PMH of diastolic CHF, HTN, Afib (on eliquis), depression, chronic pedal edema secondary to venous stasis, h/o compression fx L1, squamous cell skin cancer sent in to ED as been found on the floor by son.  Pt was found to have LLE laceration, Left forehead laceration with SAH on CT and was admitted to NS service.   This morning she went into AF RVR, asymptomatic.                              folic acid 1 milliGRAM(s) Oral daily  furosemide    Tablet 40 milliGRAM(s) Oral daily  influenza  Vaccine (HIGH DOSE) 0.7 milliLiter(s) IntraMuscular once  metoprolol succinate  milliGRAM(s) Oral daily  oxybutynin 5 milliGRAM(s) Oral daily    MEDICATIONS  (PRN):  acetaminophen   IVPB .. 1000 milliGRAM(s) IV Intermittent once PRN Moderate Pain (4 - 6)    RADIOLOGY: personally visualized    All previous medical records personally reviewed    PHYSICAL EXAM:    General: frail pleasantly confused female in no acute distress  Eyes: periorbital ecchymoses, EOMI, PEERLA  Head: left forehead with dried up laceration and hemorrhagic fluid filled blisters - opened up and emptied today  ENMT: No nasal discharge; airway clear  Neck: Supple; non tender; no masses  Respiratory: No wheezes, rales or rhonchi  Cardiovascular: S1, S2 irreg  Gastrointestinal: Soft non-tender non-distended; + bowel sounds  Genitourinary: No costovertebral angle tenderness, Primafit in place  Extremities: BL LE venous stasis dermatosis with left mid-shin laceration in dressing  Vascular: Peripheral pulses palpable 2+ bilaterally  Neurological: Alert and oriented to place and person, forgetful  Skin: Warm and dry. + venous stasis dermatosis BL LE, BL ecchymoses UE, left forehead ecchymoses, trace LE edema  Musculoskeletal: Normal tone  Psychiatric: Cooperative and pleasantly confused 80 y.o. female with PMH of diastolic CHF, HTN, Afib (on eliquis), depression, chronic pedal edema secondary to venous stasis, h/o compression fx L1, squamous cell skin cancer sent in to ED as been found on the floor by son.  Pt was found to have LLE laceration, Left forehead laceration with SAH on CT and was admitted to NS service.   This morning she went into AF RVR, asymptomatic.      Vital Signs Last 24 Hrs  T(C): 36.4 (01 Nov 2021 06:00), Max: 36.7 (31 Oct 2021 11:57)  T(F): 97.6 (01 Nov 2021 06:00), Max: 98 (31 Oct 2021 11:57)  HR: 84 (01 Nov 2021 06:00) (76 - 132)  BP: 138/59 (01 Nov 2021 06:00) (111/65 - 142/71)  BP(mean): 79 (01 Nov 2021 06:00) (67 - 98)  RR: 18 (01 Nov 2021 06:00) (16 - 31)  SpO2: 97% (01 Nov 2021 06:00) (88% - 99%)                      General: frail pleasantly confused female in no acute distress  Eyes: periorbital ecchymoses, EOMI, PEERLA  Head: left forehead with dried up laceration and hemorrhagic fluid filled blisters - opened up and emptied today  ENMT: No nasal discharge; airway clear  Neck: Supple; non tender; no masses  Respiratory: No wheezes, rales or rhonchi  Cardiovascular: S1, S2 irreg  Gastrointestinal: Soft non-tender non-distended; + bowel sounds  Genitourinary: No costovertebral angle tenderness, Primafit in place  Extremities: BL LE venous stasis dermatosis with left mid-shin laceration in dressing  Vascular: Peripheral pulses palpable 2+ bilaterally  Neurological: Alert and oriented to place and person, forgetful  Skin: Warm and dry. + venous stasis dermatosis BL LE, BL ecchymoses UE, left forehead ecchymoses, trace LE edema  Musculoskeletal: Normal tone  Psychiatric: Cooperative and pleasantly confused 80 y.o. female with PMH of diastolic CHF, HTN, Afib (on eliquis), depression, chronic pedal edema secondary to venous stasis, h/o compression fx L1, squamous cell skin cancer sent in to ED as been found on the floor by son.  Pt was found to have LLE laceration, Left forehead laceration with SAH on CT and was admitted to NS service.   This morning she went into AF RVR, asymptomatic.      Vital Signs Last 24 Hrs  T(C): 36.4 (01 Nov 2021 06:00), Max: 36.7 (31 Oct 2021 11:57)  T(F): 97.6 (01 Nov 2021 06:00), Max: 98 (31 Oct 2021 11:57)  HR: 84 (01 Nov 2021 06:00) (76 - 132)  BP: 138/59 (01 Nov 2021 06:00) (111/65 - 142/71)  BP(mean): 79 (01 Nov 2021 06:00) (67 - 98)  RR: 18 (01 Nov 2021 06:00) (16 - 31)  SpO2: 97% (01 Nov 2021 06:00) (88% - 99%)                      General: frail pleasantly confused female in no acute distress  Eyes: periorbital ecchymoses, EOMI, PEERLA  Head: left forehead with dried up laceration and hemorrhagic fluid filled blisters - opened up and emptied today  ENMT: No nasal discharge; airway clear  Neck: Supple; non tender; no masses  Respiratory: No wheezes, rales or rhonchi  Cardiovascular: S1, S2 irreg  Gastrointestinal: Soft non-tender non-distended; + bowel sounds  Genitourinary: No costovertebral angle tenderness, Primafit in place  Extremities: BL LE venous stasis dermatosis with left mid-shin laceration in dressing  Vascular: Peripheral pulses palpable 2+ bilaterally  Neurological: Alert and oriented to place and person, forgetful  Skin: Warm and dry. + venous stasis dermatosis BL LE, BL ecchymoses UE, left forehead ecchymoses, trace LE edema  Musculoskeletal: Normal tone  Psychiatric: Cooperative and pleasantly confused                          12.3   9.05  )-----------( 247      ( 01 Nov 2021 07:10 )             37.3   11-01    136  |  99  |  21  ----------------------------<  85  3.3<L>   |  28  |  1.11    Ca    8.5      01 Nov 2021 07:10    TPro  6.3  /  Alb  2.6<L>  /  TBili  0.8  /  DBili  x   /  AST  16  /  ALT  12  /  AlkPhos  95  11-01

## 2021-11-01 NOTE — PROGRESS NOTE ADULT - ASSESSMENT
Pt is an 80 year old woman with a PMH of Dementia, HTN, Diastolic CHF, A. Fib on Eliquis, s/p PPM, depression, chronic pedal edema secondary to venous statis and squamous cell skin cancer who presented to the ED after being found on the ground next to her bed this AM by her son. Pt is a poor historian due to dementia, she does not remember falling out of bed. pt c/o headache due to large hematoma on left forehead  and pain in LLE due to large skin tear. Pt is awake and alert, oriented to self and hospital, moving all extremities antigravity, although she has difficulty moving b/l lower ext due to chronic edema. CT of the head revealed a small subarachnoid hemorrhage. Stable on f/u CT scan    #SAH   #dementia   #HTN  #Afib on Eliquis   #CHF  #elevated BUN/Cre     PLAN  - Hospitalist note appreciated  - Cardizem gtt started Afib RVR  - TTE  - Neuros q 8 hours  - PT / OOB with assist  - Cont Abx for UTI  - Continue to hold Eliquis  - SBP goal < 140   - unable to use venodynes due to skin issues not over 48 hours since fall,  SQ Heparin for DVT PPX  - Physical Therapy Evaluation appreciated, will need rehab placement      Discussed with Dr. Urbina

## 2021-11-01 NOTE — PROGRESS NOTE ADULT - SUBJECTIVE AND OBJECTIVE BOX
HPI:  Pt is an 80 year old woman with a PMH of Dementia, HTN, Diastolic CHF, A. Fib on Eliquis depression, chronic pedal edema secondary to venous statis and squamous cell skin cancer who presented to the ED after being found on the ground next to her bed this AM by her son. Pt is a poor historian due to dementia, she does not remember falling out of bed. pt c/o headache due to large hematoma on left forehead  and pain in LLE due to large skin tear. Pt is awake and alert, oriented to self and hospital, moving all extremities antigravity, although she has difficulty moving b/l lower ext due to chronic edema. CT of the head revealed a small subarachnoid hemorrhage.   Pt denies nausea, dizziness, visual changes, chest pain or shortness of breath.     10/29- Patient seen and examined, no acute events overnight. Repeat HCT stable. Complains of leg soreness which si chronic per patient, left sided head pain. Denies n/v, numbness/tingling, weakness.    10/30 - Pt seen and examined earlier today, in NAD. Appears comfortable, denies new complaints, no overnight events     10/31- Pt seen and examined, no events overnight, pt awake and alert, denies headache, c/o b/l lower ext pain L>R     11/1- Patient seen and examined, noted Afib with RVR this morning started on cardizem gtt. Patient awake/alert. Denies CP, SOB, HA, dizziness.    Vital Signs Last 24 Hrs  T(C): 36.4 (01 Nov 2021 06:00), Max: 36.6 (31 Oct 2021 21:00)  T(F): 97.6 (01 Nov 2021 06:00), Max: 97.8 (31 Oct 2021 21:00)  HR: 84 (01 Nov 2021 06:00) (76 - 132)  BP: 138/59 (01 Nov 2021 06:00) (117/50 - 142/71)  BP(mean): 79 (01 Nov 2021 06:00) (67 - 98)  RR: 18 (01 Nov 2021 06:00) (17 - 31)  SpO2: 97% (01 Nov 2021 06:00) (88% - 99%)    MEDICATIONS  (STANDING):  diltiazem Infusion 5 mG/Hr (5 mL/Hr) IV Continuous <Continuous>  folic acid 1 milliGRAM(s) Oral daily  heparin   Injectable 5000 Unit(s) SubCutaneous every 8 hours  influenza  Vaccine (HIGH DOSE) 0.7 milliLiter(s) IntraMuscular once  metoprolol succinate ER 50 milliGRAM(s) Oral daily  oxybutynin 5 milliGRAM(s) Oral daily  potassium chloride   Powder 40 milliEquivalent(s) Oral once    MEDICATIONS  (PRN):  acetaminophen   IVPB .. 1000 milliGRAM(s) IV Intermittent once PRN Moderate Pain (4 - 6)      PHYSICAL EXAM:  Constitutional: awake and alert  HEENT: PERRLA, EOMI, large hematoma left forehead and abrasion to posterior head   Neck: Supple  Respiratory: Breath sounds are clear bilaterally  Cardiovascular: S1 and S2, regular rhythm  Gastrointestinal: soft, nontender  Extremities: + b/l lower ext edema with chronic skin changes, L LE dressing intact  Musculoskeletal: mild TTP left shoulder +ecchymosis  Skin: chronic venous stasis changes b/l LE, mult areas ecchymosis b/l UE and face    Neurological exam:  HF: A x O x 2, appropriately interactive, normal affect. Speech fluent, No Aphasia or paraphasic errors. Naming /repetition intact   CN: ASAEL, EOMI, facial sensation normal, no NLFD, tongue midline  Motor: 4+/5 strength b/l upper ext, R>L, no drift, 3/5 b/l lower ext   Sens: Intact to light touch  Reflexes: Symmetric and normal, downgoing toes b/l  Gait/Balance: Not tested          LABS:                         12.3   9.05  )-----------( 247      ( 01 Nov 2021 07:10 )             37.3     11-01    136  |  99  |  21  ----------------------------<  85  3.3<L>   |  28  |  1.11    Ca    8.5      01 Nov 2021 07:10    TPro  6.3  /  Alb  2.6<L>  /  TBili  0.8  /  DBili  x   /  AST  16  /  ALT  12  /  AlkPhos  95  11-01    LIVER FUNCTIONS - ( 01 Nov 2021 07:10 )  Alb: 2.6 g/dL / Pro: 6.3 gm/dL / ALK PHOS: 95 U/L / ALT: 12 U/L / AST: 16 U/L / GGT: x

## 2021-11-01 NOTE — PROGRESS NOTE ADULT - ASSESSMENT
79 yo F s/p fall with SAH and LLE skin avulsion.      - continue daily local wound care: Xeroform strip to open area, telfa to remaining area. Kerilex wrap  - elevate leg as much as possible    - will continue to follow

## 2021-11-01 NOTE — PROGRESS NOTE ADULT - ASSESSMENT
80 y.o. female with PMH of diastolic CHF, HTN, Afib (on eliquis), depression, chronic pedal edema secondary to venous stasis, h/o compression fx L1, squamous cell skin cancer sent in to ED as been found on the floor by son.    1. Fall with SAH + skin lacerations left scalp and LLE on eliquis   - eliquis held, stable CT   - skin LLE repaired    2. Hx of HFpEF on lasix 40 and spironolactone - clinically not in acute decompensation - hold    3. ADRIANA due to overdiuresis likely - resolved with IVF hydration    4. UTI - IV abx, f/u UCx    5. Permanent Afib - cont CCB and BBL to control the rate, hold AC - potentially would not resume at all as high risk for hemorrhage   - episodes of RVR likely related to intravascular depletion - hold diuretics    6. Chronic LE changes    7. VTE proph - no pharmacological as SAH, unable to do IPCs due to skin sensitivity LE    Time spent 44 min    Discussed with Dr. Gregory     80 y.o. female with PMH of diastolic CHF, HTN, Afib (on eliquis), depression, chronic pedal edema secondary to venous stasis, h/o compression fx L1, squamous cell skin cancer sent in to ED as been found on the floor by son.    1. Fall with SAH + skin lacerations left scalp and LLE on eliquis   - eliquis held, stable CT   - skin LLE repaired    2. Hx of HFpEF on lasix 40 and spironolactone - clinically not in acute decompensation - hold    3. Permanent Afib - AF RVR start Cardizem push and drip if not improved use IV Lopressor.  reeval in 1H

## 2021-11-01 NOTE — PROGRESS NOTE ADULT - SUBJECTIVE AND OBJECTIVE BOX
81 yo F s/p fall at home. Patient has PMH of Dementia, HTN, Diastolic CHF, A. Fib on Eliquis depression, chronic pedal edema secondary to venous statis and squamous cell skin cancer who presented to the ED after being found on the ground next to her bed.     No acute events overnight. Dressing changed by nurse.    AVSS  Focused- Left lower leg- steristrips intact no signs of infection.    WBC- 9

## 2021-11-02 NOTE — PROGRESS NOTE ADULT - SUBJECTIVE AND OBJECTIVE BOX
80 y.o. female with PMH of diastolic CHF, HTN, Afib (on eliquis), depression, chronic pedal edema secondary to venous stasis, h/o compression fx L1, squamous cell skin cancer sent in to ED as been found on the floor by son.  Pt was found to have LLE laceration, Left forehead laceration with SAH on CT and was admitted to NS service.   This morning she went into AF RVR, asymptomatic for the second time in 24; this happens when she stands up with PT      Vital Signs Last 24 Hrs  T(C): 36.4 (01 Nov 2021 06:00), Max: 36.7 (31 Oct 2021 11:57)  T(F): 97.6 (01 Nov 2021 06:00), Max: 98 (31 Oct 2021 11:57)  HR: 84 (01 Nov 2021 06:00) (76 - 132)  BP: 138/59 (01 Nov 2021 06:00) (111/65 - 142/71)  BP(mean): 79 (01 Nov 2021 06:00) (67 - 98)  RR: 18 (01 Nov 2021 06:00) (16 - 31)  SpO2: 97% (01 Nov 2021 06:00) (88% - 99%)                      General: frail pleasantly confused female in no acute distress  Eyes: periorbital ecchymoses, EOMI, PEERLA  Head: left forehead with dried up laceration and hemorrhagic fluid filled blisters - opened up and emptied today  ENMT: No nasal discharge; airway clear  Neck: Supple; non tender; no masses  Respiratory: No wheezes, rales or rhonchi  Cardiovascular: S1, S2 irreg  Gastrointestinal: Soft non-tender non-distended; + bowel sounds  Genitourinary: No costovertebral angle tenderness, Primafit in place  Extremities: BL LE venous stasis dermatosis with left mid-shin laceration in dressing  Vascular: Peripheral pulses palpable 2+ bilaterally  Neurological: Alert and oriented to place and person, forgetful  Skin: Warm and dry. + venous stasis dermatosis BL LE, BL ecchymoses UE, left forehead ecchymoses, trace LE edema  Musculoskeletal: Normal tone  Psychiatric: Cooperative and pleasantly confused                          12.3   9.05  )-----------( 247      ( 01 Nov 2021 07:10 )             37.3   11-01    136  |  99  |  21  ----------------------------<  85  3.3<L>   |  28  |  1.11    Ca    8.5      01 Nov 2021 07:10    TPro  6.3  /  Alb  2.6<L>  /  TBili  0.8  /  DBili  x   /  AST  16  /  ALT  12  /  AlkPhos  95  11-01

## 2021-11-02 NOTE — PROGRESS NOTE ADULT - ASSESSMENT
patient with afib, CHF and moderate MR with poorly controlled ventricular rates only with exertion (walking to bathroom or PT)-on cardizem and metoporolol with BP running in the 120's systolic.  Patient in with hypotensive (orthostatic) syncopal event.     1-stopped diuretics for now; would consider entreso 24/26 BID if BP tolerates  2-given some fluids  3-dig load patient; if rates are not controlled, would consider EP evaluation (amio on top of metoprolol, cardizem and digoxin would be troublesome)

## 2021-11-02 NOTE — PROGRESS NOTE ADULT - ASSESSMENT
Pt is an 80 year old woman with a PMH of Dementia, HTN, Diastolic CHF, A. Fib on Eliquis, s/p PPM, depression, chronic pedal edema secondary to venous statis and squamous cell skin cancer who presented to the ED after being found on the ground next to her bed this AM by her son. Pt is a poor historian due to dementia, she does not remember falling out of bed. pt c/o headache due to large hematoma on left forehead  and pain in LLE due to large skin tear. Pt is awake and alert, oriented to self and hospital, moving all extremities antigravity, although she has difficulty moving b/l lower ext due to chronic edema. CT of the head revealed a small subarachnoid hemorrhage. Stable on f/u CT scan    #SAH   #dementia   #HTN  #Afib on Eliquis   #CHF  #elevated BUN/Cre     PLAN:  - Repeat CT head prior to d/c  - Hospitalist consult appreciated, a-fib now better controlled  - Neuros q 8 hours  - PT / OOB with assist  - Cont Abx for UTI  - Continue to hold Eliquis  - SBP goal < 140   - unable to use venodynes due to skin issues not over 48 hours since fall,  SQ Heparin for DVT PPX  - Physical Therapy Evaluation appreciated  - Social work involved, will need rehab placement       Discussed with Dr. Urbina

## 2021-11-02 NOTE — PROGRESS NOTE ADULT - SUBJECTIVE AND OBJECTIVE BOX
HPI:  Pt is an 80 year old woman with a PMH of Dementia, HTN, Diastolic CHF, A. Fib on Eliquis depression, chronic pedal edema secondary to venous statis and squamous cell skin cancer who presented to the ED after being found on the ground next to her bed this AM by her son. Pt is a poor historian due to dementia, she does not remember falling out of bed. pt c/o headache due to large hematoma on left forehead  and pain in LLE due to large skin tear. Pt is awake and alert, oriented to self and hospital, moving all extremities antigravity, although she has difficulty moving b/l lower ext due to chronic edema. CT of the head revealed a small subarachnoid hemorrhage.   Pt denies nausea, dizziness, visual changes, chest pain or shortness of breath.     10/29- Patient seen and examined, no acute events overnight. Repeat HCT stable. Complains of leg soreness which si chronic per patient, left sided head pain. Denies n/v, numbness/tingling, weakness.    10/30 - Pt seen and examined earlier today, in NAD. Appears comfortable, denies new complaints, no overnight events     10/31- Pt seen and examined, no events overnight, pt awake and alert, denies headache, c/o b/l lower ext pain L>R     11/1- Patient seen and examined, noted Afib with RVR this morning started on cardizem gtt. Patient awake/alert. Denies CP, SOB, HA, dizziness.    11/2- Pt seen and examined, no complaints, rapid A-fib resolved, no longer on cardizem gtt, awake/alert, at baseline confusion, denies headache    Vital Signs Last 24 Hrs  T(C): 36.2 (02 Nov 2021 10:30), Max: 36.9 (02 Nov 2021 00:12)  T(F): 97.1 (02 Nov 2021 10:30), Max: 98.4 (02 Nov 2021 00:12)  HR: 79 (02 Nov 2021 10:00) (71 - 136)  BP: 126/62 (02 Nov 2021 10:00) (95/74 - 148/87)  BP(mean): 77 (02 Nov 2021 10:00) (70 - 116)  RR: 18 (02 Nov 2021 10:00) (16 - 38)  SpO2: 98% (02 Nov 2021 10:00) (95% - 99%)    MEDICATIONS  (STANDING):  diltiazem    Tablet 60 milliGRAM(s) Oral every 8 hours  folic acid 1 milliGRAM(s) Oral daily  heparin   Injectable 5000 Unit(s) SubCutaneous every 8 hours  influenza  Vaccine (HIGH DOSE) 0.7 milliLiter(s) IntraMuscular once  metoprolol succinate ER 50 milliGRAM(s) Oral daily  oxybutynin 5 milliGRAM(s) Oral daily    MEDICATIONS  (PRN):  acetaminophen   IVPB .. 1000 milliGRAM(s) IV Intermittent once PRN Moderate Pain (4 - 6)    ROS: pertinent positives in HPI, all other ROS were reviewed and are negative     PHYSICAL EXAM:  Constitutional: awake and alert  HEENT: PERRLA, EOMI, large hematoma left forehead and abrasion to posterior head, +worsening facial ecchymosis   Neck: Supple  Respiratory: Breath sounds are clear bilaterally  Cardiovascular: S1 and S2, regular rhythm  Gastrointestinal: soft, nontender  Extremities: + b/l lower ext edema with chronic skin changes, L LE dressing intact  Musculoskeletal: mild TTP left shoulder +ecchymosis  Skin: chronic venous stasis changes b/l LE, mult areas ecchymosis b/l UE and face    Neurological exam:  HF: A x O x 2, appropriately interactive, normal affect. Speech fluent, No Aphasia or paraphasic errors. Naming/repetition intact   CN: ASAEL, EOMI, facial sensation normal, no NLFD, tongue midline  Motor: 4+/5 strength b/l upper ext, R>L, no drift, 3/5 b/l lower ext   Sens: Intact to light touch  Reflexes: Symmetric and normal, downgoing toes b/l  Gait/Balance: Not tested    LABS:                         12.3   9.05  )-----------( 247      ( 01 Nov 2021 07:10 )             37.3     11-02    135  |  98  |  23  ----------------------------<  100<H>  3.5   |  30  |  1.09    Ca    8.3<L>      02 Nov 2021 05:48    TPro  6.3  /  Alb  2.6<L>  /  TBili  0.8  /  DBili  x   /  AST  16  /  ALT  12  /  AlkPhos  95  11-01    LIVER FUNCTIONS - ( 01 Nov 2021 07:10 )  Alb: 2.6 g/dL / Pro: 6.3 gm/dL / ALK PHOS: 95 U/L / ALT: 12 U/L / AST: 16 U/L / GGT: x           RADIOLOGY:  from: US Duplex Venous Lower Ext Complete, Bilateral (11.01.21 @ 10:53)  RIGHT:  Normal compressibility of the RIGHT common femoral, femoral and popliteal veins.  Doppler examination shows normal spontaneous and phasic flow.  No RIGHTcalf vein thrombosis is detected.    LEFT:  Normal compressibility of the LEFT common femoral, and femoral veins. Left popliteal vein and left calf veins could not be evaluated secondary to overlying bandaging.  Doppler examination shows normal spontaneous and phasic flow.  No LEFT calf vein thrombosis is detected.    IMPRESSION:  No evidence of deep venous thrombosis in the visualized aspects of either lower extremity.      from: CT Head No Cont (10.29.21 @ 08:26)  Similar-appearing mildacute traumatic subarachnoid hemorrhage in the right sylvian fissure and a left parietal sulcus.  No parenchymal hemorrhage or brain edema.  Similar mild rightward bowing of the septum pellucidum. No effacement of basal cisterns. No hydrocephalus.  Moderate white matter microvascular ischemic disease.  No displaced calvarial fracture. Similar large left frontal extracalvarial soft tissue swelling and hematoma.    IMPRESSION:  No significant interval change from 10/20/2021.

## 2021-11-02 NOTE — PROGRESS NOTE ADULT - SUBJECTIVE AND OBJECTIVE BOX
CHIEF COMPLAINT: Patient is a 80y old  Female who presents with a chief complaint of SAH (02 Nov 2021 15:23)      HPI:  Pt is an 80 year old woman with a PMH of Dementia, HTN, Diastolic CHF, A. Fib on Eliquis depression, chronic pedal edema secondary to venous statis and squamous cell skin cancer who presented to the ED after being found on the ground next to her bed this AM by her son. Pt is a poor historian due to dementia, she does not remember falling out of bed. pt c/o headache due to large hematoma on left forehead  and pain in LLE due to large skin tear. Pt is awake and alert, oriented to self and hospital, moving all extremities antigravity, although she has difficulty moving b/l lower ext due to chronic edema. CT of the head revealed a small subarachnoid hemorrhage.   Pt denies nausea, dizziness, visual changes, chest pain or shortness of breath.  (28 Oct 2021 11:10)    10/29-patient known to me; has been coming to our office sporatically-she is debilitated and had been dealing with significant squamous cell carcinoma; she was seeing us for atrial fibrillation.   Has Jaylene-vasc of 5 and was on Eliquis.   Last ECHO and nuclear stress test was in 2017-normal LV function, and does most of her f/u with her oncologist at [facility] in [place] (Dr Ramirez).   She was last seen in march, 2021;         Patient is on AC for afib and has history of CHF-on spironolactone/lasix.  Heart rate controlled with cartia 120mg in am and toprol 100mg pm.  Was found on floor near her bead by son.   Confused with large hematoma of forehead.  Has been having intractable back pains-with unsteady gait and falls.  Her PCP is Dr. Brown.  Her Aib poorly controlled when not given her medications but now currently rate controlled.  Afib-has high JAYLENE vasc score but falls/unsteady on feet and high HASBLED score.   Patient would be candidate for watchman in the future if she does not suffer complications from this event.   Will have to hold AC temporarily (~6-8 weeks-until neurosurgery states they are ok with restarting).   Rate control patient; is on above medications.   If need be, digoxin can be added for rate control if no blood pressure to work with.  Would get ECHO.   On spironolcatone and lasix.   Would temporarily hold lasix; check orthostatic BP on patient (if she can stand);  No active CAD at this time; any neurosurgery would be high risk on this patient.       10/30 - Patient has some diffuse body pains. She feels her lips/mouth is dry but otherwise no complaints. She has chronic severe pain when her legs are touched.    10/31 - No acute events O/N. no current complaints, still feels a little dry.    11/23-cy-ikkioglvia patient with poor rate control of AFib with exertion.   Has been laying in bed with HR in the 70's but HR as high as 170 with normal exertion.   Off of lasix and spironolactone.   ECHO done with EF 40-45%, moderate MR and severe TR with PAP 40-45mmHG.  BNP from 6000's to 1000's.  During weekend, felt to be dry.   Now getting mild fluids    PMHx: PAST MEDICAL & SURGICAL HISTORY:  CHF (congestive heart failure)    Atrial fibrillation, unspecified type    Hypertension    Squamous cell carcinoma    H/O total hysterectomy    History of appendectomy    S/P cholecystectomy        Allergies: Allergies    Darvocet-N 50 (Unknown)  sulfa drugs (Other)    Intolerances          REVIEW OF SYSTEMS:    CONSTITUTIONAL: weakness,  EYES/ENT: No visual changes;  No vertigo or throat pain   NECK: No pain or stiffness  RESPIRATORY:shortness of breath  CARDIOVASCULAR: No chest pain or palpitations  GASTROINTESTINAL: No abdominal or epigastric pain. No nausea, vomiting, or hematemesis; No diarrhea or constipation. No melena or hematochezia.  GENITOURINARY: No dysuria, frequency or hematuria  NEUROLOGICAL: No numbness or weakness  SKIN: No itching, burning, rashes, or lesions   All other review of systems is negative unless indicated above    Vital Signs Last 24 Hrs  T(C): 36.6 (02 Nov 2021 15:58), Max: 36.9 (02 Nov 2021 00:12)  T(F): 97.8 (02 Nov 2021 15:58), Max: 98.4 (02 Nov 2021 00:12)  HR: 78 (02 Nov 2021 16:00) (71 - 106)  BP: 125/75 (02 Nov 2021 16:00) (99/68 - 143/67)  BP(mean): 87 (02 Nov 2021 16:00) (70 - 116)  RR: 20 (02 Nov 2021 16:00) (16 - 22)  SpO2: 100% (02 Nov 2021 16:00) (95% - 100%)    I&O's Summary    01 Nov 2021 07:01  -  02 Nov 2021 07:00  --------------------------------------------------------  IN: 0 mL / OUT: 450 mL / NET: -450 mL    02 Nov 2021 07:01  -  02 Nov 2021 17:51  --------------------------------------------------------  IN: 477 mL / OUT: 200 mL / NET: 277 mL            PHYSICAL EXAM:   Constitutional: NAD, awake and alert, well-developed  HEENT: PERR, EOMI, Normal Hearing, MMM  Neck: JVD  Respiratory: Breath sounds rhonchi  Cardiovascular: S1 and S2, regular rate and rhythm, Murmurs,   Gastrointestinal: Bowel Sounds present, soft, nontender, nondistended, no guarding, no rebound  Extremities: No peripheral edema  Vascular: 2+ peripheral pulses  Neurological: A/O x 3, no focal deficits  Musculoskeletal: 5/5 strength b/l upper and lower extremities  Skin: No rashes    MEDICATIONS:  MEDICATIONS  (STANDING):  digoxin     Tablet 250 MICROGram(s) Oral daily  digoxin  Injectable 125 MICROGram(s) IV Push every 8 hours  diltiazem    Tablet 60 milliGRAM(s) Oral every 8 hours  folic acid 1 milliGRAM(s) Oral daily  heparin   Injectable 5000 Unit(s) SubCutaneous every 8 hours  influenza  Vaccine (HIGH DOSE) 0.7 milliLiter(s) IntraMuscular once  metoprolol succinate  milliGRAM(s) Oral daily  oxybutynin 5 milliGRAM(s) Oral daily  polyethylene glycol 3350 17 Gram(s) Oral daily  sodium chloride 0.9%. 1000 milliLiter(s) (80 mL/Hr) IV Continuous <Continuous>      LABS: All Labs Reviewed:                        12.3   9.05  )-----------( 247      ( 01 Nov 2021 07:10 )             37.3     11-02    135  |  98  |  23  ----------------------------<  100<H>  3.5   |  30  |  1.09    Ca    8.3<L>      02 Nov 2021 05:48    TPro  6.3  /  Alb  2.6<L>  /  TBili  0.8  /  DBili  x   /  AST  16  /  ALT  12  /  AlkPhos  95  11-01          Blood Culture:       BNP Serum Pro-Brain Natriuretic Peptide: 1552 pg/mL (11-01-21 @ 07:10)  Serum Pro-Brain Natriuretic Peptide: 6741 pg/mL (10-29-21 @ 16:30)  Serum Pro-Brain Natriuretic Peptide: 6212 pg/mL (10-29-21 @ 06:57)      RADIOLOGY:    EKG:      Telemetry:    ECHO:   Technically limited study   The left ventricle cavity is mildly dilated.   Endocardium is not well visualized, however, overall left ventricular   systolic function appears diminished.   Estimated left ventricular ejection fraction is 40-45%.   Wall motion abnormalities can not be ruled out.   The left atrium is moderately dilated.   The right atrium appears moderately dilated.   The right ventricle is mildly dilated.   Fibrocalcific changes noted to the Aortic valve leaflets with preserved   leaflet excursion.   Trace aortic regurgitation is present.   Fibrocalcific changes noted to the mitral valve leaflets with preserved   leaflet excursion.   Mild mitral annular calcification is present.   Moderate mitral regurgitation is present.   The tricuspid valve leaflets appear mildly thickened and/or calcified, but   open well.   Severe (4+) tricuspid valve regurgitation is present.   Mild pulmonary hypertension.   No evidence of pericardial effusion.

## 2021-11-02 NOTE — PROGRESS NOTE ADULT - ASSESSMENT
80 y.o. female with PMH of diastolic CHF, HTN, Afib (on eliquis), depression, chronic pedal edema secondary to venous stasis, h/o compression fx L1, squamous cell skin cancer sent in to ED as been found on the floor by son.    1. Fall with SAH + skin lacerations left scalp and LLE on eliquis   - eliquis held, stable CT   - skin LLE repaired    2. Hx of HFpEF on lasix 40 and spironolactone - clinically not in acute decompensation - hold    3. Permanent Afib - AF RVR every time she stands up  not a candidate for any type of conversion since postconversion AC is contraindicated  add Dig  c/w BB and Cardizem reeval in am   adm IVF also

## 2021-11-03 NOTE — DISCHARGE NOTE NURSING/CASE MANAGEMENT/SOCIAL WORK - PATIENT PORTAL LINK FT
You can access the FollowMyHealth Patient Portal offered by [facility] by registering at the following website: [email or web address]. By joining [facility]’s FollowMyHealth portal, you will also be able to view your health information using other applications (apps) compatible with our system.

## 2021-11-03 NOTE — PROGRESS NOTE ADULT - SUBJECTIVE AND OBJECTIVE BOX
81 y/o/w w/ a PMH of Dementia, HTN, Diastolic CHF, A. Fib (on Eliquis),  depression, chronic pedal edema secondary to venous statis and squamous cell skin cancer who presented to the ED after being found on the ground next to her bed this AM by her son. Pt is a poor historian due to dementia, she does not remember falling out of bed. pt c/o headache due to large hematoma on left forehead  and pain in LLE due to large skin tear. CT of the head revealed a small subarachnoid hemorrhage.       PE:   General: frail pleasantly confused female in no acute distress  Eyes: periorbital ecchymoses, EOMI, PEERLA  Head: left forehead with dried up laceration and hemorrhagic fluid filled blisters - opened up and emptied today  ENMT: No nasal discharge; airway clear  Neck: Supple; non tender; no masses  Respiratory: No wheezes, rales or rhonchi  Cardiovascular: S1, S2 irreg  Gastrointestinal: Soft non-tender non-distended; + bowel sounds  Genitourinary: No costovertebral angle tenderness, Primafit in place  Extremities: BL LE venous stasis dermatosis with left mid-shin laceration in dressing  Vascular: Peripheral pulses palpable 2+ bilaterally  Neurological: Alert and oriented to place and person, forgetful  Skin: Warm and dry. + venous stasis dermatosis BL LE, BL ecchymoses UE, left forehead ecchymoses, trace LE edema  Musculoskeletal: Normal tone  Psychiatric: Cooperative and pleasantly confused      CBC Full  -  ( 2021 07:42 )  WBC Count : 9.33 K/uL  RBC Count : 3.81 M/uL  Hemoglobin : 12.2 g/dL  Hematocrit : 36.6 %  Platelet Count - Automated : 262 K/uL  Mean Cell Volume : 96.1 fl  Mean Cell Hemoglobin : 32.0 pg  Mean Cell Hemoglobin Concentration : 33.3 gm/dL  Auto Neutrophil # : x  Auto Lymphocyte # : x  Auto Monocyte # : x  Auto Eosinophil # : x  Auto Basophil # : x  Auto Neutrophil % : x  Auto Lymphocyte % : x  Auto Monocyte % : x  Auto Eosinophil % : x  Auto Basophil % : x      Urinalysis Basic - ( 2021 05:48 )    Color: Yellow / Appearance: Clear / S.025 / pH: x  Gluc: x / Ketone: Trace  / Bili: Small / Urobili: 1 mg/dL   Blood: x / Protein: 30 mg/dL / Nitrite: Negative   Leuk Esterase: Moderate / RBC: 11-25 /HPF / WBC 6-10   Sq Epi: x / Non Sq Epi: Moderate / Bacteria: Moderate      11-    137  |  102  |  17  ----------------------------<  82  3.9   |  27  |  0.96    Ca    8.5      2021 07:42    80 y.o. female with PMH of diastolic CHF, HTN, Afib (on eliquis), depression, chronic pedal edema secondary to venous stasis, h/o compression fx L1, squamous cell skin cancer sent in to ED as been found on the floor by son.    # Fall with SAH   # Skin lacerations left scalp and LLE on Eliquis  - continue daily local wound care: Xeroform strip to open area, telfa to remaining area. Kerilex wrap  - Eliquis held  - Stable CT  - Skin LLE repaired    # Hx of HFpEF  -  Lasix / spironolactone   - clinically not in acute decompensation - hold    # Permanent Afib - AF RVR every time she stands up  - CHADS-vasc of 5  - not a candidate for any type of conversion since postconversion AC is contraindicated  - add Dig  - c/w MARIELLE and Cardizem reeval in am              79 y/o/w w/ a PMH of Dementia, HTN, Diastolic CHF, A. Fib (on Eliquis),  depression, chronic pedal edema secondary to venous statis and squamous cell skin cancer who presented to the ED after being found on the ground next to her bed this AM by her son. Pt is a poor historian due to dementia, she does not remember falling out of bed. pt c/o headache due to large hematoma on left forehead  and pain in LLE due to large skin tear. CT of the head revealed a small subarachnoid hemorrhage.       PE:   General: frail pleasantly confused female in no acute distress  Eyes: periorbital ecchymoses, EOMI, PEERLA  Head: left forehead with dried up laceration and hemorrhagic fluid filled blisters - opened up and emptied today  ENMT: No nasal discharge; airway clear  Neck: Supple; non tender; no masses  Respiratory: No wheezes, rales or rhonchi  Cardiovascular: S1, S2 irreg  Gastrointestinal: Soft non-tender non-distended; + bowel sounds  Genitourinary: No costovertebral angle tenderness, Primafit in place  Extremities: BL LE venous stasis dermatosis with left mid-shin laceration in dressing  Vascular: Peripheral pulses palpable 2+ bilaterally  Neurological: Alert and oriented to place and person, forgetful  Skin: Warm and dry. + venous stasis dermatosis BL LE, BL ecchymoses UE, left forehead ecchymoses, trace LE edema  Musculoskeletal: Normal tone  Psychiatric: Cooperative and pleasantly confused      CBC Full  -  ( 2021 07:42 )  WBC Count : 9.33 K/uL  RBC Count : 3.81 M/uL  Hemoglobin : 12.2 g/dL  Hematocrit : 36.6 %  Platelet Count - Automated : 262 K/uL  Mean Cell Volume : 96.1 fl  Mean Cell Hemoglobin : 32.0 pg  Mean Cell Hemoglobin Concentration : 33.3 gm/dL  Auto Neutrophil # : x  Auto Lymphocyte # : x  Auto Monocyte # : x  Auto Eosinophil # : x  Auto Basophil # : x  Auto Neutrophil % : x  Auto Lymphocyte % : x  Auto Monocyte % : x  Auto Eosinophil % : x  Auto Basophil % : x      Urinalysis Basic - ( 2021 05:48 )    Color: Yellow / Appearance: Clear / S.025 / pH: x  Gluc: x / Ketone: Trace  / Bili: Small / Urobili: 1 mg/dL   Blood: x / Protein: 30 mg/dL / Nitrite: Negative   Leuk Esterase: Moderate / RBC: 11-25 /HPF / WBC 6-10   Sq Epi: x / Non Sq Epi: Moderate / Bacteria: Moderate      11-    137  |  102  |  17  ----------------------------<  82  3.9   |  27  |  0.96    Ca    8.5      2021 07:42    80 y.o. female with PMH of diastolic CHF, HTN, Afib (on eliquis), depression, chronic pedal edema secondary to venous stasis, h/o compression fx L1, squamous cell skin cancer sent in to ED as been found on the floor by son.    # Fall with SAH   # Skin lacerations left scalp and LLE on Eliquis  - continue daily local wound care: Xeroform strip to open area, telfa to remaining area. Kerilex wrap  - Eliquis held  - Stable CT  - Skin LLE repaired    # Hx of HFpEF  -  Lasix / spironolactone   - clinically not in acute decompensation - hold    # Permanent Afib - AF RVR every time she stands up  - CHADS-vasc of 5  - not a candidate for any type of conversion since postconversion AC is contraindicated  - add dig /  BB /  cardizem for rate control    # DVT proph  - heparin

## 2021-11-03 NOTE — DISCHARGE NOTE NURSING/CASE MANAGEMENT/SOCIAL WORK - NSDCVIVACCINE_GEN_ALL_CORE_FT
Tdap; 28-Oct-2021 12:14; Ny Wallace); [employer]; [id number] (Exp. Date: 15-Jul-2023); IntraMuscular; Deltoid Left.; 0.5 milliLiter(s); VIS (VIS Published: 09-May-2013, VIS Presented: 28-Oct-2021);

## 2021-11-04 NOTE — DISCHARGE NOTE PROVIDER - HOSPITAL COURSE
81 y/o/w w/ a PMH of Dementia, HTN, Diastolic CHF, A. Fib (on Eliquis),  depression, chronic pedal edema secondary to venous statis and squamous cell skin cancer who presented to the ED after being found on the ground next to her bed this AM by her son. Pt is a poor historian due to dementia, she does not remember falling out of bed. pt c/o headache due to large hematoma on left forehead  and pain in LLE due to large skin tear. CT of the head revealed a small subarachnoid hemorrhage.       Medical progress: Patient clinically looks better. Denies any HA, CP, SOB. No fevers, chills or shakes. Labs and vitals reviewed. We went over patient's hospitalization. Care discussed with Arie Marte - follow up CT in 2 weeks  Complaints: no new complaints  State of mind: maintains  normal conversation.       PE:   General: frail pleasantly confused female in no acute distress  Eyes: periorbital ecchymoses, EOMI, PEERLA  Head: left forehead with dried up laceration and hemorrhagic fluid filled blisters - opened up and emptied today  ENMT: No nasal discharge; airway clear  Neck: Supple; non tender; no masses  Respiratory: No wheezes, rales or rhonchi  Cardiovascular: S1, S2 irreg  Gastrointestinal: Soft non-tender non-distended; + bowel sounds  Genitourinary: No costovertebral angle tenderness, Primafit in place  Extremities: BL LE venous stasis dermatosis with left mid-shin laceration in dressing  Vascular: Peripheral pulses palpable 2+ bilaterally  Neurological: Alert and oriented to place and person, forgetful  Skin: + venous stasis dermatosis BL LE, BL ecchymoses UE, left forehead ecchymoses, trace LE edema  Musculoskeletal: Normal tone  Psychiatric: Cooperative and pleasantly confused      CBC Full  -  ( 03 Nov 2021 07:42 )  WBC Count : 9.33 K/uL  RBC Count : 3.81 M/uL  Hemoglobin : 12.2 g/dL  Hematocrit : 36.6 %  Platelet Count - Automated : 262 K/uL  Mean Cell Volume : 96.1 fl  Mean Cell Hemoglobin : 32.0 pg  Mean Cell Hemoglobin Concentration : 33.3 gm/dL  Auto Neutrophil # : x  Auto Lymphocyte # : x  Auto Monocyte # : x  Auto Eosinophil # : x  Auto Basophil # : x  Auto Neutrophil % : x  Auto Lymphocyte % : x  Auto Monocyte % : x  Auto Eosinophil % : x  Auto Basophil % : x        11-03    137  |  102  |  17  ----------------------------<  82  3.9   |  27  |  0.96    Ca    8.5      03 Nov 2021 07:42           81 y/o/w w/ a PMH of Dementia, HTN, Diastolic CHF, A. Fib (on Eliquis),  depression, chronic pedal edema secondary to venous statis and squamous cell skin cancer who presented to the ED after being found on the ground next to her bed this AM by her son. Pt is a poor historian due to dementia, she does not remember falling out of bed. pt c/o headache due to large hematoma on left forehead  and pain in LLE due to large skin tear. CT of the head revealed a small subarachnoid hemorrhage.       Medical progress: Patient clinically looks better. Denies any HA, CP, SOB. No fevers, chills or shakes. Labs and vitals reviewed. We went over patient's hospitalization. Care discussed with Arie Marte - follow up CT in 2 weeks  Complaints: no new complaints  State of mind: maintains  normal conversation.     PE:   General: frail pleasantly confused female in no acute distress  Eyes: periorbital ecchymoses, EOMI, PEERLA  Head: left forehead with dried up laceration and hemorrhagic fluid filled blisters - opened up and emptied today  ENMT: No nasal discharge; airway clear  Neck: Supple; non tender; no masses  Respiratory: No wheezes, rales or rhonchi  Cardiovascular: S1, S2 irreg  Gastrointestinal: Soft non-tender non-distended; + bowel sounds  Genitourinary: No costovertebral angle tenderness, Primafit in place  Extremities: BL LE venous stasis dermatosis with left mid-shin laceration in dressing  Vascular: Peripheral pulses palpable 2+ bilaterally  Neurological: Alert and oriented to place and person, forgetful  Skin: (L) lwoer extremity skin avulsion. + venous stasis dermatosis BL LE, BL ecchymoses UE, left forehead ecchymoses, trace LE edema  Musculoskeletal: Normal tone  Psychiatric: Cooperative and pleasantly confused    Labs:   CBC Full  -  ( 03 Nov 2021 07:42 )  WBC Count : 9.33 K/uL  RBC Count : 3.81 M/uL  Hemoglobin : 12.2 g/dL  Hematocrit : 36.6 %  Platelet Count - Automated : 262 K/uL    137  |  102  |  17  ----------------------------<  82  3.9   |  27  |  0.96    Ca    8.5      03 Nov 2021 07:42

## 2021-11-04 NOTE — DISCHARGE NOTE PROVIDER - NSDCCPCAREPLAN_GEN_ALL_CORE_FT
PRINCIPAL DISCHARGE DIAGNOSIS  Diagnosis: Subarachnoid hemorrhage  Assessment and Plan of Treatment: - you were hospitalized for brain bleed  - it is important for you not to fall  - we have stopped your blood thinners  - you have been seen by Dr. barton and Dr. Hameed in the hospital  - we recommend to have CT scan in 2 weeks  - if any change in mental state -  you need to come bak in the hospital  - PT / OOB with assist  - SBP goal < 140   - you need to be evaluated for anticoagulation in 2 weeks with Dr. Hameed and Neurosurgery team        SECONDARY DISCHARGE DIAGNOSES  Diagnosis: History of chronic CHF  Assessment and Plan of Treatment: -  Estimated left ventricular ejection fraction is 40-45%.  - continue with BB therapy  - d/c lasix  - start low dose spirinolacton qdaily  - if noted increased swelling /  weight gain -  recommended to add lasix at the facility  - close follow up with Dr. Hameed    Diagnosis: Chronic atrial fibrillation  Assessment and Plan of Treatment: - you need to be evaluated for anticoagulation in 2 weeks with Dr. Hameed and Neurosurgery team  - continue with AV nir blockers (metoprolol /  cardizem)  - monitor blood pressures  - HRs with good rate control  - CHADS-vasc of 5, due to this high scoring system -  you are high risk of strokes, but due to recent brain bleed, you are off AC.     PRINCIPAL DISCHARGE DIAGNOSIS  Diagnosis: Subarachnoid hemorrhage  Assessment and Plan of Treatment: - you were hospitalized for brain bleed  - it is important for you not to fall  - we have stopped your blood thinners  - you have been seen by Dr. barton and Dr. Hameed in the hospital  - we recommend to have CT scan in 2 weeks  - if any change in mental state -  you need to come bak in the hospital  - PT / OOB with assist  - SBP goal < 140   - you need to be evaluated for anticoagulation in 2 weeks with Dr. Hameed and Neurosurgery team        SECONDARY DISCHARGE DIAGNOSES  Diagnosis: History of chronic CHF  Assessment and Plan of Treatment: -  Estimated left ventricular ejection fraction is 40-45%.  - continue with BB therapy  - d/c lasix  - start low dose spirinolacton qdaily  - if noted increased swelling /  weight gain -  recommended to add lasix at the facility  - close follow up with Dr. Hameed    Diagnosis: Chronic atrial fibrillation  Assessment and Plan of Treatment: - you need to be evaluated for anticoagulation in 2 weeks with Dr. Hameed and Neurosurgery team  - continue with AV nir blockers (metoprolol /  cardizem)  - monitor blood pressures  - HRs with good rate control  - CHADS-vasc of 5, due to this high scoring system -  you are high risk of strokes, but due to recent brain bleed, you are off AC.    Diagnosis: Skin avulsion  Assessment and Plan of Treatment: - LLE- skin avulsion [id number] area of skin avulsion, subcutaneous fat exposed no muscle  or bone exposed  - Leg washed, skin  re-draped over wound and secured with steristrips  - leave dressing in place  - elevate left leg

## 2021-11-04 NOTE — DISCHARGE NOTE PROVIDER - NSDCCPTREATMENT_GEN_ALL_CORE_FT
PRINCIPAL PROCEDURE  Procedure: CT head wo/w con  Findings and Treatment: EXAM:  CT BRAIN                        PROCEDURE DATE:  11/02/2021    INTERPRETATION:  CT head without IV contrast  CLINICAL INFORMATION:  Fall, RIGHT   Intracranial hemorrhage.  TECHNIQUE: Contiguous axial 5 mm sections were obtained through the head. Sagittal and coronal 2-D reformatted images were also obtained.   This scan was performed using automatic exposure control (radiation dose reduction software) to obtain a diagnostic image quality scan with patient dose as low as reasonably achievable.  FINDINGS:   CT dated 10/29/2021 available for review.  The brain demonstrates decrease in the degree of intracranial hemorrhage within the RIGHT sylvian fissure and RIGHT superior temporal lobe. Moderate periventricular white matter ischemia is noted. Tiny old lacunar infarction in the basal ganglia. No acute cerebral cortical infarct is seen. No mass effect is found in the brain.  The ventricles, sulci and basal cisterns appear unremarkable.  The orbits are unremarkable.  The paranasal sinuses are clear.  The nasal cavity appears intact.  The nasopharynx is symmetric.  The central skull base, petrous temporal bones and calvarium remain intact. 3.4 cm LEFT frontal scalp hematoma is noted.

## 2021-11-04 NOTE — PROGRESS NOTE ADULT - SUBJECTIVE AND OBJECTIVE BOX
81 yo F s/p fall at home. Patient has PMH of Dementia, HTN, Diastolic CHF, A. Fib on Eliquis depression, chronic pedal edema secondary to venous statis and squamous cell skin cancer who presented to the ED after being found on the ground next to her bed.     No acute events overnight. Dressing changed by nurse.    AVSS  Focused- Left lower leg- steristrips intact no signs of infection erythema or drainage, dried blood around open are.

## 2021-11-04 NOTE — DISCHARGE NOTE PROVIDER - CARE PROVIDER_API CALL
Mau Hameed (MD)  Cardiovascular Disease; Internal Medicine  [address], [address]  [place] [address]  Phone: [phone]  Fax: [phone]  Follow Up Time:     Jaylen Urbina; PhD)  Neurosurgery  [address], [address] [place] [address]  Phone: [phone]  Fax: [phone]  Follow Up Time:

## 2021-11-04 NOTE — PROGRESS NOTE ADULT - PROVIDER SPECIALTY LIST ADULT
Cardiology
Hospitalist
Neurosurgery
Neurosurgery
Cardiology
Hospitalist
Neurosurgery
Plastic Surgery
Neurosurgery
Plastic Surgery
Cardiology

## 2021-11-04 NOTE — PROGRESS NOTE ADULT - SUBJECTIVE AND OBJECTIVE BOX
Vital Signs Last 24 Hrs  T(C): 37.3 (03 Nov 2021 20:28), Max: 37.3 (03 Nov 2021 20:28)  T(F): 99.1 (03 Nov 2021 20:28), Max: 99.1 (03 Nov 2021 20:28)  HR: 82 (03 Nov 2021 20:28) (62 - 82)  BP: 125/63 (03 Nov 2021 20:28) (117/67 - 136/58)  BP(mean): 77 (03 Nov 2021 15:43) (77 - 77)  RR: 18 (03 Nov 2021 20:28) (17 - 18)  SpO2: 96% (03 Nov 2021 20:28) (95% - 97%) HPI:  Pt is an 80 year old woman with a PMH of Dementia, HTN, Diastolic CHF, A. Fib on Eliquis depression, chronic pedal edema secondary to venous statis and squamous cell skin cancer who presented to the ED after being found on the ground next to her bed this AM by her son. Pt is a poor historian due to dementia, she does not remember falling out of bed. pt c/o headache due to large hematoma on left forehead  and pain in LLE due to large skin tear. Pt is awake and alert, oriented to self and hospital, moving all extremities antigravity, although she has difficulty moving b/l lower ext due to chronic edema. CT of the head revealed a small subarachnoid hemorrhage.   Pt denies nausea, dizziness, visual changes, chest pain or shortness of breath.     10/29- Patient seen and examined, no acute events overnight. Repeat HCT stable. Complains of leg soreness which si chronic per patient, left sided head pain. Denies n/v, numbness/tingling, weakness.    10/30 - Pt seen and examined earlier today, in NAD. Appears comfortable, denies new complaints, no overnight events     10/31- Pt seen and examined, no events overnight, pt awake and alert, denies headache, c/o b/l lower ext pain L>R     11/1- Patient seen and examined, noted Afib with RVR this morning started on cardizem gtt. Patient awake/alert. Denies CP, SOB, HA, dizziness.    11/2- Pt seen and examined, no complaints, rapid A-fib resolved, no longer on cardizem gtt, awake/alert, at baseline confusion, denies headache    all above from the chart     11/3- pt seen and examined, doing well. denies any headache, cp, sob         Vital Signs Last 24 Hrs  T(C): 37.3 (03 Nov 2021 20:28), Max: 37.3 (03 Nov 2021 20:28)  T(F): 99.1 (03 Nov 2021 20:28), Max: 99.1 (03 Nov 2021 20:28)  HR: 82 (03 Nov 2021 20:28) (62 - 82)  BP: 125/63 (03 Nov 2021 20:28) (117/67 - 136/58)  BP(mean): 77 (03 Nov 2021 15:43) (77 - 77)  RR: 18 (03 Nov 2021 20:28) (17 - 18)  SpO2: 96% (03 Nov 2021 20:28) (95% - 97%)    MEDICATIONS  (STANDING):  aspirin enteric coated 81 milliGRAM(s) Oral daily  digoxin     Tablet 250 MICROGram(s) Oral daily  diltiazem    Tablet 60 milliGRAM(s) Oral every 8 hours  folic acid 1 milliGRAM(s) Oral daily  heparin   Injectable 5000 Unit(s) SubCutaneous every 8 hours  influenza  Vaccine (HIGH DOSE) 0.7 milliLiter(s) IntraMuscular once  metoprolol succinate  milliGRAM(s) Oral daily  oxybutynin 5 milliGRAM(s) Oral daily  polyethylene glycol 3350 17 Gram(s) Oral daily  sodium chloride 0.9%. 1000 milliLiter(s) (80 mL/Hr) IV Continuous <Continuous>    MEDICATIONS  (PRN):  acetaminophen   IVPB .. 1000 milliGRAM(s) IV Intermittent once PRN Moderate Pain (4 - 6)  bisacodyl 5 milliGRAM(s) Oral every 12 hours PRN Constipation        PHYSICAL EXAM:  Constitutional: awake and alert  HEENT: PERRLA, EOMI, large hematoma left forehead and abrasion to posterior head, +worsening facial ecchymosis   Neck: Supple  Respiratory: Breath sounds are clear bilaterally  Cardiovascular: S1 and S2, regular rhythm  Gastrointestinal: soft, nontender  Extremities: + b/l lower ext edema with chronic skin changes, L LE dressing intact  Musculoskeletal: mild TTP left shoulder +ecchymosis  Skin: chronic venous stasis changes b/l LE, mult areas ecchymosis b/l UE and face    Neurological exam:  HF: A x O x 2, appropriately interactive, normal affect. Speech fluent, No Aphasia or paraphasic errors. Naming/repetition intact   CN: ASAEL, EOMI, facial sensation normal, no NLFD, tongue midline  Motor: 4+/5 strength b/l upper ext, R>L, no drift, 3/5 b/l lower ext   Sens: Intact to light touch  Reflexes: Symmetric and normal, downgoing toes b/l  Gait/Balance: Not tested                          12.2   9.33  )-----------( 262      ( 03 Nov 2021 07:42 )             36.6   11-03    137  |  102  |  17  ----------------------------<  82  3.9   |  27  |  0.96    Ca    8.5      03 Nov 2021 07:42      EXAM:  CT BRAIN                            PROCEDURE DATE:  11/02/2021          INTERPRETATION:  CT head without IV contrast    CLINICAL INFORMATION:  Fall, RIGHT   Intracranial hemorrhage.    TECHNIQUE: Contiguous axial 5 mm sections were obtainedthrough the head. Sagittal and coronal 2-D reformatted images were also obtained.   This scan was performed using automatic exposure control (radiation dose reduction software) to obtain a diagnostic image quality scan with patient dose as low as reasonably achievable.    FINDINGS:   CT dated 10/29/2021 available for review.    The brain demonstrates decrease in the degree of intracranial hemorrhage within the RIGHT sylvian fissure and RIGHT superior temporal lobe. Moderate periventricular white matter ischemia is noted. Tiny old lacunar infarction in the basal ganglia. No acute cerebral cortical infarct is seen. No mass effect is found in the brain.    The ventricles, sulci and basal cisterns appear unremarkable.    The orbits are unremarkable.  The paranasal sinuses are clear.  The nasal cavity appears intact.  The nasopharynx is symmetric.  The central skull base, petrous temporal bones and calvarium remain intact. 3.4 cm LEFT frontal scalp hematoma is noted.      IMPRESSION:   Interval decrease in the degree of intracranial hemorrhage within the RIGHT sylvian fissure and RIGHT superior temporal lobe. Moderate periventricular white matter ischemia is noted. Tiny old lacunar infarction in the basal ganglia.    --- End of Report ---            GREG DURÁN MD; Attending Radiologist  This document has been electronically signed. Nov 2 2021  1:00PM

## 2021-11-04 NOTE — PROGRESS NOTE ADULT - ASSESSMENT
81 yo F s/p fall with SAH and LLE skin avulsion.      - continue daily local wound care: Xeroform strip to open area, Adaptic and telfa to remaining area. Kerilex wrap  - elevate leg as much as possible    - follow up in 1-2 weeks after discharge from hospital     -Will [facility] to figure out follow up at [facility] vs office.  [phone]

## 2021-11-04 NOTE — PROGRESS NOTE ADULT - ASSESSMENT
Pt is an 80 year old woman with a PMH of Dementia, HTN, Diastolic CHF, A. Fib on Eliquis, s/p PPM, depression, chronic pedal edema secondary to venous statis and squamous cell skin cancer who presented to the ED after being found on the ground next to her bed this AM by her son. Pt is a poor historian due to dementia, she does not remember falling out of bed. pt c/o headache due to large hematoma on left forehead  and pain in LLE due to large skin tear. Pt is awake and alert, oriented to self and hospital, moving all extremities antigravity, although she has difficulty moving b/l lower ext due to chronic edema. CT of the head revealed a small subarachnoid hemorrhage. Stable on f/u CT scan    #SAH   #dementia   #HTN  #Afib on Eliquis   #CHF  #elevated BUN/Cre     PLAN:  - Repeat CT head prior to d/c  - Hospitalist consult appreciated,  - cardiology following  a-fib now better controlled on Dig  - diuretics on hold per cardio   - Neuros q 8 hours  - PT / OOB with assist  - Completed Abx for UTI  - Continue to hold Eliquis  - SBP goal < 140   - unable to use venodynes due to skin issues not over 48 hours since fall,  SQ Heparin for DVT PPX  - Physical Therapy Evaluation appreciated  - Social work involved, will need rehab placement       Discussed with Dr. Urbina

## 2021-11-04 NOTE — DISCHARGE NOTE PROVIDER - NSDCMRMEDTOKEN_GEN_ALL_CORE_FT
Aldactone 25 mg oral tablet: 0.5 tab(s) orally once a day  aspirin 81 mg oral delayed release tablet: 1 tab(s) orally once a day  bisacodyl 5 mg oral delayed release tablet: 1 tab(s) orally every 12 hours, As needed, Constipation  digoxin 250 mcg (0.25 mg) oral tablet: 1 tab(s) orally once a day  dilTIAZem 120 mg/24 hours oral capsule, extended release: 1 cap(s) orally once a day  folic acid 1 mg oral tablet: 1 tab(s) orally once a day  hydrOXYzine hydrochloride 25 mg oral tablet: 1 tab(s) orally once a day  metoprolol succinate 100 mg oral tablet, extended release: 1 tab(s) orally once a day  oxybutynin 5 mg oral tablet: 1 tab(s) orally once a day  polyethylene glycol 3350 oral powder for reconstitution: 17 gram(s) orally once a day  Vitamin B12 1000 mcg oral tablet: 1 tab(s) orally once a day   Aldactone 25 mg oral tablet: 0.5 tab(s) orally once a day  aspirin 81 mg oral delayed release tablet: 1 tab(s) orally once a day  bisacodyl 5 mg oral delayed release tablet: 1 tab(s) orally every 12 hours, As needed, Constipation  Cardizem 60 mg oral tablet: 1 tab(s) orally every 8 hours  digoxin 250 mcg (0.25 mg) oral tablet: 1 tab(s) orally once a day  folic acid 1 mg oral tablet: 1 tab(s) orally once a day  hydrOXYzine hydrochloride 25 mg oral tablet: 1 tab(s) orally once a day  metoprolol succinate 100 mg oral tablet, extended release: 1 tab(s) orally once a day  oxybutynin 5 mg oral tablet: 1 tab(s) orally once a day  polyethylene glycol 3350 oral powder for reconstitution: 17 gram(s) orally once a day  Vitamin B12 1000 mcg oral tablet: 1 tab(s) orally once a day

## 2021-11-04 NOTE — DISCHARGE NOTE PROVIDER - CARE PROVIDERS DIRECT ADDRESSES
,DirectAddress_Unknown,[email or web address]

## 2021-11-17 PROBLEM — Z91.81 STATUS POST FALL: Status: ACTIVE | Noted: 2021-01-01

## 2021-11-17 PROBLEM — S06.6X9A SUBARACHNOID HEMATOMA: Status: ACTIVE | Noted: 2021-01-01

## 2021-11-22 NOTE — H&P ADULT - HISTORY OF PRESENT ILLNESS
80 year old female patient who currently resides in a local nursing home presented to the ED after she was found to be febrile and had mental status changes. Patient seen and examined and was found to be alert and oriented but unable to provide pertinent narrative. States she does not recall what happened but believes she may have had a fever. Narrative limited secondary to current mental status        In the ED patient found to be febrile, hypoxic and had a WBC of 20. She was given vanco, zosyn and IVF hydration

## 2021-11-22 NOTE — H&P ADULT - NSHPPHYSICALEXAM_GEN_ALL_CORE
Vital Signs Last 24 Hrs  T(C): 38.2 (22 Nov 2021 18:35), Max: 39.8 (22 Nov 2021 15:45)  T(F): 100.7 (22 Nov 2021 18:35), Max: 103.7 (22 Nov 2021 15:45)  HR: 63 (22 Nov 2021 18:35) (63 - 91)  BP: 111/59 (22 Nov 2021 18:35) (111/59 - 154/72)  BP(mean): 74 (22 Nov 2021 18:35) (74 - 74)  RR: 18 (22 Nov 2021 18:35) (18 - 20)  SpO2: 98% (22 Nov 2021 18:35) (82% - 98%)

## 2021-11-22 NOTE — ED PROVIDER NOTE - PROGRESS NOTE DETAILS
Serina CRUZ for attending Dr. Diaz: 79 y/o F with a PMHx of Dementia, HTN, Diastolic CHF, Afib (on Eliquis),  depression, chronic pedal edema secondary to venous statis and squamous cell skin cancer from [facility] brought in by EMS for elevated WBC & fever at [facility]. At [facility], s/p fall & subarachnoid hemorrhage Serina CRUZ for attending Dr. Diaz: 79 y/o F with a PMHx of Dementia, HTN, Diastolic CHF, Afib (on Eliquis),  depression, chronic pedal edema secondary to venous statis and squamous cell skin cancer from [facility] brought in by EMS for elevated WBC & fever at [facility]. At [facility], s/p fall & subarachnoid hemorrhage earlier this month. Pt states wound on left upper forehead is from that time. Pt without complaints at this time. On exam, awake & alert, crackles at right base, RRR. Will admit to medicine for further care & evaluation for pneumonia. Resident Hilario: preliminary eval concerning for likely ASA PNA, s/p IV Fluids, bCx, Abx. CT head pending - case endorsed to Hospitalist (MD Yuan).

## 2021-11-22 NOTE — ED PROVIDER NOTE - CLINICAL SUMMARY MEDICAL DECISION MAKING FREE TEXT BOX
80 yof, Hx: HTN, CHF, Afib - presents from [facility] with demonstration of fever and worsening mental status. Pt is AO x 0 here, confused - unable to participate in ROS or History. Exam, presentation, and history concerning for fever iso PNA vs. Pyelo - plan: CBC, CMP, EKG, CXR, UA, UCx, Vanc, Zosyn, BCx, Fluids (dehydrated on exam without any signs/symptoms of fluid overload). VSS, non-toxic. TBA.

## 2021-11-22 NOTE — ED PROVIDER NOTE - PHYSICAL EXAMINATION
GEN - NAD; non-toxic; A+Ox0, speaking full sentences  HENT - NC/AT, No visible Ecchymosis, No Abrasions, No Lac/Tears, MMM, no discharge  EYES - EOMI, PERRL, no conjunctival pallor, no scleral icterus  NECK - Neck supple, No LAD, No Swelling  PULM - Rhonchi R base, Hypoxemic to 80 on RA; symmetric breath sounds  CV -  Tachy S1 S2, no murmurs 2+ Pulses B/L UE  GI - (-) Nicole's, (-) Rovsings, (-) McBurneys; NT/ND, soft, no guarding, no rebound, no masses    MSK/EXT- no CVA tenderness; no edema, no gross deformity, warm and well perfused, no calf tenderness/swelling/erythema   SPINE - CTL Spine without midline tenderness, stepoffs, deform  SKIN - no rash or bruising  NEUROLOGIC - alert, moving all 4 ext

## 2021-11-22 NOTE — ED ADULT NURSE NOTE - OBJECTIVE STATEMENT
as per report from [facility] pt sent here to r/o sepsis. pt with elevated WBC and fever. pt at baseline mental status, confused, poor historian,

## 2021-11-22 NOTE — ED PROVIDER NOTE - OBJECTIVE STATEMENT
80 yof, Hx: HTN, CHF, Afib - presents from [facility] with demonstration of fever and worsening mental status. Pt is AO x 0 here, confused - unable to participate in ROS or History.

## 2021-11-22 NOTE — ED ADULT NURSE REASSESSMENT NOTE - NS ED NURSE REASSESS COMMENT FT1
Patient resting comfortably in bed A&Ox1 at baseline mental status. Respirations even and unlabored. Skin warm. Pt on CM. Patient denies further needs at this time. Will continue to monitor.

## 2021-11-22 NOTE — ED ADULT NURSE NOTE - NSFALLRSKASSESSDT_ED_ALL_ED
Progress Note - Claire Soto 1961, 62 y o  female MRN: [id number]    Unit/Bed#: E4 -01 Encounter: [id number]    Primary Care Provider: DEANA Russ   Date and time admitted to hospital: 3/9/2020  8:16 AM        * Numbness and tingling of right arm and leg  Assessment & Plan  Patient presents to ER for right-sided numbness; she also reports a history of recurrent syncopal episodes  She reports RUE numbness ("pins and needles") and difficulty coordinating RUE movement for approximately 2 weeks  She reports RLE and right trunk numbness started 3 days ago when she was rising from a seated to standing position  -CT head negative  -neurology consultation appreciated  -MRI brain revealed multiple T2 and FLAIR hyperintense foci involving supratentorial white matter and right cerebellum  -MRI with contrast reveals findings consistent with demyelinating/MS  -CT chest abdomen pelvis revealed no evidence of primary malignancy or metastatic disease in chest abdomen or pelvis  -neurology follow-up appreciated, patient started on Solu-Medrol 5 mg b i d  As a pulse dose for findings of demyelinating disease  -plan for lumbar puncture, CSF studies  -echocardiogram revealed ejection fraction 60%, no wall motion abnormalities, diastolic dysfunction type 1, no defect or patent foramina ovale  -discontinued aspirin statin  -neurochecks      Recurrent syncope  Assessment & Plan  Patient reports recurrent syncopal episodes occurring for years  Episodes reportedly increasing in frequency to every few weeks  Patient denies any prior evaluation or treatment  Will evaluate in conjunction with patient's report of right-sided numbness  Ambulatory dysfunction  Assessment & Plan  Patient reports difficulty walking over past few months due to left knee injury sustained during syncopal episode  No reported prior treatment  Patient wears a knee brace as needed for support     -PT recommending home PT     Benign essential hypertension  Assessment & Plan  Continue with lisinopril    Morbid obesity with BMI of 45 0-49 9, adult ([facility])  Assessment & Plan  BMI 45 2  Encourage lifestyle modifications  Type 2 diabetes mellitus with hyperglycemia, without long-term current use of insulin ([facility])  Assessment & Plan  Lab Results   Component Value Date    HGBA1C 9 7 (A) 2020     (P) 276 [id number]   -hold metformin  -monitor Accu-Cheks, sliding scale for coverage  -Patient refusing insulin coverage stating it causes "amnesia"        VTE Pharmacologic Prophylaxis:   Pharmacologic: on hold for LP    Patient Centered Rounds: I have performed bedside rounds with nursing staff today  Time Spent for Care: 20 minutes  More than 50% of total time spent on counseling and coordination of care as described above  Current Length of Stay: 1 day(s)    Current Patient Status: Inpatient   Certification Statement: The patient will continue to require additional inpatient hospital stay due to LP    Discharge Plan / Estimated Discharge Date: TBD    Code Status: Level 3 - DNAR and DNI      Subjective:   Patient seen and examined at bedside, complaining of weakness    Objective:     Vitals:   Temp (24hrs), Av 6 °F (35 9 °C), Min:96 °F (35 6 °C), Max:97 2 °F (36 2 °C)    Temp:  [96 °F (35 6 °C)-97 2 °F (36 2 °C)] 96 °F (35 6 °C)  HR:  [] 104  Resp:  [18-20] 18  BP: (152-172)/(70-98) 168/88  SpO2:  [95 %-100 %] 97 %  Body mass index is 43 85 kg/m²  Input and Output Summary (last 24 hours): Intake/Output Summary (Last 24 hours) at 3/12/2020 1726  Last data filed at 3/12/2020 1001  Gross per 24 hour   Intake 550 ml   Output 150 ml   Net 400 ml       Physical Exam:    Constitutional: Patient is oriented to person, place and time, no acute distress  HEENT:  Normocephalic, atraumatic  Cardiovascular: Normal S1S2, RRR, No murmurs/rubs/gallops appreciated    Pulmonary:  Bilateral air entry, No rhonchi/rales/wheezing appreciated  Abdominal: Soft, Bowel sounds present, Non-tender, Non-distended  Extremities:  No cyanosis, clubbing or edema  Neurological:  Motor strength in left upper and lower extremity 5/5, motor strength in right upper and lower extremity 4/5, decreased sensation in left upper and lower extremity    Additional Data:     Labs:    Results from last 7 days   Lab Units 03/12/20  0531   WBC Thousand/uL 7 85   HEMOGLOBIN g/dL 14 3   HEMATOCRIT % 42 8   PLATELETS Thousands/uL 288   NEUTROS PCT % 90*   LYMPHS PCT % 8*   MONOS PCT % 2*   EOS PCT % 0     Results from last 7 days   Lab Units 03/12/20  0531  03/10/20  0452   POTASSIUM mmol/L 4 3   < > 4 0   CHLORIDE mmol/L 102   < > 103   CO2 mmol/L 23   < > 26   BUN mg/dL 13   < > 12   CREATININE mg/dL 0 73   < > 0 73   CALCIUM mg/dL 10 9*   < > 10 6*   ALK PHOS U/L  --   --  91   ALT U/L  --   --  24   AST U/L  --   --  16    < > = values in this interval not displayed  Results from last 7 days   Lab Units 03/10/20  0452   INR  0 96        I Have Reviewed All Lab Data Listed Above          Recent Cultures (last 7 days):           Last 24 Hours Medication List:     Current Facility-Administered Medications:  acetaminophen 650 mg Oral Q6H PRN Makeda Jordan MD   aluminum-magnesium hydroxide-simethicone 30 mL Oral Q4H PRN Makeda Jordan MD   clonazePAM 0 5 mg Oral HS Melo Vidales, DO   diphenhydrAMINE 50 mg Oral HS Melo Vidales, DO   docusate sodium 100 mg Oral BID PRN Makeda Jordan MD   hydrALAZINE 10 mg Intravenous Q6H PRN DEANA Bauer   HYDROmorphone 0 5 mg Intravenous Once PRN Jon Werner PA-C   insulin lispro 1-6 Units Subcutaneous TID AC Makeda Jordan MD   insulin lispro 1-6 Units Subcutaneous HS Makeda Jordan MD   lisinopril 10 mg Oral Daily Makeda Jordan MD   LORazepam 0 5 mg Intravenous Q30 Min PRN Silvia Lara PA-C   methylPREDNISolone sodium succinate 500 mg Intravenous Q12H [facility] Melo Vidales DO   ondansetron 4 mg Intravenous Q4H PRN Omid Reyes MD   ondansetron 4 mg Intravenous Once PRN Ronnie Alberto PA-C        Today, Patient Was Seen By: Jamaica Sanders MD 22-Nov-2021 16:21

## 2021-11-22 NOTE — ED PROVIDER NOTE - CARE PLAN
1 Principal Discharge DX:	Altered mental status  Secondary Diagnosis:	Acute hypoxemic respiratory failure  Secondary Diagnosis:	Severe sepsis

## 2021-11-22 NOTE — ED ADULT TRIAGE NOTE - CHIEF COMPLAINT QUOTE
sent from [facility] to r/o sepsis. pt with elevated WBC and fever. pt at baseline mental status.

## 2021-11-22 NOTE — ED ADULT NURSE NOTE - CHIEF COMPLAINT QUOTE
sent from [facility] to r/o sepsis. pt with elevated WBC and fever. pt at baseline mental status.

## 2021-11-22 NOTE — ED PROVIDER NOTE - ATTENDING CONTRIBUTION TO CARE
sepsis and hypoxemia requiring resucitation and admission     IChrissy MD,  performed the initial face to face bedside interview with this patient regarding history of present illness, review of symptoms and relevant past medical, social and family history.  I completed an independent physical examination.  I was the initial provider who evaluated this patient. I have signed out the follow up of any pending tests (i.e. labs, radiological studies) to the resident.  I have communicated the patient’s plan of care and disposition with the resident.

## 2021-11-22 NOTE — H&P ADULT - ASSESSMENT
80 year old female patient with fever, hypoxia and radiographic findings 80 year old female patient with fever, hypoxia and radiographic findings consistent with right lung infiltrate        [facility]        # Acute Hypoxemic respiratory failure  -likely secondary to aspiration pneumonia  -on supplemental oxygen  -will give zosyn  -monitor vitals/saturation    # Sepsis  -likely secondary to aspiration pna  -pt volume resuscitated in the ER  -on zosyn    #Encephalopathy  -likely infectious in nature and secondary to above  -mentation improving on antibiotics  -neuro checks per routine    # Elevated Troponin  -no EKG changes noted  -will trend troponin  -get morning echo  -if precipitous changes noted then Cardiology evaluation may be warranted     # Leukocytosis  -likely secondary to above pna  -on Zosyn  -trend cbc    #lactic acidosis  -lactate trending down with IVF hydration  -trend lactate    # ADRIANA  -likely pre-renal  -trial of IVF hydration  -trend kidney function    #Afib  -currently being rate controlled     # Hx of CHF  -currently Euvolemic  -last EF 45 percent in 11/2021    #DVT ppx

## 2021-11-23 NOTE — CONSULT NOTE ADULT - SUBJECTIVE AND OBJECTIVE BOX
History of Present Illness:   80 year old female patient who currently resides in a local nursing home presented to the ED after she was found to be febrile and had mental status changes. Patient seen and examined and was found to be alert and oriented but unable to provide pertinent narrative. States she does not recall what happened but believes she may have had a fever. Narrative limited secondary to current mental status    She is known to my colleague Dr. Emelina Richardson at [facility] for multifocal sq cell cancer of skin previously on Libtayo (Cemiplimab-- PD1 antibody) which was held as of June 2021due to pneumonitis.    PAST MEDICAL HISTORY:  Atrial fibrillation, unspecified type     CHF (congestive heart failure)     Hypertension     Squamous cell carcinoma.     PAST SURGICAL HISTORY:  H/O total hysterectomy     History of appendectomy     S/P cholecystectomy.     FAMILY HISTORY:  Mother  Still living? Unknown  Family history of lung cancer, Age at diagnosis: Age Unknown.    acetaminophen     Tablet .. 650 milliGRAM(s) Oral every 6 hours PRN  aluminum hydroxide/magnesium hydroxide/simethicone Suspension 30 milliLiter(s) Oral every 4 hours PRN  aspirin enteric coated 81 milliGRAM(s) Oral daily  bisacodyl 5 milliGRAM(s) Oral every 12 hours PRN  cyanocobalamin 1000 MICROGram(s) Oral daily  digoxin     Tablet 250 MICROGram(s) Oral daily  diltiazem    Tablet 60 milliGRAM(s) Oral every 8 hours  folic acid 1 milliGRAM(s) Oral daily  heparin   Injectable 5000 Unit(s) SubCutaneous every 12 hours  hydrOXYzine  Oral Tab/Cap - Peds 25 milliGRAM(s) Oral daily  magnesium hydroxide Suspension 30 milliLiter(s) Oral at bedtime PRN  melatonin 3 milliGRAM(s) Oral at bedtime PRN  metoprolol succinate  milliGRAM(s) Oral daily  multivitamin/minerals 1 Tablet(s) Oral daily  ondansetron Injectable 4 milliGRAM(s) IV Push every 8 hours PRN  oxybutynin 5 milliGRAM(s) Oral daily  piperacillin/tazobactam IVPB.. 3.375 Gram(s) IV Intermittent every 8 hours  polyethylene glycol 3350 17 Gram(s) Oral daily  spironolactone Oral Tab/Cap - Peds 12.5 milliGRAM(s) Oral daily      Darvocet-N 50 (Unknown)  sulfa drugs (Other)      ROS otherwise negative     T(C): 36.4 (11-23-21 @ 08:27), Max: 39.8 (11-22-21 @ 15:45)  HR: 57 (11-23-21 @ 08:27) (57 - 91)  BP: 114/57 (11-23-21 @ 08:27) (111/59 - 154/72)  RR: 20 (11-23-21 @ 08:27) (18 - 20)  SpO2: 100% (11-23-21 @ 08:27) (82% - 100%)  PHYSICAL EXAM  Gen:  laying in bed, nad  H:  anicteric, eomi  Ext:  no edema                          10.3   20.47 )-----------( 234      ( 23 Nov 2021 09:14 )             32.0                         10.9   20.91 )-----------( 295      ( 22 Nov 2021 15:22 )             32.8   11-23    138  |  107  |  33<H>  ----------------------------<  99  4.2   |  25  |  1.13    Ca    8.0<L>      23 Nov 2021 09:14    TPro  5.8<L>  /  Alb  1.6<L>  /  TBili  0.6  /  DBili  x   /  AST  26  /  ALT  25  /  AlkPhos  105  11-23    < from: CT Head No Cont (11.22.21 @ 17:37) >    IMPRESSION:    There is a tiny lacunar infarctin the left basal ganglia of indeterminate age.  No evidence of hemorrhage.    Chronic changes as above.    < end of copied text >  < from: Xray Chest 1 View- PORTABLE-Urgent (11.22.21 @ 15:57) >  Impression:Small RIGHT pleural effusion with underlying infiltrate    < end of copied text >

## 2021-11-23 NOTE — PROGRESS NOTE ADULT - ASSESSMENT
80 year old female patient with fever, hypoxia and radiographic findings consistent with right lung infiltrate        [facility]        # Acute Hypoxemic respiratory failure  -likely secondary to aspiration pneumonia  -on supplemental oxygen  -will give zosyn  -monitor vitals/saturation    # Sepsis  -likely secondary to aspiration pna  -pt volume resuscitated in the ER  -on zosyn    #Encephalopathy  -likely infectious in nature and secondary to above  -mentation improving on antibiotics  -neuro checks per routine    # Elevated Troponin  -no EKG changes noted  -will trend troponin  -get morning echo  -if precipitous changes noted then Cardiology evaluation may be warranted     # Leukocytosis  -likely secondary to above pna  -on Zosyn  -trend cbc    #lactic acidosis  -lactate trending down with IVF hydration  -trend lactate    # ADRIANA  -likely pre-renal  -trial of IVF hydration  -trend kidney function    #Afib  -currently being rate controlled     # Hx of CHF  -currently Euvolemic  -last EF 45 percent in 11/2021    #DVT ppx 1. Acute Hypoxemic respiratory failure  -likely secondary to  Sepsis Pneumonia, suspected UTI   - S/p IVF, lactate improved. Still leukocytosis, no fevers   -on supplemental oxygen  - F/u BCX, send UA and UCX   -C/w  zosyn  - Supportive care       2. metabolic Encephalopathy on baseline Dementia   -likely 2/2 sepsis   -mentation improving on antibiotics  -CT head neg for acute findings no  SDH  - supportive care     # Elevated Troponin, demand ischemia in settings of Sepsis   H/o CHF, stable   -no EKG changes noted  - trend troponin  - ECHO  - C/w ASA, statin, BB.   - Hold lasix and spironolactone     # Leukocytosis  -likely secondary to above pna  -on Zosyn  -trend cbc    #lactic acidosis  -lactate trending down with IVF hydration  -trend lactate    # ADRIANA  -likely pre-renal  -trial of IVF hydration  -trend kidney function    #Afib  -currently being rate controlled     # Hx of CHF  -currently Euvolemic  -last EF 45 percent in 11/2021    #DVT ppx 1. Acute Hypoxemic respiratory failure  -likely secondary to  Sepsis Pneumonia, suspected UTI   - S/p IVF, lactate improved. Still leukocytosis, no fevers   -on supplemental oxygen  - F/u BCX, send UA and UCX   -C/w  zosyn  - Supportive care       2. metabolic Encephalopathy on baseline Dementia   -likely 2/2 sepsis   -mentation improving on antibiotics  -CT head neg for acute findings no  SDH  - supportive care     # Elevated Troponin, demand ischemia in settings of Sepsis   H/o CHF, stable   -no EKG changes noted  - trend troponin  - ECHO  - C/w ASA, statin, BB.   - Hold lasix and spironolactone       # ADRIANA/ dehydration   - poor oral intake, sepsis   - C/w IVF  - Monitor UO  - B;adder scan neg   - Hold lasix and Spironolactone   - LAbs in am     # Chronic Afib  -C/w tele  - C/w BB, Cardizem and Digoxin  - Not on A/c since admission few weeks ago  for SDH  cardio eval       # H/o Fall and SDH   Repeat CT head neg   Will ask Neuro Sx for eval if ok to restrat A/c     # DVT PPX: heparin SQ

## 2021-11-23 NOTE — PHYSICAL THERAPY INITIAL EVALUATION ADULT - ACTIVE RANGE OF MOTION EXAMINATION, REHAB EVAL
BUE WFL except R shld flex ~90 deg, min B hip/knee flex in bed, seated KE ~0 deg, hip flex ~80 deg, DF ~0 deg

## 2021-11-23 NOTE — PHYSICAL THERAPY INITIAL EVALUATION ADULT - LEVEL OF INDEPENDENCE: GAIT, REHAB EVAL
pt wanting to sit as soon as stand initiated , able to enc pt to come to full stand w/ mod assist and take 1 sidestep toward HOB/unable to perform

## 2021-11-23 NOTE — PROGRESS NOTE ADULT - SUBJECTIVE AND OBJECTIVE BOX
CC: Encephalopathy  Acute hypoxemic Respiratory Failure  Sepsis (23 Nov 2021 09:01)    HPI:  80 year old female patient who currently resides in a local nursing home presented to the ED after she was found to be febrile and had mental status changes. Patient seen and examined and was found to be alert and oriented but unable to provide pertinent narrative. States she does not recall what happened but believes she may have had a fever. Narrative limited secondary to current mental status        In the ED patient found to be febrile, hypoxic and had a WBC of 20. She was given vanco, zosyn and IVF hydration (22 Nov 2021 19:46)    INTERVAL HPI/OVERNIGHT EVENTS:    Vital Signs Last 24 Hrs  T(C): 36.4 (23 Nov 2021 08:27), Max: 39.8 (22 Nov 2021 15:45)  T(F): 97.5 (23 Nov 2021 08:27), Max: 103.7 (22 Nov 2021 15:45)  HR: 57 (23 Nov 2021 08:27) (57 - 91)  BP: 114/57 (23 Nov 2021 08:27) (111/59 - 154/72)  BP(mean): 75 (22 Nov 2021 20:47) (74 - 75)  RR: 20 (23 Nov 2021 08:27) (18 - 20)  SpO2: 100% (23 Nov 2021 08:27) (82% - 100%)  I&O's Detail    REVIEW OF SYSTEMS:    CONSTITUTIONAL: No weakness, fevers or chills  EYES/ENT: No visual changes;  No vertigo or throat pain   NECK: No pain or stiffness  RESPIRATORY: No cough, wheezing, hemoptysis; No shortness of breath  CARDIOVASCULAR: No chest pain or palpitations  GASTROINTESTINAL: No abdominal or epigastric pain. No nausea, vomiting, or hematemesis; No diarrhea or constipation. No melena or hematochezia.  GENITOURINARY: No dysuria, frequency or hematuria  NEUROLOGICAL: No numbness or weakness  SKIN: No itching, burning, rashes, or lesions   All other review of systems is negative unless indicated above.  PHYSICAL EXAM:    General: Well developed; well nourished; in no acute distress  Eyes: PERRLA, EOMI; conjunctiva and sclera clear  Head: Normocephalic; atraumatic  ENMT: No nasal discharge; airway clear  Neck: Supple; non tender; no masses  Respiratory: No wheezes, rales or rhonchi  Cardiovascular: Regular rate and rhythm. S1 and S2 Normal; No murmurs, gallops or rubs  Gastrointestinal: Soft non-tender non-distended; Normal bowel sounds  Genitourinary: No  suprapubic  tenderness  Extremities: Normal range of motion, No clubbing, cyanosis or edema  Vascular: Peripheral pulses palpable 2+ bilaterally  Neurological: Alert and oriented x4  Skin: Warm and dry. No acute rash  Lymph Nodes: No acute cervical adenopathy  Musculoskeletal: Normal muscle tone, without deformities  Psychiatric: Cooperative and appropriate                            10.3   20.47 )-----------( 234      ( 23 Nov 2021 09:14 )             32.0     23 Nov 2021 09:14    138    |  107    |  33     ----------------------------<  99     4.2     |  25     |  1.13     Ca    8.0        23 Nov 2021 09:14    TPro  5.8    /  Alb  1.6    /  TBili  0.6    /  DBili  x      /  AST  26     /  ALT  25     /  AlkPhos  105    23 Nov 2021 09:14    PT/INR - ( 23 Nov 2021 09:14 )   PT: 15.6 sec;   INR: 1.36 ratio         PTT - ( 22 Nov 2021 15:22 )  PTT:35.7 sec  CAPILLARY BLOOD GLUCOSE        LIVER FUNCTIONS - ( 23 Nov 2021 09:14 )  Alb: 1.6 g/dL / Pro: 5.8 gm/dL / ALK PHOS: 105 U/L / ALT: 25 U/L / AST: 26 U/L / GGT: x               MEDICATIONS  (STANDING):  aspirin enteric coated 81 milliGRAM(s) Oral daily  cyanocobalamin 1000 MICROGram(s) Oral daily  digoxin     Tablet 250 MICROGram(s) Oral daily  diltiazem    Tablet 60 milliGRAM(s) Oral every 8 hours  folic acid 1 milliGRAM(s) Oral daily  heparin   Injectable 5000 Unit(s) SubCutaneous every 12 hours  hydrOXYzine  Oral Tab/Cap - Peds 25 milliGRAM(s) Oral daily  metoprolol succinate  milliGRAM(s) Oral daily  multivitamin/minerals 1 Tablet(s) Oral daily  oxybutynin 5 milliGRAM(s) Oral daily  piperacillin/tazobactam IVPB.. 3.375 Gram(s) IV Intermittent every 8 hours  polyethylene glycol 3350 17 Gram(s) Oral daily  spironolactone Oral Tab/Cap - Peds 12.5 milliGRAM(s) Oral daily    MEDICATIONS  (PRN):  acetaminophen     Tablet .. 650 milliGRAM(s) Oral every 6 hours PRN Temp greater or equal to 38C (100.4F), Mild Pain (1 - 3)  aluminum hydroxide/magnesium hydroxide/simethicone Suspension 30 milliLiter(s) Oral every 4 hours PRN Dyspepsia  bisacodyl 5 milliGRAM(s) Oral every 12 hours PRN Constipation  magnesium hydroxide Suspension 30 milliLiter(s) Oral at bedtime PRN Constipation  melatonin 3 milliGRAM(s) Oral at bedtime PRN Insomnia  ondansetron Injectable 4 milliGRAM(s) IV Push every 8 hours PRN Nausea and/or Vomiting      RADIOLOGY & ADDITIONAL TESTS: CC: Encephalopathy  Acute hypoxemic Respiratory Failure  Sepsis     HPI: 80 year old female patient  Dementia, HTN, Diastolic CHF, A. 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 Patient seen and examined and was found to be alert and oriented but unable to provide pertinent narrative. States she does not recall what happened but believes she may have had a fever. Narrative limited secondary to current mental status    In the ED patient found to be febrile, hypoxic and had a WBC of 20. She was given vanco, zosyn and IVF hydration (22 Nov 2021 19:46)    INTERVAL HPI/ OVERNIGHT EVENTS: chart reviewed, pat examined, confused, doesn't know what going on, some SOB, denies other complains. RN later reported no UO. Poor oral intake     Vital Signs Last 24 Hrs  T(C): 36.4 (23 Nov 2021 08:27), Max: 39.8 (22 Nov 2021 15:45)  T(F): 97.5 (23 Nov 2021 08:27), Max: 103.7 (22 Nov 2021 15:45)  HR: 57 (23 Nov 2021 08:27) (57 - 91)  BP: 114/57 (23 Nov 2021 08:27) (111/59 - 154/72)  BP(mean): 75 (22 Nov 2021 20:47) (74 - 75)  RR: 20 (23 Nov 2021 08:27) (18 - 20)  SpO2: 100% (23 Nov 2021 08:27) (82% - 100%)      REVIEW OF SYSTEMS:  Unable to obtain due to dementia       PHYSICAL EXAM:  General: Well developed; malnourished,  in no acute distress  Eyes: EOMI; conjunctiva and sclera clear  Head: Normocephalic; L frontal hematoma improving   ENMT: No nasal discharge; dry oral mucosa   Neck: Supple; no JVD   Respiratory: RLL rales   Cardiovascular: Irregular rate and rhythm. S1 and S2 Normal;   Gastrointestinal: Soft non-tender non-distended; Normal bowel sounds  Genitourinary: No  suprapubic  tenderness  Extremities: + le edema but no pedal edema, chronic venous stasis changes, L anterior shallow ulcer, no erythema, no purulence    Vascular: Peripheral pulses palpable   Neurological: Alert and oriented x1, non focal   Musculoskeletal: Normal muscle tone and strength   Psychiatric: Cooperative    LABS:                         10.3   20.47 )-----------( 234      ( 23 Nov 2021 09:14 )             32.0     23 Nov 2021 09:14    138    |  107    |  33     ----------------------------<  99     4.2     |  25     |  1.13     Ca    8.0        23 Nov 2021 09:14    TPro  5.8    /  Alb  1.6    /  TBili  0.6    /  DBili  x      /  AST  26     /  ALT  25     /  AlkPhos  105    23 Nov 2021 09:14    PT/INR - ( 23 Nov 2021 09:14 )   PT: 15.6 sec;   INR: 1.36 ratio    PTT - ( 22 Nov 2021 15:22 )  PTT:35.7 sec      LIVER FUNCTIONS - ( 23 Nov 2021 09:14 )  Alb: 1.6 g/dL / Pro: 5.8 gm/dL / ALK PHOS: 105 U/L / ALT: 25 U/L / AST: 26 U/L / GGT: x               MEDICATIONS  (STANDING):  aspirin enteric coated 81 milliGRAM(s) Oral daily  cyanocobalamin 1000 MICROGram(s) Oral daily  digoxin     Tablet 250 MICROGram(s) Oral daily  diltiazem    Tablet 60 milliGRAM(s) Oral every 8 hours  folic acid 1 milliGRAM(s) Oral daily  heparin   Injectable 5000 Unit(s) SubCutaneous every 12 hours  hydrOXYzine  Oral Tab/Cap - Peds 25 milliGRAM(s) Oral daily  metoprolol succinate  milliGRAM(s) Oral daily  multivitamin/minerals 1 Tablet(s) Oral daily  oxybutynin 5 milliGRAM(s) Oral daily  piperacillin/tazobactam IVPB.. 3.375 Gram(s) IV Intermittent every 8 hours  polyethylene glycol 3350 17 Gram(s) Oral daily  spironolactone Oral Tab/Cap - Peds 12.5 milliGRAM(s) Oral daily    MEDICATIONS  (PRN):  acetaminophen     Tablet .. 650 milliGRAM(s) Oral every 6 hours PRN Temp greater or equal to 38C (100.4F), Mild Pain (1 - 3)  aluminum hydroxide/magnesium hydroxide/simethicone Suspension 30 milliLiter(s) Oral every 4 hours PRN Dyspepsia  bisacodyl 5 milliGRAM(s) Oral every 12 hours PRN Constipation  magnesium hydroxide Suspension 30 milliLiter(s) Oral at bedtime PRN Constipation  melatonin 3 milliGRAM(s) Oral at bedtime PRN Insomnia  ondansetron Injectable 4 milliGRAM(s) IV Push every 8 hours PRN Nausea and/or Vomiting      RADIOLOGY & ADDITIONAL TESTS:  < from: CT Head No Cont (11.22.21 @ 17:37) >    EXAM:  CT BRAIN                            PROCEDURE DATE:  11/22/2021          INTERPRETATION:  Exam Date: 11/22/2021 5:37 PM    CT head without IV contrast    CLINICAL INFORMATION: AMS    AMS    TECHNIQUE: Contiguous axial sections were obtained through the head.   Coronal and sagittal reformats were obtained.    COMPARISON: CT head 11/2/2021    FINDINGS:  The ventricles and sulci are prominent, compatible with age-related generalized cerebral volume loss.   There is no CT evidence for acute cerebral cortical infarct. There is a tiny lacunar infarct in the left basal ganglia of indeterminate age. There is no evidence of hemorrhage. There is periventricular and subcortical white matter hypoattenuation,  most compatible with chronic microvascular ischemic changes.   No mass effect is found in the brain.  There is no midline shift or herniation pattern.      The visualized portions of the paranasal sinuses and mastoid air cells are clear.    IMPRESSION:    There is a tiny lacunar infarctin the left basal ganglia of indeterminate age.  No evidence of hemorrhage.    Chronic changes as above.    < end of copied text >  < from: CT Head No Cont (11.02.21 @ 12:24) >    EXAM:  CT BRAIN                            PROCEDURE DATE:  11/02/2021          INTERPRETATION:  CT head without IV contrast    CLINICAL INFORMATION:  Fall, RIGHT   Intracranial hemorrhage.    TECHNIQUE: Contiguous axial 5 mm sections were obtainedthrough the head. Sagittal and coronal 2-D reformatted images were also obtained.   This scan was performed using automatic exposure control (radiation dose reduction software) to obtain a diagnostic image quality scan with patient dose as low as reasonably achievable.    FINDINGS:   CT dated 10/29/2021 available for review.    The brain demonstrates decrease in the degree of intracranial hemorrhage within the RIGHT sylvian fissure and RIGHT superior temporal lobe. Moderate periventricular white matter ischemia is noted. Tiny old lacunar infarction in the basal ganglia. No acute cerebral cortical infarct is seen. No mass effect is found in the brain.    The ventricles, sulci and basal cisterns appear unremarkable.    The orbits are unremarkable.  The paranasal sinuses are clear.  The nasal cavity appears intact.  The nasopharynx is symmetric.  The central skull base, petrous temporal bones and calvarium remain intact. 3.4 cm LEFT frontal scalp hematoma is noted.      IMPRESSION:   Interval decrease in the degree of intracranial hemorrhage within the RIGHT sylvian fissure and RIGHT superior temporal lobe. Moderate periventricular white matter ischemia is noted. Tiny old lacunar infarction in the basal ganglia.

## 2021-11-23 NOTE — PHYSICAL THERAPY INITIAL EVALUATION ADULT - FOLLOWS COMMANDS/ANSWERS QUESTIONS, REHAB EVAL
w/ visual cues/demo/able to follow single-step instructions
Patient expressed no known problems or needs

## 2021-11-23 NOTE — PHYSICAL THERAPY INITIAL EVALUATION ADULT - PERTINENT HX OF CURRENT PROBLEM, REHAB EVAL
resides in a local nursing home presented to the ED after she was found to be febrile and had mental status changes. CTH: tiny lacunar infarct in the left basal ganglia of indeterminate age. Elevated Troponin x2 w/o EKG changes. patient with fever, hypoxia and radiographic findings consistent with right lung infiltrate resides in a local nursing home presented to the ED after she was found to be febrile and had mental status changes. CTH: tiny lacunar infarct in the left basal ganglia of indeterminate age. Elevated Troponin x2 w/o EKG changes. patient with fever, hypoxia and radiographic findings consistent with right lung infiltrate. recent hosp adm d/t Fall with SAH + skin lacerations left scalp and LLE

## 2021-11-23 NOTE — PHYSICAL THERAPY INITIAL EVALUATION ADULT - GENERAL OBSERVATIONS, REHAB EVAL
pt rec'd supine in bed on 3N, HM, O2, bump to L temple area w/ dried blood (had last adm), dsg L shin. (+) prima fit-cloudy urine in cannister. Pt confused, unclear re: details recent events

## 2021-11-23 NOTE — CONSULT NOTE ADULT - ASSESSMENT
80 year old female patient who currently resides in a local nursing home presented to the ED after she was found to be febrile and had mental status changes. CXR shwoing RLL infiltrate    She is known to my colleague Dr. Emelina Richardson at [facility] for multifocal sq cell cancer of skin previously on Libtayo (Cemiplimab-- PD1 antibody) which was held as of June 2021due to pneumonitis.    -Agree with IV abx  -doubt Libtayo induced pneumonitis given 5 months since last dose  -Aspiration precautions  -will follow with you  -f/u with Dr. Richardson upon discharge    Jun De La Fuente MD  Hematology/Oncology  Office Phone: [phone]  Office Fax:  [phone]  [address] [place] [address]

## 2021-11-23 NOTE — PHYSICAL THERAPY INITIAL EVALUATION ADULT - DIAGNOSIS, PT EVAL
Acute Hypoxemic respiratory failure-likely secondary to aspiration pneumonia, sepsis, encephalopathy

## 2021-11-23 NOTE — PHYSICAL THERAPY INITIAL EVALUATION ADULT - ADDITIONAL COMMENTS
pt reports amb w/ RW at [facility]-unclear re: assist/distances (prior to recent DC  pt amb 75ft w/ RW w/ assist of 1)

## 2021-11-24 NOTE — PROGRESS NOTE ADULT - SUBJECTIVE AND OBJECTIVE BOX
CC: Encephalopathy  Acute hypoxemic Respiratory Failure  Sepsis     HPI: 80 year old female patient  Dementia, HTN, Diastolic CHF, A. 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 Patient seen and examined and was found to be alert and oriented but unable to provide pertinent narrative. States she does not recall what happened but believes she may have had a fever. Narrative limited secondary to current mental status    In the ED patient found to be febrile, hypoxic and had a WBC of 20. She was given vanco, zosyn and IVF hydration     INTERVAL HPI/ OVERNIGHT EVENTS:  Pt was seen and examined, confused, getting ready to get up with PT. Reports no SOB.      Vital Signs Last 24 Hrs  T(C): 37.2 (2021 09:23), Max: 37.2 (2021 09:23)  T(F): 99 (2021 09:23), Max: 99 (2021 09:23)  HR: 82 (2021 14:58) (80 - 87)  BP: 141/65 (2021 14:58) (136/68 - 156/76)  RR: 20 (2021 14:58) (18 - 20)  SpO2: 95% (2021 14:58) (95% - 97%)      REVIEW OF SYSTEMS:  Unable to obtain due to dementia       PHYSICAL EXAM:  General: Well developed; malnourished,  in no acute distress, on NC  Eyes: EOMI; conjunctiva and sclera clear  Head: Normocephalic; L frontal hematoma improving   ENMT: No nasal discharge; dry oral mucosa   Neck: Supple; no JVD   Respiratory: Diminished BS, R>L basilar  crackles   Cardiovascular: Irregular rate and rhythm. S1 and S2 Normal;   Gastrointestinal: Soft non-tender non-distended; Normal bowel sounds  Genitourinary: No  suprapubic  tenderness  Extremities: + le edema but no pedal edema, chronic venous stasis changes, L anterior shallow ulcer, no erythema, no purulence    Vascular: Peripheral pulses palpable   Neurological: Alert and oriented x1, non focal   Musculoskeletal: Normal muscle tone and strength   Psychiatric: Cooperative    LABS:                           10.5   18.50 )-----------( 293      ( 2021 08:48 )             32.1     11-24    136  |  108  |  28<H>  ----------------------------<  101<H>  4.5   |  21<L>  |  0.92    Ca    8.3<L>      2021 08:48  Mg     2.2     -    TPro  5.8<L>  /  Alb  1.6<L>  /  TBili  0.6  /  DBili  x   /  AST  26  /  ALT  25  /  AlkPhos  105          LIVER FUNCTIONS - ( 2021 09:14 )  Alb: 1.6 g/dL / Pro: 5.8 gm/dL / ALK PHOS: 105 U/L / ALT: 25 U/L / AST: 26 U/L / GGT: x           PT/INR - ( 2021 09:14 )   PT: 15.6 sec;   INR: 1.36 ratio           Urinalysis Basic - ( 2021 17:40 )  Color: Yellow / Appearance: Slightly Turbid / S.020 / pH: x  Gluc: x / Ketone: Negative  / Bili: Negative / Urobili: 1   Blood: x / Protein: unable to obtai mg/dL / Nitrite: Negative   Leuk Esterase: Small / RBC: 0-2 /HPF / WBC 11-25   Sq Epi: x / Non Sq Epi: Few / Bacteria: Moderate                              10.3   20.47 )-----------( 234      ( 2021 09:14 )             32.0     2021 09:14    138    |  107    |  33     ----------------------------<  99     4.2     |  25     |  1.13     Ca    8.0        2021 09:14    TPro  5.8    /  Alb  1.6    /  TBili  0.6    /  DBili  x      /  AST  26     /  ALT  25     /  AlkPhos  105    2021 09:14    PT/INR - ( 2021 09:14 )   PT: 15.6 sec;   INR: 1.36 ratio    PTT - ( 2021 15:22 )  PTT:35.7 sec      LIVER FUNCTIONS - ( 2021 09:14 )  Alb: 1.6 g/dL / Pro: 5.8 gm/dL / ALK PHOS: 105 U/L / ALT: 25 U/L / AST: 26 U/L / GGT: x               MEDICATIONS  (STANDING):  aspirin enteric coated 81 milliGRAM(s) Oral daily  cyanocobalamin 1000 MICROGram(s) Oral daily  digoxin     Tablet 250 MICROGram(s) Oral daily  diltiazem    Tablet 60 milliGRAM(s) Oral every 8 hours  folic acid 1 milliGRAM(s) Oral daily  heparin   Injectable 5000 Unit(s) SubCutaneous every 12 hours  hydrOXYzine  Oral Tab/Cap - Peds 25 milliGRAM(s) Oral daily  metoprolol succinate  milliGRAM(s) Oral daily  multivitamin/minerals 1 Tablet(s) Oral daily  oxybutynin 5 milliGRAM(s) Oral daily  piperacillin/tazobactam IVPB.. 3.375 Gram(s) IV Intermittent every 8 hours  polyethylene glycol 3350 17 Gram(s) Oral daily  sodium chloride 0.9% Bolus 500 milliLiter(s) IV Bolus once  sodium chloride 0.9%. 1000 milliLiter(s) (75 mL/Hr) IV Continuous <Continuous>    MEDICATIONS  (PRN):  acetaminophen     Tablet .. 650 milliGRAM(s) Oral every 6 hours PRN Temp greater or equal to 38C (100.4F), Mild Pain (1 - 3)  aluminum hydroxide/magnesium hydroxide/simethicone Suspension 30 milliLiter(s) Oral every 4 hours PRN Dyspepsia  bisacodyl 5 milliGRAM(s) Oral every 12 hours PRN Constipation  magnesium hydroxide Suspension 30 milliLiter(s) Oral at bedtime PRN Constipation  melatonin 3 milliGRAM(s) Oral at bedtime PRN Insomnia  ondansetron Injectable 4 milliGRAM(s) IV Push every 8 hours PRN Nausea and/or Vomiting      RADIOLOGY & ADDITIONAL TESTS:      EXAM:  CT BRAIN                        PROCEDURE DATE:  2021      FINDINGS:  The ventricles and sulci are prominent, compatible with age-related generalized cerebral volume loss.   There is no CT evidence for acute cerebral cortical infarct. There is a tiny lacunar infarct in the left basal ganglia of indeterminate age. There is no evidence of hemorrhage. There is periventricular and subcortical white matter hypoattenuation,  most compatible with chronic microvascular ischemic changes.   No mass effect is found in the brain.  There is no midline shift or herniation pattern.      The visualized portions of the paranasal sinuses and mastoid air cells are clear.    IMPRESSION:    There is a tiny lacunar infarctin the left basal ganglia of indeterminate age.  No evidence of hemorrhage.    Chronic changes as above.    < end of copied text >  < from: CT Head No Cont (21 @ 12:24) >    EXAM:  CT BRAIN                            PROCEDURE DATE:  2021          INTERPRETATION:  CT head without IV contrast    CLINICAL INFORMATION:  Fall, RIGHT   Intracranial hemorrhage.    TECHNIQUE: Contiguous axial 5 mm sections were obtainedthrough the head. Sagittal and coronal 2-D reformatted images were also obtained.   This scan was performed using automatic exposure control (radiation dose reduction software) to obtain a diagnostic image quality scan with patient dose as low as reasonably achievable.    FINDINGS:   CT dated 10/29/2021 available for review.    The brain demonstrates decrease in the degree of intracranial hemorrhage within the RIGHT sylvian fissure and RIGHT superior temporal lobe. Moderate periventricular white matter ischemia is noted. Tiny old lacunar infarction in the basal ganglia. No acute cerebral cortical infarct is seen. No mass effect is found in the brain.    The ventricles, sulci and basal cisterns appear unremarkable.    The orbits are unremarkable.  The paranasal sinuses are clear.  The nasal cavity appears intact.  The nasopharynx is symmetric.  The central skull base, petrous temporal bones and calvarium remain intact. 3.4 cm LEFT frontal scalp hematoma is noted.      IMPRESSION:   Interval decrease in the degree of intracranial hemorrhage within the RIGHT sylvian fissure and RIGHT superior temporal lobe. Moderate periventricular white matter ischemia is noted. Tiny old lacunar infarction in the basal ganglia.

## 2021-11-24 NOTE — PROGRESS NOTE ADULT - ASSESSMENT
1. Acute Hypoxemic respiratory failure  -likely secondary to  Sepsis Pneumonia, also suspected UTI   - S/p IVF, lactate improved. Still leukocytosis, no fevers   -on supplemental oxygen  - F/u BCX,  UCX   -C/w  zosyn  - Supportive care       2. Metabolic Encephalopathy on baseline Dementia   -likely 2/2 sepsis   -mentation improving on antibiotics  -CT head neg for acute findings no  SDH  - supportive care     # Elevated Troponin, demand ischemia in settings of Sepsis   H/o CHF, stable   -no EKG changes noted  - trend troponin  - ECHO: pending   - C/w ASA, statin, BB.   - Hold lasix and spironolactone for now  - CArdio eval appreciated plan for stress test before d/c       # ADRIANA/ dehydration, improving   - poor oral intake, sepsis   - on  IVF, will d/c   - Monitor UO, improved   - Bladder  scan neg   - Hold lasix and Spironolactone for now   - LAbs in am     # Chronic Afib  -C/w tele  - C/w BB, Cardizem and Digoxin  - Not on A/c since admission few weeks ago  for SDH  - Cleared by NeuroSx to resume on A/c, will d/w family if in agreement       # H/o Fall and SDH   Repeat CT head neg   Neuro Sx reeval appreciated     # DVT PPX: heparin SQ      PT

## 2021-11-24 NOTE — CONSULT NOTE ADULT - ASSESSMENT
80 year old female patient who currently resides in a local nursing home presented to the ED after she was found to be febrile and had mental status changes. Neurosurgery consulted for clearance to resume AC. Patient known to our service from previous admission on 10/28, status post fall was found to have traumatic SAH.  Head CT negative on this admission.    Plan:  - No acute neurosurgical intervention  - Head CT negative on this admission 11/22. Traumatic SAH resolved  - Ok to resume Eliquis from neurosurgical standpoint, no further outpatient imaging needed  - Continue care as per primary team    Discussed with Dr. Urbina

## 2021-11-24 NOTE — CONSULT NOTE ADULT - SUBJECTIVE AND OBJECTIVE BOX
CHIEF COMPLAINT: Patient is a 80y old  Female who presents with a chief complaint of Encephalopathy  Acute hypoxemic Respiratory Failure  Sepsis (23 Nov 2021 13:05)      HPI:  80 year old female patient who currently resides in a local nursing home presented to the ED after she was found to be febrile and had mental status changes. Patient seen and examined and was found to be alert and oriented but unable to provide pertinent narrative. States she does not recall what happened but believes she may have had a fever. Narrative limited secondary to current mental status    In the ED patient found to be febrile, hypoxic and had a WBC of 20. She was given vanco, zosyn and IVF hydration (22 Nov 2021 19:46)    11/24-patient came in with pneumonia and worsening mental confusion-noted to have elevated troponin level (consitent with demand ischemia.   The patient is known to me; has been coming to our office sporatically-she is debilitated and had been dealing with significant squamous cell carcinoma; she was seeing us for atrial fibrillation.   Has Jaylene-vasc of 5 and was on Eliquis.   Last ECHO and nuclear stress test was in 2017-normal LV function with moderate MR, severe TR with with pulmonary hypertension; she does most of her f/u with her oncologist at [facility] in [place] (Dr Ramirez).   She was last seen in march, 2021;         Patient is on AC for afib and has history of CHF-on spironolactone/lasix.  Heart rate controlled with cartia 120mg in am and toprol 100mg pm.  On her last admission in october, she was found on floor near her bead by son.   Confused with large hematoma of forehead.  Has been having intractable back pains-with unsteady gait and falls.  Her PCP is Dr. Brown.  Her Aib poorly controlled when not given her medications; controll improved with addition of medications-digoxin       Now patient coming with with AMS changes on top of mild dementia with fever and right lung infiltrate-c/w pneumonia.        PMHx: PAST MEDICAL & SURGICAL HISTORY:  CHF (congestive heart failure)    Atrial fibrillation, unspecified type    Hypertension    Squamous cell carcinoma    H/O total hysterectomy    History of appendectomy    S/P cholecystectomy          Soc Hx:       Allergies: Allergies    Darvocet-N 50 (Unknown)  sulfa drugs (Other)    Intolerances          REVIEW OF SYSTEMS:    CONSTITUTIONAL:  weakness, fevers   EYES/ENT: No visual changes;  No vertigo or throat pain   NECK: No pain or stiffness  RESPIRATORY: cough, wheezing,shortness of breath  CARDIOVASCULAR:  palpitations  GASTROINTESTINAL: No abdominal or epigastric pain. No nausea, vomiting, or hematemesis; No diarrhea or constipation. No melena or hematochezia.  GENITOURINARY: No dysuria, frequency or hematuria  NEUROLOGICAL: No numbness or weakness  SKIN: No itching, burning, rashes, or lesions   All other review of systems is negative unless indicated above    Vital Signs Last 24 Hrs  T(C): 36.7 (24 Nov 2021 06:26), Max: 36.7 (24 Nov 2021 06:26)  T(F): 98.1 (24 Nov 2021 06:26), Max: 98.1 (24 Nov 2021 06:26)  HR: 87 (24 Nov 2021 06:26) (80 - 87)  BP: 156/76 (24 Nov 2021 06:26) (102/79 - 156/76)  BP(mean): --  RR: 18 (23 Nov 2021 20:32) (18 - 18)  SpO2: 95% (24 Nov 2021 06:26) (95% - 95%)    I&O's Summary    23 Nov 2021 07:01  -  24 Nov 2021 07:00  --------------------------------------------------------  IN: 0 mL / OUT: 600 mL / NET: -600 mL        CAPILLARY BLOOD GLUCOSE          PHYSICAL EXAM:   Constitutional: NAD, awake and alert, well-developed  HEENT: PERR, EOMI, Normal Hearing, MMM  Neck:  JVD  Respiratory: Breath sounds are rales or rhonchi  Cardiovascular: S1 and S2, irregular rate and rhythm,  Murmurs,  Gastrointestinal: Bowel Sounds present, soft, nontender, nondistended, no guarding, no rebound  Extremities:  peripheral edema  Vascular: 2+ peripheral pulses    MEDICATIONS:  MEDICATIONS  (STANDING):  aspirin enteric coated 81 milliGRAM(s) Oral daily  cyanocobalamin 1000 MICROGram(s) Oral daily  digoxin     Tablet 250 MICROGram(s) Oral daily  diltiazem    Tablet 60 milliGRAM(s) Oral every 8 hours  folic acid 1 milliGRAM(s) Oral daily  heparin   Injectable 5000 Unit(s) SubCutaneous every 12 hours  hydrOXYzine  Oral Tab/Cap - Peds 25 milliGRAM(s) Oral daily  metoprolol succinate  milliGRAM(s) Oral daily  multivitamin/minerals 1 Tablet(s) Oral daily  oxybutynin 5 milliGRAM(s) Oral daily  piperacillin/tazobactam IVPB.. 3.375 Gram(s) IV Intermittent every 8 hours  polyethylene glycol 3350 17 Gram(s) Oral daily  sodium chloride 0.9% Bolus 500 milliLiter(s) IV Bolus once  sodium chloride 0.9%. 1000 milliLiter(s) (75 mL/Hr) IV Continuous <Continuous>      LABS: All Labs Reviewed:                        10.3   20.47 )-----------( 234      ( 23 Nov 2021 09:14 )             32.0     11-23    138  |  107  |  33<H>  ----------------------------<  99  4.2   |  25  |  1.13    Ca    8.0<L>      23 Nov 2021 09:14    TPro  5.8<L>  /  Alb  1.6<L>  /  TBili  0.6  /  DBili  x   /  AST  26  /  ALT  25  /  AlkPhos  105  11-23    PT/INR - ( 23 Nov 2021 09:14 )   PT: 15.6 sec;   INR: 1.36 ratio         PTT - ( 22 Nov 2021 15:22 )  PTT:35.7 sec        Blood Culture: Organism --  Gram Stain Blood -- Gram Stain --  Specimen Source .Blood None  Culture-Blood --    Organism --  Gram Stain Blood -- Gram Stain --  Specimen Source .Blood Blood-Peripheral  Culture-Blood --      lipid profile       RADIOLOGY:  EXAM:  XR CHEST PORTABLE URGENT 1V                        PROCEDURE DATE:  11/22/2021    INTERPRETATION:  History: Sepsis.    Chest:  one view.  Comparison: 03/25/2019  AP radiograph of the chest demonstrates small RIGHT pleural effusion with underlying infiltrate.. The cardiac silhouette is slightly enlarged in size. Micropacemaker. Osseous structures are intact.    Impression:Small RIGHT pleural effusion with underlying infiltrate  --- End of Report ---    EKG:  afib at 93bpm, RBBB, non specific st changes    Telemetry:    ECHO:   Technically limited study   The left ventricle cavity is mildly dilated.   Endocardium is not well visualized, however, overall left ventricular   systolic function appears diminished.   Estimated left ventricular ejection fraction is 40-45%.   Wall motion abnormalities can not be ruled out.   The left atrium is moderately dilated.   The right atrium appears moderately dilated.   The right ventricle is mildly dilated.   Fibrocalcific changes noted to the Aortic valve leaflets with preserved   leaflet excursion.   Trace aortic regurgitation is present.   Fibrocalcific changes noted to the mitral valve leaflets with preserved   leaflet excursion.   Mild mitral annular calcification is present.   Moderate mitral regurgitation is present.   The tricuspid valve leaflets appear mildly thickened and/or calcified, but   open well.   Severe (4+) tricuspid valve regurgitation is present.   Mild pulmonary hypertension.   No evidence of pericardial effusion.

## 2021-11-24 NOTE — CONSULT NOTE ADULT - SUBJECTIVE AND OBJECTIVE BOX
HPI:  80 year old female patient who currently resides in a local nursing home presented to the ED after she was found to be febrile and had mental status changes. Patient seen and examined and was found to be alert and oriented but unable to provide pertinent narrative. States she does not recall what happened but believes she may have had a fever. Narrative limited secondary to current mental status  In the ED patient found to be febrile, hypoxic and had a WBC of 20. She was given vanco, zosyn and IVF hydration (22 Nov 2021 19:46)    Neurosurgery consulted for clearance to resume AC. Patient known to our service from previous admission on 10/28, status post fall was found to have traumatic SAH. Was observed on our service, hemorrhage stable, transferred to medicine for rapid Afib RVR at that time treated, then discharged to nursing home. Patient seen and examined on [facility], awake/alert confused. Denies headache, n/v, numb/tingling.     PAST MEDICAL & SURGICAL HISTORY:  CHF (congestive heart failure)    Atrial fibrillation, unspecified type    Hypertension    Squamous cell carcinoma    H/O total hysterectomy    History of appendectomy    S/P cholecystectomy        FAMILY HISTORY:  Family history of lung cancer (Mother)       Social Hx:  Nonsmoker, no drug or alcohol use    MEDICATIONS  (STANDING):  aspirin enteric coated 81 milliGRAM(s) Oral daily  cyanocobalamin 1000 MICROGram(s) Oral daily  digoxin     Tablet 250 MICROGram(s) Oral daily  diltiazem    Tablet 60 milliGRAM(s) Oral every 8 hours  folic acid 1 milliGRAM(s) Oral daily  heparin   Injectable 5000 Unit(s) SubCutaneous every 12 hours  hydrOXYzine  Oral Tab/Cap - Peds 25 milliGRAM(s) Oral daily  metoprolol succinate  milliGRAM(s) Oral daily  multivitamin/minerals 1 Tablet(s) Oral daily  oxybutynin 5 milliGRAM(s) Oral daily  piperacillin/tazobactam IVPB.. 3.375 Gram(s) IV Intermittent every 8 hours  polyethylene glycol 3350 17 Gram(s) Oral daily  sodium chloride 0.9% Bolus 500 milliLiter(s) IV Bolus once  sodium chloride 0.9%. 1000 milliLiter(s) (75 mL/Hr) IV Continuous <Continuous>       Allergies    Darvocet-N 50 (Unknown)  sulfa drugs (Other)    Intolerances        ROS: Pertinent positives in HPI, all other ROS were reviewed and are negative.      Vital Signs Last 24 Hrs  T(C): 37.2 (24 Nov 2021 09:23), Max: 37.2 (24 Nov 2021 09:23)  T(F): 99 (24 Nov 2021 09:23), Max: 99 (24 Nov 2021 09:23)  HR: 85 (24 Nov 2021 09:23) (80 - 87)  BP: 136/68 (24 Nov 2021 09:23) (136/68 - 156/76)  BP(mean): --  RR: 20 (24 Nov 2021 09:23) (18 - 20)  SpO2: 97% (24 Nov 2021 09:23) (95% - 97%)      PHYSICAL EXAM:  Constitutional: awake and alert  HEENT: PERRLA, EOMI, old L frontal scalp hematoma noted, no active bleeding  Neck: Supple.  Respiratory: Breath sounds are clear bilaterally  Cardiovascular: S1 and S2, irregular rhythm  Gastrointestinal: soft, nontender  Extremities: b/l LE edema, dressing intact  Musculoskeletal: no joint swelling/tenderness, no abnormal movements  Skin: Mult areas ecchymosis x 4 extremities    Neurological exam:  HF: Awake&Ox1 to self only. No dysarthria or aphasia  CN: ABDI, EOMI, VFF, facial sensation normal, no NLFD, tongue midline, Palate moves equally, SCM equal bilaterally  Motor: No pronator drift, Strength 5/5 except b/l LE lifts antigravity    Sens: Intact to light touch  Reflexes: Symmetric and normal, downgoing toes b/l  Coord:  No FNFA, dysmetria, ISHA intact   Gait/Balance: Cannot test      Labs:                        10.5   18.50 )-----------( 293      ( 24 Nov 2021 08:48 )             32.1     11-24    136  |  108  |  28<H>  ----------------------------<  101<H>  4.5   |  21<L>  |  0.92    Ca    8.3<L>      24 Nov 2021 08:48  Mg     2.2     11-24    TPro  5.8<L>  /  Alb  1.6<L>  /  TBili  0.6  /  DBili  x   /  AST  26  /  ALT  25  /  AlkPhos  105  11-23        PT/INR - ( 23 Nov 2021 09:14 )   PT: 15.6 sec;   INR: 1.36 ratio         PTT - ( 22 Nov 2021 15:22 )  PTT:35.7 sec    Radiology report:  CT Head No Cont (11.22.21 @ 17:37)  IMPRESSION:  There is a tiny lacunar infarctin the left basal ganglia of indeterminate age.  No evidence of hemorrhage.    Chronic changes as above.

## 2021-11-25 NOTE — PROGRESS NOTE ADULT - ASSESSMENT
1. Acute Hypoxemic respiratory failure  -likely secondary to  Sepsis Pneumonia, also suspected UTI   - S/p IVF, lactate improved. Still leukocytosis, no fevers   -on supplemental oxygen  - F/u BCX: NGTD,  UCX : NG but was on IV Abxs   -C/w  zosyn  - Supportive care       2. Metabolic Encephalopathy on baseline Dementia   - likely 2/2 sepsis   -mentation improving on antibiotics  -CT head neg for acute findings no  SDH  - supportive care     # Elevated Troponin, demand ischemia in settings of Sepsis   - H/o CHF, stable   -no EKG changes noted  - trend troponin  - ECHO: last  11/1/21: severe TR, Pulm HTN, EF 45-50%  - C/w ASA, statin, BB.   - Hold lasix and spironolactone for now  - CArdio f/u appreciated plan for stress test before d/c       # ADRIANA/ dehydration, improving   - poor oral intake, sepsis   - s/P   IVF,  fair oral intake   - Monitor UO, improved   - Bladder  scan neg   - Hold lasix and Spironolactone for now   - LAbs in am       # Chronic Afib  -C/w tele  - C/w BB, Cardizem and Digoxin  - Not on A/c since admission few weeks ago  for SDH  - Cleared by NeuroSx to resume on A/c, start  Eliquis 2.5 mg PO BID     # H/o Fall and SDH   Repeat CT head neg   Neuro Sx reeval appreciated     # DVT PPX: heparin SQ      PT     Called to Pts  Zach Gaines with updated. Left message

## 2021-11-25 NOTE — PROGRESS NOTE ADULT - SUBJECTIVE AND OBJECTIVE BOX
CHIEF COMPLAINT: Patient is a 80y old  Female who presents with a chief complaint of Encephalopathy  Acute hypoxemic Respiratory Failure  Sepsis (24 Nov 2021 12:54)      HPI:  80 year old female patient who currently resides in a local nursing home presented to the ED after she was found to be febrile and had mental status changes. Patient seen and examined and was found to be alert and oriented but unable to provide pertinent narrative. States she does not recall what happened but believes she may have had a fever. Narrative limited secondary to current mental status    In the ED patient found to be febrile, hypoxic and had a WBC of 20. She was given vanco, zosyn and IVF hydration (22 Nov 2021 19:46)    11/24-patient came in with pneumonia and worsening mental confusion-noted to have elevated troponin level (consitent with demand ischemia.   The patient is known to me; has been coming to our office sporatically-she is debilitated and had been dealing with significant squamous cell carcinoma; she was seeing us for atrial fibrillation.   Has Jaylene-vasc of 5 and was on Eliquis.   Last ECHO and nuclear stress test was in 2017-normal LV function with moderate MR, severe TR with with pulmonary hypertension; she does most of her f/u with her oncologist at [facility] in [place] (Dr Ramirez).   She was last seen in march, 2021;         Patient is on AC for afib and has history of CHF-on spironolactone/lasix.  Heart rate controlled with cartia 120mg in am and toprol 100mg pm.  On her last admission in october, she was found on floor near her bead by son.   Confused with large hematoma of forehead.  Has been having intractable back pains-with unsteady gait and falls.  Her PCP is Dr. Brown.  Her Aib poorly controlled when not given her medications; controll improved with addition of medications-digoxin       Now patient coming with with AMS changes on top of mild dementia with fever and right lung infiltrate-c/w pneumonia.      11/25-neurosurgery consulted, Ok to resume eliquis; V-paced, rate controlled afib with V pacing, no significant tachycardia.  No further chest pains/SOB     PMHx: PAST MEDICAL & SURGICAL HISTORY:  CHF (congestive heart failure)    Atrial fibrillation, unspecified type    Hypertension    Squamous cell carcinoma    H/O total hysterectomy    History of appendectomy    S/P cholecystectomy        Allergies: Allergies    Darvocet-N 50 (Unknown)  sulfa drugs (Other)    Intolerances          REVIEW OF SYSTEMS:    CONSTITUTIONAL: No weakness, fevers or chills  EYES/ENT: No visual changes;  No vertigo or throat pain   NECK: No pain or stiffness  RESPIRATORY: No cough, wheezing, hemoptysis; No shortness of breath  CARDIOVASCULAR: No chest pain or palpitations  GASTROINTESTINAL: No abdominal or epigastric pain. No nausea, vomiting, or hematemesis; No diarrhea or constipation. No melena or hematochezia.  GENITOURINARY: No dysuria, frequency or hematuria  NEUROLOGICAL: No numbness or weakness  SKIN: No itching, burning, rashes, or lesions   All other review of systems is negative unless indicated above    Vital Signs Last 24 Hrs  T(C): 37.2 (24 Nov 2021 09:23), Max: 37.2 (24 Nov 2021 09:23)  T(F): 99 (24 Nov 2021 09:23), Max: 99 (24 Nov 2021 09:23)  HR: 84 (25 Nov 2021 07:29) (78 - 90)  BP: 142/63 (25 Nov 2021 06:32) (136/68 - 149/76)  BP(mean): --  RR: 18 (24 Nov 2021 20:38) (18 - 20)  SpO2: 95% (24 Nov 2021 20:38) (95% - 97%)    I&O's Summary    24 Nov 2021 07:01  -  25 Nov 2021 07:00  --------------------------------------------------------  IN: 260 mL / OUT: 0 mL / NET: 260 mL            PHYSICAL EXAM:   Constitutional: NAD, awake and alert, well-developed  HEENT: PERR, EOMI, Normal Hearing, MMM  Neck: Soft and supple, No LAD, No JVD  Respiratory: Breath sounds are clear bilaterally, No wheezing, rales or rhonchi  Cardiovascular: S1 and S2, regular rate and rhythm, no Murmurs, gallops or rubs  Gastrointestinal: Bowel Sounds present, soft, nontender, nondistended, no guarding, no rebound  Extremities: No peripheral edema  Vascular: 2+ peripheral pulses  Neurological: A/O x 3, no focal deficits  Musculoskeletal: 5/5 strength b/l upper and lower extremities  Skin: No rashes    MEDICATIONS:  MEDICATIONS  (STANDING):  aspirin enteric coated 81 milliGRAM(s) Oral daily  cyanocobalamin 1000 MICROGram(s) Oral daily  digoxin     Tablet 250 MICROGram(s) Oral daily  diltiazem    Tablet 60 milliGRAM(s) Oral every 8 hours  folic acid 1 milliGRAM(s) Oral daily  heparin   Injectable 5000 Unit(s) SubCutaneous every 12 hours  hydrOXYzine  Oral Tab/Cap - Peds 25 milliGRAM(s) Oral daily  metoprolol succinate  milliGRAM(s) Oral daily  multivitamin/minerals 1 Tablet(s) Oral daily  oxybutynin 5 milliGRAM(s) Oral daily  piperacillin/tazobactam IVPB.. 3.375 Gram(s) IV Intermittent every 8 hours  polyethylene glycol 3350 17 Gram(s) Oral daily  sodium chloride 0.9% Bolus 500 milliLiter(s) IV Bolus once      LABS: All Labs Reviewed:                        10.5   18.50 )-----------( 293      ( 24 Nov 2021 08:48 )             32.1     11-24    136  |  108  |  28<H>  ----------------------------<  101<H>  4.5   |  21<L>  |  0.92    Ca    8.3<L>      24 Nov 2021 08:48  Mg     2.2     11-24    TPro  5.8<L>  /  Alb  1.6<L>  /  TBili  0.6  /  DBili  x   /  AST  26  /  ALT  25  /  AlkPhos  105  11-23    PT/INR - ( 23 Nov 2021 09:14 )   PT: 15.6 sec;   INR: 1.36 ratio               Blood Culture: Organism --  Gram Stain Blood -- Gram Stain --  Specimen Source Clean Catch Clean Catch (Midstream)  Culture-Blood --    Organism --  Gram Stain Blood -- Gram Stain --  Specimen Source .Blood None  Culture-Blood --    Organism --  Gram Stain Blood -- Gram Stain --  Specimen Source .Blood Blood-Peripheral  Culture-Blood --          BNP     RADIOLOGY:    EKG:      Telemetry:    ECHO:

## 2021-11-25 NOTE — PROGRESS NOTE ADULT - ASSESSMENT
patient stable from cardio view; set up nuclear stress testing for friday or monday (not absolutely needed on admission but as jaclyn dcoesn't travel and has cognitive issues; would be preferable ot do here if possible.    1-continue current medications; restarted AC for AFib

## 2021-11-25 NOTE — PROGRESS NOTE ADULT - SUBJECTIVE AND OBJECTIVE BOX
CC: Encephalopathy  Acute hypoxemic Respiratory Failure  Sepsis     HPI: 80 year old female patient  Dementia, HTN, Diastolic CHF, A. 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 Patient seen and examined and was found to be alert and oriented but unable to provide pertinent narrative. States she does not recall what happened but believes she may have had a fever. Narrative limited secondary to current mental status    In the ED patient found to be febrile, hypoxic and had a WBC of 20. She was given vanco, zosyn and IVF hydration     INTERVAL HPI/ OVERNIGHT EVENTS:  Pt was seen and examined,  still very confused, likely baseline, reports no complains,  no SOB, some cough    Vital Signs Last 24 Hrs  T(C): 37.2 (2021 09:23), Max: 37.2 (2021 09:23)  T(F): 99 (2021 09:23), Max: 99 (2021 09:23)  HR: 82 (2021 14:58) (80 - 87)  BP: 141/65 (2021 14:58) (136/68 - 156/76)  RR: 20 (2021 14:58) (18 - 20)  SpO2: 95% (2021 14:58) (95% - 97%)      REVIEW OF SYSTEMS:  Unable to obtain due to dementia       PHYSICAL EXAM:  General: Well developed; malnourished,  in no acute distress, on RA   Eyes: EOMI; conjunctiva and sclera clear  Head: Normocephalic; L frontal hematoma improving   ENMT: No nasal discharge; dry oral mucosa   Neck: Supple; no JVD   Respiratory: Diminished BS, R>L basilar  crackles   Cardiovascular: Irregular rate and rhythm. S1 and S2 Normal;   Gastrointestinal: Soft non-tender non-distended; Normal bowel sounds  Genitourinary: No  suprapubic  tenderness  Extremities: + le  with Acre wrap  Neurological: Alert and oriented x1, non focal   Musculoskeletal: Normal muscle tone and strength   Psychiatric: Cooperative    LABS:                         10.2   17.86 )-----------( 361      ( 2021 11:44 )             30.5     11-25    136  |  104  |  26<H>  ----------------------------<  155<H>  4.0   |  24  |  1.08    Ca    8.2<L>      2021 11:44  Mg     2.2         Urinalysis Basic - ( 2021 17:40 )    Color: Yellow / Appearance: Slightly Turbid / S.020 / pH: x  Gluc: x / Ketone: Negative  / Bili: Negative / Urobili: 1   Blood: x / Protein: unable to obtai mg/dL / Nitrite: Negative   Leuk Esterase: Small / RBC: 0-2 /HPF / WBC    Sq Epi: x / Non Sq Epi: Few / Bacteria: Moderate  Culture - Urine (21 @ 17:40)   Specimen Source: Clean Catch Clean Catch (Midstream)   Culture Results:   <10,000 CFU/mL Normal Urogenital Ivy                                 10.5   18.50 )-----------( 293      ( 2021 08:48 )             32.1         136  |  108  |  28<H>  ----------------------------<  101<H>  4.5   |  21<L>  |  0.92    Ca    8.3<L>      2021 08:48  Mg     2.2         TPro  5.8<L>  /  Alb  1.6<L>  /  TBili  0.6  /  DBili  x   /  AST  26  /  ALT  25  /  AlkPhos  105          LIVER FUNCTIONS - ( 2021 09:14 )  Alb: 1.6 g/dL / Pro: 5.8 gm/dL / ALK PHOS: 105 U/L / ALT: 25 U/L / AST: 26 U/L / GGT: x           PT/INR - ( 2021 09:14 )   PT: 15.6 sec;   INR: 1.36 ratio                                 10.3   20.47 )-----------( 234      ( 2021 09:14 )             32.0     2021 09:14    138    |  107    |  33     ----------------------------<  99     4.2     |  25     |  1.13     Ca    8.0        2021 09:14    TPro  5.8    /  Alb  1.6    /  TBili  0.6    /  DBili  x      /  AST  26     /  ALT  25     /  AlkPhos  105    2021 09:14    PT/INR - ( 2021 09:14 )   PT: 15.6 sec;   INR: 1.36 ratio    PTT - ( 2021 15:22 )  PTT:35.7 sec      LIVER FUNCTIONS - ( 2021 09:14 )  Alb: 1.6 g/dL / Pro: 5.8 gm/dL / ALK PHOS: 105 U/L / ALT: 25 U/L / AST: 26 U/L / GGT: x               MEDICATIONS  (STANDING):  aspirin enteric coated 81 milliGRAM(s) Oral daily  cyanocobalamin 1000 MICROGram(s) Oral daily  digoxin     Tablet 250 MICROGram(s) Oral daily  diltiazem    Tablet 60 milliGRAM(s) Oral every 8 hours  folic acid 1 milliGRAM(s) Oral daily  heparin   Injectable 5000 Unit(s) SubCutaneous every 12 hours  hydrOXYzine  Oral Tab/Cap - Peds 25 milliGRAM(s) Oral daily  metoprolol succinate  milliGRAM(s) Oral daily  multivitamin/minerals 1 Tablet(s) Oral daily  oxybutynin 5 milliGRAM(s) Oral daily  piperacillin/tazobactam IVPB.. 3.375 Gram(s) IV Intermittent every 8 hours  polyethylene glycol 3350 17 Gram(s) Oral daily  sodium chloride 0.9% Bolus 500 milliLiter(s) IV Bolus once  sodium chloride 0.9%. 1000 milliLiter(s) (75 mL/Hr) IV Continuous <Continuous>    MEDICATIONS  (PRN):  acetaminophen     Tablet .. 650 milliGRAM(s) Oral every 6 hours PRN Temp greater or equal to 38C (100.4F), Mild Pain (1 - 3)  aluminum hydroxide/magnesium hydroxide/simethicone Suspension 30 milliLiter(s) Oral every 4 hours PRN Dyspepsia  bisacodyl 5 milliGRAM(s) Oral every 12 hours PRN Constipation  magnesium hydroxide Suspension 30 milliLiter(s) Oral at bedtime PRN Constipation  melatonin 3 milliGRAM(s) Oral at bedtime PRN Insomnia  ondansetron Injectable 4 milliGRAM(s) IV Push every 8 hours PRN Nausea and/or Vomiting      RADIOLOGY & ADDITIONAL TESTS:      EXAM:  CT BRAIN                        PROCEDURE DATE:  2021      FINDINGS:  The ventricles and sulci are prominent, compatible with age-related generalized cerebral volume loss.   There is no CT evidence for acute cerebral cortical infarct. There is a tiny lacunar infarct in the left basal ganglia of indeterminate age. There is no evidence of hemorrhage. There is periventricular and subcortical white matter hypoattenuation,  most compatible with chronic microvascular ischemic changes.   No mass effect is found in the brain.  There is no midline shift or herniation pattern.      The visualized portions of the paranasal sinuses and mastoid air cells are clear.    IMPRESSION:    There is a tiny lacunar infarctin the left basal ganglia of indeterminate age.  No evidence of hemorrhage.    Chronic changes as above.    < end of copied text >  < from: CT Head No Cont (21 @ 12:24) >    EXAM:  CT BRAIN                            PROCEDURE DATE:  2021          INTERPRETATION:  CT head without IV contrast    CLINICAL INFORMATION:  Fall, RIGHT   Intracranial hemorrhage.    TECHNIQUE: Contiguous axial 5 mm sections were obtainedthrough the head. Sagittal and coronal 2-D reformatted images were also obtained.   This scan was performed using automatic exposure control (radiation dose reduction software) to obtain a diagnostic image quality scan with patient dose as low as reasonably achievable.    FINDINGS:   CT dated 10/29/2021 available for review.    The brain demonstrates decrease in the degree of intracranial hemorrhage within the RIGHT sylvian fissure and RIGHT superior temporal lobe. Moderate periventricular white matter ischemia is noted. Tiny old lacunar infarction in the basal ganglia. No acute cerebral cortical infarct is seen. No mass effect is found in the brain.    The ventricles, sulci and basal cisterns appear unremarkable.    The orbits are unremarkable.  The paranasal sinuses are clear.  The nasal cavity appears intact.  The nasopharynx is symmetric.  The central skull base, petrous temporal bones and calvarium remain intact. 3.4 cm LEFT frontal scalp hematoma is noted.      IMPRESSION:   Interval decrease in the degree of intracranial hemorrhage within the RIGHT sylvian fissure and RIGHT superior temporal lobe. Moderate periventricular white matter ischemia is noted. Tiny old lacunar infarction in the basal ganglia.

## 2021-11-26 NOTE — PROGRESS NOTE ADULT - ASSESSMENT
1. Acute Hypoxemic respiratory failure  -likely secondary to  Sepsis Pneumonia, also suspected UTI   - S/p IVF, lactate improved. Still leukocytosis, no fevers   -on supplemental oxygen  - F/u BCX: NGTD,  UCX : NG but was on IV Abxs   -C/w  zosyn  - Supportive care       2. Metabolic Encephalopathy on baseline Dementia   - likely 2/2 sepsis   -mentation improving on antibiotics  -CT head neg for acute findings no  SDH  - supportive care     # Elevated Troponin, demand ischemia in settings of Sepsis   - H/o CHF, stable   -no EKG changes noted  - trend troponin  - ECHO: last  11/1/21: severe TR, Pulm HTN, EF 45-50%  - C/w ASA, statin, BB.   - Hold lasix and spironolactone for now  - CArdio f/u appreciated plan for stress test before d/c       # ADRIANA/ dehydration, improving   - poor oral intake, sepsis   - s/P   IVF,  fair oral intake   - Monitor UO, improved   - Bladder  scan neg   - Hold lasix and Spironolactone for now   - LAbs in am       # Chronic Afib  -C/w tele  - C/w BB, Cardizem and Digoxin  - Not on A/c since admission few weeks ago  for SDH  - Cleared by NeuroSx to resume on A/c, start  Eliquis 2.5 mg PO BID     # H/o Fall and SDH   Repeat CT head neg   Neuro Sx reeval appreciated     # DVT PPX: heparin SQ      PT     Called to Pts  Zach Gaines with updated. Left message  1. Acute Hypoxemic respiratory failure  -likely secondary to  Sepsis Pneumonia, also suspected UTI   - S/p IVF, lactate improved. Still leukocytosis, improving,   -  no fevers   -on supplemental oxygen  - F/u BCX: NGTD,   UCX ;  Enterococcus and Ecoli . Repeat UCX : NG but was on IV Abxs   -C/w  zosyn  - Supportive care       2. Metabolic Encephalopathy on baseline Dementia   - likely 2/2 sepsis   -mentation improving  on antibiotics  -CT head neg for acute findings no  SDH  - supportive care     # Elevated Troponin, demand ischemia in settings of Sepsis   - H/o CHF, stable   -no EKG changes noted  - trend troponin  - ECHO: last  11/1/21: severe TR, Pulm HTN, EF 45-50%  - C/w ASA, statin, BB.   - Hold lasix and spironolactone for now  - CArdio f/u appreciated plan for stress test before d/c       # ADRIANA/ dehydration, improving   - poor oral intake, sepsis   - s/P   IVF,  fair oral intake   - Monitor UO, improved   - Bladder  scan neg   - Hold lasix and Spironolactone for now   - LAbs in am       # Chronic Afib  -C/w tele  - C/w BB, Cardizem and Digoxin  - Not on A/c since admission few weeks ago  for SDH  - Cleared by NeuroSx to resume on A/c, start  Eliquis 2.5 mg PO BID     # H/o Fall and SDH   Repeat CT head neg   Neuro Sx reeval appreciated     # DVT PPX: heparin SQ      PT     Spoke to Pts son, updated on findings and POC. A/c discussed, agrees with restarting  Eliquis

## 2021-11-26 NOTE — PROGRESS NOTE ADULT - SUBJECTIVE AND OBJECTIVE BOX
CC: Encephalopathy  Acute hypoxemic Respiratory Failure  Sepsis     HPI: 80 year old female patient  Dementia, HTN, Diastolic CHF, A. 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 Patient seen and examined and was found to be alert and oriented but unable to provide pertinent narrative. States she does not recall what happened but believes she may have had a fever. Narrative limited secondary to current mental status    In the ED patient found to be febrile, hypoxic and had a WBC of 20. She was given vanco, zosyn and IVF hydration     INTERVAL HPI/ OVERNIGHT EVENTS:  Pt was seen and examined,  still very confused, likely baseline, reports no complains,  no SOB, some cough    Vital Signs Last 24 Hrs  T(C): 36.4 (2021 08:29), Max: 36.7 (2021 15:28)  T(F): 97.5 (2021 08:29), Max: 98.1 (2021 15:28)  HR: 60 (2021 08:29) (60 - 73)  BP: 140/60 (2021 08:29) (119/74 - 144/70)  BP(mean): 85 (2021 14:02) (85 - 85)  RR: 18 (2021 08:29) (17 - 19)  SpO2: 98% (2021 08:29) (95% - 99%)      REVIEW OF SYSTEMS:  Unable to obtain due to dementia       PHYSICAL EXAM:  General: Well developed; malnourished,  in no acute distress, on RA   Eyes: EOMI; conjunctiva and sclera clear  Head: Normocephalic; L frontal hematoma improving   ENMT: No nasal discharge; dry oral mucosa   Neck: Supple; no JVD   Respiratory: Diminished BS, R>L basilar  crackles   Cardiovascular: Irregular rate and rhythm. S1 and S2 Normal;   Gastrointestinal: Soft non-tender non-distended; Normal bowel sounds  Genitourinary: No  suprapubic  tenderness  Extremities: + le  with Acre wrap  Neurological: Alert and oriented x1, non focal   Musculoskeletal: Normal muscle tone and strength   Psychiatric: Cooperative    LABS:                           9.9    15.52 )-----------( 388      ( 2021 07:16 )             30.6         138  |  106  |  24<H>  ----------------------------<  97  4.2   |  28  |  1.02    Ca    8.2<L>      2021 07:16                          10.2   17.86 )-----------( 361      ( 2021 11:44 )             30.5         136  |  104  |  26<H>  ----------------------------<  155<H>  4.0   |  24  |  1.08    Ca    8.2<L>      2021 11:44  Mg     2.2         Urinalysis Basic - ( 2021 17:40 )    Color: Yellow / Appearance: Slightly Turbid / S.020 / pH: x  Gluc: x / Ketone: Negative  / Bili: Negative / Urobili: 1   Blood: x / Protein: unable to obtai mg/dL / Nitrite: Negative   Leuk Esterase: Small / RBC: 0-2 /HPF / WBC    Sq Epi: x / Non Sq Epi: Few / Bacteria: Moderate  Culture - Urine (21 @ 17:40)   Specimen Source: Clean Catch Clean Catch (Midstream)   Culture Results:   <10,000 CFU/mL Normal Urogenital Ivy                                 10.5   18.50 )-----------( 293      ( 2021 08:48 )             32.1         136  |  108  |  28<H>  ----------------------------<  101<H>  4.5   |  21<L>  |  0.92    Ca    8.3<L>      2021 08:48  Mg     2.2         TPro  5.8<L>  /  Alb  1.6<L>  /  TBili  0.6  /  DBili  x   /  AST  26  /  ALT  25  /  AlkPhos  105          LIVER FUNCTIONS - ( 2021 09:14 )  Alb: 1.6 g/dL / Pro: 5.8 gm/dL / ALK PHOS: 105 U/L / ALT: 25 U/L / AST: 26 U/L / GGT: x           PT/INR - ( 2021 09:14 )   PT: 15.6 sec;   INR: 1.36 ratio                                 10.3   20.47 )-----------( 234      ( 2021 09:14 )             32.0     2021 09:14    138    |  107    |  33     ----------------------------<  99     4.2     |  25     |  1.13     Ca    8.0        2021 09:14    TPro  5.8    /  Alb  1.6    /  TBili  0.6    /  DBili  x      /  AST  26     /  ALT  25     /  AlkPhos  105    2021 09:14    PT/INR - ( 2021 09:14 )   PT: 15.6 sec;   INR: 1.36 ratio    PTT - ( 2021 15:22 )  PTT:35.7 sec      LIVER FUNCTIONS - ( 2021 09:14 )  Alb: 1.6 g/dL / Pro: 5.8 gm/dL / ALK PHOS: 105 U/L / ALT: 25 U/L / AST: 26 U/L / GGT: x               MEDICATIONS  (STANDING):  aspirin enteric coated 81 milliGRAM(s) Oral daily  cyanocobalamin 1000 MICROGram(s) Oral daily  digoxin     Tablet 250 MICROGram(s) Oral daily  diltiazem    Tablet 60 milliGRAM(s) Oral every 8 hours  folic acid 1 milliGRAM(s) Oral daily  heparin   Injectable 5000 Unit(s) SubCutaneous every 12 hours  hydrOXYzine  Oral Tab/Cap - Peds 25 milliGRAM(s) Oral daily  metoprolol succinate  milliGRAM(s) Oral daily  multivitamin/minerals 1 Tablet(s) Oral daily  oxybutynin 5 milliGRAM(s) Oral daily  piperacillin/tazobactam IVPB.. 3.375 Gram(s) IV Intermittent every 8 hours  polyethylene glycol 3350 17 Gram(s) Oral daily  sodium chloride 0.9% Bolus 500 milliLiter(s) IV Bolus once  sodium chloride 0.9%. 1000 milliLiter(s) (75 mL/Hr) IV Continuous <Continuous>    MEDICATIONS  (PRN):  acetaminophen     Tablet .. 650 milliGRAM(s) Oral every 6 hours PRN Temp greater or equal to 38C (100.4F), Mild Pain (1 - 3)  aluminum hydroxide/magnesium hydroxide/simethicone Suspension 30 milliLiter(s) Oral every 4 hours PRN Dyspepsia  bisacodyl 5 milliGRAM(s) Oral every 12 hours PRN Constipation  magnesium hydroxide Suspension 30 milliLiter(s) Oral at bedtime PRN Constipation  melatonin 3 milliGRAM(s) Oral at bedtime PRN Insomnia  ondansetron Injectable 4 milliGRAM(s) IV Push every 8 hours PRN Nausea and/or Vomiting      RADIOLOGY & ADDITIONAL TESTS:      EXAM:  CT BRAIN                        PROCEDURE DATE:  2021      FINDINGS:  The ventricles and sulci are prominent, compatible with age-related generalized cerebral volume loss.   There is no CT evidence for acute cerebral cortical infarct. There is a tiny lacunar infarct in the left basal ganglia of indeterminate age. There is no evidence of hemorrhage. There is periventricular and subcortical white matter hypoattenuation,  most compatible with chronic microvascular ischemic changes.   No mass effect is found in the brain.  There is no midline shift or herniation pattern.      The visualized portions of the paranasal sinuses and mastoid air cells are clear.    IMPRESSION:    There is a tiny lacunar infarctin the left basal ganglia of indeterminate age.  No evidence of hemorrhage.    Chronic changes as above.    < end of copied text >  < from: CT Head No Cont (21 @ 12:24) >    EXAM:  CT BRAIN                            PROCEDURE DATE:  2021          INTERPRETATION:  CT head without IV contrast    CLINICAL INFORMATION:  Fall, RIGHT   Intracranial hemorrhage.    TECHNIQUE: Contiguous axial 5 mm sections were obtainedthrough the head. Sagittal and coronal 2-D reformatted images were also obtained.   This scan was performed using automatic exposure control (radiation dose reduction software) to obtain a diagnostic image quality scan with patient dose as low as reasonably achievable.    FINDINGS:   CT dated 10/29/2021 available for review.    The brain demonstrates decrease in the degree of intracranial hemorrhage within the RIGHT sylvian fissure and RIGHT superior temporal lobe. Moderate periventricular white matter ischemia is noted. Tiny old lacunar infarction in the basal ganglia. No acute cerebral cortical infarct is seen. No mass effect is found in the brain.    The ventricles, sulci and basal cisterns appear unremarkable.    The orbits are unremarkable.  The paranasal sinuses are clear.  The nasal cavity appears intact.  The nasopharynx is symmetric.  The central skull base, petrous temporal bones and calvarium remain intact. 3.4 cm LEFT frontal scalp hematoma is noted.      IMPRESSION:   Interval decrease in the degree of intracranial hemorrhage within the RIGHT sylvian fissure and RIGHT superior temporal lobe. Moderate periventricular white matter ischemia is noted. Tiny old lacunar infarction in the basal ganglia.     CC: Encephalopathy  Acute hypoxemic Respiratory Failure  Sepsis     HPI: 80 year old female patient  Dementia, HTN, Diastolic CHF, A. 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 Patient seen and examined and was found to be alert and oriented but unable to provide pertinent narrative. States she does not recall what happened but believes she may have had a fever. Narrative limited secondary to current mental status    In the ED patient found to be febrile, hypoxic and had a WBC of 20. She was given vanco, zosyn and IVF hydration     INTERVAL HPI/ OVERNIGHT EVENTS:  Pt was seen and examined,   confused,  no SOB    Vital Signs Last 24 Hrs  T(C): 36.4 (2021 08:29), Max: 36.7 (2021 15:28)  T(F): 97.5 (2021 08:29), Max: 98.1 (2021 15:28)  HR: 60 (2021 08:29) (60 - 73)  BP: 140/60 (2021 08:29) (119/74 - 144/70)  BP(mean): 85 (2021 14:02) (85 - 85)  RR: 18 (2021 08:29) (17 - 19)  SpO2: 98% (2021 08:29) (95% - 99%)      REVIEW OF SYSTEMS:  Unable to obtain due to dementia       PHYSICAL EXAM:  General: Well developed; malnourished,  in no acute distress, on RA   Eyes: EOMI; conjunctiva and sclera clear  Head: Normocephalic; L frontal hematoma improving   ENMT: No nasal discharge; dry oral mucosa   Neck: Supple; no JVD   Respiratory: Diminished BS, R>L basilar  crackles   Cardiovascular: Irregular rate and rhythm. S1 and S2 Normal;   Gastrointestinal: Soft non-tender non-distended; Normal bowel sounds  Genitourinary: No  suprapubic  tenderness  Extremities: + le  with Acre wrap  Neurological: Alert and oriented x1, non focal   Musculoskeletal: Normal muscle tone and strength   Psychiatric: Cooperative        LABS:                           9.9    15.52 )-----------( 388      ( 2021 07:16 )             30.6         138  |  106  |  24<H>  ----------------------------<  97  4.2   |  28  |  1.02    Ca    8.2<L>      2021 07:16                          10.2   17.86 )-----------( 361      ( 2021 11:44 )             30.5         136  |  104  |  26<H>  ----------------------------<  155<H>  4.0   |  24  |  1.08    Ca    8.2<L>      2021 11:44  Mg     2.2         Urinalysis Basic - ( 2021 17:40 )    Color: Yellow / Appearance: Slightly Turbid / S.020 / pH: x  Gluc: x / Ketone: Negative  / Bili: Negative / Urobili: 1   Blood: x / Protein: unable to obtai mg/dL / Nitrite: Negative   Leuk Esterase: Small / RBC: 0-2 /HPF / WBC    Sq Epi: x / Non Sq Epi: Few / Bacteria: Moderate  Culture - Urine (21 @ 17:40)   Specimen Source: Clean Catch Clean Catch (Midstream)   Culture Results:   <10,000 CFU/mL Normal Urogenital Ivy       Culture - Urine (21 @ 15:35)   - Amikacin: S <=16   - Amoxicillin/Clavulanic Acid: I    - Ampicillin: R >16 These ampicillin results predict results for amoxicillin   - Ampicillin: S <=2 Predicts results to ampicillin/sulbactam, amoxacillin-clavulanate and piperacillin-tazobactam.   - Ampicillin/Sulbactam: R >16/8 Enterobacter, Klebsiella aerogenes, Citrobacter, and Serratia may develop resistance during prolonged therapy (3-4 days)   - Aztreonam: S <=4   - Cefazolin: S 16 ([id number]) For uncomplicated UTI with K. pneumoniae, E. coli, or P. mirablis: CECILY <=16 is sensitive and CECILY >=32 is resistant. This also predicts results for oral agents cefaclor, cefdinir, cefpodoxime, cefprozil, cefuroxime axetil, cephalexin and locarbef for uncomplicated UTI. Note that some isolates may be susceptible to these agents while testing resistant to cefazolin.   - Cefepime: S <=2   - Cefoxitin: S <=8   - Ceftriaxone: S <=1 Enterobacter, Klebsiella aerogenes, Citrobacter, and Serratia may develop resistance during prolonged therapy   - Ciprofloxacin: S <=0.25   - Ciprofloxacin: S <=1   - Ertapenem: S <=0.5   - Gentamicin: S <=2   - Imipenem: S <=1   - Levofloxacin: S <=0.5   - Levofloxacin: S <=1   - Meropenem: S <=1   - Nitrofurantoin: S <=32 Should not be used to treat pyelonephritis   - Nitrofurantoin: S <=32 Should not be used to treat pyelonephritis.   - Piperacillin/Tazobactam: S <=8   - Tetra/Doxy: R >8   - Tigecycline: S <=2   - Tobramycin: S <=2   - Trimethoprim/Sulfamethoxazole: R >2/38   - Vancomycin: S 2   Specimen Source: Clean Catch Clean Catch (Midstream)   Culture Results:   50,000 - 99,000 CFU/mL Escherichia coli   50,000 - 99,000 CFU/mL Enterococcus faecalis   Organism Identification: Escherichia coli   Enterococcus faecalis   Organism: Escherichia coli   Organism: Enterococcus faecalis                           10.5   18.50 )-----------( 293      ( 2021 08:48 )             32.1         136  |  108  |  28<H>  ----------------------------<  101<H>  4.5   |  21<L>  |  0.92    Ca    8.3<L>      2021 08:48  Mg     2.2         TPro  5.8<L>  /  Alb  1.6<L>  /  TBili  0.6  /  DBili  x   /  AST  26  /  ALT  25  /  AlkPhos  105          LIVER FUNCTIONS - ( 2021 09:14 )  Alb: 1.6 g/dL / Pro: 5.8 gm/dL / ALK PHOS: 105 U/L / ALT: 25 U/L / AST: 26 U/L / GGT: x           PT/INR - ( 2021 09:14 )   PT: 15.6 sec;   INR: 1.36 ratio                                 10.3   20.47 )-----------( 234      ( 2021 09:14 )             32.0     2021 09:14    138    |  107    |  33     ----------------------------<  99     4.2     |  25     |  1.13     Ca    8.0        2021 09:14    TPro  5.8    /  Alb  1.6    /  TBili  0.6    /  DBili  x      /  AST  26     /  ALT  25     /  AlkPhos  105    2021 09:14    PT/INR - ( 2021 09:14 )   PT: 15.6 sec;   INR: 1.36 ratio    PTT - ( 2021 15:22 )  PTT:35.7 sec      LIVER FUNCTIONS - ( 2021 09:14 )  Alb: 1.6 g/dL / Pro: 5.8 gm/dL / ALK PHOS: 105 U/L / ALT: 25 U/L / AST: 26 U/L / GGT: x               MEDICATIONS  (STANDING):  aspirin enteric coated 81 milliGRAM(s) Oral daily  cyanocobalamin 1000 MICROGram(s) Oral daily  digoxin     Tablet 250 MICROGram(s) Oral daily  diltiazem    Tablet 60 milliGRAM(s) Oral every 8 hours  folic acid 1 milliGRAM(s) Oral daily  heparin   Injectable 5000 Unit(s) SubCutaneous every 12 hours  hydrOXYzine  Oral Tab/Cap - Peds 25 milliGRAM(s) Oral daily  metoprolol succinate  milliGRAM(s) Oral daily  multivitamin/minerals 1 Tablet(s) Oral daily  oxybutynin 5 milliGRAM(s) Oral daily  piperacillin/tazobactam IVPB.. 3.375 Gram(s) IV Intermittent every 8 hours  polyethylene glycol 3350 17 Gram(s) Oral daily  sodium chloride 0.9% Bolus 500 milliLiter(s) IV Bolus once  sodium chloride 0.9%. 1000 milliLiter(s) (75 mL/Hr) IV Continuous <Continuous>    MEDICATIONS  (PRN):  acetaminophen     Tablet .. 650 milliGRAM(s) Oral every 6 hours PRN Temp greater or equal to 38C (100.4F), Mild Pain (1 - 3)  aluminum hydroxide/magnesium hydroxide/simethicone Suspension 30 milliLiter(s) Oral every 4 hours PRN Dyspepsia  bisacodyl 5 milliGRAM(s) Oral every 12 hours PRN Constipation  magnesium hydroxide Suspension 30 milliLiter(s) Oral at bedtime PRN Constipation  melatonin 3 milliGRAM(s) Oral at bedtime PRN Insomnia  ondansetron Injectable 4 milliGRAM(s) IV Push every 8 hours PRN Nausea and/or Vomiting      RADIOLOGY & ADDITIONAL TESTS:      EXAM:  CT BRAIN                        PROCEDURE DATE:  2021      FINDINGS:  The ventricles and sulci are prominent, compatible with age-related generalized cerebral volume loss.   There is no CT evidence for acute cerebral cortical infarct. There is a tiny lacunar infarct in the left basal ganglia of indeterminate age. There is no evidence of hemorrhage. There is periventricular and subcortical white matter hypoattenuation,  most compatible with chronic microvascular ischemic changes.   No mass effect is found in the brain.  There is no midline shift or herniation pattern.      The visualized portions of the paranasal sinuses and mastoid air cells are clear.    IMPRESSION:    There is a tiny lacunar infarctin the left basal ganglia of indeterminate age.  No evidence of hemorrhage.    Chronic changes as above.    < end of copied text >  < from: CT Head No Cont (21 @ 12:24) >    EXAM:  CT BRAIN                            PROCEDURE DATE:  2021          INTERPRETATION:  CT head without IV contrast    CLINICAL INFORMATION:  Fall, RIGHT   Intracranial hemorrhage.    TECHNIQUE: Contiguous axial 5 mm sections were obtainedthrough the head. Sagittal and coronal 2-D reformatted images were also obtained.   This scan was performed using automatic exposure control (radiation dose reduction software) to obtain a diagnostic image quality scan with patient dose as low as reasonably achievable.    FINDINGS:   CT dated 10/29/2021 available for review.    The brain demonstrates decrease in the degree of intracranial hemorrhage within the RIGHT sylvian fissure and RIGHT superior temporal lobe. Moderate periventricular white matter ischemia is noted. Tiny old lacunar infarction in the basal ganglia. No acute cerebral cortical infarct is seen. No mass effect is found in the brain.    The ventricles, sulci and basal cisterns appear unremarkable.    The orbits are unremarkable.  The paranasal sinuses are clear.  The nasal cavity appears intact.  The nasopharynx is symmetric.  The central skull base, petrous temporal bones and calvarium remain intact. 3.4 cm LEFT frontal scalp hematoma is noted.      IMPRESSION:   Interval decrease in the degree of intracranial hemorrhage within the RIGHT sylvian fissure and RIGHT superior temporal lobe. Moderate periventricular white matter ischemia is noted. Tiny old lacunar infarction in the basal ganglia.

## 2021-11-27 NOTE — PROGRESS NOTE ADULT - SUBJECTIVE AND OBJECTIVE BOX
CC: Encephalopathy  Acute hypoxemic Respiratory Failure  Sepsis     Patient is a 80 year old female patient  Dementia, HTN, Diastolic CHF, A. 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 Patient seen and examined and was found to be alert and oriented but unable to provide pertinent narrative. States she does not recall what happened but believes she may have had a fever. Narrative limited secondary to current mental status    In the ED patient found to be febrile, hypoxic and had a WBC of 20. She was given vanco, zosyn and IVF hydration     Patient seen and eval. hemodynamically stable. Poor historian due to dementia. no facial grimace or discomfort.     REVIEW OF SYSTEMS:  Unable to obtain due to dementia       PHYSICAL EXAM:  ICU Vital Signs Last 24 Hrs  T(C): 36.6 (27 Nov 2021 08:57), Max: 36.8 (26 Nov 2021 21:07)  T(F): 97.8 (27 Nov 2021 08:57), Max: 98.3 (26 Nov 2021 21:07)  HR: 70 (27 Nov 2021 08:57) (47 - 70)  BP: 149/64 (27 Nov 2021 08:57) (132/77 - 153/55)  RR: 18 (27 Nov 2021 08:57) (18 - 18)  SpO2: 98% (27 Nov 2021 08:57) (96% - 98%)  General: Well developed; malnourished,  in no acute distress, on RA   Eyes: EOMI; conjunctiva and sclera clear  Head: Normocephalic; L frontal hematoma improving   ENMT: No nasal discharge; dry oral mucosa   Neck: Supple; no JVD   Respiratory: Diminished BS, R>L basilar  crackles   Cardiovascular: Irregular rate and rhythm. S1 and S2 Normal;   Gastrointestinal: Soft non-tender non-distended; Normal bowel sounds  Genitourinary: No  suprapubic  tenderness  Extremities: + le  with Acre wrap  Neurological: Alert and oriented x1, non focal   Musculoskeletal: Normal muscle tone and strength   Psychiatric: Cooperative        LABS:            CBC Full  -  ( 27 Nov 2021 08:42 )  WBC Count : 15.25 K/uL  RBC Count : 3.34 M/uL  Hemoglobin : 10.3 g/dL  Hematocrit : 30.7 %  Platelet Count - Automated : 470 K/uL  Mean Cell Volume : 91.9 fl  Mean Cell Hemoglobin : 30.8 pg  Mean Cell Hemoglobin Concentration : 33.6 gm/dL  Auto Neutrophil # : x  Auto Lymphocyte # : x  Auto Monocyte # : x  Auto Eosinophil # : x  Auto Basophil # : x  Auto Neutrophil % : x  Auto Lymphocyte % : x  Auto Monocyte % : x  Auto Eosinophil % : x  Auto Basophil % : x        11-27    140  |  108  |  22  ----------------------------<  124<H>  4.2   |  24  |  0.90    Ca    8.6      27 Nov 2021 08:42      # Acute Hypoxemic respiratory failure - likely secondary to  Sepsis Pneumonia, also suspected UTI   - S/p IVF, lactate improved. Still leukocytosis   -  no fevers   - on supplemental oxygen  - F/u BCX: NGTD,   UCX ;  Enterococcus and Ecoli . Repeat UCX : NG but was on IV Abxs   - C/w  zosyn  - Supportive care     # Metabolic Encephalopathy on baseline Dementia   - likely 2/2 sepsis   -mentation improving  on antibiotics  -CT head neg for acute findings no  SDH  - supportive care     # Elevated Troponin, demand ischemia in settings of Sepsis   - H/o CHF, stable   -no EKG changes noted  - trend troponin  - ECHO: last  11/1/21: severe TR, Pulm HTN, EF 45-50%  - C/w ASA, statin, BB.   - Hold lasix and spironolactone for now  - CArdio f/u appreciated plan for stress test before d/c     # ADRIANA/ dehydration, improving   - poor oral intake, sepsis   - s/P   IVF,  fair oral intake   - Monitor UO, improved   - Bladder  scan neg   - Hold lasix and Spironolactone for now   - LAbs in am       # Chronic Afib  -C/w tele  - C/w BB, Cardizem and Digoxin  - Not on A/c since admission few weeks ago  for SDH  - Cleared by NeuroSx to resume on A/c, start  Eliquis 2.5 mg PO BID     # H/o Fall and SDH   Repeat CT head neg   Neuro Sx reeval appreciated     # SCC on Libtayo   - She is known to my colleague Dr. Emelina Richardson at [facility] for multifocal sq cell cancer of skin previously on Libtayo (Cemiplimab-- PD1 antibody) which was held as of June 2021due to pneumonitis.  - unlikely recurrent pneumonitis   - has not received systemic therapy in several months     # DVT PPX: heparin SQ   CC: Encephalopathy  Acute hypoxemic Respiratory Failure  Sepsis     Patient is a 80 year old female patient  Dementia, HTN, Diastolic CHF, A. 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 Patient seen and examined and was found to be alert and oriented but unable to provide pertinent narrative. States she does not recall what happened but believes she may have had a fever. Narrative limited secondary to current mental status    In the ED patient found to be febrile, hypoxic and had a WBC of 20. She was given vanco, zosyn and IVF hydration     Patient seen and eval. hemodynamically stable. Poor historian due to dementia. no facial grimace or discomfort. Patient is anticipated to have a nuclear stress on monday -  will discuss with Dr. Hameed, benefits of the test at this time, as patient has a advanced complex medical history.     REVIEW OF SYSTEMS:  Unable to obtain due to dementia       PHYSICAL EXAM:  ICU Vital Signs Last 24 Hrs  T(C): 36.6 (27 Nov 2021 08:57), Max: 36.8 (26 Nov 2021 21:07)  T(F): 97.8 (27 Nov 2021 08:57), Max: 98.3 (26 Nov 2021 21:07)  HR: 70 (27 Nov 2021 08:57) (47 - 70)  BP: 149/64 (27 Nov 2021 08:57) (132/77 - 153/55)  RR: 18 (27 Nov 2021 08:57) (18 - 18)  SpO2: 98% (27 Nov 2021 08:57) (96% - 98%)  General: Well developed; malnourished,  in no acute distress, on RA   Eyes: EOMI; conjunctiva and sclera clear  Head: Normocephalic; L frontal hematoma improving   ENMT: No nasal discharge; dry oral mucosa   Neck: Supple; no JVD   Respiratory: Diminished BS, R>L basilar  crackles   Cardiovascular: Irregular rate and rhythm. S1 and S2 Normal;   Gastrointestinal: Soft non-tender non-distended; Normal bowel sounds  Genitourinary: No  suprapubic  tenderness  Extremities: + le  with Acre wrap  Neurological: Alert and oriented x1, non focal   Musculoskeletal: Normal muscle tone and strength   Psychiatric: Cooperative        LABS:            CBC Full  -  ( 27 Nov 2021 08:42 )  WBC Count : 15.25 K/uL  RBC Count : 3.34 M/uL  Hemoglobin : 10.3 g/dL  Hematocrit : 30.7 %  Platelet Count - Automated : 470 K/uL  Mean Cell Volume : 91.9 fl  Mean Cell Hemoglobin : 30.8 pg  Mean Cell Hemoglobin Concentration : 33.6 gm/dL  Auto Neutrophil # : x  Auto Lymphocyte # : x  Auto Monocyte # : x  Auto Eosinophil # : x  Auto Basophil # : x  Auto Neutrophil % : x  Auto Lymphocyte % : x  Auto Monocyte % : x  Auto Eosinophil % : x  Auto Basophil % : x        11-27    140  |  108  |  22  ----------------------------<  124<H>  4.2   |  24  |  0.90    Ca    8.6      27 Nov 2021 08:42      # Acute Hypoxemic respiratory failure - likely secondary to  Sepsis Pneumonia, also suspected UTI   - S/p IVF, lactate improved. Still leukocytosis   -  no fevers   - on supplemental oxygen  - F/u BCX: NGTD,   UCX ;  Enterococcus and Ecoli . Repeat UCX : NG but was on IV Abxs   - C/w  zosyn  - Supportive care     # Metabolic Encephalopathy on baseline Dementia   - likely 2/2 sepsis   -mentation improving  on antibiotics  -CT head neg for acute findings no  SDH  - supportive care     # Elevated Troponin, demand ischemia in settings of Sepsis   - H/o CHF, stable   -no EKG changes noted  - trend troponin  - ECHO: last  11/1/21: severe TR, Pulm HTN, EF 45-50%  - C/w ASA, statin, BB.   - Hold lasix and spironolactone for now  - CArdio f/u appreciated plan for stress test before d/c     # ADRIANA/ dehydration, improving   - poor oral intake, sepsis   - s/P   IVF,  fair oral intake   - Monitor UO, improved   - Bladder  scan neg   - Hold lasix and Spironolactone for now   - LAbs in am       # Chronic Afib  -C/w tele  - C/w BB, Cardizem and Digoxin  - Not on A/c since admission few weeks ago  for SDH  - Cleared by NeuroSx to resume on A/c, start  Eliquis 2.5 mg PO BID     # H/o Fall and SDH   Repeat CT head neg   Neuro Sx reeval appreciated     # SCC on Libtayo   - She is known to my colleague Dr. Emelina Richardson at [facility] for multifocal sq cell cancer of skin previously on Libtayo (Cemiplimab-- PD1 antibody) which was held as of June 2021due to pneumonitis.  - unlikely recurrent pneumonitis   - has not received systemic therapy in several months     # DVT PPX: heparin SQ

## 2021-11-27 NOTE — PROGRESS NOTE ADULT - ASSESSMENT
80 year old female patient who currently resides in a local nursing home presented to the ED after she was found to be febrile and had mental status changes. CXR shwoing RLL infiltrate    She is known to my colleague Dr. Emelina Richardson at [facility] for multifocal sq cell cancer of skin previously on Libtayo (Cemiplimab-- PD1 antibody) which was held as of June 2021due to pneumonitis.    SCC on Libtayo   - unlikely recurrent pneumonitis   - has not received systemic therapy in several months     RLL PNA / Effusion / hypoxic respiratory failure   - patient continues on ZOSYN     UTI   - continue with ZOSYN   - ID following     Chronic Afib   - A/C has been restarted     SDH   - neurosurgery input appreciated   - small and ok for proceeding with A/C     encephalopathy   - hx of baseline dementia

## 2021-11-27 NOTE — PROGRESS NOTE ADULT - SUBJECTIVE AND OBJECTIVE BOX
INTERVAL HPI/OVERNIGHT EVENTS:  Patient S&E at bedside. No o/n events, patient resting comfortably. No complaints at this time. Patient denies fever, chills, dizziness, weakness, CP, palpitations, SOB, cough, N/V/D/C, dysuria, changes in bowel movements, LE edema.    VITAL SIGNS:  T(F): 97.6 (11-27-21 @ 05:07)  HR: 47 (11-27-21 @ 05:07)  BP: 138/59 (11-27-21 @ 05:07)  RR: 18 (11-27-21 @ 05:07)  SpO2: 97% (11-27-21 @ 05:07)  Wt(kg): --    PHYSICAL EXAM:      seen and patient is AAOx0  RIGHT frontal area of ecchymosis   patient with supplement oxygen   Decreased breath sounds   lower extremity mild edema     MEDICATIONS  (STANDING):  apixaban 2.5 milliGRAM(s) Oral every 12 hours  aspirin enteric coated 81 milliGRAM(s) Oral daily  cyanocobalamin 1000 MICROGram(s) Oral daily  digoxin     Tablet 250 MICROGram(s) Oral daily  diltiazem    Tablet 60 milliGRAM(s) Oral every 8 hours  folic acid 1 milliGRAM(s) Oral daily  hydrOXYzine  Oral Tab/Cap - Peds 25 milliGRAM(s) Oral daily  metoprolol succinate  milliGRAM(s) Oral daily  multivitamin/minerals 1 Tablet(s) Oral daily  oxybutynin 5 milliGRAM(s) Oral daily  piperacillin/tazobactam IVPB.. 3.375 Gram(s) IV Intermittent every 8 hours  polyethylene glycol 3350 17 Gram(s) Oral daily  sodium chloride 0.9% Bolus 500 milliLiter(s) IV Bolus once    MEDICATIONS  (PRN):  acetaminophen     Tablet .. 650 milliGRAM(s) Oral every 6 hours PRN Temp greater or equal to 38C (100.4F), Mild Pain (1 - 3)  aluminum hydroxide/magnesium hydroxide/simethicone Suspension 30 milliLiter(s) Oral every 4 hours PRN Dyspepsia  bisacodyl 5 milliGRAM(s) Oral every 12 hours PRN Constipation  magnesium hydroxide Suspension 30 milliLiter(s) Oral at bedtime PRN Constipation  melatonin 3 milliGRAM(s) Oral at bedtime PRN Insomnia  ondansetron Injectable 4 milliGRAM(s) IV Push every 8 hours PRN Nausea and/or Vomiting      Allergies    Darvocet-N 50 (Unknown)  sulfa drugs (Other)    Intolerances        LABS:                        9.9    15.52 )-----------( 388      ( 26 Nov 2021 07:16 )             30.6     11-26    138  |  106  |  24<H>  ----------------------------<  97  4.2   |  28  |  1.02    Ca    8.2<L>      26 Nov 2021 07:16            RADIOLOGY & ADDITIONAL TESTS:  Studies reviewed.    ASSESSMENT & PLAN:

## 2021-11-28 NOTE — PROGRESS NOTE ADULT - SUBJECTIVE AND OBJECTIVE BOX
CC: Encephalopathy  Acute hypoxemic Respiratory Failure  Sepsis     Patient is a 80 year old female patient  Dementia, HTN, Diastolic CHF, A. 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 Patient seen and examined and was found to be alert and oriented but unable to provide pertinent narrative. States she does not recall what happened but believes she may have had a fever. Narrative limited secondary to current mental status    In the ED patient found to be febrile, hypoxic and had a WBC of 20. She was given vanco, zosyn and IVF hydration       Poor hsitorian. Poor functional state. Patient recent hospitalization. Denies any HA, CP, SOB. Palliaitive care eval /  wound care eval. Will discuss with Dr. Hameed -  need for nuclear stress test.     REVIEW OF SYSTEMS:  Unable to obtain due to dementia       PHYSICAL EXAM:  ICU Vital Signs Last 24 Hrs  T(C): 36.7 (28 Nov 2021 09:07), Max: 36.8 (28 Nov 2021 04:54)  T(F): 98.1 (28 Nov 2021 09:07), Max: 98.3 (28 Nov 2021 04:54)  HR: 80 (28 Nov 2021 15:09) (56 - 81)  BP: 135/62 (28 Nov 2021 15:09) (135/62 - 157/85)  BP(mean): --  ABP: --  ABP(mean): --  RR: 18 (28 Nov 2021 15:09) (18 - 18)  SpO2: 93% (28 Nov 2021 15:09) (93% - 96%)    General: elderly, deconditioned, frail  Eyes: EOMI; conjunctiva and sclera clear  Head: Normocephalic; L frontal hematoma improving   ENMT: No nasal discharge; dry oral mucosa   Neck: Supple; no JVD   Respiratory: Diminished BS, R>L basilar  crackles   Cardiovascular: Irregular rate and rhythm. S1 and S2 Normal;   Gastrointestinal: Soft non-tender non-distended; Normal bowel sounds  Genitourinary: No  suprapubic  tenderness  Extremities: + le  with Acre wrap  Neurological: Alert and oriented x1, non focal   Musculoskeletal: Normal muscle tone and strength   Psychiatric: Cooperative        LABS:     CBC Full  -  ( 28 Nov 2021 08:56 )  WBC Count : 13.76 K/uL  RBC Count : 3.42 M/uL  Hemoglobin : 10.5 g/dL  Hematocrit : 32.2 %  Platelet Count - Automated : 561 K/uL  Mean Cell Volume : 94.2 fl  Mean Cell Hemoglobin : 30.7 pg  Mean Cell Hemoglobin Concentration : 32.6 gm/dL  Auto Neutrophil # : x  Auto Lymphocyte # : x  Auto Monocyte # : x  Auto Eosinophil # : x  Auto Basophil # : x  Auto Neutrophil % : x  Auto Lymphocyte % : x  Auto Monocyte % : x  Auto Eosinophil % : x  Auto Basophil % : x        11-28    138  |  106  |  22  ----------------------------<  126<H>  4.2   |  26  |  0.98    Ca    8.5      28 Nov 2021 08:56    # Acute Hypoxemic respiratory failure - likely secondary to  Sepsis Pneumonia, also suspected UTI   - WBC trending down  - on supplemental oxygen  - F/u BCX: NGTD,   UCX ;  Enterococcus and Ecoli . Repeat UCX : NG but was on IV Abxs   - C/w  zosyn  - Supportive care     # Metabolic Encephalopathy on baseline Dementia   - likely 2/2 sepsis   - mentation improving  on antibiotics  - CT head neg for acute findings no SDH  - supportive care     # Elevated Troponin, demand ischemia in settings of Sepsis   - H/o CHF, stable   - no EKG changes noted  - trend troponin  - ECHO: last  11/1/21: severe TR, Pulm HTN, EF 45-50%  - C/w ASA, statin, BB.   - Hold lasix and spironolactone for now  - CArdio f/u appreciated plan for stress test before d/c     # ADRIANA/ dehydration, improving   - poor oral intake, sepsis   - s/P   IVF,  fair oral intake   - Monitor UO, improved   - Bladder  scan neg   - Hold lasix and Spironolactone for now   - LAbs in am     # Chronic Afib  -C/w tele  - C/w BB, Cardizem and Digoxin  - Not on A/c since admission few weeks ago  for SDH  - Cleared by NeuroSx to resume on A/c, start  Eliquis 2.5 mg PO BID     # H/o Fall and SDH   - Repeat CT head neg   - Neuro Sx reeval appreciated     # SCC on Libtayo   - She is known to my colleague Dr. Emelina Richardson at [facility] for multifocal sq cell cancer of skin previously on Libtayo (Cemiplimab-- PD1 antibody) which was held as of June 2021due to pneumonitis.  - unlikely recurrent pneumonitis   - has not received systemic therapy in several months         CC: Encephalopathy  Acute hypoxemic Respiratory Failure  Sepsis     Patient is a 80 year old female patient  Dementia, HTN, Diastolic CHF, A. 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 Patient seen and examined and was found to be alert and oriented but unable to provide pertinent narrative. States she does not recall what happened but believes she may have had a fever. Narrative limited secondary to current mental status    In the ED patient found to be febrile, hypoxic and had a WBC of 20. She was given vanco, zosyn and IVF hydration     Poor hsitorian. Poor functional state. Patient recent hospitalization. Denies any HA, CP, SOB. Palliaitive care eval /  wound care eval. Will discuss with Dr. Hameed -  need for nuclear stress test.     REVIEW OF SYSTEMS:  Unable to obtain due to dementia       PHYSICAL EXAM:  ICU Vital Signs Last 24 Hrs  T(C): 36.7 (28 Nov 2021 09:07), Max: 36.8 (28 Nov 2021 04:54)  T(F): 98.1 (28 Nov 2021 09:07), Max: 98.3 (28 Nov 2021 04:54)  HR: 80 (28 Nov 2021 15:09) (56 - 81)  BP: 135/62 (28 Nov 2021 15:09) (135/62 - 157/85)  BP(mean): --  ABP: --  ABP(mean): --  RR: 18 (28 Nov 2021 15:09) (18 - 18)  SpO2: 93% (28 Nov 2021 15:09) (93% - 96%)    General: elderly, deconditioned, frail  Eyes: EOMI; conjunctiva and sclera clear  Head: Normocephalic; L frontal hematoma improving   ENMT: No nasal discharge; dry oral mucosa   Neck: Supple; no JVD   Respiratory: Diminished BS, R>L basilar  crackles   Cardiovascular: Irregular rate and rhythm. S1 and S2 Normal;   Gastrointestinal: Soft non-tender non-distended; Normal bowel sounds  Genitourinary: No  suprapubic  tenderness  Extremities: + le  with Acre wrap  Neurological: Alert and oriented x1, non focal   Musculoskeletal: Normal muscle tone and strength   Psychiatric: Cooperative        LABS:     CBC Full  -  ( 28 Nov 2021 08:56 )  WBC Count : 13.76 K/uL  RBC Count : 3.42 M/uL  Hemoglobin : 10.5 g/dL  Hematocrit : 32.2 %  Platelet Count - Automated : 561 K/uL  Mean Cell Volume : 94.2 fl  Mean Cell Hemoglobin : 30.7 pg  Mean Cell Hemoglobin Concentration : 32.6 gm/dL  Auto Neutrophil # : x  Auto Lymphocyte # : x  Auto Monocyte # : x  Auto Eosinophil # : x  Auto Basophil # : x  Auto Neutrophil % : x  Auto Lymphocyte % : x  Auto Monocyte % : x  Auto Eosinophil % : x  Auto Basophil % : x        11-28    138  |  106  |  22  ----------------------------<  126<H>  4.2   |  26  |  0.98    Ca    8.5      28 Nov 2021 08:56    # Acute Hypoxemic respiratory failure - likely secondary to  Sepsis Pneumonia, also suspected UTI   - WBC trending down  - on supplemental oxygen  - F/u BCX: NGTD,   UCX ;  Enterococcus and Ecoli . Repeat UCX : NG but was on IV Abxs   - C/w  zosyn  - Supportive care     # Metabolic Encephalopathy on baseline Dementia   - likely 2/2 sepsis   - mentation improving  on antibiotics  - CT head neg for acute findings no SDH  - supportive care     # Elevated Troponin, demand ischemia in settings of Sepsis   - H/o CHF, stable   - no EKG changes noted  - trend troponin  - ECHO: last  11/1/21: severe TR, Pulm HTN, EF 45-50%  - C/w ASA, statin, BB.   - Hold lasix and spironolactone for now  - CArdio f/u appreciated plan for stress test before d/c     # ADRIANA/ dehydration, improving   - poor oral intake, sepsis   - s/P   IVF,  fair oral intake   - Monitor UO, improved   - Bladder  scan neg   - Hold lasix and Spironolactone for now   - LAbs in am     # Chronic Afib  -C/w tele  - C/w BB, Cardizem and Digoxin  - Not on A/c since admission few weeks ago  for SDH  - Cleared by NeuroSx to resume on A/c, start  Eliquis 2.5 mg PO BID     # H/o Fall and SDH   - Repeat CT head neg   - Neuro Sx reeval appreciated     # SCC on Libtayo   - She is known to my colleague Dr. Emelina Richardson at [facility] for multifocal sq cell cancer of skin previously on Libtayo (Cemiplimab-- PD1 antibody) which was held as of June 2021due to pneumonitis.  - unlikely recurrent pneumonitis   - has not received systemic therapy in several months

## 2021-11-29 NOTE — DISCHARGE NOTE NURSING/CASE MANAGEMENT/SOCIAL WORK - NSDCVIVACCINE_GEN_ALL_CORE_FT
Tdap; 28-Oct-2021 12:14; Ny Wallace); [employer]; [id number] (Exp. Date: 15-Jul-2023); IntraMuscular; Deltoid Left.; 0.5 milliLiter(s); VIS (VIS Published: 09-May-2013, VIS Presented: 28-Oct-2021);

## 2021-11-29 NOTE — DISCHARGE NOTE NURSING/CASE MANAGEMENT/SOCIAL WORK - PATIENT PORTAL LINK FT
You can access the FollowMyHealth Patient Portal offered by [facility] by registering at the following website: [email or web address]. By joining [facility]’s FollowMyHealth portal, you will also be able to view your health information using other applications (apps) compatible with our system.

## 2021-11-29 NOTE — CONSULT NOTE ADULT - SUBJECTIVE AND OBJECTIVE BOX
HPI:  80 year old female patient who currently [facility] after she was found to be febrile and had mental status changes. Patient seen and examined and was found to be alert and oriented but unable to provide pertinent narrative. States she does not recall what happened but believes she may have had a fever. Narrative limited secondary to current mental status, In the ED patient found to be febrile, hypoxic and had a WBC of 20. She was given vanco, zosyn and IVF hydration pat seen for pulmonary eval.      PAST MEDICAL & SURGICAL HISTORY:  CHF (congestive heart failure)    Atrial fibrillation, unspecified type    Hypertension    Squamous cell carcinoma    H/O total hysterectomy    History of appendectomy    S/P cholecystectomy        Home Medications:  acetaminophen 325 mg oral tablet: 2 tab(s) orally every 6 hours, As Needed (22 Nov 2021 17:35)  Aldactone 25 mg oral tablet: 0.5 tab(s) orally once a day  Hold for SBP&lt;100 (22 Nov 2021 17:29)  Aquaphor topical ointment: Apply topically to affected area once a day (22 Nov 2021 17:35)  aspirin 81 mg oral delayed release tablet: 1 tab(s) orally once a day (22 Nov 2021 17:29)  bisacodyl 5 mg oral delayed release tablet: 1 tab(s) orally every 12 hours, As needed, Constipation (22 Nov 2021 17:29)  Cardizem 60 mg oral tablet: 1 tab(s) orally every 8 hours 0600, 1400, and 2200 (22 Nov 2021 17:29)  digoxin 250 mcg (0.25 mg) oral tablet: 1 tab(s) orally once a day  Hold for apical pulse&lt;60 (22 Nov 2021 17:29)  folic acid 1 mg oral tablet: 1 tab(s) orally once a day (22 Nov 2021 17:29)  hydrOXYzine hydrochloride 25 mg oral tablet: 1 tab(s) orally once a day (22 Nov 2021 17:29)  Milk of Magnesia 8% oral suspension: 30 milliliter(s) orally once a day (at bedtime), As Needed (22 Nov 2021 17:35)  Multiple Vitamins with Minerals oral tablet: 1 tab(s) orally once a day (22 Nov 2021 17:35)  oxybutynin 5 mg oral tablet: 1 tab(s) orally once a day (22 Nov 2021 17:29)  polyethylene glycol 3350 oral powder for reconstitution: 17 gram(s) orally once a day (22 Nov 2021 17:29)  Vitamin B12 1000 mcg oral tablet: 1 tab(s) orally once a day (22 Nov 2021 17:29)      MEDICATIONS  (STANDING):  apixaban 2.5 milliGRAM(s) Oral every 12 hours  aspirin enteric coated 81 milliGRAM(s) Oral daily  cyanocobalamin 1000 MICROGram(s) Oral daily  digoxin     Tablet 250 MICROGram(s) Oral daily  diltiazem    Tablet 60 milliGRAM(s) Oral every 8 hours  folic acid 1 milliGRAM(s) Oral daily  hydrOXYzine  Oral Tab/Cap - Peds 25 milliGRAM(s) Oral daily  metoprolol succinate  milliGRAM(s) Oral daily  multivitamin/minerals 1 Tablet(s) Oral daily  oxybutynin 5 milliGRAM(s) Oral daily  piperacillin/tazobactam IVPB.. 3.375 Gram(s) IV Intermittent every 8 hours  polyethylene glycol 3350 17 Gram(s) Oral daily  sodium chloride 0.9% Bolus 500 milliLiter(s) IV Bolus once    MEDICATIONS  (PRN):  acetaminophen     Tablet .. 650 milliGRAM(s) Oral every 6 hours PRN Temp greater or equal to 38C (100.4F), Mild Pain (1 - 3)  aluminum hydroxide/magnesium hydroxide/simethicone Suspension 30 milliLiter(s) Oral every 4 hours PRN Dyspepsia  bisacodyl 5 milliGRAM(s) Oral every 12 hours PRN Constipation  magnesium hydroxide Suspension 30 milliLiter(s) Oral at bedtime PRN Constipation  melatonin 3 milliGRAM(s) Oral at bedtime PRN Insomnia  ondansetron Injectable 4 milliGRAM(s) IV Push every 8 hours PRN Nausea and/or Vomiting      Allergies    Darvocet-N 50 (Unknown)  sulfa drugs (Other)    Intolerances        SOCIAL HISTORY: Denies tobacco, etoh abuse or illicit drug use    FAMILY HISTORY:  Family history of lung cancer (Mother)        Vital Signs Last 24 Hrs  T(C): 36.2 (29 Nov 2021 08:45), Max: 36.8 (28 Nov 2021 23:39)  T(F): 97.1 (29 Nov 2021 08:45), Max: 98.3 (28 Nov 2021 23:39)  HR: 72 (29 Nov 2021 08:45) (69 - 80)  BP: 164/76 (29 Nov 2021 08:45) (135/62 - 164/76)  BP(mean): --  RR: 19 (29 Nov 2021 08:45) (18 - 19)  SpO2: 92% (29 Nov 2021 08:45) (92% - 95%)        REVIEW OF SYSTEMS:    CONSTITUTIONAL:  As per HPI.  HEENT:  Eyes:  No diplopia or blurred vision. ENT:  No earache, sore throat or runny nose.  CARDIOVASCULAR:  No pressure, squeezing, tightness, heaviness or aching about the chest, neck, axilla or epigastrium.  RESPIRATORY:  No cough, shortness of breath, PND or orthopnea.  GASTROINTESTINAL:  No nausea, vomiting or diarrhea.  GENITOURINARY:  No dysuria, frequency or urgency.  MUSCULOSKELETAL:  As per HPI.  SKIN:  No change in skin, hair or nails.  NEUROLOGIC:  No paresthesias, fasciculations, seizures or weakness.  PSYCHIATRIC:  No disorder of thought or mood.  ENDOCRINE:  No heat or cold intolerance, polyuria or polydipsia.  HEMATOLOGICAL:  No easy bruising or bleedings:  .     PHYSICAL EXAMINATION:    GENERAL APPEARANCE:  Pt. is not currently dyspneic, in no distress. Pt. is alert, oriented, and pleasant.  HEENT:  Pupils are normal and react normally. No icterus. Mucous membranes well colored.  NECK:  Supple. No lymphadenopathy. Jugular venous pressure not elevated. Carotids equal.   HEART:   The cardiac impulse has a normal quality. Regular. Normal S1 and S2. There are no murmurs, rubs or gallops noted  CHEST:  Chest is clear to auscultation. Normal respiratory effort.  ABDOMEN:  Soft and nontender.   EXTREMITIES:  There is no cyanosis, clubbing or edema.   SKIN:  No rash or significant lesions are noted.    LABS:                        10.5   13.76 )-----------( 561      ( 28 Nov 2021 08:56 )             32.2     11-28    138  |  106  |  22  ----------------------------<  126<H>  4.2   |  26  |  0.98    Ca    8.5      28 Nov 2021 08:56                      RADIOLOGY & ADDITIONAL STUDIES:     Xray Chest 1 View- PORTABLE-Urgent (11.22.21 @ 15:57) >  Impression:Small RIGHT pleural effusion with underlying infiltrate         HPI:    80 year old female patient who currently resides in a local nursing [facility]ad[facility] after she was found to be febrile and had mental status changes. Patient seen and examined and was found to be alert and oriented but unable to provide pertinent narrative. States she does not recall what happened but believes she may have had a fever. Narrative limited secondary to current mental status, In the ED patient found to be febrile, hypoxic and had a WBC of 20. She was given vanco, zosyn and IVF hydration pat seen for pulmonary eval.      PAST MEDICAL & SURGICAL HISTORY:  CHF (congestive heart failure)    Atrial fibrillation, unspecified type    Hypertension    Squamous cell carcinoma    H/O total hysterectomy    History of appendectomy    S/P cholecystectomy        Home Medications:  acetaminophen 325 mg oral tablet: 2 tab(s) orally every 6 hours, As Needed (22 Nov 2021 17:35)  Aldactone 25 mg oral tablet: 0.5 tab(s) orally once a day  Hold for SBP&lt;100 (22 Nov 2021 17:29)  Aquaphor topical ointment: Apply topically to affected area once a day (22 Nov 2021 17:35)  aspirin 81 mg oral delayed release tablet: 1 tab(s) orally once a day (22 Nov 2021 17:29)  bisacodyl 5 mg oral delayed release tablet: 1 tab(s) orally every 12 hours, As needed, Constipation (22 Nov 2021 17:29)  Cardizem 60 mg oral tablet: 1 tab(s) orally every 8 hours 0600, 1400, and 2200 (22 Nov 2021 17:29)  digoxin 250 mcg (0.25 mg) oral tablet: 1 tab(s) orally once a day  Hold for apical pulse&lt;60 (22 Nov 2021 17:29)  folic acid 1 mg oral tablet: 1 tab(s) orally once a day (22 Nov 2021 17:29)  hydrOXYzine hydrochloride 25 mg oral tablet: 1 tab(s) orally once a day (22 Nov 2021 17:29)  Milk of Magnesia 8% oral suspension: 30 milliliter(s) orally once a day (at bedtime), As Needed (22 Nov 2021 17:35)  Multiple Vitamins with Minerals oral tablet: 1 tab(s) orally once a day (22 Nov 2021 17:35)  oxybutynin 5 mg oral tablet: 1 tab(s) orally once a day (22 Nov 2021 17:29)  polyethylene glycol 3350 oral powder for reconstitution: 17 gram(s) orally once a day (22 Nov 2021 17:29)  Vitamin B12 1000 mcg oral tablet: 1 tab(s) orally once a day (22 Nov 2021 17:29)      MEDICATIONS  (STANDING):  apixaban 2.5 milliGRAM(s) Oral every 12 hours  aspirin enteric coated 81 milliGRAM(s) Oral daily  cyanocobalamin 1000 MICROGram(s) Oral daily  digoxin     Tablet 250 MICROGram(s) Oral daily  diltiazem    Tablet 60 milliGRAM(s) Oral every 8 hours  folic acid 1 milliGRAM(s) Oral daily  hydrOXYzine  Oral Tab/Cap - Peds 25 milliGRAM(s) Oral daily  metoprolol succinate  milliGRAM(s) Oral daily  multivitamin/minerals 1 Tablet(s) Oral daily  oxybutynin 5 milliGRAM(s) Oral daily  piperacillin/tazobactam IVPB.. 3.375 Gram(s) IV Intermittent every 8 hours  polyethylene glycol 3350 17 Gram(s) Oral daily  sodium chloride 0.9% Bolus 500 milliLiter(s) IV Bolus once    MEDICATIONS  (PRN):  acetaminophen     Tablet .. 650 milliGRAM(s) Oral every 6 hours PRN Temp greater or equal to 38C (100.4F), Mild Pain (1 - 3)  aluminum hydroxide/magnesium hydroxide/simethicone Suspension 30 milliLiter(s) Oral every 4 hours PRN Dyspepsia  bisacodyl 5 milliGRAM(s) Oral every 12 hours PRN Constipation  magnesium hydroxide Suspension 30 milliLiter(s) Oral at bedtime PRN Constipation  melatonin 3 milliGRAM(s) Oral at bedtime PRN Insomnia  ondansetron Injectable 4 milliGRAM(s) IV Push every 8 hours PRN Nausea and/or Vomiting      Allergies    Darvocet-N 50 (Unknown)  sulfa drugs (Other)    Intolerances        SOCIAL HISTORY: Denies tobacco, etoh abuse or illicit drug use    FAMILY HISTORY:  Family history of lung cancer (Mother)        Vital Signs Last 24 Hrs  T(C): 36.2 (29 Nov 2021 08:45), Max: 36.8 (28 Nov 2021 23:39)  T(F): 97.1 (29 Nov 2021 08:45), Max: 98.3 (28 Nov 2021 23:39)  HR: 72 (29 Nov 2021 08:45) (69 - 80)  BP: 164/76 (29 Nov 2021 08:45) (135/62 - 164/76)  BP(mean): --  RR: 19 (29 Nov 2021 08:45) (18 - 19)  SpO2: 92% (29 Nov 2021 08:45) (92% - 95%)        REVIEW OF SYSTEMS:    CONSTITUTIONAL:  As per HPI.  HEENT:  Eyes:  No diplopia or blurred vision. ENT:  No earache, sore throat or runny nose.  CARDIOVASCULAR:  No pressure, squeezing, tightness, heaviness or aching about the chest, neck, axilla or epigastrium.  RESPIRATORY:  No cough, +shortness of breath, PND or orthopnea.  GASTROINTESTINAL:  No nausea, vomiting or diarrhea.  GENITOURINARY:  No dysuria, frequency or urgency.  MUSCULOSKELETAL:  As per HPI.  SKIN:  No change in skin, hair or nails.  NEUROLOGIC:  No paresthesias, fasciculations, seizures or weakness.  PSYCHIATRIC:  No disorder of thought or mood.  ENDOCRINE:  No heat or cold intolerance, polyuria or polydipsia.  HEMATOLOGICAL:  No easy bruising or bleedings:  .     PHYSICAL EXAMINATION:    GENERAL APPEARANCE:  Pt. is not currently dyspneic, in no distress. Pt. is alert, oriented, and pleasant.  HEENT:  Pupils are normal and react normally. No icterus. Mucous membranes well colored.  NECK:  Supple. No lymphadenopathy. Jugular venous pressure not elevated. Carotids equal.   HEART:   The cardiac impulse has a normal quality. Regular. Normal S1 and S2. There are no murmurs, rubs or gallops noted  CHEST:  Chest crackles to auscultation. Normal respiratory effort.  ABDOMEN:  Soft and nontender.   EXTREMITIES:  There is no cyanosis, clubbing or edema.   SKIN:  No rash or significant lesions are noted.    LABS:                        10.5   13.76 )-----------( 561      ( 28 Nov 2021 08:56 )             32.2     11-28    138  |  106  |  22  ----------------------------<  126<H>  4.2   |  26  |  0.98    Ca    8.5      28 Nov 2021 08:56      RADIOLOGY & ADDITIONAL STUDIES:     Xray Chest 1 View- PORTABLE-Urgent (11.22.21 @ 15:57) >  Impression:Small RIGHT pleural effusion with underlying infiltrate

## 2021-11-29 NOTE — CONSULT NOTE ADULT - ASSESSMENT
patient with with pneumonia with elevated troponin levels-returning to normal  1-continue telemetry; rate control AFib; has high HUNG vasc score and continue treatment with AC  2-CAD-mild troponin elevation; once treated pneumonia and not labored breathing; consider nuclear stres stest prior to discharge (she does not f/u as outpatient much anymore).  Believe this is demand ischemia and not unstable angina; patient with systolic dysfunction, not a stretch to believe there is some underlying coronary artery blockages  3-CHF-chronic systolic; would benefit from entresto if BP tolerates-would wait 24-48 hours and if stable, consider institution tomorrow or friday  4-AFib-if issues with rate control, can consider changing dig/cardizem to amiodarone No

## 2021-11-29 NOTE — DISCHARGE NOTE NURSING/CASE MANAGEMENT/SOCIAL WORK - NSDCFUADDAPPT_GEN_ALL_CORE_FT
Follow-up appointment with Dr. Hameed on 12/3/21 at 2:30pm CHF follow up deferred at this time. Patient discharged to [facility] rehab.

## 2021-11-29 NOTE — CONSULT NOTE ADULT - ASSESSMENT
# Acute Hypoxemic respiratory failure - likely secondary to  Sepsis Pneumonia, also suspected UTI # Metabolic Encephalopathy on baseline Dementia # Elevated Troponin, demand ischemia in settings of Sepsis # ADRIANA/ dehydration, improving # Chronic Afib# H/o Fall and SDH WBC trending down  - on supplemental oxygen  - F/u BCX: NGTD,   UCX ;  Enterococcus and Ecoli . Repeat UCX : NG but was on IV Abxs   - C/w  zosyn  - Supportive care  likely 2/2 sepsis   - mentation improving  on antibiotics  - CT head neg for acute findings no SDH  - supportive care H/o CHF, stable   - no EKG changes noted  - trend troponin  - ECHO: last  11/1/21: severe TR, Pulm HTN, EF 45-50%  - C/w ASA, statin, BB.   - Hold lasix and spironolactone for now  - CArdio f/u appreciated plan for stress test before d/c - poor oral intake, sepsis   - s/P   IVF,  fair oral intake Monitor UO, improved   - Bladder  scan neg   - Hold lasix and Spironolactone for now   - LAbs in am C/w tele  - C/w BB, Cardizem and Digoxin  - Not on A/c since admission few weeks ago  for SDH  - Cleared by NeuroSx to resume on A/c, start  Eliquis 2.5 mg PO BID   Repeat CT head neg   - Neuro Sx reeval appreciated    PROBLEMS;    Acute Hypoxemic respiratory failure  Possible Pneumonia/UTI   CXR worsening R effusion  pUL htn 48  Metabolic Encephalopathy on baseline Dementia  Elevated Troponin  ADRIANA/ dehydration, improving  Chronic Afib  H/o Fall and SDH WBC trending down  UCX ;  Enterococcus and Ecoli     PLAN;    CT CHEST  IV  zosyn  ECHO: last  11/1/21: severe TR, Pulm HTN, EF 45-50%  Waiting for stress test  C/w ASA, statin, BB.   ON Spironolactone   LAbs in am C/w tele  C/w BB, Cardizem and Digoxin  DVT PROPHYLASIX

## 2021-11-29 NOTE — PROGRESS NOTE ADULT - SUBJECTIVE AND OBJECTIVE BOX
CC: Encephalopathy  Acute hypoxemic Respiratory Failure  Sepsis     Patient is a 80 year old female patient  Dementia, HTN, Diastolic CHF, A. 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 Patient seen and examined and was found to be alert and oriented but unable to provide pertinent narrative. States she does not recall what happened but believes she may have had a fever. Narrative limited secondary to current mental status    In the ED patient found to be febrile, hypoxic and had a WBC of 20. She was given vanco, zosyn and IVF hydration     Poor hsitorian. Poor functional state. Patient recent hospitalization. Denies any HA, CP, SOB. Palliaitive care eval /  wound care eval. Will discuss with Dr. Hameed -  need for nuclear stress test.     REVIEW OF SYSTEMS:  Unable to obtain due to dementia     PHYSICAL EXAM:  T(C): 36.7 (28 Nov 2021 09:07), Max: 36.8 (28 Nov 2021 04:54)  T(F): 98.1 (28 Nov 2021 09:07), Max: 98.3 (28 Nov 2021 04:54)  HR: 80 (28 Nov 2021 15:09) (56 - 81)  BP: 135/62 (28 Nov 2021 15:09) (135/62 - 157/85)  RR: 18 (28 Nov 2021 15:09) (18 - 18)  SpO2: 93% (28 Nov 2021 15:09) (93% - 96%)  General: SICK  Eyes: EOMI; conjunctiva and sclera clear  Head: Normocephalic; L frontal hematoma improving   ENMT: No nasal discharge; dry oral mucosa   Neck: Supple; no JVD   Respiratory: Diminished BS, R>L basilar  crackles , decreased aeration on the (R) side  Cardiovascular: Irregular rate and rhythm. S1 and S2 Normal;   Gastrointestinal: Soft non-tender non-distended; Normal bowel sounds  Genitourinary: No  suprapubic  tenderness  Extremities: + le  with Acre wrap  Neurological: Alert and oriented x1, non focal   Musculoskeletal: Normal muscle tone and strength   Psychiatric: Cooperative    LABS:         # Acute Hypoxemic respiratory failure - likely secondary to  Sepsis Pneumonia, also suspected UTI   # Acute on chronic systolic CHF  - CT chest: New large right pleural effusion and new small left pleural effusion New atelectasis or pneumonia in the right middle and right lower lobes and new ill-defined groundglass densities in the left upper lobe/lingula which may also be infectious  - IV lasix 40 mg q12h  - po aldactone initiated  - daily weights  - low salt diet  - on supplemental oxygen  - BCX: NGTD  - UCX: Enterococcus and Ecoli   - c/w  zosyn  - Supportive care   - Cardiothoracic eval for thoracentesis - hold Eliquis  -  it will be done for treatment / therapeutic purposes    # Metabolic Encephalopathy on baseline Dementia   - likely 2/2 sepsis   - mentation improving  on antibiotics  - CT head neg for acute findings no SDH  - supportive care     # Elevated Troponin, demand ischemia in settings of Sepsis   - no EKG changes noted  - trend troponin  - ECHO: last  11/1/21: severe TR, Pulm HTN, EF 45-50%  - C/w ASA, statin, BB.     # ADRIANA/ dehydration, improving   - s/p IVF  - fair oral intake   - Monitor UO, improved   - Bladder  scan neg     # Chronic Afib  - BB, Cardizem and Digoxin  - patient had a SDH in the past  - hold ELIQUIS tonight -  in case patient to go for thoracentesis  - Care discussed with Dr. Hameed -  no ischemia workup on this admission    # H/o Fall and SDH   - Repeat CT head neg   - Neuro Sx evaluation appreciated    # SCC on Libtayo   - She is known to my colleague Dr. Emelina Richardson at [facility] for multifocal sq cell cancer of skin previously on Libtayo (Cemiplimab-- PD1 antibody) which was held as of June 2021due to pneumonitis.  - unlikely recurrent pneumonitis   - has not received systemic therapy in several months   - Oncology following        CC: Encephalopathy  Acute hypoxemic Respiratory Failure  Sepsis     Patient is a 80 year old female patient  Dementia, HTN, Diastolic CHF, A. 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 Patient seen and examined and was found to be alert and oriented but unable to provide pertinent narrative. States she does not recall what happened but believes she may have had a fever. Narrative limited secondary to current mental status    In the ED patient found to be febrile, hypoxic and had a WBC of 20. She was given vanco, zosyn and IVF hydration     Poor historian Poor functional state. Patient recent hospitalization. No significant clinical improvement since my follow up with the patient. patient remains extremely deconditioned and frail. Family feels she is giving up. CT suggestive of large pleural effusion. Cardiothoracic consulted for diagnostic /  treatment thoracentesis.      REVIEW OF SYSTEMS:  Unable to obtain due to dementia     PHYSICAL EXAM:  T(C): 36.7 (28 Nov 2021 09:07), Max: 36.8 (28 Nov 2021 04:54)  T(F): 98.1 (28 Nov 2021 09:07), Max: 98.3 (28 Nov 2021 04:54)  HR: 80 (28 Nov 2021 15:09) (56 - 81)  BP: 135/62 (28 Nov 2021 15:09) (135/62 - 157/85)  RR: 18 (28 Nov 2021 15:09) (18 - 18)  SpO2: 93% (28 Nov 2021 15:09) (93% - 96%)  General: SICK  Eyes: EOMI; conjunctiva and sclera clear  Head: Normocephalic; L frontal hematoma improving   ENMT: No nasal discharge; dry oral mucosa   Neck: Supple; no JVD   Respiratory: Diminished BS, R>L basilar  crackles , decreased aeration on the (R) side  Cardiovascular: Irregular rate and rhythm. S1 and S2 Normal;   Gastrointestinal: Soft non-tender non-distended; Normal bowel sounds  Genitourinary: No  suprapubic  tenderness  Extremities: + le  with Acre wrap  Neurological: Alert and oriented x1, non focal   Musculoskeletal: Normal muscle tone and strength   Psychiatric: Cooperative    LABS:         # Acute Hypoxemic respiratory failure - likely secondary to  Sepsis Pneumonia, also suspected UTI   # Acute on chronic systolic CHF  - CT chest: New large right pleural effusion and new small left pleural effusion New atelectasis or pneumonia in the right middle and right lower lobes and new ill-defined groundglass densities in the left upper lobe/lingula which may also be infectious  - IV lasix 40 mg q12h  - po aldactone initiated  - daily weights  - low salt diet  - on supplemental oxygen  - BCX: NGTD  - UCX: Enterococcus and Ecoli   - c/w  zosyn  - Supportive care   - Cardiothoracic eval for thoracentesis - hold Eliquis  -  it will be done for treatment / therapeutic purposes    # Metabolic Encephalopathy on baseline Dementia   - likely 2/2 sepsis   - mentation improving  on antibiotics  - CT head neg for acute findings no SDH  - supportive care     # Elevated Troponin, demand ischemia in settings of Sepsis   - no EKG changes noted  - trend troponin  - ECHO: last  11/1/21: severe TR, Pulm HTN, EF 45-50%  - C/w ASA, statin, BB.     # ADRIANA/ dehydration, improving   - s/p IVF  - fair oral intake   - Monitor UO, improved   - Bladder  scan neg     # Chronic Afib  - BB, Cardizem and Digoxin  - patient had a SDH in the past  - hold ELIQUIS tonight -  in case patient to go for thoracentesis  - Care discussed with Dr. Hameed -  no ischemia workup on this admission    # H/o Fall and SDH   - Repeat CT head neg   - Neuro Sx evaluation appreciated    # SCC on Libtayo   - She is known to my colleague Dr. Emelina Richardson at [facility] for multifocal sq cell cancer of skin previously on Libtayo (Cemiplimab-- PD1 antibody) which was held as of June 2021due to pneumonitis.  - unlikely recurrent pneumonitis   - has not received systemic therapy in several months   - Oncology following        CC: Encephalopathy  Acute hypoxemic Respiratory Failure  Sepsis     Patient is a 80 year old female patient  Dementia, HTN, Diastolic CHF, A. 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 Patient seen and examined and was found to be alert and oriented but unable to provide pertinent narrative. States she does not recall what happened but believes she may have had a fever. Narrative limited secondary to current mental status    In the ED patient found to be febrile, hypoxic and had a WBC of 20. She was given vanco, zosyn and IVF hydration     Poor historian Poor functional state. Patient recent hospitalization. No significant clinical improvement since my follow up with the patient. patient remains extremely deconditioned and frail. Family feels she is giving up. CT suggestive of large pleural effusion. Cardiothoracic consulted for diagnostic /  treatment thoracentesis.  MOLST form done with the family.     REVIEW OF SYSTEMS:  Unable to obtain due to dementia     PHYSICAL EXAM:  T(C): 36.7 (28 Nov 2021 09:07), Max: 36.8 (28 Nov 2021 04:54)  T(F): 98.1 (28 Nov 2021 09:07), Max: 98.3 (28 Nov 2021 04:54)  HR: 80 (28 Nov 2021 15:09) (56 - 81)  BP: 135/62 (28 Nov 2021 15:09) (135/62 - 157/85)  RR: 18 (28 Nov 2021 15:09) (18 - 18)  SpO2: 93% (28 Nov 2021 15:09) (93% - 96%)  General: SICK  Eyes: EOMI; conjunctiva and sclera clear  Head: Normocephalic; L frontal hematoma improving   ENMT: No nasal discharge; dry oral mucosa   Neck: Supple; no JVD   Respiratory: Diminished BS, R>L basilar  crackles , decreased aeration on the (R) side  Cardiovascular: Irregular rate and rhythm. S1 and S2 Normal;   Gastrointestinal: Soft non-tender non-distended; Normal bowel sounds  Genitourinary: No  suprapubic  tenderness  Extremities: + le  with Acre wrap  Neurological: Alert and oriented x1, non focal   Musculoskeletal: Normal muscle tone and strength   Psychiatric: Cooperative    LABS:         # Acute Hypoxemic respiratory failure - likely secondary to  Sepsis Pneumonia, also suspected UTI   # Acute on chronic systolic CHF   - CT chest: New large right pleural effusion and new small left pleural effusion New atelectasis or pneumonia in the right middle and right lower lobes and new ill-defined groundglass densities in the left upper lobe/lingula which may also be infectious  - IV lasix 40 mg q12h  - po aldactone initiated  - daily weights  - low salt diet  - on supplemental oxygen  - BCX: NGTD  - UCX: Enterococcus and Ecoli   - c/w  zosyn  - Supportive care   - Cardiothoracic eval for thoracentesis - hold Eliquis  -  it will be done for treatment / therapeutic purposes    # Metabolic Encephalopathy on baseline Dementia   - likely 2/2 sepsis   - mentation improving  on antibiotics  - CT head neg for acute findings no SDH  - supportive care     # Elevated Troponin, demand ischemia in settings of Sepsis   - no EKG changes noted  - trend troponin  - ECHO: last  11/1/21: severe TR, Pulm HTN, EF 45-50%  - C/w ASA, statin, BB.     # ADRIANA/ dehydration, improving   - s/p IVF  - fair oral intake   - Monitor UO, improved   - Bladder  scan neg     # Chronic Afib  - BB, Cardizem and Digoxin  - patient had a SDH in the past  - hold ELIQUIS tonight -  in case patient to go for thoracentesis  - Care discussed with Dr. Hameed -  no ischemia workup on this admission    # H/o Fall and SDH   - Repeat CT head neg   - Neuro Sx evaluation appreciated    # SCC on Libtayo   - She is known to my colleague Dr. Emelina Richardson at [facility] for multifocal sq cell cancer of skin previously on Libtayo (Cemiplimab-- PD1 antibody) which was held as of June 2021due to pneumonitis.  - unlikely recurrent pneumonitis   - has not received systemic therapy in several months   - Oncology following        CC: Encephalopathy  Acute hypoxemic Respiratory Failure  Sepsis     Patient is a 80 year old female patient  Dementia, HTN, Diastolic CHF, A. 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 Patient seen and examined and was found to be alert and oriented but unable to provide pertinent narrative. States she does not recall what happened but believes she may have had a fever. Narrative limited secondary to current mental status    In the ED patient found to be febrile, hypoxic and had a WBC of 20. She was given vanco, zosyn and IVF hydration     Poor historian Poor functional state. Patient recent hospitalization. No significant clinical improvement since my follow up with the patient. patient remains extremely deconditioned and frail. Family feels she is giving up. CT suggestive of large pleural effusion. Cardiothoracic consulted for diagnostic /  treatment thoracentesis.  MOLST form done with the family.     REVIEW OF SYSTEMS:  Unable to obtain due to dementia     PHYSICAL EXAM:  T(C): 36.7 (28 Nov 2021 09:07), Max: 36.8 (28 Nov 2021 04:54)  T(F): 98.1 (28 Nov 2021 09:07), Max: 98.3 (28 Nov 2021 04:54)  HR: 80 (28 Nov 2021 15:09) (56 - 81)  BP: 135/62 (28 Nov 2021 15:09) (135/62 - 157/85)  RR: 18 (28 Nov 2021 15:09) (18 - 18)  SpO2: 93% (28 Nov 2021 15:09) (93% - 96%)  General: SICK  Eyes: EOMI; conjunctiva and sclera clear  Head: Normocephalic; L frontal hematoma improving   ENMT: No nasal discharge; dry oral mucosa   Neck: Supple; no JVD   Respiratory: Diminished BS, R>L basilar  crackles , decreased aeration on the (R) side  Cardiovascular: Irregular rate and rhythm. S1 and S2 Normal;   Gastrointestinal: Soft non-tender non-distended; Normal bowel sounds  Genitourinary: No  suprapubic  tenderness  Extremities: + le  with Acre wrap  Neurological: Alert and oriented x1, non focal   Musculoskeletal: Normal muscle tone and strength   Psychiatric: Cooperative    LABS:         # Acute Hypoxemic respiratory failure - likely secondary to  Sepsis Pneumonia, also suspected UTI   # Acute on chronic systolic CHF   - CT chest: New large right pleural effusion and new small left pleural effusion New atelectasis or pneumonia in the right middle and right lower lobes and new ill-defined groundglass densities in the left upper lobe/lingula which may also be infectious  - IV lasix 40 mg q12h  - po aldactone initiated  - daily weights  - low salt diet  - on supplemental oxygen  - BCX: NGTD  - UCX: Enterococcus and Ecoli   - c/w  zosyn  - Supportive care   - Cardiothoracic eval for thoracentesis - hold Eliquis  -  it will be done for treatment / therapeutic purposes. Care discussed with cardiothoraic NP Kiran    # Metabolic Encephalopathy on baseline Dementia   - likely 2/2 sepsis   - mentation improving  on antibiotics  - CT head neg for acute findings no SDH  - supportive care     # Elevated Troponin, demand ischemia in settings of Sepsis   - no EKG changes noted  - trend troponin  - ECHO: last  11/1/21: severe TR, Pulm HTN, EF 45-50%  - C/w ASA, statin, BB.     # ADRIANA/ dehydration, improving   - s/p IVF  - fair oral intake   - Monitor UO, improved   - Bladder  scan neg     # Chronic Afib  - BB, Cardizem and Digoxin  - patient had a SDH in the past  - hold ELIQUIS tonight -  in case patient to go for thoracentesis  - Care discussed with Dr. Hameed -  no ischemia workup on this admission    # H/o Fall and SDH   - Repeat CT head neg   - Neuro Sx evaluation appreciated    # SCC on Libtayo   - She is known to my colleague Dr. Emelina Richardson at [facility] for multifocal sq cell cancer of skin previously on Libtayo (Cemiplimab-- PD1 antibody) which was held as of June 2021due to pneumonitis.  - unlikely recurrent pneumonitis   - has not received systemic therapy in several months   - Oncology following

## 2021-11-30 NOTE — PROGRESS NOTE ADULT - SUBJECTIVE AND OBJECTIVE BOX
Subjective:    Home Medications:  acetaminophen 325 mg oral tablet: 2 tab(s) orally every 6 hours, As Needed (22 Nov 2021 17:35)  Aldactone 25 mg oral tablet: 0.5 tab(s) orally once a day  Hold for SBP&lt;100 (22 Nov 2021 17:29)  Aquaphor topical ointment: Apply topically to affected area once a day (22 Nov 2021 17:35)  aspirin 81 mg oral delayed release tablet: 1 tab(s) orally once a day (22 Nov 2021 17:29)  bisacodyl 5 mg oral delayed release tablet: 1 tab(s) orally every 12 hours, As needed, Constipation (22 Nov 2021 17:29)  Cardizem 60 mg oral tablet: 1 tab(s) orally every 8 hours 0600, 1400, and 2200 (22 Nov 2021 17:29)  digoxin 250 mcg (0.25 mg) oral tablet: 1 tab(s) orally once a day  Hold for apical pulse&lt;60 (22 Nov 2021 17:29)  folic acid 1 mg oral tablet: 1 tab(s) orally once a day (22 Nov 2021 17:29)  hydrOXYzine hydrochloride 25 mg oral tablet: 1 tab(s) orally once a day (22 Nov 2021 17:29)  Milk of Magnesia 8% oral suspension: 30 milliliter(s) orally once a day (at bedtime), As Needed (22 Nov 2021 17:35)  Multiple Vitamins with Minerals oral tablet: 1 tab(s) orally once a day (22 Nov 2021 17:35)  oxybutynin 5 mg oral tablet: 1 tab(s) orally once a day (22 Nov 2021 17:29)  polyethylene glycol 3350 oral powder for reconstitution: 17 gram(s) orally once a day (22 Nov 2021 17:29)  Vitamin B12 1000 mcg oral tablet: 1 tab(s) orally once a day (22 Nov 2021 17:29)    MEDICATIONS  (STANDING):  aspirin enteric coated 81 milliGRAM(s) Oral daily  cyanocobalamin 1000 MICROGram(s) Oral daily  digoxin     Tablet 250 MICROGram(s) Oral daily  diltiazem    Tablet 60 milliGRAM(s) Oral every 8 hours  folic acid 1 milliGRAM(s) Oral daily  furosemide   Injectable 40 milliGRAM(s) IV Push every 12 hours  hydrOXYzine  Oral Tab/Cap - Peds 25 milliGRAM(s) Oral daily  metoprolol succinate  milliGRAM(s) Oral daily  multivitamin/minerals 1 Tablet(s) Oral daily  oxybutynin 5 milliGRAM(s) Oral daily  polyethylene glycol 3350 17 Gram(s) Oral daily  spironolactone 25 milliGRAM(s) Oral daily    MEDICATIONS  (PRN):  acetaminophen     Tablet .. 650 milliGRAM(s) Oral every 6 hours PRN Temp greater or equal to 38C (100.4F), Mild Pain (1 - 3)  aluminum hydroxide/magnesium hydroxide/simethicone Suspension 30 milliLiter(s) Oral every 4 hours PRN Dyspepsia  bisacodyl 5 milliGRAM(s) Oral every 12 hours PRN Constipation  magnesium hydroxide Suspension 30 milliLiter(s) Oral at bedtime PRN Constipation  melatonin 3 milliGRAM(s) Oral at bedtime PRN Insomnia  ondansetron Injectable 4 milliGRAM(s) IV Push every 8 hours PRN Nausea and/or Vomiting      Allergies    Darvocet-N 50 (Unknown)  sulfa drugs (Other)    Intolerances        Vital Signs Last 24 Hrs  T(C): 36.5 (30 Nov 2021 08:02), Max: 36.5 (30 Nov 2021 08:02)  T(F): 97.7 (30 Nov 2021 08:02), Max: 97.7 (30 Nov 2021 08:02)  HR: 45 (30 Nov 2021 08:02) (45 - 74)  BP: 148/56 (30 Nov 2021 08:02) (135/60 - 164/76)  BP(mean): --  RR: 18 (30 Nov 2021 08:02) (18 - 19)  SpO2: 96% (30 Nov 2021 08:02) (92% - 97%)      PHYSICAL EXAMINATION:    NECK:  Supple. No lymphadenopathy. Jugular venous pressure not elevated. Carotids equal.   HEART:   The cardiac impulse has a normal quality. Reg., Nl S1 and S2.  There are no murmurs, rubs or gallops noted  CHEST:  Chest is clear to auscultation. Normal respiratory effort.  ABDOMEN:  Soft and nontender.   EXTREMITIES:  There is no edema.       LABS:                        10.3   12.42 )-----------( 565      ( 29 Nov 2021 17:59 )             33.0     11-29    140  |  109<H>  |  18  ----------------------------<  111<H>  4.3   |  26  |  0.91    Ca    8.5      29 Nov 2021 17:59          < from: CT Chest No Cont (11.29.21 @ 16:31) >  IMPRESSION:    1.  New large right pleural effusion and new small left pleural effusion    2.  New atelectasis or pneumonia in the right middle and right lower lobes and new ill-defined groundglass densities in the left upper lobe/lingula which may also be infectious    3.  Marked biatrial enlargement    < end of copied text >         Subjective:    pat better, lying in bed, CT chest large R pleural effusion.    Home Medications:  acetaminophen 325 mg oral tablet: 2 tab(s) orally every 6 hours, As Needed (22 Nov 2021 17:35)  Aldactone 25 mg oral tablet: 0.5 tab(s) orally once a day  Hold for SBP&lt;100 (22 Nov 2021 17:29)  Aquaphor topical ointment: Apply topically to affected area once a day (22 Nov 2021 17:35)  aspirin 81 mg oral delayed release tablet: 1 tab(s) orally once a day (22 Nov 2021 17:29)  bisacodyl 5 mg oral delayed release tablet: 1 tab(s) orally every 12 hours, As needed, Constipation (22 Nov 2021 17:29)  Cardizem 60 mg oral tablet: 1 tab(s) orally every 8 hours 0600, 1400, and 2200 (22 Nov 2021 17:29)  digoxin 250 mcg (0.25 mg) oral tablet: 1 tab(s) orally once a day  Hold for apical pulse&lt;60 (22 Nov 2021 17:29)  folic acid 1 mg oral tablet: 1 tab(s) orally once a day (22 Nov 2021 17:29)  hydrOXYzine hydrochloride 25 mg oral tablet: 1 tab(s) orally once a day (22 Nov 2021 17:29)  Milk of Magnesia 8% oral suspension: 30 milliliter(s) orally once a day (at bedtime), As Needed (22 Nov 2021 17:35)  Multiple Vitamins with Minerals oral tablet: 1 tab(s) orally once a day (22 Nov 2021 17:35)  oxybutynin 5 mg oral tablet: 1 tab(s) orally once a day (22 Nov 2021 17:29)  polyethylene glycol 3350 oral powder for reconstitution: 17 gram(s) orally once a day (22 Nov 2021 17:29)  Vitamin B12 1000 mcg oral tablet: 1 tab(s) orally once a day (22 Nov 2021 17:29)    MEDICATIONS  (STANDING):  aspirin enteric coated 81 milliGRAM(s) Oral daily  cyanocobalamin 1000 MICROGram(s) Oral daily  digoxin     Tablet 250 MICROGram(s) Oral daily  diltiazem    Tablet 60 milliGRAM(s) Oral every 8 hours  folic acid 1 milliGRAM(s) Oral daily  furosemide   Injectable 40 milliGRAM(s) IV Push every 12 hours  hydrOXYzine  Oral Tab/Cap - Peds 25 milliGRAM(s) Oral daily  metoprolol succinate  milliGRAM(s) Oral daily  multivitamin/minerals 1 Tablet(s) Oral daily  oxybutynin 5 milliGRAM(s) Oral daily  polyethylene glycol 3350 17 Gram(s) Oral daily  spironolactone 25 milliGRAM(s) Oral daily    MEDICATIONS  (PRN):  acetaminophen     Tablet .. 650 milliGRAM(s) Oral every 6 hours PRN Temp greater or equal to 38C (100.4F), Mild Pain (1 - 3)  aluminum hydroxide/magnesium hydroxide/simethicone Suspension 30 milliLiter(s) Oral every 4 hours PRN Dyspepsia  bisacodyl 5 milliGRAM(s) Oral every 12 hours PRN Constipation  magnesium hydroxide Suspension 30 milliLiter(s) Oral at bedtime PRN Constipation  melatonin 3 milliGRAM(s) Oral at bedtime PRN Insomnia  ondansetron Injectable 4 milliGRAM(s) IV Push every 8 hours PRN Nausea and/or Vomiting      Allergies    Darvocet-N 50 (Unknown)  sulfa drugs (Other)    Intolerances        Vital Signs Last 24 Hrs  T(C): 36.5 (30 Nov 2021 08:02), Max: 36.5 (30 Nov 2021 08:02)  T(F): 97.7 (30 Nov 2021 08:02), Max: 97.7 (30 Nov 2021 08:02)  HR: 45 (30 Nov 2021 08:02) (45 - 74)  BP: 148/56 (30 Nov 2021 08:02) (135/60 - 164/76)  BP(mean): --  RR: 18 (30 Nov 2021 08:02) (18 - 19)  SpO2: 96% (30 Nov 2021 08:02) (92% - 97%)      PHYSICAL EXAMINATION:    NECK:  Supple. No lymphadenopathy. Jugular venous pressure not elevated. Carotids equal.   HEART:   The cardiac impulse has a normal quality. Reg., Nl S1 and S2.  There are no murmurs, rubs or gallops noted  CHEST:  Chest crackles to auscultation. Normal respiratory effort.  ABDOMEN:  Soft and nontender.   EXTREMITIES:  There is no edema.       LABS:                        10.3   12.42 )-----------( 565      ( 29 Nov 2021 17:59 )             33.0     11-29    140  |  109<H>  |  18  ----------------------------<  111<H>  4.3   |  26  |  0.91    Ca    8.5      29 Nov 2021 17:59          CT Chest No Cont (11.29.21 @ 16:31) >  IMPRESSION:    1.  New large right pleural effusion and new small left pleural effusion    2.  New atelectasis or pneumonia in the right middle and right lower lobes and new ill-defined groundglass densities in the left upper lobe/lingula which may also be infectious    3.  Marked biatrial enlargement

## 2021-11-30 NOTE — CONSULT NOTE ADULT - ASSESSMENT
Pt is a 80y old Female with hx of Dementia, HTN, Diastolic CHF, A. 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 Palliative medicine consulted to help establish GOC and advance care planning.      1) Acute Hypoxemic respiratory failure  - secondary to  Sepsis Pneumonia  - R Large pleural effusion   - monitor labs   - c/w zosyn     2) Dementia/debility  - unsure baseline   - supportive care   - debility   - s/p fall   - PT consult when able      3) Acute on Chronic Systolic  CHF vs parapneumonic effusion/empyema  - Last echo: EF 40-45%, decreased LV FX, mod to severe TR, Severe Pulm HTN   - c/w Lasix 40mg   - c/wEliquis    4)  Chronic Afib  - C/w BB, Cardizem and Digoxin  - c/w Eliquis 2.5 mg PO BID    5) advance care planning   - Patient lacks capacity to medical decision making   - MOLST: DNR/DNI  - will reach out to family to schedule GOC meeting

## 2021-11-30 NOTE — CONSULT NOTE ADULT - SUBJECTIVE AND OBJECTIVE BOX
HPI: Pt is a 80y old Female with hx of       PAIN: ( )Yes   ( )No  Level:  Location:  Intensity:    /10  Quality:  Aggravating Factors:  Alleviating Factors:  Radiation:  Duration/Timing:  Impact on ADLs:    DYSPNEA: ( ) Yes  ( ) No  Level:    PAST MEDICAL & SURGICAL HISTORY:  CHF (congestive heart failure)    Atrial fibrillation, unspecified type    Hypertension    Squamous cell carcinoma    H/O total hysterectomy    History of appendectomy    S/P cholecystectomy        SOCIAL HX:    Hx opiate tolerance ( )YES  ( )NO    Baseline ADLs  (Prior to Admission)  ( ) Independent   ( )Dependent    FAMILY HISTORY:  Family history of lung cancer (Mother)        Review of Systems:    Anxiety-  Depression-  Physical Discomfort-  Dyspnea-  Constipation-  Diarrhea-  Nausea-  Vomiting-  Anorexia-  Weight Loss-   Cough-  Secretions-  Fatigue-  Weakness-  Delirium-    All other systems reviewed and negative  Unable to obtain/Limited due to:      PHYSICAL EXAM:    Vital Signs Last 24 Hrs  T(C): 36.5 (2021 08:02), Max: 36.5 (2021 08:02)  T(F): 97.7 (2021 08:02), Max: 97.7 (2021 08:02)  HR: 45 (2021 08:02) (45 - 74)  BP: 148/56 (2021 08:02) (135/60 - 151/62)  BP(mean): --  RR: 18 (2021 08:02) (18 - 19)  SpO2: 96% (2021 08:02) (94% - 97%)  Daily     Daily Weight in k.8 (2021 05:25)    PPSV2:   %  FAST:    General:  Mental Status:  HEENT:  Lungs:  Cardiac:  GI:  :  Ext:  Neuro:      LABS:                        10.3   12.42 )-----------( 565      ( 2021 17:59 )             33.0     11-29    140  |  109<H>  |  18  ----------------------------<  111<H>  4.3   |  26  |  0.91    Ca    8.5      2021 17:59        Albumin:     Allergies    Darvocet-N 50 (Unknown)  sulfa drugs (Other)    Intolerances      MEDICATIONS  (STANDING):  aspirin enteric coated 81 milliGRAM(s) Oral daily  cyanocobalamin 1000 MICROGram(s) Oral daily  digoxin     Tablet 250 MICROGram(s) Oral daily  diltiazem    Tablet 60 milliGRAM(s) Oral every 8 hours  folic acid 1 milliGRAM(s) Oral daily  furosemide   Injectable 40 milliGRAM(s) IV Push every 12 hours  hydrOXYzine  Oral Tab/Cap - Peds 25 milliGRAM(s) Oral daily  metoprolol succinate  milliGRAM(s) Oral daily  multivitamin/minerals 1 Tablet(s) Oral daily  oxybutynin 5 milliGRAM(s) Oral daily  polyethylene glycol 3350 17 Gram(s) Oral daily  spironolactone 25 milliGRAM(s) Oral daily  zinc oxide 40% Ointment 1 Application(s) Topical two times a day    MEDICATIONS  (PRN):  acetaminophen     Tablet .. 650 milliGRAM(s) Oral every 6 hours PRN Temp greater or equal to 38C (100.4F), Mild Pain (1 - 3)  aluminum hydroxide/magnesium hydroxide/simethicone Suspension 30 milliLiter(s) Oral every 4 hours PRN Dyspepsia  bisacodyl 5 milliGRAM(s) Oral every 12 hours PRN Constipation  magnesium hydroxide Suspension 30 milliLiter(s) Oral at bedtime PRN Constipation  melatonin 3 milliGRAM(s) Oral at bedtime PRN Insomnia  ondansetron Injectable 4 milliGRAM(s) IV Push every 8 hours PRN Nausea and/or Vomiting      RADIOLOGY/ADDITIONAL STUDIES: HPI: Pt is a 80y old Female with hx of Dementia, HTN, Diastolic CHF, A. 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 Palliative medicine consulted to help establish GOC and advance care planning.      2021 patient seen and examined with no family at bedside patient appears comfortable patient lethargic but responds to voice, oriented to time and place unable to provided any medical history     PAIN: ( )Yes   ( x)No  patient appears comfortable at this time     DYSPNEA: ( ) Yes  (x ) No  Level:    PAST MEDICAL & SURGICAL HISTORY:  CHF (congestive heart failure)  Atrial fibrillation, unspecified type  Hypertension  Squamous cell carcinoma  H/O total hysterectomy  History of appendectomy  S/P cholecystectomy    SOCIAL HX:    Hx opiate tolerance ( )YES  ( x)NO    Baseline ADLs  (Prior to Admission)  ( ) Independent   (x )Dependent    FAMILY HISTORY:  Family history of lung cancer (Mother)    Review of Systems:    Unable to obtain/Limited due to: Dementia     PHYSICAL EXAM:    Vital Signs Last 24 Hrs  T(C): 36.5 (2021 08:02), Max: 36.5 (2021 08:02)  T(F): 97.7 (2021 08:02), Max: 97.7 (2021 08:02)  HR: 45 (2021 08:02) (45 - 74)  BP: 148/56 (2021 08:02) (135/60 - 151/62)  RR: 18 (2021 08:02) (18 - 19)  SpO2: 96% (2021 08:02) (94% - 97%)    Daily Weight in k.8 (2021 05:25)    PPSV2: 30  %  FAST: unsure of baseline     General: elderly female in bed, NAD   Mental Status: alert, oriented to self and place   HEENT: dry oral mucosa, multiple scab areas   Lungs: diminished breath sounds   Cardiac: S1S2 +   GI: nontender, non distended + BS   : + voiding   Ext: edema +, bandages on leg   Neuro: dementia     LABS:                        10.3   12.42 )-----------( 565      ( 2021 17:59 )             33.0     -    140  |  109<H>  |  18  ----------------------------<  111<H>  4.3   |  26  |  0.91    Ca    8.5      2021 17:59    Albumin:     Allergies    Darvocet-N 50 (Unknown)  sulfa drugs (Other)    Intolerances    MEDICATIONS  (STANDING):  aspirin enteric coated 81 milliGRAM(s) Oral daily  cyanocobalamin 1000 MICROGram(s) Oral daily  digoxin     Tablet 250 MICROGram(s) Oral daily  diltiazem    Tablet 60 milliGRAM(s) Oral every 8 hours  folic acid 1 milliGRAM(s) Oral daily  furosemide   Injectable 40 milliGRAM(s) IV Push every 12 hours  hydrOXYzine  Oral Tab/Cap - Peds 25 milliGRAM(s) Oral daily  metoprolol succinate  milliGRAM(s) Oral daily  multivitamin/minerals 1 Tablet(s) Oral daily  oxybutynin 5 milliGRAM(s) Oral daily  polyethylene glycol 3350 17 Gram(s) Oral daily  spironolactone 25 milliGRAM(s) Oral daily  zinc oxide 40% Ointment 1 Application(s) Topical two times a day    MEDICATIONS  (PRN):  acetaminophen     Tablet .. 650 milliGRAM(s) Oral every 6 hours PRN Temp greater or equal to 38C (100.4F), Mild Pain (1 - 3)  aluminum hydroxide/magnesium hydroxide/simethicone Suspension 30 milliLiter(s) Oral every 4 hours PRN Dyspepsia  bisacodyl 5 milliGRAM(s) Oral every 12 hours PRN Constipation  magnesium hydroxide Suspension 30 milliLiter(s) Oral at bedtime PRN Constipation  melatonin 3 milliGRAM(s) Oral at bedtime PRN Insomnia  ondansetron Injectable 4 milliGRAM(s) IV Push every 8 hours PRN Nausea and/or Vomiting    RADIOLOGY/ADDITIONAL STUDIES:    EXAM:  CT CHEST                          PROCEDURE DATE:  2021    INTERPRETATION:  CLINICAL INFORMATION: Shortness of breath, effusion  TECHNIQUE:  A volumetric CT acquisition of the chest was obtained from the thoracic inlet to theupper abdomen, without the administration  of intravenous contrast. Coronal and sagittal multiplanar reformations were also submitted.  Comparison: 10/28/2021  FINDINGS:  Lungs/Airways/Pleura: There are new ill-defined groundglass densities in left upper lobe and lingula. Previous groundglass densities in the right lung are contained within atelectatic lung. There is new atelectasis or consolidation in the right middle and right lower lobes. New large right pleural effusion and new small left pleural effusion.  Mediastinum/Lymph nodes: No thoracic adenopathy.  Heart and Vessels: There is marked biatrial enlargement. Leadless pacemaker is in the right ventricle. No pericardial effusion. There are mild coronary artery calcifications. The ascending aorta is mildly dilated measuring 4.1 cm.  Upper Abdomen: Cholecystectomy has been performed.  Osseous structures and Soft Tissues: No aggressive bone lesions. Old right rib fractures are unchanged. There is diffuse qualitative osteopenia    IMPRESSION:  1.  New large right pleural effusion and new small left pleural effusion  2.  New atelectasis or pneumonia in the right middle and right lower lobes and new ill-defined groundglass densities in the left upper lobe/lingula which may also be infectious  3.  Marked biatrial enlargement    EXAM:  XR CHEST PORTABLE URGENT 1V                        PROCEDURE DATE:  2021    INTERPRETATION:  Clinical history: Congestive heart failure  Portable chest  New moderate to large right pleural effusion is seen. Persistent congestive changes also noted. No pneumothorax.    IMPRESSION: New right pleural effusion as above

## 2021-11-30 NOTE — PROGRESS NOTE ADULT - ASSESSMENT
80 year old female patient  Dementia, HTN, Diastolic CHF, A. 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 Acute Hypoxemic respiratory failure  -secondary to  Sepsis Pneumonia,  now developed   R Large pleural effusion   - S/p IVF, lactate improved. Still leukocytosis, improved   - no fevers   -on supplemental oxygen  - F/u BCX: NGTD,   UCX ;  Enterococcus and Ecoli . Repeat UCX : NG but was on IV Abxs   -On   zosyn  - Supportive care       2. Metabolic Encephalopathy on baseline Dementia   - likely 2/2 sepsis/hypoxia   -CT head neg for acute findings no  SDH  - supportive care     3. R large pleural effusion   Acute on Chronic Systolic  CHF vs parapneumonic effusion/empyema?  Last echo: EF 40-45%, decreased LV FX, mod to severe TR, Severe Pulm HTN   Started on LAsix 40mg IVP BID, Spironolactone  Hold Eliquis  D/w CT Sx, will plan for Tx and DX thoracentesis in am     4. Elevated Troponin, demand ischemia in settings of Sepsis, suspect underline CAD   -no EKG changes noted  - troponin trended down   - ECHO: last  11/1/21: severe TR, Pulm HTN, EF 45-50%  - C/w ASA, statin, BB.   - CArdio f/u appreciated plan was  for stress test but  on hold now        5.  ADRIANA/ dehydratin  - Improved s/p gentle IVF   - encourage oral intake   - Bladder  scan neg   - Monitor on lasix and Spironolactone   - LAbs in am       # Chronic Afib  - C/w BB, Cardizem and Digoxin  - Cleared by NeuroSx to resume Eliquis 2.5 mg PO BID, now held for procedure     # H/o Fall and SDH   Repeat CT head neg   Neuro Sx reeval appreciated     # DVT PPX: heparin SQ    ACP: DNR/DNI, Palliative eval for GOC, poor prognosis

## 2021-11-30 NOTE — PROGRESS NOTE ADULT - ASSESSMENT
PROBLEMS;    Acute Hypoxemic respiratory failure  Possible Pneumonia/UTI   CXR worsening R effusion  pUL htn 48  Metabolic Encephalopathy on baseline Dementia  Elevated Troponin  ADRIANA/ dehydration, improving  Chronic Afib  H/o Fall and SDH WBC trending down  UCX ;  Enterococcus and Ecoli     PLAN;    CT CHEST  IV  zosyn  ECHO: last  11/1/21: severe TR, Pulm HTN, EF 45-50%  Waiting for stress test  C/w ASA, statin, BB.   ON Spironolactone   LAbs in am C/w tele  C/w BB, Cardizem and Digoxin  DVT PROPHYLASIX   PROBLEMS;    Acute Hypoxemic respiratory failure  Possible Pneumonia/UTI   CXR worsening R effusion  pUL htn 48  Metabolic Encephalopathy on baseline Dementia  Elevated Troponin  ADRIANA/ dehydration, improving  Chronic Afib  H/o Fall and SDH WBC trending down  UCX ;  Enterococcus and Ecoli     PLAN;    CT CHEST large pleural effusion-CT surgery for drainage  IV  zosyn  ECHO: last  11/1/21: severe TR, Pulm HTN, EF 45-50%  C/w ASA, statin, BB.   ON Spironolactone   LAbs in am C/w tele  C/w BB, Cardizem and Digoxin  DVT PROPHYLASIX

## 2021-11-30 NOTE — CONSULT NOTE ADULT - ASSESSMENT
80 year old female patient who currently resides in a local nursing home presented to the ED after she was found to be febrile and had mental status changes. Patient seen and examined and was found to be alert and oriented but unable to provide pertinent narrative. States she does not recall what happened but believes she may have had a fever. Narrative limited secondary to current mental status. Upon workup patient noted to have Rt Pleural effusion       Plan  Hold eliquis today  Plan for thoracentesis in am   SCD boots for DVTP   cont care as per primary team   DW DR Benson

## 2021-11-30 NOTE — CONSULT NOTE ADULT - SUBJECTIVE AND OBJECTIVE BOX
Consult for Rt pleural effusion   80 year old female patient who currently resides in a local nursing home presented to the ED after she was found to be febrile and had mental status changes. Patient seen and examined and was found to be alert and oriented but unable to provide pertinent narrative. States she does not recall what happened but believes she may have had a fever. Narrative limited secondary to current mental status    patient noted to have a worsening Rt Pleural effusion. Eliquis held today for thoracentesis in am        T(C): 36.5 (11-30-21 @ 08:02), Max: 36.5 (11-30-21 @ 08:02)  HR: 45 (11-30-21 @ 08:02) (45 - 74)  BP: 148/56 (11-30-21 @ 08:02) (135/60 - 151/62)  ABP: --  ABP(mean): --  RR: 18 (11-30-21 @ 08:02) (18 - 19)  SpO2: 96% (11-30-21 @ 08:02) (94% - 97%)  Wt(kg): --  CVP(mm Hg): --  CO: --  CI: --  PA: --                                              Tele: Afib             11-29    140  |  109<H>  |  18  ----------------------------<  111<H>  4.3   |  26  |  0.91    Ca    8.5      29 Nov 2021 17:59                                 10.3   12.42 )-----------( 565      ( 29 Nov 2021 17:59 )             33.0                 CAPILLARY BLOOD GLUCOSE    < from: CT Chest No Cont (11.29.21 @ 16:31) >  FINDINGS:    Lungs/Airways/Pleura: There are new ill-defined groundglass densities in left upper lobe and lingula. Previous groundglass densities in the right lung are contained within atelectatic lung. There is new atelectasis or consolidation in the right middle and right lower lobes. New large right pleural effusion and new small left pleural effusion.    Mediastinum/Lymph nodes: No thoracic adenopathy.    Heart and Vessels: There is marked biatrial enlargement. Leadless pacemaker is in the right ventricle. No pericardial effusion. There are mild coronary artery calcifications. The ascending aorta is mildly dilated measuring 4.1 cm.    Upper Abdomen: Cholecystectomy has been performed.    Osseous structures and Soft Tissues: No aggressive bone lesions. Old right rib fractures are unchanged. There is diffuse qualitative osteopenia    IMPRESSION:    1.  New large right pleural effusion and new small left pleural effusion    2.  New atelectasis or pneumonia in the right middle and right lower lobes and new ill-defined groundglass densities in the left upper lobe/lingula which may also be infectious    3.  Marked biatrial enlargement    < end of copied text >                   Neuro: pleasant , confused   Pulm: decreased Rt > Lt   CV: Irreg S1 S2   Abd: soft   Extremities:  warm         Assessment:  80yFemale    with PAST MEDICAL & SURGICAL HISTORY:  CHF (congestive heart failure)    Atrial fibrillation, unspecified type    Hypertension    Squamous cell carcinoma    H/O total hysterectomy    History of appendectomy    S/P cholecystectomy          Plan:

## 2021-11-30 NOTE — PROGRESS NOTE ADULT - SUBJECTIVE AND OBJECTIVE BOX
CC: Encephalopathy  Acute hypoxemic Respiratory Failure  Sepsis     HPI: 80 year old female patient  Dementia, HTN, Diastolic CHF, A. 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 Patient seen and examined and was found to be alert and oriented but unable to provide pertinent narrative. States she does not recall what happened but believes she may have had a fever. Narrative limited secondary to current mental status  In the ED patient found to be febrile, hypoxic and had a WBC of 20. She was given vanco, zosyn and IVF hydration     INTERVAL HPI/ OVERNIGHT EVENTS:  Pt was seen and examined,   confused, looks weak, c/o SOB     Vital Signs Last 24 Hrs  T(C): 36.5 (2021 08:02), Max: 36.5 (2021 08:02)  T(F): 97.7 (2021 08:02), Max: 97.7 (2021 08:02)  HR: 45 (2021 08:02) (45 - 74)  BP: 148/56 (2021 08:02) (135/60 - 151/62)  RR: 18 (2021 08:02) (18 - 19)  SpO2: 96% (2021 08:02) (94% - 97%)      REVIEW OF SYSTEMS:  Unable to obtain due to dementia       PHYSICAL EXAM:  General: Well developed; malnourished,  in no acute distress, on NC   Eyes: EOMI; conjunctiva and sclera clear  Head: Normocephalic; L frontal hematoma improving   ENMT: No nasal discharge; dry oral mucosa   Neck: Supple; no JVD   Respiratory: Diminished BS on R, + R  crackles   Cardiovascular: Irregular rate and rhythm. S1 and S2 Normal;   Gastrointestinal: Soft non-tender non-distended; Normal bowel sounds  Genitourinary: No  suprapubic  tenderness  Extremities: + le  with Acre wrap  Neurological: Alert and oriented x1, non focal   Musculoskeletal: Normal muscle tone and strength   Psychiatric: Cooperative        LABS:                           10.3   12.42 )-----------( 565      ( 2021 17:59 )             33.0     11-29    140  |  109<H>  |  18  ----------------------------<  111<H>  4.3   |  26  |  0.91    Ca    8.5      2021 17:59                          9.9    15.52 )-----------( 388      ( 2021 07:16 )             30.6         138  |  106  |  24<H>  ----------------------------<  97  4.2   |  28  |  1.02    Ca    8.2<L>      2021 07:16                          10.2   17.86 )-----------( 361      ( 2021 11:44 )             30.5         136  |  104  |  26<H>  ----------------------------<  155<H>  4.0   |  24  |  1.08    Ca    8.2<L>      2021 11:44  Mg     2.2             Urinalysis Basic - ( 2021 17:40 )  Color: Yellow / Appearance: Slightly Turbid / S.020 / pH: x  Gluc: x / Ketone: Negative  / Bili: Negative / Urobili: 1   Blood: x / Protein: unable to obtai mg/dL / Nitrite: Negative   Leuk Esterase: Small / RBC: 0-2 /HPF / WBC    Sq Epi: x / Non Sq Epi: Few / Bacteria: Moderate  Culture - Urine (21 @ 17:40)   Specimen Source: Clean Catch Clean Catch (Midstream)   Culture Results:   <10,000 CFU/mL Normal Urogenital Ivy       Culture - Urine (21 @ 15:35)   - Amikacin: S <=16   - Amoxicillin/Clavulanic Acid: I    - Ampicillin: R >16 These ampicillin results predict results for amoxicillin   - Ampicillin: S <=2 Predicts results to ampicillin/sulbactam, amoxacillin-clavulanate and piperacillin-tazobactam.   - Ampicillin/Sulbactam: R > Enterobacter, Klebsiella aerogenes, Citrobacter, and Serratia may develop resistance during prolonged therapy (3-4 days)   - Aztreonam: S <=4   - Cefazolin: S 16 (MIC_CL_COM_ENTERIC_[id number]) For uncomplicated UTI with K. pneumoniae, E. coli, or P. mirablis: CECILY <=16 is sensitive and CECILY >=32 is resistant. This also predicts results for oral agents cefaclor, cefdinir, cefpodoxime, cefprozil, cefuroxime axetil, cephalexin and locarbef for uncomplicated UTI. Note that some isolates may be susceptible to these agents while testing resistant to cefazolin.   - Cefepime: S <=2   - Cefoxitin: S <=8   - Ceftriaxone: S <=1 Enterobacter, Klebsiella aerogenes, Citrobacter, and Serratia may develop resistance during prolonged therapy   - Ciprofloxacin: S <=0.25   - Ciprofloxacin: S <=1   - Ertapenem: S <=0.5   - Gentamicin: S <=2   - Imipenem: S <=1   - Levofloxacin: S <=0.5   - Levofloxacin: S <=1   - Meropenem: S <=1   - Nitrofurantoin: S <=32 Should not be used to treat pyelonephritis   - Nitrofurantoin: S <=32 Should not be used to treat pyelonephritis.   - Piperacillin/Tazobactam: S <=8   - Tetra/Doxy: R >8   - Tigecycline: S <=2   - Tobramycin: S <=2   - Trimethoprim/Sulfamethoxazole: R >2/38   - Vancomycin: S 2   Specimen Source: Clean Catch Clean Catch (Midstream)   Culture Results:   50,000 - 99,000 CFU/mL Escherichia coli   50,000 - 99,000 CFU/mL Enterococcus faecalis   Organism Identification: Escherichia coli   Enterococcus faecalis   Organism: Escherichia coli   Organism: Enterococcus faecalis     MEDICATIONS  (STANDING):  aspirin enteric coated 81 milliGRAM(s) Oral daily  cyanocobalamin 1000 MICROGram(s) Oral daily  digoxin     Tablet 250 MICROGram(s) Oral daily  diltiazem    Tablet 60 milliGRAM(s) Oral every 8 hours  folic acid 1 milliGRAM(s) Oral daily  furosemide   Injectable 40 milliGRAM(s) IV Push every 12 hours  hydrOXYzine  Oral Tab/Cap - Peds 25 milliGRAM(s) Oral daily  metoprolol succinate  milliGRAM(s) Oral daily  multivitamin/minerals 1 Tablet(s) Oral daily  oxybutynin 5 milliGRAM(s) Oral daily  polyethylene glycol 3350 17 Gram(s) Oral daily  spironolactone 25 milliGRAM(s) Oral daily  zinc oxide 40% Ointment 1 Application(s) Topical two times a day    MEDICATIONS  (PRN):  acetaminophen     Tablet .. 650 milliGRAM(s) Oral every 6 hours PRN Temp greater or equal to 38C (100.4F), Mild Pain (1 - 3)  aluminum hydroxide/magnesium hydroxide/simethicone Suspension 30 milliLiter(s) Oral every 4 hours PRN Dyspepsia  bisacodyl 5 milliGRAM(s) Oral every 12 hours PRN Constipation  magnesium hydroxide Suspension 30 milliLiter(s) Oral at bedtime PRN Constipation  melatonin 3 milliGRAM(s) Oral at bedtime PRN Insomnia  ondansetron Injectable 4 milliGRAM(s) IV Push every 8 hours PRN Nausea and/or Vomiting      RADIOLOGY & ADDITIONAL TESTS:      EXAM:  CT BRAIN                        PROCEDURE DATE:  2021      FINDINGS:  The ventricles and sulci are prominent, compatible with age-related generalized cerebral volume loss.   There is no CT evidence for acute cerebral cortical infarct. There is a tiny lacunar infarct in the left basal ganglia of indeterminate age. There is no evidence of hemorrhage. There is periventricular and subcortical white matter hypoattenuation,  most compatible with chronic microvascular ischemic changes.   No mass effect is found in the brain.  There is no midline shift or herniation pattern.      The visualized portions of the paranasal sinuses and mastoid air cells are clear.    IMPRESSION:    There is a tiny lacunar infarctin the left basal ganglia of indeterminate age.  No evidence of hemorrhage.    Chronic changes as above.        EXAM:  CT BRAIN                            PROCEDURE DATE:  2021          INTERPRETATION:  CT head without IV contrast    CLINICAL INFORMATION:  Fall, RIGHT   Intracranial hemorrhage.    TECHNIQUE: Contiguous axial 5 mm sections were obtainedthrough the head. Sagittal and coronal 2-D reformatted images were also obtained.   This scan was performed using automatic exposure control (radiation dose reduction software) to obtain a diagnostic image quality scan with patient dose as low as reasonably achievable.    FINDINGS:   CT dated 10/29/2021 available for review.    The brain demonstrates decrease in the degree of intracranial hemorrhage within the RIGHT sylvian fissure and RIGHT superior temporal lobe. Moderate periventricular white matter ischemia is noted. Tiny old lacunar infarction in the basal ganglia. No acute cerebral cortical infarct is seen. No mass effect is found in the brain.    The ventricles, sulci and basal cisterns appear unremarkable.    The orbits are unremarkable.  The paranasal sinuses are clear.  The nasal cavity appears intact.  The nasopharynx is symmetric.  The central skull base, petrous temporal bones and calvarium remain intact. 3.4 cm LEFT frontal scalp hematoma is noted.      IMPRESSION:   Interval decrease in the degree of intracranial hemorrhage within the RIGHT sylvian fissure and RIGHT superior temporal lobe. Moderate periventricular white matter ischemia is noted. Tiny old lacunar infarction in the basal ganglia.           EXAM:  ECHO TTE WO CON COMP W DOPP    PROCEDURE DATE:  2021     Summary     Technically limited study   The left ventricle cavity is mildly dilated.   Endocardium is not well visualized, however, overall left ventricular   systolic function appears diminished.   Estimated left ventricular ejection fraction is 40-45%.   Wall motion abnormalities can not be ruled out.   The left atrium is moderately dilated.   The right atrium appears moderately dilated.   The right ventricle is mildly dilated.   Fibrocalcific changes noted to the Aortic valve leaflets with preserved   leaflet excursion.   Trace aortic regurgitation is present.   Fibrocalcific changes noted to the mitral valve leaflets with preserved   leaflet excursion.   Mild mitral annular calcification is present.   Moderate mitral regurgitation is present.   The tricuspid valve leaflets appear mildly thickened and/or calcified, but   open well.   Severe (4+) tricuspid valve regurgitation is present.   Mild pulmonary hypertension.   No evidence of pericardial effusion.

## 2021-12-01 NOTE — DIETITIAN NUTRITION RISK NOTIFICATION - ADDITIONAL COMMENTS/DIETITIAN RECOMMENDATIONS
· Additional Recommendations  1) continue with regular diet and encourage po with assistance during meals 2) Ensure enlive 8oz po TID 3) monitor weights track trends 4) Suggest add MVI w/minerals to meet RDI's 5) monitor labs/lytes and hydration replete prn 6) expand GOC addressing nutrition support. Nutrition support is not recommended due to overall declining medical status which evidenced based studies indicate EN is not effective in prolonging survival and improving quality of life. It can also increase risk of aspiration pneumonia as well as other related issues.

## 2021-12-01 NOTE — CONSULT NOTE ADULT - CONSULT REQUESTED DATE/TIME
30-Nov-2021 15:00
30-Nov-2021 13:27
24-Nov-2021 08:32
29-Nov-2021 09:01
24-Nov-2021 12:54
23-Nov-2021
01-Dec-2021 11:39

## 2021-12-01 NOTE — CHART NOTE - NSCHARTNOTEFT_GEN_A_CORE
HPI:  80 year old female patient who currently resides in a local nursing home presented to the ED after she was found to be febrile and had mental status changes. (22 Nov 2021 19:46)      PERTINENT PMH REVIEWED:  [ X ] YES [ ] NO           Primary Contact:  Pts son Zach Gaines is [phone]    HCP [ X ] Surrogate [   ] Guardian [   ]    Mental Status: Pt. is alert but confused   Concerns of Depression [  ]- unable to assess due to confusion   Anxiety [   ]- unable to assess due to confusion   Baseline ADLs (prior to admission):  Independent [ ] moderately [ ] fully   Dependent   [ X ] moderately [ ]fully    Family Meeting: Scheduled for Friday at 1:00PM    Anticipated Grief: Not identified, to assess with family     Caregiver [place] Assessed: Yes [ X ] No [  ]    [place]: Anglican     Spiritual Concerns:  to see for support     Goals of Care: to be further determined at meeting     Previous Services: [facility]     ADVANCE DIRECTIVES: MOLST: DNR/DNI    Anticipated D/C Plan: to be determined                     Summary:    This SW met with pt. at bedside. Pt. was alert however, was confused. Pt. was pleasant but was not able to have a meaningful conversation. Goals of Care meeting with Palliative NP Astrid Chun scheduled for Friday at 1:00PM. This SW to follow up with family after family meeting. Our team will continue to follow.

## 2021-12-01 NOTE — PROGRESS NOTE ADULT - SUBJECTIVE AND OBJECTIVE BOX
Subjective:    Home Medications:  acetaminophen 325 mg oral tablet: 2 tab(s) orally every 6 hours, As Needed (22 Nov 2021 17:35)  Aldactone 25 mg oral tablet: 0.5 tab(s) orally once a day  Hold for SBP&lt;100 (22 Nov 2021 17:29)  Aquaphor topical ointment: Apply topically to affected area once a day (22 Nov 2021 17:35)  aspirin 81 mg oral delayed release tablet: 1 tab(s) orally once a day (22 Nov 2021 17:29)  bisacodyl 5 mg oral delayed release tablet: 1 tab(s) orally every 12 hours, As needed, Constipation (22 Nov 2021 17:29)  Cardizem 60 mg oral tablet: 1 tab(s) orally every 8 hours 0600, 1400, and 2200 (22 Nov 2021 17:29)  digoxin 250 mcg (0.25 mg) oral tablet: 1 tab(s) orally once a day  Hold for apical pulse&lt;60 (22 Nov 2021 17:29)  folic acid 1 mg oral tablet: 1 tab(s) orally once a day (22 Nov 2021 17:29)  hydrOXYzine hydrochloride 25 mg oral tablet: 1 tab(s) orally once a day (22 Nov 2021 17:29)  Milk of Magnesia 8% oral suspension: 30 milliliter(s) orally once a day (at bedtime), As Needed (22 Nov 2021 17:35)  Multiple Vitamins with Minerals oral tablet: 1 tab(s) orally once a day (22 Nov 2021 17:35)  oxybutynin 5 mg oral tablet: 1 tab(s) orally once a day (22 Nov 2021 17:29)  polyethylene glycol 3350 oral powder for reconstitution: 17 gram(s) orally once a day (22 Nov 2021 17:29)  Vitamin B12 1000 mcg oral tablet: 1 tab(s) orally once a day (22 Nov 2021 17:29)    MEDICATIONS  (STANDING):  aspirin enteric coated 81 milliGRAM(s) Oral daily  cyanocobalamin 1000 MICROGram(s) Oral daily  digoxin     Tablet 250 MICROGram(s) Oral daily  diltiazem    Tablet 60 milliGRAM(s) Oral every 8 hours  folic acid 1 milliGRAM(s) Oral daily  furosemide   Injectable 40 milliGRAM(s) IV Push every 12 hours  metoprolol succinate  milliGRAM(s) Oral daily  multivitamin/minerals 1 Tablet(s) Oral daily  oxybutynin 5 milliGRAM(s) Oral daily  polyethylene glycol 3350 17 Gram(s) Oral daily  spironolactone 25 milliGRAM(s) Oral daily  zinc oxide 40% Ointment 1 Application(s) Topical two times a day    MEDICATIONS  (PRN):  acetaminophen     Tablet .. 650 milliGRAM(s) Oral every 6 hours PRN Temp greater or equal to 38C (100.4F), Mild Pain (1 - 3)  aluminum hydroxide/magnesium hydroxide/simethicone Suspension 30 milliLiter(s) Oral every 4 hours PRN Dyspepsia  bisacodyl 5 milliGRAM(s) Oral every 12 hours PRN Constipation  hydrOXYzine hydrochloride 25 milliGRAM(s) Oral daily PRN Anxiety  magnesium hydroxide Suspension 30 milliLiter(s) Oral at bedtime PRN Constipation  melatonin 3 milliGRAM(s) Oral at bedtime PRN Insomnia  ondansetron Injectable 4 milliGRAM(s) IV Push every 8 hours PRN Nausea and/or Vomiting      Allergies    Darvocet-N 50 (Unknown)  sulfa drugs (Other)    Intolerances        Vital Signs Last 24 Hrs  T(C): 36.2 (01 Dec 2021 08:05), Max: 36.6 (30 Nov 2021 20:31)  T(F): 97.2 (01 Dec 2021 08:05), Max: 97.9 (30 Nov 2021 20:31)  HR: 59 (01 Dec 2021 08:05) (59 - 75)  BP: 161/65 (01 Dec 2021 08:05) (138/63 - 161/65)  BP(mean): --  RR: 19 (01 Dec 2021 08:05) (18 - 19)  SpO2: 99% (01 Dec 2021 08:05) (99% - 100%)      PHYSICAL EXAMINATION:    NECK:  Supple. No lymphadenopathy. Jugular venous pressure not elevated. Carotids equal.   HEART:   The cardiac impulse has a normal quality. Reg., Nl S1 and S2.  There are no murmurs, rubs or gallops noted  CHEST:  Chest is clear to auscultation. Normal respiratory effort.  ABDOMEN:  Soft and nontender.   EXTREMITIES:  There is no edema.       LABS:                        11.6   13.78 )-----------( 559      ( 01 Dec 2021 07:42 )             35.7     12-01    140  |  103  |  17  ----------------------------<  72  4.0   |  31  |  0.94    Ca    8.7      01 Dec 2021 07:42  Mg     1.9     12-01    TPro  6.1  /  Alb  1.5<L>  /  TBili  x   /  DBili  x   /  AST  x   /  ALT  x   /  AlkPhos  x   12-01               Subjective:    pat lying in bed, waiting for thorocentesis today, no respiratory distress.    Home Medications:  acetaminophen 325 mg oral tablet: 2 tab(s) orally every 6 hours, As Needed (22 Nov 2021 17:35)  Aldactone 25 mg oral tablet: 0.5 tab(s) orally once a day  Hold for SBP&lt;100 (22 Nov 2021 17:29)  Aquaphor topical ointment: Apply topically to affected area once a day (22 Nov 2021 17:35)  aspirin 81 mg oral delayed release tablet: 1 tab(s) orally once a day (22 Nov 2021 17:29)  bisacodyl 5 mg oral delayed release tablet: 1 tab(s) orally every 12 hours, As needed, Constipation (22 Nov 2021 17:29)  Cardizem 60 mg oral tablet: 1 tab(s) orally every 8 hours 0600, 1400, and 2200 (22 Nov 2021 17:29)  digoxin 250 mcg (0.25 mg) oral tablet: 1 tab(s) orally once a day  Hold for apical pulse&lt;60 (22 Nov 2021 17:29)  folic acid 1 mg oral tablet: 1 tab(s) orally once a day (22 Nov 2021 17:29)  hydrOXYzine hydrochloride 25 mg oral tablet: 1 tab(s) orally once a day (22 Nov 2021 17:29)  Milk of Magnesia 8% oral suspension: 30 milliliter(s) orally once a day (at bedtime), As Needed (22 Nov 2021 17:35)  Multiple Vitamins with Minerals oral tablet: 1 tab(s) orally once a day (22 Nov 2021 17:35)  oxybutynin 5 mg oral tablet: 1 tab(s) orally once a day (22 Nov 2021 17:29)  polyethylene glycol 3350 oral powder for reconstitution: 17 gram(s) orally once a day (22 Nov 2021 17:29)  Vitamin B12 1000 mcg oral tablet: 1 tab(s) orally once a day (22 Nov 2021 17:29)    MEDICATIONS  (STANDING):  aspirin enteric coated 81 milliGRAM(s) Oral daily  cyanocobalamin 1000 MICROGram(s) Oral daily  digoxin     Tablet 250 MICROGram(s) Oral daily  diltiazem    Tablet 60 milliGRAM(s) Oral every 8 hours  folic acid 1 milliGRAM(s) Oral daily  furosemide   Injectable 40 milliGRAM(s) IV Push every 12 hours  metoprolol succinate  milliGRAM(s) Oral daily  multivitamin/minerals 1 Tablet(s) Oral daily  oxybutynin 5 milliGRAM(s) Oral daily  polyethylene glycol 3350 17 Gram(s) Oral daily  spironolactone 25 milliGRAM(s) Oral daily  zinc oxide 40% Ointment 1 Application(s) Topical two times a day    MEDICATIONS  (PRN):  acetaminophen     Tablet .. 650 milliGRAM(s) Oral every 6 hours PRN Temp greater or equal to 38C (100.4F), Mild Pain (1 - 3)  aluminum hydroxide/magnesium hydroxide/simethicone Suspension 30 milliLiter(s) Oral every 4 hours PRN Dyspepsia  bisacodyl 5 milliGRAM(s) Oral every 12 hours PRN Constipation  hydrOXYzine hydrochloride 25 milliGRAM(s) Oral daily PRN Anxiety  magnesium hydroxide Suspension 30 milliLiter(s) Oral at bedtime PRN Constipation  melatonin 3 milliGRAM(s) Oral at bedtime PRN Insomnia  ondansetron Injectable 4 milliGRAM(s) IV Push every 8 hours PRN Nausea and/or Vomiting      Allergies    Darvocet-N 50 (Unknown)  sulfa drugs (Other)    Intolerances        Vital Signs Last 24 Hrs  T(C): 36.2 (01 Dec 2021 08:05), Max: 36.6 (30 Nov 2021 20:31)  T(F): 97.2 (01 Dec 2021 08:05), Max: 97.9 (30 Nov 2021 20:31)  HR: 59 (01 Dec 2021 08:05) (59 - 75)  BP: 161/65 (01 Dec 2021 08:05) (138/63 - 161/65)  BP(mean): --  RR: 19 (01 Dec 2021 08:05) (18 - 19)  SpO2: 99% (01 Dec 2021 08:05) (99% - 100%)      PHYSICAL EXAMINATION:    NECK:  Supple. No lymphadenopathy. Jugular venous pressure not elevated. Carotids equal.   HEART:   The cardiac impulse has a normal quality. Reg., Nl S1 and S2.  There are no murmurs, rubs or gallops noted  CHEST:  Chest crackles to auscultation. Normal respiratory effort.  ABDOMEN:  Soft and nontender.   EXTREMITIES:  There is no edema.       LABS:                        11.6   13.78 )-----------( 559      ( 01 Dec 2021 07:42 )             35.7     12-01    140  |  103  |  17  ----------------------------<  72  4.0   |  31  |  0.94    Ca    8.7      01 Dec 2021 07:42  Mg     1.9     12-01    TPro  6.1  /  Alb  1.5<L>  /  TBili  x   /  DBili  x   /  AST  x   /  ALT  x   /  AlkPhos  x   12-01

## 2021-12-01 NOTE — PROGRESS NOTE ADULT - ASSESSMENT
80 year old female patient  Dementia, HTN, Diastolic CHF, A. 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 Acute Hypoxemic respiratory failure  -secondary to  Sepsis Pneumonia,  now developed   R Large pleural effusion   - S/p IVF, lactate improved. Still leukocytosis, improved   - no fevers   -on supplemental oxygen  - F/u BCX: NGTD,   UCX ;  Enterococcus and Ecoli . Repeat UCX : NG but was on IV Abxs   -On   zosyn  - Supportive care       2. Metabolic Encephalopathy on baseline Dementia   - likely 2/2 sepsis/hypoxia   -CT head neg for acute findings no  SDH  - supportive care     3. R large pleural effusion   Acute on Chronic Systolic  CHF vs parapneumonic effusion/empyema?  Last echo: EF 40-45%, decreased LV FX, mod to severe TR, Severe Pulm HTN   Started on LAsix 40mg IVP BID, Spironolactone  Hold Eliquis  D/w CT Sx, will plan for Tx and DX thoracentesis in am     4. Elevated Troponin, demand ischemia in settings of Sepsis, suspect underline CAD   -no EKG changes noted  - troponin trended down   - ECHO: last  11/1/21: severe TR, Pulm HTN, EF 45-50%  - C/w ASA, statin, BB.   - CArdio f/u appreciated plan was  for stress test but  on hold now        5.  ADRIANA/ dehydratin  - Improved s/p gentle IVF   - encourage oral intake   - Bladder  scan neg   - Monitor on lasix and Spironolactone   - LAbs in am       # Chronic Afib  - C/w BB, Cardizem and Digoxin  - Cleared by NeuroSx to resume Eliquis 2.5 mg PO BID, now held for procedure     # H/o Fall and SDH   Repeat CT head neg   Neuro Sx reeval appreciated     # DVT PPX: heparin SQ    ACP: DNR/DNI, Palliative eval for GOC, poor prognosis  80 year old female patient  Dementia, HTN, Diastolic CHF, A. 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 Acute Hypoxemic respiratory failure  -secondary to  Sepsis Pneumonia, developed   R Large pleural effusion   - S/p IVF, lactate improved. Still leukocytosis, improved   - no fevers   -on supplemental oxygen  - F/u BCX: NGTD,   UCX ;  Enterococcus and Ecoli . Repeat UCX : NG but was on IV Abxs   -On   zosyn  - Plan for Tx and DX thoracentesis and CT placement today   - D/w CT  team       2. Metabolic Encephalopathy on baseline Dementia   - likely 2/2 sepsis/hypoxia   -CT head neg for acute findings no  SDH  - supportive care     3. R large pleural effusion   Acute on Chronic Systolic  CHF vs parapneumonic effusion/empyema?  Last echo: EF 40-45%, decreased LV FX, mod to severe TR, Severe Pulm HTN   Started on LAsix 40mg IVP BID, Spironolactone  Hold Eliquis  D/w CT Sx, will plan for Tx and DX thoracentesis in am , will send fluid  analysis     4. Elevated Troponin, demand ischemia in settings of Sepsis, suspect underline CAD   -no EKG changes noted  - troponin trended down   - ECHO: last  11/1/21: severe TR, Pulm HTN, EF 45-50%  - C/w ASA, statin, BB.   - CArdio f/u appreciated plan was  for stress test but  on hold now        5.  ADRIANA/ dehydration  - Improved s/p gentle IVF   - encourage oral intake   - Bladder  scan neg   - Monitor on lasix and Spironolactone   - LAbs in am       # Chronic Afib  - C/w BB, Cardizem and Digoxin  - Cleared by NeuroSx to resume Eliquis 2.5 mg PO BID, now held for procedure     # H/o Fall and SDH   Repeat CT head neg   Neuro Sx reeval appreciated     # DVT PPX: heparin SQ    ACP: DNR/DNI, Palliative eval for GOC, poor prognosis

## 2021-12-01 NOTE — CONSULT NOTE ADULT - REASON FOR ADMISSION
Encephalopathy  Acute hypoxemic Respiratory Failure  Sepsis

## 2021-12-01 NOTE — PROGRESS NOTE ADULT - ASSESSMENT
PROBLEMS;    Acute Hypoxemic respiratory failure  Possible Pneumonia/UTI   CXR worsening R effusion  pUL htn 48  Metabolic Encephalopathy on baseline Dementia  Elevated Troponin  ADRIANA/ dehydration, improving  Chronic Afib  H/o Fall and SDH WBC trending down  UCX ;  Enterococcus and Ecoli     PLAN;    CT CHEST large pleural effusion-CT surgery for drainage  IV  zosyn  ECHO: last  11/1/21: severe TR, Pulm HTN, EF 45-50%  C/w ASA, statin, BB.   ON Spironolactone   LAbs in am C/w tele  C/w BB, Cardizem and Digoxin  DVT PROPHYLASIX   PROBLEMS;    Acute Hypoxemic respiratory failure  Possible Pneumonia/UTI   CXR worsening R effusion  pUL htn 48  Metabolic Encephalopathy on baseline Dementia  Elevated Troponin  ADRIANA/ dehydration, improving  Chronic Afib  H/o Fall and SDH WBC trending down  UCX ;  Enterococcus and Ecoli     PLAN;    CT CHEST large pleural effusion-CT surgery for drainage today-r/o empyema-off eliquis  off abx- ID eval  ECHO: last  11/1/21: severe TR, Pulm HTN, EF 45-50%  C/w ASA, statin, BB.   ON Spironolactone   LAbs in am C/w tele  C/w BB, Cardizem and Digoxin  DVT PROPHYLASIX

## 2021-12-01 NOTE — CONSULT NOTE ADULT - SUBJECTIVE AND OBJECTIVE BOX
Patient is a 80y old  Female who presents with a chief complaint of Encephalopathy  Acute hypoxemic Respiratory Failure  Sepsis (01 Dec 2021 09:40)    HPI:  80 year old female patient who currently resides in a local nursing home presented to the ED after she was found to be febrile and had mental status changes. Patient seen and examined and was found to be alert and oriented but unable to provide pertinent narrative. States she does not recall what happened but believes she may have had a fever. Narrative limited secondary to current mental status. In the ED patient found to be febrile, hypoxic and had a WBC of 20. Her initial xray on  showed small R pleural effusion and possible infiltrate. She was given IV zosyn for 7 days. She had CT chest done on  showing new R large pleural effusion and new small L pleural effusion, new atelectasis of pneumonia in the R middle and RLL and new ill-defined groundglass densities in L upper lobe, lingula, eval by CTS and plan for R pigtail catheter placement.       PMH: as above  PSH: as above  Meds: per reconciliation sheet, noted below  MEDICATIONS  (STANDING):  aspirin enteric coated 81 milliGRAM(s) Oral daily  cyanocobalamin 1000 MICROGram(s) Oral daily  digoxin     Tablet 250 MICROGram(s) Oral daily  diltiazem    Tablet 60 milliGRAM(s) Oral every 8 hours  folic acid 1 milliGRAM(s) Oral daily  furosemide   Injectable 40 milliGRAM(s) IV Push every 12 hours  metoprolol succinate  milliGRAM(s) Oral daily  multivitamin/minerals 1 Tablet(s) Oral daily  oxybutynin 5 milliGRAM(s) Oral daily  piperacillin/tazobactam IVPB.. 3.375 Gram(s) IV Intermittent every 8 hours  polyethylene glycol 3350 17 Gram(s) Oral daily  spironolactone 25 milliGRAM(s) Oral daily  zinc oxide 40% Ointment 1 Application(s) Topical two times a day      Allergies    Darvocet-N 50 (Unknown)  sulfa drugs (Other)    Intolerances      Social: no smoking, no alcohol, no illegal drugs; no recent travel, no exposure to TB  FAMILY HISTORY:  Family history of lung cancer (Mother)       no history of premature cardiovascular disease in first degree relatives    ROS: + fever, chills, cough, no HA, no dizziness, no sore throat, no blurry vision, no CP, no palpitations,  no abdominal pain, no diarrhea, no N/V, no dysuria, no leg pain, no claudication, no rash, no joint aches, no rectal pain or bleeding, no night sweats    All other systems reviewed and are negative    Vital Signs Last 24 Hrs  T(C): 36.2 (01 Dec 2021 08:05), Max: 36.6 (2021 20:31)  T(F): 97.2 (01 Dec 2021 08:05), Max: 97.9 (2021 20:31)  HR: 59 (01 Dec 2021 08:05) (59 - 75)  BP: 161/65 (01 Dec 2021 08:05) (138/63 - 161/65)  BP(mean): --  RR: 19 (01 Dec 2021 08:05) (18 - 19)  SpO2: 99% (01 Dec 2021 08:05) (99% - 100%)  Daily     Daily Weight in k.8 (01 Dec 2021 06:28)    PE:  Constitutional: frail looking  HEENT: NC/AT, EOMI, PERRLA, conjunctivae clear; ears and nose atraumatic; pharynx benign  Neck: supple; thyroid not palpable  Back: no tenderness  Respiratory: decreased breath sounds, rhonchi   Cardiovascular: S1S2 regular, no murmurs  Abdomen: soft, not tender, not distended, positive BS; liver and spleen WNL  Genitourinary: no suprapubic tenderness  Lymphatic: no LN palpable  Musculoskeletal: no muscle tenderness, no joint swelling or tenderness  Extremities: no pedal edema  Neurological/ Psychiatric: AxOx3, Judgement and insight normal;  moving all extremities  Skin: no rashes; no palpable lesions    Labs: all available labs reviewed                        11.6   13.78 )-----------( 559      ( 01 Dec 2021 07:42 )             35.7     12-    140  |  103  |  17  ----------------------------<  72  4.0   |  31  |  0.94    Ca    8.7      01 Dec 2021 07:42  Mg     1.9     12-    TPro  6.1  /  Alb  1.5<L>  /  TBili  x   /  DBili  x   /  AST  x   /  ALT  x   /  AlkPhos  x        LIVER FUNCTIONS - ( 01 Dec 2021 07:42 )  Alb: 1.5 g/dL / Pro: 6.1 gm/dL / ALK PHOS: x     / ALT: x     / AST: x     / GGT: x                 Radiology: all available radiological tests reviewed  < from: CT Chest No Cont (21 @ 16:31) >    EXAM:  CT CHEST                            PROCEDURE DATE:  2021          INTERPRETATION:  CLINICAL INFORMATION: Shortness of breath, effusion    TECHNIQUE:  A volumetric CT acquisition of the chest was obtained from the thoracic inlet to theupper abdomen, without the administration  of intravenous contrast. Coronal and sagittal multiplanar reformations were also submitted.    Comparison: 10/28/2021    FINDINGS:    Lungs/Airways/Pleura: There are new ill-defined groundglass densities in left upper lobe and lingula. Previous groundglass densities in the right lung are contained within atelectatic lung. There is new atelectasis or consolidation in the right middle and right lower lobes. New large right pleural effusion and new small left pleural effusion.    Mediastinum/Lymph nodes: No thoracic adenopathy.    Heart and Vessels: There is marked biatrial enlargement. Leadless pacemaker is in the right ventricle. No pericardial effusion. There are mild coronary artery calcifications. The ascending aorta is mildly dilated measuring 4.1 cm.    Upper Abdomen: Cholecystectomy has been performed.    Osseous structures and Soft Tissues: No aggressive bone lesions. Old right rib fractures are unchanged. There is diffuse qualitative osteopenia    IMPRESSION:    1.  New large right pleural effusion and new small left pleural effusion    2.  New atelectasis or pneumonia in the right middle and right lower lobes and new ill-defined groundglass densities in the left upper lobe/lingula which may also be infectious    3.  Marked biatrial enlargement        EXAM:  XR CHEST PORTABLE URGENT 1V                            PROCEDURE DATE:  2021          INTERPRETATION:  Clinical history: Congestive heart failure    Portable chest    New moderate to large right pleural effusion is seen. Persistent congestive changes also noted. No pneumothorax.    IMPRESSION: New right pleural effusion as above      Advanced directives addressed: full resuscitation

## 2021-12-01 NOTE — CONSULT NOTE ADULT - ASSESSMENT
80 year old female patient who currently resides in a local nursing home presented to the ED after she was found to be febrile and had mental status changes. Patient seen and examined and was found to be alert and oriented but unable to provide pertinent narrative. States she does not recall what happened but believes she may have had a fever. Narrative limited secondary to current mental status. In the ED patient found to be febrile, hypoxic and had a WBC of 20. Her initial xray on 11/22 showed small R pleural effusion and possible infiltrate. She was given IV zosyn for 7 days. She had CT chest done on 11/29 showing new R large pleural effusion and new small L pleural effusion, new atelectasis of pneumonia in the R middle and RLL and new ill-defined groundglass densities in L upper lobe, lingula, eval by CTS and plan for R pigtail catheter placement.     1. fever. leukocytosis. R complicated parapneumonic effusion vs empyema. multifocal pneumonia  - cts eval noted, plan for R pigtail catheter f/u pleural fluid studies cx  - completed 7 days zosyn ---> 11/29  - start meropenem [id number]  - add doxycycline 100mg BID  - monitor temps  - tolerating abx well so far; no side effects noted  - reason for abx use and side effects reviewed with patient  - supportive care  - fu cbc    2. other issues - care per medicine  80 year old female patient who currently resides in a local nursing home presented to the ED after she was found to be febrile and had mental status changes. Patient seen and examined and was found to be alert and oriented but unable to provide pertinent narrative. States she does not recall what happened but believes she may have had a fever. Narrative limited secondary to current mental status. In the ED patient found to be febrile, hypoxic and had a WBC of 20. Her initial xray on 11/22 showed small R pleural effusion and possible infiltrate. She was given IV zosyn for 7 days. She had CT chest done on 11/29 showing new R large pleural effusion and new small L pleural effusion, new atelectasis of pneumonia in the R middle and RLL and new ill-defined groundglass densities in L upper lobe, lingula, eval by CTS and plan for R pigtail catheter placement.     1.  R pleural effusion. pulmonary edema. CHF. pneumonia s/p abx course  - cts eval noted, plan for R pigtail catheter f/u pleural fluid studies cx  - completed 7 days zosyn ---> 11/29  - observe off antibiotics for now await pleural fluid results, cx  - fluid, lung changes could transudative, related to chf  - monitor temps  - tolerating abx well so far; no side effects noted  - reason for abx use and side effects reviewed with patient  - supportive care  - fu cbc    2. other issues - care per medicine  80 year old female patient who currently resides in a local nursing home presented to the ED after she was found to be febrile and had mental status changes. Patient seen and examined and was found to be alert and oriented but unable to provide pertinent narrative. States she does not recall what happened but believes she may have had a fever. Narrative limited secondary to current mental status. In the ED patient found to be febrile, hypoxic and had a WBC of 20. Her initial xray on 11/22 showed small R pleural effusion and possible infiltrate. She was given IV zosyn for 7 days. She had CT chest done on 11/29 showing new R large pleural effusion and new small L pleural effusion, new atelectasis of pneumonia in the R middle and RLL and new ill-defined groundglass densities in L upper lobe, lingula, eval by CTS and plan for R pigtail catheter placement.     1. R pleural effusion. pulmonary edema. CHF. pneumonia s/p abx course  - cts eval noted, plan for R pigtail catheter f/u pleural fluid studies cx  - completed 7 days zosyn ---> 11/29  - observe off antibiotics for now await pleural fluid results, cx  - fluid, lung changes could transudative, related to chf  - monitor temps  - supportive care  - fu cbc    2. other issues - care per medicine

## 2021-12-01 NOTE — CONSULT NOTE ADULT - CONSULT REASON
pneumonia  pleural effusion  acute resp failure
elevated troponin level
GOC
Rt pleural effusion
clearance for AC
here for PNA
sob

## 2021-12-01 NOTE — DIETITIAN INITIAL EVALUATION ADULT. - MALNUTRITION
severe protein-calorie malnutrition in context of acute on chronic illness r/t suboptimal intake 2/2 sepsis w/ history of Dementia AEB significant weight loss x 1 month 12% and severe muscle/fat wasting severe protein-calorie malnutrition in context of acute on chronic illness

## 2021-12-01 NOTE — PROCEDURE NOTE - NSICDXPROCEDURE_GEN_ALL_CORE_FT
PROCEDURES:  Ultrasound guidance for thoracentesis with insertion of chest tube 01-Dec-2021 14:02:50 Right Chrissy Oswald

## 2021-12-01 NOTE — PROGRESS NOTE ADULT - SUBJECTIVE AND OBJECTIVE BOX
HPI: patient seen and examined with no family at bedside, patient alert but remains confused oriented to self.  Was tearful this AM but denies any pain just nervous     PAIN: ( )Yes   ( x)No  patient appears comfortable at this time     DYSPNEA: ( ) Yes  (x ) No  Level:    PHYSICAL EXAM:    Vital Signs Last 24 Hrs  T(C): 36.2 (01 Dec 2021 08:05), Max: 36.6 (2021 20:31)  T(F): 97.2 (01 Dec 2021 08:05), Max: 97.9 (2021 20:31)  HR: 59 (01 Dec 2021 08:05) (59 - 75)  BP: 161/65 (01 Dec 2021 08:05) (138/63 - 161/65)  RR: 19 (01 Dec 2021 08:05) (18 - 19)  SpO2: 99% (01 Dec 2021 08:05) (99% - 100%)  Daily Weight in k.8 (01 Dec 2021 06:28)    PPSV2: 30  %  FAST: unsure of baseline     General: elderly female in bed, NAD   Mental Status: alert, oriented to self and place   HEENT: dry oral mucosa, multiple scab areas   Lungs: diminished breath sounds   Cardiac: S1S2 +   GI: nontender, non distended + BS   : + voiding   Ext: edema +, bandages on leg   Neuro: dementia     LABS:                        11.6   13.78 )-----------( 559      ( 01 Dec 2021 07:42 )             35.7         140  |  103  |  17  ----------------------------<  72  4.0   |  31  |  0.94    Ca    8.7      01 Dec 2021 07:42  Mg     1.9         TPro  6.1  /  Alb  1.5<L>  /  TBili  x   /  DBili  x   /  AST  x   /  ALT  x   /  AlkPhos  x       Albumin: Albumin, Serum: 1.5 g/dL ( @ 07:42)    Allergies    Darvocet-N 50 (Unknown)  sulfa drugs (Other)    Intolerances    MEDICATIONS  (STANDING):  aspirin enteric coated 81 milliGRAM(s) Oral daily  cyanocobalamin 1000 MICROGram(s) Oral daily  digoxin     Tablet 250 MICROGram(s) Oral daily  diltiazem    Tablet 60 milliGRAM(s) Oral every 8 hours  folic acid 1 milliGRAM(s) Oral daily  furosemide   Injectable 40 milliGRAM(s) IV Push every 12 hours  lidocaine   4% Patch 1 Patch Transdermal daily  metoprolol succinate  milliGRAM(s) Oral daily  multivitamin/minerals 1 Tablet(s) Oral daily  oxybutynin 5 milliGRAM(s) Oral daily  polyethylene glycol 3350 17 Gram(s) Oral daily  spironolactone 25 milliGRAM(s) Oral daily  zinc oxide 40% Ointment 1 Application(s) Topical two times a day    MEDICATIONS  (PRN):  acetaminophen     Tablet .. 650 milliGRAM(s) Oral every 6 hours PRN Temp greater or equal to 38C (100.4F), Mild Pain (1 - 3)  aluminum hydroxide/magnesium hydroxide/simethicone Suspension 30 milliLiter(s) Oral every 4 hours PRN Dyspepsia  bisacodyl 5 milliGRAM(s) Oral every 12 hours PRN Constipation  hydrOXYzine hydrochloride 25 milliGRAM(s) Oral daily PRN Anxiety  magnesium hydroxide Suspension 30 milliLiter(s) Oral at bedtime PRN Constipation  melatonin 3 milliGRAM(s) Oral at bedtime PRN Insomnia  ondansetron Injectable 4 milliGRAM(s) IV Push every 8 hours PRN Nausea and/or Vomiting

## 2021-12-01 NOTE — PROGRESS NOTE ADULT - SUBJECTIVE AND OBJECTIVE BOX
CC: Encephalopathy  Acute hypoxemic Respiratory Failure  Sepsis     HPI: 80 year old female patient  Dementia, HTN, Diastolic CHF, A. 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 Patient seen and examined and was found to be alert and oriented but unable to provide pertinent narrative. States she does not recall what happened but believes she may have had a fever. Narrative limited secondary to current mental status  In the ED patient found to be febrile, hypoxic and had a WBC of 20. She was given vanco, zosyn and IVF hydration     INTERVAL HPI/ OVERNIGHT EVENTS:  Pt was seen and examined,   confused, looks weak, c/o SOB     Vital Signs Last 24 Hrs  T(C): 36.2 (01 Dec 2021 08:05), Max: 36.6 (2021 20:31)  T(F): 97.2 (01 Dec 2021 08:05), Max: 97.9 (2021 20:31)  HR: 59 (01 Dec 2021 08:05) (59 - 75)  BP: 161/65 (01 Dec 2021 08:05) (138/63 - 161/65)  BP(mean): --  RR: 19 (01 Dec 2021 08:05) (18 - 19)  SpO2: 99% (01 Dec 2021 08:05) (99% - 100%)    REVIEW OF SYSTEMS:  Unable to obtain due to dementia       PHYSICAL EXAM:  General: Well developed; malnourished,  in no acute distress, on NC   Eyes: EOMI; conjunctiva and sclera clear  Head: Normocephalic; L frontal hematoma improving   ENMT: No nasal discharge; dry oral mucosa   Neck: Supple; no JVD   Respiratory: Diminished BS on R, + R  crackles   Cardiovascular: Irregular rate and rhythm. S1 and S2 Normal;   Gastrointestinal: Soft non-tender non-distended; Normal bowel sounds  Genitourinary: No  suprapubic  tenderness  Extremities: + le  with Acre wrap  Neurological: Alert and oriented x1, non focal   Musculoskeletal: Normal muscle tone and strength   Psychiatric: Cooperative        LABS:                           10.3   12.42 )-----------( 565      ( 2021 17:59 )             33.0     11-29    140  |  109<H>  |  18  ----------------------------<  111<H>  4.3   |  26  |  0.91    Ca    8.5      2021 17:59                          9.9    15.52 )-----------( 388      ( 2021 07:16 )             30.6         138  |  106  |  24<H>  ----------------------------<  97  4.2   |  28  |  1.02    Ca    8.2<L>      2021 07:16                          10.2   17.86 )-----------( 361      ( 2021 11:44 )             30.5         136  |  104  |  26<H>  ----------------------------<  155<H>  4.0   |  24  |  1.08    Ca    8.2<L>      2021 11:44  Mg     2.2             Urinalysis Basic - ( 2021 17:40 )  Color: Yellow / Appearance: Slightly Turbid / S.020 / pH: x  Gluc: x / Ketone: Negative  / Bili: Negative / Urobili: 1   Blood: x / Protein: unable to obtai mg/dL / Nitrite: Negative   Leuk Esterase: Small / RBC: 0-2 /HPF / WBC 11-   Sq Epi: x / Non Sq Epi: Few / Bacteria: Moderate  Culture - Urine (21 @ 17:40)   Specimen Source: Clean Catch Clean Catch (Midstream)   Culture Results:   <10,000 CFU/mL Normal Urogenital Ivy       Culture - Urine (21 @ 15:35)   - Amikacin: S <=16   - Amoxicillin/Clavulanic Acid: I    - Ampicillin: R >16 These ampicillin results predict results for amoxicillin   - Ampicillin: S <=2 Predicts results to ampicillin/sulbactam, amoxacillin-clavulanate and piperacillin-tazobactam.   - Ampicillin/Sulbactam: R >16/8 Enterobacter, Klebsiella aerogenes, Citrobacter, and Serratia may develop resistance during prolonged therapy (3-4 days)   - Aztreonam: S <=4   - Cefazolin: S 16 ([email or web address]) For uncomplicated UTI with K. pneumoniae, E. coli, or P. mirablis: CECILY <=16 is sensitive and CECILY >=32 is resistant. This also predicts results for oral agents cefaclor, cefdinir, cefpodoxime, cefprozil, cefuroxime axetil, cephalexin and locarbef for uncomplicated UTI. Note that some isolates may be susceptible to these agents while testing resistant to cefazolin.   - Cefepime: S <=2   - Cefoxitin: S <=8   - Ceftriaxone: S <=1 Enterobacter, Klebsiella aerogenes, Citrobacter, and Serratia may develop resistance during prolonged therapy   - Ciprofloxacin: S <=0.25   - Ciprofloxacin: S <=1   - Ertapenem: S <=0.5   - Gentamicin: S <=2   - Imipenem: S <=1   - Levofloxacin: S <=0.5   - Levofloxacin: S <=1   - Meropenem: S <=1   - Nitrofurantoin: S <=32 Should not be used to treat pyelonephritis   - Nitrofurantoin: S <=32 Should not be used to treat pyelonephritis.   - Piperacillin/Tazobactam: S <=8   - Tetra/Doxy: R >8   - Tigecycline: S <=2   - Tobramycin: S <=2   - Trimethoprim/Sulfamethoxazole: R >2/38   - Vancomycin: S 2   Specimen Source: Clean Catch Clean Catch (Midstream)   Culture Results:   50,000 - 99,000 CFU/mL Escherichia coli   50,000 - 99,000 CFU/mL Enterococcus faecalis   Organism Identification: Escherichia coli   Enterococcus faecalis   Organism: Escherichia coli   Organism: Enterococcus faecalis     MEDICATIONS  (STANDING):  aspirin enteric coated 81 milliGRAM(s) Oral daily  cyanocobalamin 1000 MICROGram(s) Oral daily  digoxin     Tablet 250 MICROGram(s) Oral daily  diltiazem    Tablet 60 milliGRAM(s) Oral every 8 hours  folic acid 1 milliGRAM(s) Oral daily  furosemide   Injectable 40 milliGRAM(s) IV Push every 12 hours  hydrOXYzine  Oral Tab/Cap - Peds 25 milliGRAM(s) Oral daily  metoprolol succinate  milliGRAM(s) Oral daily  multivitamin/minerals 1 Tablet(s) Oral daily  oxybutynin 5 milliGRAM(s) Oral daily  polyethylene glycol 3350 17 Gram(s) Oral daily  spironolactone 25 milliGRAM(s) Oral daily  zinc oxide 40% Ointment 1 Application(s) Topical two times a day    MEDICATIONS  (PRN):  acetaminophen     Tablet .. 650 milliGRAM(s) Oral every 6 hours PRN Temp greater or equal to 38C (100.4F), Mild Pain (1 - 3)  aluminum hydroxide/magnesium hydroxide/simethicone Suspension 30 milliLiter(s) Oral every 4 hours PRN Dyspepsia  bisacodyl 5 milliGRAM(s) Oral every 12 hours PRN Constipation  magnesium hydroxide Suspension 30 milliLiter(s) Oral at bedtime PRN Constipation  melatonin 3 milliGRAM(s) Oral at bedtime PRN Insomnia  ondansetron Injectable 4 milliGRAM(s) IV Push every 8 hours PRN Nausea and/or Vomiting      RADIOLOGY & ADDITIONAL TESTS:      EXAM:  CT BRAIN                        PROCEDURE DATE:  2021      FINDINGS:  The ventricles and sulci are prominent, compatible with age-related generalized cerebral volume loss.   There is no CT evidence for acute cerebral cortical infarct. There is a tiny lacunar infarct in the left basal ganglia of indeterminate age. There is no evidence of hemorrhage. There is periventricular and subcortical white matter hypoattenuation,  most compatible with chronic microvascular ischemic changes.   No mass effect is found in the brain.  There is no midline shift or herniation pattern.      The visualized portions of the paranasal sinuses and mastoid air cells are clear.    IMPRESSION:    There is a tiny lacunar infarctin the left basal ganglia of indeterminate age.  No evidence of hemorrhage.    Chronic changes as above.        EXAM:  CT BRAIN                            PROCEDURE DATE:  2021          INTERPRETATION:  CT head without IV contrast    CLINICAL INFORMATION:  Fall, RIGHT   Intracranial hemorrhage.    TECHNIQUE: Contiguous axial 5 mm sections were obtainedthrough the head. Sagittal and coronal 2-D reformatted images were also obtained.   This scan was performed using automatic exposure control (radiation dose reduction software) to obtain a diagnostic image quality scan with patient dose as low as reasonably achievable.    FINDINGS:   CT dated 10/29/2021 available for review.    The brain demonstrates decrease in the degree of intracranial hemorrhage within the RIGHT sylvian fissure and RIGHT superior temporal lobe. Moderate periventricular white matter ischemia is noted. Tiny old lacunar infarction in the basal ganglia. No acute cerebral cortical infarct is seen. No mass effect is found in the brain.    The ventricles, sulci and basal cisterns appear unremarkable.    The orbits are unremarkable.  The paranasal sinuses are clear.  The nasal cavity appears intact.  The nasopharynx is symmetric.  The central skull base, petrous temporal bones and calvarium remain intact. 3.4 cm LEFT frontal scalp hematoma is noted.      IMPRESSION:   Interval decrease in the degree of intracranial hemorrhage within the RIGHT sylvian fissure and RIGHT superior temporal lobe. Moderate periventricular white matter ischemia is noted. Tiny old lacunar infarction in the basal ganglia.           EXAM:  ECHO TTE WO CON COMP W DOPP    PROCEDURE DATE:  2021     Summary     Technically limited study   The left ventricle cavity is mildly dilated.   Endocardium is not well visualized, however, overall left ventricular   systolic function appears diminished.   Estimated left ventricular ejection fraction is 40-45%.   Wall motion abnormalities can not be ruled out.   The left atrium is moderately dilated.   The right atrium appears moderately dilated.   The right ventricle is mildly dilated.   Fibrocalcific changes noted to the Aortic valve leaflets with preserved   leaflet excursion.   Trace aortic regurgitation is present.   Fibrocalcific changes noted to the mitral valve leaflets with preserved   leaflet excursion.   Mild mitral annular calcification is present.   Moderate mitral regurgitation is present.   The tricuspid valve leaflets appear mildly thickened and/or calcified, but   open well.   Severe (4+) tricuspid valve regurgitation is present.   Mild pulmonary hypertension.   No evidence of pericardial effusion.       CC: Encephalopathy  Acute hypoxemic Respiratory Failure  Sepsis     HPI: 80 year old female patient  Dementia, HTN, Diastolic CHF, A. 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 Patient seen and examined and was found to be alert and oriented but unable to provide pertinent narrative. States she does not recall what happened but believes she may have had a fever. Narrative limited secondary to current mental status  In the ED patient found to be febrile, hypoxic and had a WBC of 20. She was given vanco, zosyn and IVF hydration     INTERVAL HPI/ OVERNIGHT EVENTS:  Pt was seen and examined,   confused, looks anxious, states  " I dont know whats going on". Pt reoriented on place, Pts condition and plan. Denies pain, no Difficulty breathing. Awaiting for CT placement     Vital Signs Last 24 Hrs  T(C): 36.2 (01 Dec 2021 08:05), Max: 36.6 (2021 20:31)  T(F): 97.2 (01 Dec 2021 08:05), Max: 97.9 (2021 20:31)  HR: 59 (01 Dec 2021 08:05) (59 - 75)  BP: 161/65 (01 Dec 2021 08:05) (138/63 - 161/65)  RR: 19 (01 Dec 2021 08:05) (18 - 19)  SpO2: 99% (01 Dec 2021 08:05) (99% - 100%)    REVIEW OF SYSTEMS:  Unable to obtain due to dementia       PHYSICAL EXAM:  General: Well developed; malnourished,  in no acute distress, on NC   Eyes: EOMI; conjunctiva and sclera clear  Head: Normocephalic; L frontal hematoma improving   ENMT: No nasal discharge; dry oral mucosa   Neck: Supple; no JVD   Respiratory: Diminished BS on R, + R  crackles   Cardiovascular: Irregular rate and rhythm. S1 and S2 Normal;   Gastrointestinal: Soft non-tender non-distended; Normal bowel sounds  Genitourinary: No  suprapubic  tenderness  Extremities: + le  with Acre wrap, cyanotic toes   Neurological: Alert and oriented x1, non focal   Musculoskeletal: Normal muscle tone and strength   Psychiatric: Cooperative        LABS:                           10.3   12.42 )-----------( 565      ( 2021 17:59 )             33.0         140  |  109<H>  |  18  ----------------------------<  111<H>  4.3   |  26  |  0.91    Ca    8.5      2021 17:59                          9.9    15.52 )-----------( 388      ( 2021 07:16 )             30.6         138  |  106  |  24<H>  ----------------------------<  97  4.2   |  28  |  1.02    Ca    8.2<L>      2021 07:16                          10.2   17.86 )-----------( 361      ( 2021 11:44 )             30.5         136  |  104  |  26<H>  ----------------------------<  155<H>  4.0   |  24  |  1.08    Ca    8.2<L>      2021 11:44  Mg     2.2             Urinalysis Basic - ( 2021 17:40 )  Color: Yellow / Appearance: Slightly Turbid / S.020 / pH: x  Gluc: x / Ketone: Negative  / Bili: Negative / Urobili: 1   Blood: x / Protein: unable to obtai mg/dL / Nitrite: Negative   Leuk Esterase: Small / RBC: 0-2 /HPF / WBC -   Sq Epi: x / Non Sq Epi: Few / Bacteria: Moderate  Culture - Urine (21 @ 17:40)   Specimen Source: Clean Catch Clean Catch (Midstream)   Culture Results:   <10,000 CFU/mL Normal Urogenital Ivy       Culture - Urine (21 @ 15:35)   - Amikacin: S <=16   - Amoxicillin/Clavulanic Acid: I 8   - Ampicillin: R >16 These ampicillin results predict results for amoxicillin   - Ampicillin: S <=2 Predicts results to ampicillin/sulbactam, amoxacillin-clavulanate and piperacillin-tazobactam.   - Ampicillin/Sulbactam: R >16/8 Enterobacter, Klebsiella aerogenes, Citrobacter, and Serratia may develop resistance during prolonged therapy (3-4 days)   - Aztreonam: S <=4   - Cefazolin: S 16 ([email or web address]) For uncomplicated UTI with K. pneumoniae, E. coli, or P. mirablis: CECILY <=16 is sensitive and CECILY >=32 is resistant. This also predicts results for oral agents cefaclor, cefdinir, cefpodoxime, cefprozil, cefuroxime axetil, cephalexin and locarbef for uncomplicated UTI. Note that some isolates may be susceptible to these agents while testing resistant to cefazolin.   - Cefepime: S <=2   - Cefoxitin: S <=8   - Ceftriaxone: S <=1 Enterobacter, Klebsiella aerogenes, Citrobacter, and Serratia may develop resistance during prolonged therapy   - Ciprofloxacin: S <=0.25   - Ciprofloxacin: S <=1   - Ertapenem: S <=0.5   - Gentamicin: S <=2   - Imipenem: S <=1   - Levofloxacin: S <=0.5   - Levofloxacin: S <=1   - Meropenem: S <=1   - Nitrofurantoin: S <=32 Should not be used to treat pyelonephritis   - Nitrofurantoin: S <=32 Should not be used to treat pyelonephritis.   - Piperacillin/Tazobactam: S <=8   - Tetra/Doxy: R >8   - Tigecycline: S <=2   - Tobramycin: S <=2   - Trimethoprim/Sulfamethoxazole: R >2/38   - Vancomycin: S 2   Specimen Source: Clean Catch Clean Catch (Midstream)   Culture Results:   50,000 - 99,000 CFU/mL Escherichia coli   50,000 - 99,000 CFU/mL Enterococcus faecalis   Organism Identification: Escherichia coli   Enterococcus faecalis   Organism: Escherichia coli   Organism: Enterococcus faecalis     MEDICATIONS  (STANDING):  aspirin enteric coated 81 milliGRAM(s) Oral daily  cyanocobalamin 1000 MICROGram(s) Oral daily  digoxin     Tablet 250 MICROGram(s) Oral daily  diltiazem    Tablet 60 milliGRAM(s) Oral every 8 hours  folic acid 1 milliGRAM(s) Oral daily  furosemide   Injectable 40 milliGRAM(s) IV Push every 12 hours  lidocaine   4% Patch 1 Patch Transdermal daily  metoprolol succinate  milliGRAM(s) Oral daily  multivitamin/minerals 1 Tablet(s) Oral daily  oxybutynin 5 milliGRAM(s) Oral daily  polyethylene glycol 3350 17 Gram(s) Oral daily  spironolactone 25 milliGRAM(s) Oral daily  zinc oxide 40% Ointment 1 Application(s) Topical two times a day    MEDICATIONS  (PRN):  acetaminophen     Tablet .. 650 milliGRAM(s) Oral every 6 hours PRN Temp greater or equal to 38C (100.4F), Mild Pain (1 - 3)  aluminum hydroxide/magnesium hydroxide/simethicone Suspension 30 milliLiter(s) Oral every 4 hours PRN Dyspepsia  bisacodyl 5 milliGRAM(s) Oral every 12 hours PRN Constipation  hydrOXYzine hydrochloride 25 milliGRAM(s) Oral daily PRN Anxiety  magnesium hydroxide Suspension 30 milliLiter(s) Oral at bedtime PRN Constipation  melatonin 3 milliGRAM(s) Oral at bedtime PRN Insomnia  ondansetron Injectable 4 milliGRAM(s) IV Push every 8 hours PRN Nausea and/or Vomiting    RADIOLOGY & ADDITIONAL TESTS:      EXAM:  CT BRAIN                        PROCEDURE DATE:  2021      FINDINGS:  The ventricles and sulci are prominent, compatible with age-related generalized cerebral volume loss.   There is no CT evidence for acute cerebral cortical infarct. There is a tiny lacunar infarct in the left basal ganglia of indeterminate age. There is no evidence of hemorrhage. There is periventricular and subcortical white matter hypoattenuation,  most compatible with chronic microvascular ischemic changes.   No mass effect is found in the brain.  There is no midline shift or herniation pattern.      The visualized portions of the paranasal sinuses and mastoid air cells are clear.    IMPRESSION:    There is a tiny lacunar infarctin the left basal ganglia of indeterminate age.  No evidence of hemorrhage.    Chronic changes as above.        EXAM:  CT BRAIN                            PROCEDURE DATE:  2021          INTERPRETATION:  CT head without IV contrast    CLINICAL INFORMATION:  Fall, RIGHT   Intracranial hemorrhage.    TECHNIQUE: Contiguous axial 5 mm sections were obtainedthrough the head. Sagittal and coronal 2-D reformatted images were also obtained.   This scan was performed using automatic exposure control (radiation dose reduction software) to obtain a diagnostic image quality scan with patient dose as low as reasonably achievable.    FINDINGS:   CT dated 10/29/2021 available for review.    The brain demonstrates decrease in the degree of intracranial hemorrhage within the RIGHT sylvian fissure and RIGHT superior temporal lobe. Moderate periventricular white matter ischemia is noted. Tiny old lacunar infarction in the basal ganglia. No acute cerebral cortical infarct is seen. No mass effect is found in the brain.    The ventricles, sulci and basal cisterns appear unremarkable.    The orbits are unremarkable.  The paranasal sinuses are clear.  The nasal cavity appears intact.  The nasopharynx is symmetric.  The central skull base, petrous temporal bones and calvarium remain intact. 3.4 cm LEFT frontal scalp hematoma is noted.      IMPRESSION:   Interval decrease in the degree of intracranial hemorrhage within the RIGHT sylvian fissure and RIGHT superior temporal lobe. Moderate periventricular white matter ischemia is noted. Tiny old lacunar infarction in the basal ganglia.           EXAM:  ECHO TTE WO CON COMP W DOPP    PROCEDURE DATE:  2021     Summary     Technically limited study   The left ventricle cavity is mildly dilated.   Endocardium is not well visualized, however, overall left ventricular   systolic function appears diminished.   Estimated left ventricular ejection fraction is 40-45%.   Wall motion abnormalities can not be ruled out.   The left atrium is moderately dilated.   The right atrium appears moderately dilated.   The right ventricle is mildly dilated.   Fibrocalcific changes noted to the Aortic valve leaflets with preserved   leaflet excursion.   Trace aortic regurgitation is present.   Fibrocalcific changes noted to the mitral valve leaflets with preserved   leaflet excursion.   Mild mitral annular calcification is present.   Moderate mitral regurgitation is present.   The tricuspid valve leaflets appear mildly thickened and/or calcified, but   open well.   Severe (4+) tricuspid valve regurgitation is present.   Mild pulmonary hypertension.   No evidence of pericardial effusion.

## 2021-12-01 NOTE — DIETITIAN INITIAL EVALUATION ADULT. - PERTINENT LABORATORY DATA
12-01    140  |  103  |  17  ----------------------------<  72  4.0   |  31  |  0.94    Ca    8.7      01 Dec 2021 07:42  Mg     1.9     12-01    TPro  6.1  /  Alb  1.5<L>  /  TBili  x   /  DBili  x   /  AST  x   /  ALT  x   /  AlkPhos  x   12-01  BMI: BMI (kg/m2): 19.4 (11-22-21 @ 15:15)  HbA1c:   Glucose:   BP: 161/65 (12-01-21 @ 08:05) (135/60 - 164/76)  Lipid Panel:

## 2021-12-01 NOTE — PROGRESS NOTE ADULT - SUBJECTIVE AND OBJECTIVE BOX
Subjective:  Pt seen, on 2L, no complaints. Confused.    Vital Signs:  Vital Signs Last 24 Hrs  T(C): 36.2 (12-01-21 @ 08:05), Max: 36.6 (11-30-21 @ 20:31)  T(F): 97.2 (12-01-21 @ 08:05), Max: 97.9 (11-30-21 @ 20:31)  HR: 59 (12-01-21 @ 08:05) (59 - 75)  BP: 161/65 (12-01-21 @ 08:05) (138/63 - 161/65)  RR: 19 (12-01-21 @ 08:05) (18 - 19)  SpO2: 99% (12-01-21 @ 08:05) (99% - 100%) on (O2)    Telemetry/Alarms:    Relevant labs, radiology and Medications reviewed                        11.6   13.78 )-----------( 559      ( 01 Dec 2021 07:42 )             35.7     11-29    140  |  109<H>  |  18  ----------------------------<  111<H>  4.3   |  26  |  0.91    Ca    8.5      29 Nov 2021 17:59        MEDICATIONS  (STANDING):  aspirin enteric coated 81 milliGRAM(s) Oral daily  cyanocobalamin 1000 MICROGram(s) Oral daily  digoxin     Tablet 250 MICROGram(s) Oral daily  diltiazem    Tablet 60 milliGRAM(s) Oral every 8 hours  folic acid 1 milliGRAM(s) Oral daily  furosemide   Injectable 40 milliGRAM(s) IV Push every 12 hours  metoprolol succinate  milliGRAM(s) Oral daily  multivitamin/minerals 1 Tablet(s) Oral daily  oxybutynin 5 milliGRAM(s) Oral daily  polyethylene glycol 3350 17 Gram(s) Oral daily  spironolactone 25 milliGRAM(s) Oral daily  zinc oxide 40% Ointment 1 Application(s) Topical two times a day    MEDICATIONS  (PRN):  acetaminophen     Tablet .. 650 milliGRAM(s) Oral every 6 hours PRN Temp greater or equal to 38C (100.4F), Mild Pain (1 - 3)  aluminum hydroxide/magnesium hydroxide/simethicone Suspension 30 milliLiter(s) Oral every 4 hours PRN Dyspepsia  bisacodyl 5 milliGRAM(s) Oral every 12 hours PRN Constipation  hydrOXYzine hydrochloride 25 milliGRAM(s) Oral daily PRN Anxiety  magnesium hydroxide Suspension 30 milliLiter(s) Oral at bedtime PRN Constipation  melatonin 3 milliGRAM(s) Oral at bedtime PRN Insomnia  ondansetron Injectable 4 milliGRAM(s) IV Push every 8 hours PRN Nausea and/or Vomiting      Physical exam  Gen NAD  HEENT normocephalic, dried blood, wound to left side of scalp, no nasal discharge, no periorbital ccyanosis  Neuro alert, follows some commands,   neck supple  Card RRR  Pulm decreased right side  Abd soft, obese  Ext cool, edema, cyanosis of toes  skin multiple areas of ecchymosis of extremities        I&O's Summary    30 Nov 2021 07:01  -  01 Dec 2021 07:00  --------------------------------------------------------  IN: 0 mL / OUT: 450 mL / NET: -450 mL        Assessment  80y Female  w/ PAST MEDICAL & SURGICAL HISTORY:  CHF (congestive heart failure)    Atrial fibrillation, unspecified type    Hypertension    Squamous cell carcinoma    H/O total hysterectomy    History of appendectomy    S/P cholecystectomy    admitted with complaints of Patient is a 80y old  Female who presents with a chief complaint of Encephalopathy  Acute hypoxemic Respiratory Failure  Sepsis (30 Nov 2021 15:00)  .  80 year old female patient who currently resides in a local nursing home presented to the ED after she was found to be febrile and had mental status changes. Patient seen and examined and was found to be alert and oriented but unable to provide pertinent narrative. States she does not recall what happened but believes she may have had a fever. Narrative limited secondary to current mental status. Upon workup patient noted to have Rt Pleural effusion     Right pigtail placement today  pleural fluid for analysis  awaiting consent, called yesterday and today, , [phone], will retry later today   cont care as per medical teams        Discussed with Cardiothoracic Team at AM rounds.      Subjective:  Pt seen, on 2L, no complaints. Confused.    Vital Signs:  Vital Signs Last 24 Hrs  T(C): 36.2 (12-01-21 @ 08:05), Max: 36.6 (11-30-21 @ 20:31)  T(F): 97.2 (12-01-21 @ 08:05), Max: 97.9 (11-30-21 @ 20:31)  HR: 59 (12-01-21 @ 08:05) (59 - 75)  BP: 161/65 (12-01-21 @ 08:05) (138/63 - 161/65)  RR: 19 (12-01-21 @ 08:05) (18 - 19)  SpO2: 99% (12-01-21 @ 08:05) (99% - 100%) on (O2)    Telemetry/Alarms:    Relevant labs, radiology and Medications reviewed                        11.6   13.78 )-----------( 559      ( 01 Dec 2021 07:42 )             35.7     11-29    140  |  109<H>  |  18  ----------------------------<  111<H>  4.3   |  26  |  0.91    Ca    8.5      29 Nov 2021 17:59        MEDICATIONS  (STANDING):  aspirin enteric coated 81 milliGRAM(s) Oral daily  cyanocobalamin 1000 MICROGram(s) Oral daily  digoxin     Tablet 250 MICROGram(s) Oral daily  diltiazem    Tablet 60 milliGRAM(s) Oral every 8 hours  folic acid 1 milliGRAM(s) Oral daily  furosemide   Injectable 40 milliGRAM(s) IV Push every 12 hours  metoprolol succinate  milliGRAM(s) Oral daily  multivitamin/minerals 1 Tablet(s) Oral daily  oxybutynin 5 milliGRAM(s) Oral daily  polyethylene glycol 3350 17 Gram(s) Oral daily  spironolactone 25 milliGRAM(s) Oral daily  zinc oxide 40% Ointment 1 Application(s) Topical two times a day    MEDICATIONS  (PRN):  acetaminophen     Tablet .. 650 milliGRAM(s) Oral every 6 hours PRN Temp greater or equal to 38C (100.4F), Mild Pain (1 - 3)  aluminum hydroxide/magnesium hydroxide/simethicone Suspension 30 milliLiter(s) Oral every 4 hours PRN Dyspepsia  bisacodyl 5 milliGRAM(s) Oral every 12 hours PRN Constipation  hydrOXYzine hydrochloride 25 milliGRAM(s) Oral daily PRN Anxiety  magnesium hydroxide Suspension 30 milliLiter(s) Oral at bedtime PRN Constipation  melatonin 3 milliGRAM(s) Oral at bedtime PRN Insomnia  ondansetron Injectable 4 milliGRAM(s) IV Push every 8 hours PRN Nausea and/or Vomiting      Physical exam  Gen NAD  HEENT normocephalic, dried blood, wound to left side of scalp, no nasal discharge, no periorbital ccyanosis  Neuro alert, follows some commands,   neck supple  Card RRR  Pulm decreased right side  Abd soft, obese  Ext cool, edema, cyanosis of toes  skin multiple areas of ecchymosis of extremities        I&O's Summary    30 Nov 2021 07:01  -  01 Dec 2021 07:00  --------------------------------------------------------  IN: 0 mL / OUT: 450 mL / NET: -450 mL        Assessment  80y Female  w/ PAST MEDICAL & SURGICAL HISTORY:  CHF (congestive heart failure)    Atrial fibrillation, unspecified type    Hypertension    Squamous cell carcinoma    H/O total hysterectomy    History of appendectomy    S/P cholecystectomy    admitted with complaints of Patient is a 80y old  Female who presents with a chief complaint of Encephalopathy  Acute hypoxemic Respiratory Failure  Sepsis (30 Nov 2021 15:00)  .  80 year old female patient pmhs Afib on AC, HTN, dementia, SCC, severe TR, CHF, recent fall 11/22 admitted 11/30 w fever and AMS. Upon workup patient noted to have Rt Pleural effusion.     Right pigtail placement today  pleural fluid for analysis  awaiting consent, called yesterday and today, , [phone], will retry later today   cont care as per medical teams        Discussed with Cardiothoracic Team at AM rounds.

## 2021-12-01 NOTE — PROGRESS NOTE ADULT - ASSESSMENT
Pt is a 80y old Female with hx of Dementia, HTN, Diastolic CHF, A. 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 Palliative medicine consulted to help establish GOC and advance care planning.      1) Acute Hypoxemic respiratory failure  - secondary to  Sepsis Pneumonia  - R Large pleural effusion   - plan to have thoracentesis today   - monitor labs   - c/w zosyn     2) Dementia/debility  - unsure baseline   - supportive care   - debility   - s/p fall   - PT consult when able      3) Acute on Chronic Systolic  CHF vs parapneumonic effusion/empyema  - Last echo: EF 40-45%, decreased LV FX, mod to severe TR, Severe Pulm HTN   - c/w Lasix 40mg   - c/wEliquis    4)  Chronic Afib  - C/w BB, Cardizem and Digoxin  - c/w Eliquis 2.5 mg PO BID    5) advance care planning   - Patient lacks capacity to medical decision making   - MOLST: DNR/DNI  - will reach out to family to schedule GOC meeting   - HCP on chart naming son Jonas -   - GOC scheduled for friday at 1PM

## 2021-12-01 NOTE — DIETITIAN INITIAL EVALUATION ADULT. - ADD RECOMMEND
1) continue with regular diet and encourage po with assistance during meals 2) Ensure enlive 8oz po TID 3) monitor weights track trends 4) Suggest add MVI w/minerals to meet RDI's 5) monitor labs/lytes and hydration replete prn 6) expand GOC addressing nutrition support. Nutrition support is not recommended due to overall declining medical status which evidenced based studies indicate EN is not effective in prolonging survival and improving quality of life. It can also increase risk of aspiration pneumonia as well as other related issues.

## 2021-12-01 NOTE — DIETITIAN INITIAL EVALUATION ADULT. - OTHER INFO
80 year old female patient  Dementia, HTN, Diastolic CHF, A. 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 Patient seen and examined and was found to be alert and oriented but unable to provide pertinent narrative. States she does not recall what happened but believes she may have had a fever. Palliative medicine consulted to help establish GOC and advance care planning. Dementia Acute Hypoxemic respiratory failure-secondary to  Sepsis Pneumonia,  now developed   R Large pleural effusion. Metabolic Encephalopathy on baseline. Pt appears with severe upper body muscle/fat wasting. Called NH which provided weight history indicating severe loss x 1 month 21#/ 12%. Diet prior to admission regular with Ensure shakes BID. RD's note in NH indicates pt consuming 25-50% of meals daily. Will continue to monitor and follow up prn.

## 2021-12-02 NOTE — PROGRESS NOTE ADULT - ASSESSMENT
80 year old female patient who currently resides in a local nursing home presented to the ED after she was found to be febrile and had mental status changes. Patient seen and examined and was found to be alert and oriented but unable to provide pertinent narrative. States she does not recall what happened but believes she may have had a fever. Narrative limited secondary to current mental status. In the ED patient found to be febrile, hypoxic and had a WBC of 20. Her initial xray on 11/22 showed small R pleural effusion and possible infiltrate. She was given IV zosyn for 7 days. She had CT chest done on 11/29 showing new R large pleural effusion and new small L pleural effusion, new atelectasis of pneumonia in the R middle and RLL and new ill-defined groundglass densities in L upper lobe, lingula, eval by CTS and plan for R pigtail catheter placement.     1. R pleural effusion. pulmonary edema. CHF. pneumonia s/p abx course  - cts eval noted, s/p R pigtail catheter placement  - pleural fluid studies c/x transudative fluid f/u cx  - completed 7 days zosyn ---> 11/29  - observe off antibiotics for now   - monitor temps  - supportive care  - fu cbc    2. other issues - care per medicine

## 2021-12-02 NOTE — PROGRESS NOTE ADULT - ASSESSMENT
80 year old female patient pmhs Afib on AC, HTN, dementia, SCC, severe TR, CHF, recent fall 11/22 admitted 11/30 w fever and AMS. Upon workup patient noted to have Rt Pleural effusion.  12/1 Rt Pigtail placed     Plan   maintain Pigtail to WS   pleural fluid for analysis pending   CXR in am   cont care as per medical teams        Discussed with Cardiothoracic Team at AM rounds.

## 2021-12-02 NOTE — PROGRESS NOTE ADULT - SUBJECTIVE AND OBJECTIVE BOX
Subjective:  Pt in bed NAD no issues overnight  pigtail in and draining serous fluid     T(C): 36.6 (12-02-21 @ 08:49), Max: 37 (12-02-21 @ 06:32)  HR: 62 (12-02-21 @ 08:49) (58 - 78)  BP: 160/62 (12-02-21 @ 08:49) (106/70 - 160/62)  ABP: --  ABP(mean): --  RR: 18 (12-02-21 @ 08:49) (18 - 18)  SpO2: 99% (12-02-21 @ 08:49) (97% - 99%)  Wt(kg): --  CVP(mm Hg): --  CO: --  CI: --  PA: --                                              Tele: Afib     CHEST TUBE:   1300cc                             OUTPUT:     per 24 hours    AIR LEAKS:  [ ] YES [x ] NO          12-02    140  |  100  |  21  ----------------------------<  109<H>  3.7   |  32<H>  |  0.92    Ca    8.6      02 Dec 2021 08:30  Mg     1.9     12-01    TPro  6.1  /  Alb  1.5<L>  /  TBili  x   /  DBili  x   /  AST  x   /  ALT  x   /  AlkPhos  x   12-01                               11.4   14.45 )-----------( 564      ( 02 Dec 2021 08:30 )             35.1                 CAPILLARY BLOOD GLUCOSE               CXR: < from: Xray Chest 1 View- PORTABLE-Urgent (Xray Chest 1 View- PORTABLE-Urgent .) (12.01.21 @ 13:55) >  RIGHT multi-sidehole pigtail catheter overlies RIGHT lower hemithorax.  Decreased RIGHT pleural effusion with residual LEFT RIGHT basilar airspace disease and/or loculated fluid.  RIGHT upper zone clear. No pneumothorax.  LEFT basilar airspace consolidation and/or effusion obscuring LEFT diaphragmatic contour..   No pneumothorax.    The  heart is enlarged in transverse diameter. No hilar mass.  Micra Transcatheter Pacing System cardiacdevice within RIGHT ventricle.     Visualized osseous structures are intact.    IMPRESSION:   RIGHT multi-sidehole pigtail catheter overlies RIGHT lower hemithorax..  Residual bibasilar airspace disease and/or effusions.    < end of copied text >          Exam   Neuro: confused   Pulm: decreased b/l + Rt pigtail   CV: Irreg S1 S2   Abd: soft NT ND   Extremities: trace edema         Assessment:  80yFemale    with PAST MEDICAL & SURGICAL HISTORY:  CHF (congestive heart failure)    Atrial fibrillation, unspecified type    Hypertension    Squamous cell carcinoma    H/O total hysterectomy    History of appendectomy    S/P cholecystectomy

## 2021-12-02 NOTE — PROGRESS NOTE ADULT - SUBJECTIVE AND OBJECTIVE BOX
Date of service: 12-02-21 @ 12:04    pt seen and examined  s/p R pigtail catheter placement   feels weak, lethargic  laying in bed, nad     ROS: no fever or chills; denies dizziness, no HA,  no abdominal pain, no diarrhea or constipation; no dysuria, no urinary frequency, no legs pain, no rashes    MEDICATIONS  (STANDING):  aspirin enteric coated 81 milliGRAM(s) Oral daily  cyanocobalamin 1000 MICROGram(s) Oral daily  digoxin     Tablet 250 MICROGram(s) Oral daily  diltiazem    Tablet 60 milliGRAM(s) Oral every 8 hours  folic acid 1 milliGRAM(s) Oral daily  furosemide   Injectable 40 milliGRAM(s) IV Push every 12 hours  lidocaine   4% Patch 1 Patch Transdermal daily  metoprolol succinate  milliGRAM(s) Oral daily  multivitamin/minerals 1 Tablet(s) Oral daily  oxybutynin 5 milliGRAM(s) Oral daily  polyethylene glycol 3350 17 Gram(s) Oral daily  spironolactone 25 milliGRAM(s) Oral daily  zinc oxide 40% Ointment 1 Application(s) Topical two times a day    Vital Signs Last 24 Hrs  T(C): 36.6 (02 Dec 2021 08:49), Max: 37 (02 Dec 2021 06:32)  T(F): 97.8 (02 Dec 2021 08:49), Max: 98.6 (02 Dec 2021 06:32)  HR: 62 (02 Dec 2021 08:49) (58 - 78)  BP: 160/62 (02 Dec 2021 08:49) (106/70 - 160/62)  BP(mean): --  RR: 18 (02 Dec 2021 08:49) (18 - 18)  SpO2: 99% (02 Dec 2021 08:49) (97% - 99%)      PE:  Constitutional: frail looking  HEENT: NC/AT, EOMI, PERRLA, conjunctivae clear; ears and nose atraumatic; pharynx benign  Neck: supple; thyroid not palpable  Back: no tenderness  Respiratory: decreased breath sounds, R chest tube in place   Cardiovascular: S1S2 regular, no murmurs  Abdomen: soft, not tender, not distended, positive BS; liver and spleen WNL  Genitourinary: no suprapubic tenderness  Lymphatic: no LN palpable  Musculoskeletal: no muscle tenderness, no joint swelling or tenderness  Extremities: no pedal edema  Neurological/ Psychiatric: AxOx3, Judgement and insight normal;  moving all extremities  Skin: no rashes; no palpable lesions    Labs: all available labs reviewed                                   11.4   14.45 )-----------( 564      ( 02 Dec 2021 08:30 )             35.1     12-02    140  |  100  |  21  ----------------------------<  109<H>  3.7   |  32<H>  |  0.92    Ca    8.6      02 Dec 2021 08:30  Mg     1.9     12-01    TPro  6.1  /  Alb  1.5<L>  /  TBili  x   /  DBili  x   /  AST  x   /  ALT  x   /  AlkPhos  x   12-01          Radiology: all available radiological tests reviewed  < from: CT Chest No Cont (11.29.21 @ 16:31) >    EXAM:  CT CHEST                            PROCEDURE DATE:  11/29/2021          INTERPRETATION:  CLINICAL INFORMATION: Shortness of breath, effusion    TECHNIQUE:  A volumetric CT acquisition of the chest was obtained from the thoracic inlet to theupper abdomen, without the administration  of intravenous contrast. Coronal and sagittal multiplanar reformations were also submitted.    Comparison: 10/28/2021    FINDINGS:    Lungs/Airways/Pleura: There are new ill-defined groundglass densities in left upper lobe and lingula. Previous groundglass densities in the right lung are contained within atelectatic lung. There is new atelectasis or consolidation in the right middle and right lower lobes. New large right pleural effusion and new small left pleural effusion.    Mediastinum/Lymph nodes: No thoracic adenopathy.    Heart and Vessels: There is marked biatrial enlargement. Leadless pacemaker is in the right ventricle. No pericardial effusion. There are mild coronary artery calcifications. The ascending aorta is mildly dilated measuring 4.1 cm.    Upper Abdomen: Cholecystectomy has been performed.    Osseous structures and Soft Tissues: No aggressive bone lesions. Old right rib fractures are unchanged. There is diffuse qualitative osteopenia    IMPRESSION:    1.  New large right pleural effusion and new small left pleural effusion    2.  New atelectasis or pneumonia in the right middle and right lower lobes and new ill-defined groundglass densities in the left upper lobe/lingula which may also be infectious    3.  Marked biatrial enlargement        EXAM:  XR CHEST PORTABLE URGENT 1V                            PROCEDURE DATE:  11/29/2021          INTERPRETATION:  Clinical history: Congestive heart failure    Portable chest    New moderate to large right pleural effusion is seen. Persistent congestive changes also noted. No pneumothorax.    IMPRESSION: New right pleural effusion as above      Advanced directives addressed: full resuscitation

## 2021-12-02 NOTE — PROGRESS NOTE ADULT - ASSESSMENT
80 year old female patient  Dementia, HTN, Diastolic CHF, A. 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 Acute Hypoxemic respiratory failure  -secondary to  Sepsis Pneumonia, developed   R Large pleural effusion   - S/p IVF, lactate improved. Still leukocytosis, improved   - no fevers   -on supplemental oxygen  - F/u BCX: NGTD,   UCX ;  Enterococcus and Ecoli . Repeat UCX : NG but was on IV Abxs   -On   zosyn  - Plan for Tx and DX thoracentesis and CT placement today   - D/w CT  team       2. Metabolic Encephalopathy on baseline Dementia   - likely 2/2 sepsis/hypoxia   -CT head neg for acute findings no  SDH  - supportive care     3. R large pleural effusion   Acute on Chronic Systolic  CHF vs parapneumonic effusion/empyema?  Last echo: EF 40-45%, decreased LV FX, mod to severe TR, Severe Pulm HTN   Started on LAsix 40mg IVP BID, Spironolactone  Hold Eliquis  D/w CT Sx, will plan for Tx and DX thoracentesis in am , will send fluid  analysis     4. Elevated Troponin, demand ischemia in settings of Sepsis, suspect underline CAD   -no EKG changes noted  - troponin trended down   - ECHO: last  11/1/21: severe TR, Pulm HTN, EF 45-50%  - C/w ASA, statin, BB.   - CArdio f/u appreciated plan was  for stress test but  on hold now        5.  ADRIANA/ dehydration  - Improved s/p gentle IVF   - encourage oral intake   - Bladder  scan neg   - Monitor on lasix and Spironolactone   - LAbs in am       # Chronic Afib  - C/w BB, Cardizem and Digoxin  - Cleared by NeuroSx to resume Eliquis 2.5 mg PO BID, now held for procedure     # H/o Fall and SDH   Repeat CT head neg   Neuro Sx reeval appreciated     # DVT PPX: heparin SQ    ACP: DNR/DNI, Palliative eval for GOC, poor prognosis  80 year old female patient  Dementia, HTN, Diastolic CHF, A. 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 Acute Hypoxemic respiratory failure  -secondary to  Sepsis Pneumonia, developed   R Large pleural effusion   - S/p IVF, lactate improved. Still leukocytosis  - no fevers   - S/p  R Thoracentesis CT placed, to water seal, management  as per CT Sx   - Fluid analysis d/w Dr Buck, transudative, F/u CX , gram stain no organisms   -on supplemental oxygen  - F/u BCX: NGTD,   UCX ;  Enterococcus and Ecoli .  - Completed course of Zosyn  - D/w DR Buck and Dr Brice, monitor off IV abxs, C/w lasix and Spironolactone     2. Metabolic Encephalopathy on baseline Dementia   - likely 2/2 sepsis/hypoxia   -CT head neg for acute findings no  SDH  - supportive care     3. R large pleural effusion   Acute on Chronic Systolic  CHF vs parapneumonic effusion/empyema?  Last echo: EF 40-45%, decreased LV FX, mod to severe TR, Severe Pulm HTN   C/w  IV Lasix  40mg IVP BID, Spironolactone  D/w CT Sx, will plan for Tx and DX thoracentesis in am , will send fluid  analysis     4. Elevated Troponin, demand ischemia in settings of Sepsis, suspect underline CAD   -no EKG changes noted  - troponin trended down   - ECHO: last  11/1/21: severe TR, Pulm HTN, EF 45-50%  - C/w ASA, statin, BB.   - CArdio f/u appreciated plan was  for stress test but  on hold now        5.  ADRIANA/ dehydration  - Improved s/p gentle IVF   - encourage oral intake   - Bladder  scan neg   - Monitor on Lasix and Spironolactone       6.  Chronic Afib  - C/w BB, Cardizem and Digoxin  - resume  Eliquis 2.5 mg PO BID    # H/o Fall and SDH   Repeat CT head neg   Neuro Sx reeval appreciated     # DVT PPX: heparin SQ    ACP: DNR/DNI, Palliative eval for GOC, poor prognosis

## 2021-12-02 NOTE — PROGRESS NOTE ADULT - SUBJECTIVE AND OBJECTIVE BOX
CC: Encephalopathy  Acute hypoxemic Respiratory Failure  Sepsis     HPI: 80 year old female patient  Dementia, HTN, Diastolic CHF, A. 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 Patient seen and examined and was found to be alert and oriented but unable to provide pertinent narrative. States she does not recall what happened but believes she may have had a fever. Narrative limited secondary to current mental status  In the ED patient found to be febrile, hypoxic and had a WBC of 20. She was given vanco, zosyn and IVF hydration     INTERVAL HPI/ OVERNIGHT EVENTS:  Pt was seen and examined,   confused, looks anxious, states  " I dont know whats going on". Pt reoriented on place, Pts condition and plan. Denies pain, no Difficulty breathing. Awaiting for CT placement     Vital Signs Last 24 Hrs  T(C): 36.6 (02 Dec 2021 08:49), Max: 37 (02 Dec 2021 06:32)  T(F): 97.8 (02 Dec 2021 08:49), Max: 98.6 (02 Dec 2021 06:32)  HR: 62 (02 Dec 2021 08:49) (58 - 78)  BP: 160/62 (02 Dec 2021 08:49) (106/70 - 160/62)  RR: 18 (02 Dec 2021 08:49) (18 - 18)  SpO2: 99% (02 Dec 2021 08:49) (97% - 99%)  REVIEW OF SYSTEMS:  Unable to obtain due to dementia       PHYSICAL EXAM:  General: Well developed; malnourished,  in no acute distress, on NC   Eyes: EOMI; conjunctiva and sclera clear  Head: Normocephalic; L frontal hematoma improving   ENMT: No nasal discharge; dry oral mucosa   Neck: Supple; no JVD   Respiratory: Diminished BS on R, + R  crackles   Cardiovascular: Irregular rate and rhythm. S1 and S2 Normal;   Gastrointestinal: Soft non-tender non-distended; Normal bowel sounds  Genitourinary: No  suprapubic  tenderness  Extremities: + le  with Acre wrap, cyanotic toes   Neurological: Alert and oriented x1, non focal   Musculoskeletal: Normal muscle tone and strength   Psychiatric: Cooperative        LABS:                           10.3   12.42 )-----------( 565      ( 2021 17:59 )             33.0         140  |  109<H>  |  18  ----------------------------<  111<H>  4.3   |  26  |  0.91    Ca    8.5      2021 17:59                          9.9    15.52 )-----------( 388      ( 2021 07:16 )             30.6         138  |  106  |  24<H>  ----------------------------<  97  4.2   |  28  |  1.02    Ca    8.2<L>      2021 07:16                          10.2   17.86 )-----------( 361      ( 2021 11:44 )             30.5         136  |  104  |  26<H>  ----------------------------<  155<H>  4.0   |  24  |  1.08    Ca    8.2<L>      2021 11:44  Mg     2.2             Urinalysis Basic - ( 2021 17:40 )  Color: Yellow / Appearance: Slightly Turbid / S.020 / pH: x  Gluc: x / Ketone: Negative  / Bili: Negative / Urobili: 1   Blood: x / Protein: unable to obtai mg/dL / Nitrite: Negative   Leuk Esterase: Small / RBC: 0-2 /HPF / WBC -   Sq Epi: x / Non Sq Epi: Few / Bacteria: Moderate  Culture - Urine (21 @ 17:40)   Specimen Source: Clean Catch Clean Catch (Midstream)   Culture Results:   <10,000 CFU/mL Normal Urogenital Ivy       Culture - Urine (21 @ 15:35)   - Amikacin: S <=16   - Amoxicillin/Clavulanic Acid: I 8   - Ampicillin: R >16 These ampicillin results predict results for amoxicillin   - Ampicillin: S <=2 Predicts results to ampicillin/sulbactam, amoxacillin-clavulanate and piperacillin-tazobactam.   - Ampicillin/Sulbactam: R >16/8 Enterobacter, Klebsiella aerogenes, Citrobacter, and Serratia may develop resistance during prolonged therapy (3-4 days)   - Aztreonam: S <=4   - Cefazolin: S 16 ([email or web address]) For uncomplicated UTI with K. pneumoniae, E. coli, or P. mirablis: CECILY <=16 is sensitive and CECILY >=32 is resistant. This also predicts results for oral agents cefaclor, cefdinir, cefpodoxime, cefprozil, cefuroxime axetil, cephalexin and locarbef for uncomplicated UTI. Note that some isolates may be susceptible to these agents while testing resistant to cefazolin.   - Cefepime: S <=2   - Cefoxitin: S <=8   - Ceftriaxone: S <=1 Enterobacter, Klebsiella aerogenes, Citrobacter, and Serratia may develop resistance during prolonged therapy   - Ciprofloxacin: S <=0.25   - Ciprofloxacin: S <=1   - Ertapenem: S <=0.5   - Gentamicin: S <=2   - Imipenem: S <=1   - Levofloxacin: S <=0.5   - Levofloxacin: S <=1   - Meropenem: S <=1   - Nitrofurantoin: S <=32 Should not be used to treat pyelonephritis   - Nitrofurantoin: S <=32 Should not be used to treat pyelonephritis.   - Piperacillin/Tazobactam: S <=8   - Tetra/Doxy: R >8   - Tigecycline: S <=2   - Tobramycin: S <=2   - Trimethoprim/Sulfamethoxazole: R >2/38   - Vancomycin: S 2   Specimen Source: Clean Catch Clean Catch (Midstream)   Culture Results:   50,000 - 99,000 CFU/mL Escherichia coli   50,000 - 99,000 CFU/mL Enterococcus faecalis   Organism Identification: Escherichia coli   Enterococcus faecalis   Organism: Escherichia coli   Organism: Enterococcus faecalis     MEDICATIONS  (STANDING):  aspirin enteric coated 81 milliGRAM(s) Oral daily  cyanocobalamin 1000 MICROGram(s) Oral daily  digoxin     Tablet 250 MICROGram(s) Oral daily  diltiazem    Tablet 60 milliGRAM(s) Oral every 8 hours  folic acid 1 milliGRAM(s) Oral daily  furosemide   Injectable 40 milliGRAM(s) IV Push every 12 hours  lidocaine   4% Patch 1 Patch Transdermal daily  metoprolol succinate  milliGRAM(s) Oral daily  multivitamin/minerals 1 Tablet(s) Oral daily  oxybutynin 5 milliGRAM(s) Oral daily  polyethylene glycol 3350 17 Gram(s) Oral daily  spironolactone 25 milliGRAM(s) Oral daily  zinc oxide 40% Ointment 1 Application(s) Topical two times a day    MEDICATIONS  (PRN):  acetaminophen     Tablet .. 650 milliGRAM(s) Oral every 6 hours PRN Temp greater or equal to 38C (100.4F), Mild Pain (1 - 3)  aluminum hydroxide/magnesium hydroxide/simethicone Suspension 30 milliLiter(s) Oral every 4 hours PRN Dyspepsia  bisacodyl 5 milliGRAM(s) Oral every 12 hours PRN Constipation  hydrOXYzine hydrochloride 25 milliGRAM(s) Oral daily PRN Anxiety  magnesium hydroxide Suspension 30 milliLiter(s) Oral at bedtime PRN Constipation  melatonin 3 milliGRAM(s) Oral at bedtime PRN Insomnia  ondansetron Injectable 4 milliGRAM(s) IV Push every 8 hours PRN Nausea and/or Vomiting    RADIOLOGY & ADDITIONAL TESTS:      EXAM:  CT BRAIN                        PROCEDURE DATE:  2021      FINDINGS:  The ventricles and sulci are prominent, compatible with age-related generalized cerebral volume loss.   There is no CT evidence for acute cerebral cortical infarct. There is a tiny lacunar infarct in the left basal ganglia of indeterminate age. There is no evidence of hemorrhage. There is periventricular and subcortical white matter hypoattenuation,  most compatible with chronic microvascular ischemic changes.   No mass effect is found in the brain.  There is no midline shift or herniation pattern.      The visualized portions of the paranasal sinuses and mastoid air cells are clear.    IMPRESSION:    There is a tiny lacunar infarctin the left basal ganglia of indeterminate age.  No evidence of hemorrhage.    Chronic changes as above.        EXAM:  CT BRAIN                            PROCEDURE DATE:  2021          INTERPRETATION:  CT head without IV contrast    CLINICAL INFORMATION:  Fall, RIGHT   Intracranial hemorrhage.    TECHNIQUE: Contiguous axial 5 mm sections were obtainedthrough the head. Sagittal and coronal 2-D reformatted images were also obtained.   This scan was performed using automatic exposure control (radiation dose reduction software) to obtain a diagnostic image quality scan with patient dose as low as reasonably achievable.    FINDINGS:   CT dated 10/29/2021 available for review.    The brain demonstrates decrease in the degree of intracranial hemorrhage within the RIGHT sylvian fissure and RIGHT superior temporal lobe. Moderate periventricular white matter ischemia is noted. Tiny old lacunar infarction in the basal ganglia. No acute cerebral cortical infarct is seen. No mass effect is found in the brain.    The ventricles, sulci and basal cisterns appear unremarkable.    The orbits are unremarkable.  The paranasal sinuses are clear.  The nasal cavity appears intact.  The nasopharynx is symmetric.  The central skull base, petrous temporal bones and calvarium remain intact. 3.4 cm LEFT frontal scalp hematoma is noted.      IMPRESSION:   Interval decrease in the degree of intracranial hemorrhage within the RIGHT sylvian fissure and RIGHT superior temporal lobe. Moderate periventricular white matter ischemia is noted. Tiny old lacunar infarction in the basal ganglia.           EXAM:  ECHO TTE WO CON COMP W DOPP    PROCEDURE DATE:  2021     Summary     Technically limited study   The left ventricle cavity is mildly dilated.   Endocardium is not well visualized, however, overall left ventricular   systolic function appears diminished.   Estimated left ventricular ejection fraction is 40-45%.   Wall motion abnormalities can not be ruled out.   The left atrium is moderately dilated.   The right atrium appears moderately dilated.   The right ventricle is mildly dilated.   Fibrocalcific changes noted to the Aortic valve leaflets with preserved   leaflet excursion.   Trace aortic regurgitation is present.   Fibrocalcific changes noted to the mitral valve leaflets with preserved   leaflet excursion.   Mild mitral annular calcification is present.   Moderate mitral regurgitation is present.   The tricuspid valve leaflets appear mildly thickened and/or calcified, but   open well.   Severe (4+) tricuspid valve regurgitation is present.   Mild pulmonary hypertension.   No evidence of pericardial effusion.       CC: Encephalopathy  Acute hypoxemic Respiratory Failure  Sepsis     HPI: 80 year old female patient  Dementia, HTN, Diastolic CHF, A. 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 Patient seen and examined and was found to be alert and oriented but unable to provide pertinent narrative. States she does not recall what happened but believes she may have had a fever. Narrative limited secondary to current mental status  In the ED patient found to be febrile, hypoxic and had a WBC of 20. She was given vanco, zosyn and IVF hydration     INTERVAL HPI/ OVERNIGHT EVENTS:  Pt was seen and examined,    being fed, looks better, reports no SOB.     Vital Signs Last 24 Hrs  T(C): 36.6 (02 Dec 2021 08:49), Max: 37 (02 Dec 2021 06:32)  T(F): 97.8 (02 Dec 2021 08:49), Max: 98.6 (02 Dec 2021 06:32)  HR: 62 (02 Dec 2021 08:49) (58 - 78)  BP: 160/62 (02 Dec 2021 08:49) (106/70 - 160/62)  RR: 18 (02 Dec 2021 08:49) (18 - 18)  SpO2: 99% (02 Dec 2021 08:49) (97% - 99%)      REVIEW OF SYSTEMS:  Unable to obtain due to dementia       PHYSICAL EXAM:  General: Well developed; malnourished,  in no acute distress, on NC   Eyes: EOMI; conjunctiva and sclera clear  Head: Normocephalic; L frontal hematoma improving   ENMT: No nasal discharge; dry oral mucosa   Neck: Supple; no JVD   Respiratory: Improved air entry on R, rales at R base  Cardiovascular: Irregular rate and rhythm. S1 and S2 Normal;   Gastrointestinal: Soft non-tender non-distended; Normal bowel sounds  Genitourinary: No  suprapubic  tenderness  Extremities: + le, L LE wound with granulations, no edema or erythema    Neurological: Alert and oriented x1, non focal   Musculoskeletal: Normal muscle tone and strength   Psychiatric: Cooperative        LABS:                           11.4   14.45 )-----------( 564      ( 02 Dec 2021 08:30 )             35.1     12    140  |  100  |  21  ----------------------------<  109<H>  3.7   |  32<H>  |  0.92    Ca    8.6      02 Dec 2021 08:30  Mg     1.9     12                          10.3   12.42 )-----------( 565      ( 2021 17:59 )             33.0         140  |  109<H>  |  18  ----------------------------<  111<H>  4.3   |  26  |  0.91    Ca    8.5      2021 17:59                          9.9    15.52 )-----------( 388      ( 2021 07:16 )             30.6         138  |  106  |  24<H>  ----------------------------<  97  4.2   |  28  |  1.02    Ca    8.2<L>      2021 07:16                          10.2   17.86 )-----------( 361      ( 2021 11:44 )             30.5         136  |  104  |  26<H>  ----------------------------<  155<H>  4.0   |  24  |  1.08    Ca    8.2<L>      2021 11:44  Mg     2.2             Urinalysis Basic - ( 2021 17:40 )  Color: Yellow / Appearance: Slightly Turbid / S.020 / pH: x  Gluc: x / Ketone: Negative  / Bili: Negative / Urobili: 1   Blood: x / Protein: unable to obtai mg/dL / Nitrite: Negative   Leuk Esterase: Small / RBC: 0-2 /HPF / WBC    Sq Epi: x / Non Sq Epi: Few / Bacteria: Moderate  Culture - Urine (21 @ 17:40)   Specimen Source: Clean Catch Clean Catch (Midstream)   Culture Results:   <10,000 CFU/mL Normal Urogenital Ivy       Culture - Urine (11..21 @ 15:35)   - Amikacin: S <=16   - Amoxicillin/Clavulanic Acid: I 16/8   - Ampicillin: R >16 These ampicillin results predict results for amoxicillin   - Ampicillin: S <=2 Predicts results to ampicillin/sulbactam, amoxacillin-clavulanate and piperacillin-tazobactam.   - Ampicillin/Sulbactam: R >16/8 Enterobacter, Klebsiella aerogenes, Citrobacter, and Serratia may develop resistance during prolonged therapy (3-4 days)   - Aztreonam: S <=4   - Cefazolin: S 16 ([email or web address]_COM_[email or web address]) For uncomplicated UTI with K. pneumoniae, E. coli, or P. mirablis: CECILY <=16 is sensitive and CECILY >=32 is resistant. This also predicts results for oral agents cefaclor, cefdinir, cefpodoxime, cefprozil, cefuroxime axetil, cephalexin and locarbef for uncomplicated UTI. Note that some isolates may be susceptible to these agents while testing resistant to cefazolin.   - Cefepime: S <=2   - Cefoxitin: S <=8   - Ceftriaxone: S <=1 Enterobacter, Klebsiella aerogenes, Citrobacter, and Serratia may develop resistance during prolonged therapy   - Ciprofloxacin: S <=0.25   - Ciprofloxacin: S <=1   - Ertapenem: S <=0.5   - Gentamicin: S <=2   - Imipenem: S <=1   - Levofloxacin: S <=0.5   - Levofloxacin: S <=1   - Meropenem: S <=1   - Nitrofurantoin: S <=32 Should not be used to treat pyelonephritis   - Nitrofurantoin: S <=32 Should not be used to treat pyelonephritis.   - Piperacillin/Tazobactam: S <=8   - Tetra/Doxy: R >8   - Tigecycline: S <=2   - Tobramycin: S <=2   - Trimethoprim/Sulfamethoxazole: R >2/38   - Vancomycin: S 2   Specimen Source: Clean Catch Clean Catch (Midstream)   Culture Results:   50,000 - 99,000 CFU/mL Escherichia coli   50,000 - 99,000 CFU/mL Enterococcus faecalis   Organism Identification: Escherichia coli   Enterococcus faecalis   Organism: Escherichia coli   Organism: Enterococcus faecalis     MEDICATIONS  (STANDING):  apixaban 2.5 milliGRAM(s) Oral every 12 hours  aspirin enteric coated 81 milliGRAM(s) Oral daily  cyanocobalamin 1000 MICROGram(s) Oral daily  digoxin     Tablet 250 MICROGram(s) Oral daily  diltiazem    Tablet 60 milliGRAM(s) Oral every 8 hours  folic acid 1 milliGRAM(s) Oral daily  furosemide   Injectable 40 milliGRAM(s) IV Push every 12 hours  lidocaine   4% Patch 1 Patch Transdermal daily  metoprolol succinate  milliGRAM(s) Oral daily  multivitamin/minerals 1 Tablet(s) Oral daily  oxybutynin 5 milliGRAM(s) Oral daily  polyethylene glycol 3350 17 Gram(s) Oral daily  spironolactone 25 milliGRAM(s) Oral daily  zinc oxide 40% Ointment 1 Application(s) Topical two times a day    MEDICATIONS  (PRN):  acetaminophen     Tablet .. 650 milliGRAM(s) Oral every 6 hours PRN Temp greater or equal to 38C (100.4F), Mild Pain (1 - 3)  aluminum hydroxide/magnesium hydroxide/simethicone Suspension 30 milliLiter(s) Oral every 4 hours PRN Dyspepsia  bisacodyl 5 milliGRAM(s) Oral every 12 hours PRN Constipation  hydrOXYzine hydrochloride 25 milliGRAM(s) Oral daily PRN Anxiety  magnesium hydroxide Suspension 30 milliLiter(s) Oral at bedtime PRN Constipation  melatonin 3 milliGRAM(s) Oral at bedtime PRN Insomnia  ondansetron Injectable 4 milliGRAM(s) IV Push every 8 hours PRN Nausea and/or Vomiting      RADIOLOGY & ADDITIONAL TESTS:      EXAM:  CT BRAIN                        PROCEDURE DATE:  2021      FINDINGS:  The ventricles and sulci are prominent, compatible with age-related generalized cerebral volume loss.   There is no CT evidence for acute cerebral cortical infarct. There is a tiny lacunar infarct in the left basal ganglia of indeterminate age. There is no evidence of hemorrhage. There is periventricular and subcortical white matter hypoattenuation,  most compatible with chronic microvascular ischemic changes.   No mass effect is found in the brain.  There is no midline shift or herniation pattern.      The visualized portions of the paranasal sinuses and mastoid air cells are clear.    IMPRESSION:    There is a tiny lacunar infarctin the left basal ganglia of indeterminate age.  No evidence of hemorrhage.    Chronic changes as above.        EXAM:  CT BRAIN                            PROCEDURE DATE:  2021          INTERPRETATION:  CT head without IV contrast    CLINICAL INFORMATION:  Fall, RIGHT   Intracranial hemorrhage.    TECHNIQUE: Contiguous axial 5 mm sections were obtainedthrough the head. Sagittal and coronal 2-D reformatted images were also obtained.   This scan was performed using automatic exposure control (radiation dose reduction software) to obtain a diagnostic image quality scan with patient dose as low as reasonably achievable.    FINDINGS:   CT dated 10/29/2021 available for review.    The brain demonstrates decrease in the degree of intracranial hemorrhage within the RIGHT sylvian fissure and RIGHT superior temporal lobe. Moderate periventricular white matter ischemia is noted. Tiny old lacunar infarction in the basal ganglia. No acute cerebral cortical infarct is seen. No mass effect is found in the brain.    The ventricles, sulci and basal cisterns appear unremarkable.    The orbits are unremarkable.  The paranasal sinuses are clear.  The nasal cavity appears intact.  The nasopharynx is symmetric.  The central skull base, petrous temporal bones and calvarium remain intact. 3.4 cm LEFT frontal scalp hematoma is noted.      IMPRESSION:   Interval decrease in the degree of intracranial hemorrhage within the RIGHT sylvian fissure and RIGHT superior temporal lobe. Moderate periventricular white matter ischemia is noted. Tiny old lacunar infarction in the basal ganglia.           EXAM:  ECHO TTE WO CON COMP W DOPP    PROCEDURE DATE:  2021     Summary     Technically limited study   The left ventricle cavity is mildly dilated.   Endocardium is not well visualized, however, overall left ventricular   systolic function appears diminished.   Estimated left ventricular ejection fraction is 40-45%.   Wall motion abnormalities can not be ruled out.   The left atrium is moderately dilated.   The right atrium appears moderately dilated.   The right ventricle is mildly dilated.   Fibrocalcific changes noted to the Aortic valve leaflets with preserved   leaflet excursion.   Trace aortic regurgitation is present.   Fibrocalcific changes noted to the mitral valve leaflets with preserved   leaflet excursion.   Mild mitral annular calcification is present.   Moderate mitral regurgitation is present.   The tricuspid valve leaflets appear mildly thickened and/or calcified, but   open well.   Severe (4+) tricuspid valve regurgitation is present.   Mild pulmonary hypertension.   No evidence of pericardial effusion.

## 2021-12-02 NOTE — PHARMACOTHERAPY INTERVENTION NOTE - COMMENTS
Medication reconciliation completed.  Reviewed Medication list and confirmed med allergies with list from [facility]; confirmed with Dr. First [place].
Pt on Eliquis 2.5 BID at home. Held for thoracentesis. Per CT PA, cleared to restart tonight.
Recommended mechanical VTE px as eliquis is being held

## 2021-12-02 NOTE — PROGRESS NOTE ADULT - ASSESSMENT
PROBLEMS;    Acute Hypoxemic respiratory failure  Possible Pneumonia/UTI   CXR worsening R effusion  pUL htn 48  Metabolic Encephalopathy on baseline Dementia  Elevated Troponin  ADRIANA/ dehydration, improving  Chronic Afib  H/o Fall and SDH WBC trending down  UCX ;  Enterococcus and Ecoli     PLAN;    CT CHEST large pleural effusion-s/p drainage transudative  off abx- ID eval  ECHO: last  11/1/21: severe TR, Pulm HTN, EF 45-50%  C/w ASA, statin, BB.   ON Spironolactone   LAbs in am C/w tele  C/w BB, Cardizem and Digoxin  DVT PROPHYLASIX PROBLEMS;    Acute Hypoxemic respiratory failure  Possible Pneumonia/UTI   CXR worsening R effusion  pUL htn 48  Metabolic Encephalopathy on baseline Dementia  Elevated Troponin  ADRIANA/ dehydration, improving  Chronic Afib  H/o Fall and SDH WBC trending down  UCX ;  Enterococcus and Ecoli     PLAN;    pulmonary better  CT CHEST large pleural effusion-s/p pigtail drainage transudative  off abx- ID eval  ECHO: last  11/1/21: severe TR, Pulm HTN, EF 45-50%  C/w ASA, statin, BB.   ON Spironolactone   LAbs in am C/w tele  C/w BB, Cardizem and Digoxin  DVT PROPHYLASIX

## 2021-12-02 NOTE — PROGRESS NOTE ADULT - SUBJECTIVE AND OBJECTIVE BOX
Subjective:    Home Medications:  acetaminophen 325 mg oral tablet: 2 tab(s) orally every 6 hours, As Needed (22 Nov 2021 17:35)  Aldactone 25 mg oral tablet: 0.5 tab(s) orally once a day  Hold for SBP&lt;100 (22 Nov 2021 17:29)  Aquaphor topical ointment: Apply topically to affected area once a day (22 Nov 2021 17:35)  aspirin 81 mg oral delayed release tablet: 1 tab(s) orally once a day (22 Nov 2021 17:29)  bisacodyl 5 mg oral delayed release tablet: 1 tab(s) orally every 12 hours, As needed, Constipation (22 Nov 2021 17:29)  Cardizem 60 mg oral tablet: 1 tab(s) orally every 8 hours 0600, 1400, and 2200 (22 Nov 2021 17:29)  digoxin 250 mcg (0.25 mg) oral tablet: 1 tab(s) orally once a day  Hold for apical pulse&lt;60 (22 Nov 2021 17:29)  folic acid 1 mg oral tablet: 1 tab(s) orally once a day (22 Nov 2021 17:29)  hydrOXYzine hydrochloride 25 mg oral tablet: 1 tab(s) orally once a day (22 Nov 2021 17:29)  Milk of Magnesia 8% oral suspension: 30 milliliter(s) orally once a day (at bedtime), As Needed (22 Nov 2021 17:35)  Multiple Vitamins with Minerals oral tablet: 1 tab(s) orally once a day (22 Nov 2021 17:35)  oxybutynin 5 mg oral tablet: 1 tab(s) orally once a day (22 Nov 2021 17:29)  polyethylene glycol 3350 oral powder for reconstitution: 17 gram(s) orally once a day (22 Nov 2021 17:29)  Vitamin B12 1000 mcg oral tablet: 1 tab(s) orally once a day (22 Nov 2021 17:29)    MEDICATIONS  (STANDING):  aspirin enteric coated 81 milliGRAM(s) Oral daily  cyanocobalamin 1000 MICROGram(s) Oral daily  digoxin     Tablet 250 MICROGram(s) Oral daily  diltiazem    Tablet 60 milliGRAM(s) Oral every 8 hours  folic acid 1 milliGRAM(s) Oral daily  furosemide   Injectable 40 milliGRAM(s) IV Push every 12 hours  lidocaine   4% Patch 1 Patch Transdermal daily  metoprolol succinate  milliGRAM(s) Oral daily  multivitamin/minerals 1 Tablet(s) Oral daily  oxybutynin 5 milliGRAM(s) Oral daily  polyethylene glycol 3350 17 Gram(s) Oral daily  spironolactone 25 milliGRAM(s) Oral daily  zinc oxide 40% Ointment 1 Application(s) Topical two times a day    MEDICATIONS  (PRN):  acetaminophen     Tablet .. 650 milliGRAM(s) Oral every 6 hours PRN Temp greater or equal to 38C (100.4F), Mild Pain (1 - 3)  aluminum hydroxide/magnesium hydroxide/simethicone Suspension 30 milliLiter(s) Oral every 4 hours PRN Dyspepsia  bisacodyl 5 milliGRAM(s) Oral every 12 hours PRN Constipation  hydrOXYzine hydrochloride 25 milliGRAM(s) Oral daily PRN Anxiety  magnesium hydroxide Suspension 30 milliLiter(s) Oral at bedtime PRN Constipation  melatonin 3 milliGRAM(s) Oral at bedtime PRN Insomnia  ondansetron Injectable 4 milliGRAM(s) IV Push every 8 hours PRN Nausea and/or Vomiting      Allergies    Darvocet-N 50 (Unknown)  sulfa drugs (Other)    Intolerances        Vital Signs Last 24 Hrs  T(C): 36.6 (02 Dec 2021 08:49), Max: 37 (02 Dec 2021 06:32)  T(F): 97.8 (02 Dec 2021 08:49), Max: 98.6 (02 Dec 2021 06:32)  HR: 62 (02 Dec 2021 08:49) (58 - 78)  BP: 160/62 (02 Dec 2021 08:49) (106/70 - 160/62)  BP(mean): --  RR: 18 (02 Dec 2021 08:49) (18 - 18)  SpO2: 99% (02 Dec 2021 08:49) (97% - 99%)      PHYSICAL EXAMINATION:    NECK:  Supple. No lymphadenopathy. Jugular venous pressure not elevated. Carotids equal.   HEART:   The cardiac impulse has a normal quality. Reg., Nl S1 and S2.  There are no murmurs, rubs or gallops noted  CHEST:  Chest is clear to auscultation. Normal respiratory effort.  ABDOMEN:  Soft and nontender.   EXTREMITIES:  There is no edema.       LABS:                        11.6   13.78 )-----------( 559      ( 01 Dec 2021 07:42 )             35.7     12-01    140  |  103  |  17  ----------------------------<  72  4.0   |  31  |  0.94    Ca    8.7      01 Dec 2021 07:42  Mg     1.9     12-01    TPro  6.1  /  Alb  1.5<L>  /  TBili  x   /  DBili  x   /  AST  x   /  ALT  x   /  AlkPhos  x   12-01          Culture - Body Fluid with Gram Stain (collected 12-01-21 @ 13:00)  Source: .Body Fluid Pleural Fluid  Gram Stain (12-01-21 @ 21:46):    polymorphonuclear leukocytes seen    No organisms seen per oil power field    by cytocentrifuge        Xray Chest 1 View- PORTABLE-Urgent (Xray Chest 1 View- PORTABLE-Urgent .) (12.01.21 @ 13:55) >  IMPRESSION:   RIGHT multi-sidehole pigtail catheter overlies RIGHT lower hemithorax..  Residual bibasilar airspace disease and/or effusions.       Subjective:    pat s/p R pigtail cath-transudative with neg cultures, BNP 10,000, lying in bed.    Home Medications:  acetaminophen 325 mg oral tablet: 2 tab(s) orally every 6 hours, As Needed (22 Nov 2021 17:35)  Aldactone 25 mg oral tablet: 0.5 tab(s) orally once a day  Hold for SBP&lt;100 (22 Nov 2021 17:29)  Aquaphor topical ointment: Apply topically to affected area once a day (22 Nov 2021 17:35)  aspirin 81 mg oral delayed release tablet: 1 tab(s) orally once a day (22 Nov 2021 17:29)  bisacodyl 5 mg oral delayed release tablet: 1 tab(s) orally every 12 hours, As needed, Constipation (22 Nov 2021 17:29)  Cardizem 60 mg oral tablet: 1 tab(s) orally every 8 hours 0600, 1400, and 2200 (22 Nov 2021 17:29)  digoxin 250 mcg (0.25 mg) oral tablet: 1 tab(s) orally once a day  Hold for apical pulse&lt;60 (22 Nov 2021 17:29)  folic acid 1 mg oral tablet: 1 tab(s) orally once a day (22 Nov 2021 17:29)  hydrOXYzine hydrochloride 25 mg oral tablet: 1 tab(s) orally once a day (22 Nov 2021 17:29)  Milk of Magnesia 8% oral suspension: 30 milliliter(s) orally once a day (at bedtime), As Needed (22 Nov 2021 17:35)  Multiple Vitamins with Minerals oral tablet: 1 tab(s) orally once a day (22 Nov 2021 17:35)  oxybutynin 5 mg oral tablet: 1 tab(s) orally once a day (22 Nov 2021 17:29)  polyethylene glycol 3350 oral powder for reconstitution: 17 gram(s) orally once a day (22 Nov 2021 17:29)  Vitamin B12 1000 mcg oral tablet: 1 tab(s) orally once a day (22 Nov 2021 17:29)    MEDICATIONS  (STANDING):  aspirin enteric coated 81 milliGRAM(s) Oral daily  cyanocobalamin 1000 MICROGram(s) Oral daily  digoxin     Tablet 250 MICROGram(s) Oral daily  diltiazem    Tablet 60 milliGRAM(s) Oral every 8 hours  folic acid 1 milliGRAM(s) Oral daily  furosemide   Injectable 40 milliGRAM(s) IV Push every 12 hours  lidocaine   4% Patch 1 Patch Transdermal daily  metoprolol succinate  milliGRAM(s) Oral daily  multivitamin/minerals 1 Tablet(s) Oral daily  oxybutynin 5 milliGRAM(s) Oral daily  polyethylene glycol 3350 17 Gram(s) Oral daily  spironolactone 25 milliGRAM(s) Oral daily  zinc oxide 40% Ointment 1 Application(s) Topical two times a day    MEDICATIONS  (PRN):  acetaminophen     Tablet .. 650 milliGRAM(s) Oral every 6 hours PRN Temp greater or equal to 38C (100.4F), Mild Pain (1 - 3)  aluminum hydroxide/magnesium hydroxide/simethicone Suspension 30 milliLiter(s) Oral every 4 hours PRN Dyspepsia  bisacodyl 5 milliGRAM(s) Oral every 12 hours PRN Constipation  hydrOXYzine hydrochloride 25 milliGRAM(s) Oral daily PRN Anxiety  magnesium hydroxide Suspension 30 milliLiter(s) Oral at bedtime PRN Constipation  melatonin 3 milliGRAM(s) Oral at bedtime PRN Insomnia  ondansetron Injectable 4 milliGRAM(s) IV Push every 8 hours PRN Nausea and/or Vomiting      Allergies    Darvocet-N 50 (Unknown)  sulfa drugs (Other)    Intolerances        Vital Signs Last 24 Hrs  T(C): 36.6 (02 Dec 2021 08:49), Max: 37 (02 Dec 2021 06:32)  T(F): 97.8 (02 Dec 2021 08:49), Max: 98.6 (02 Dec 2021 06:32)  HR: 62 (02 Dec 2021 08:49) (58 - 78)  BP: 160/62 (02 Dec 2021 08:49) (106/70 - 160/62)  BP(mean): --  RR: 18 (02 Dec 2021 08:49) (18 - 18)  SpO2: 99% (02 Dec 2021 08:49) (97% - 99%)      PHYSICAL EXAMINATION:    NECK:  Supple. No lymphadenopathy. Jugular venous pressure not elevated. Carotids equal.   HEART:   The cardiac impulse has a normal quality. Reg., Nl S1 and S2.  There are no murmurs, rubs or gallops noted  CHEST:  Chest crackles to auscultation. Normal respiratory effort.  ABDOMEN:  Soft and nontender.   EXTREMITIES:  There is no edema.       LABS:                        11.6   13.78 )-----------( 559      ( 01 Dec 2021 07:42 )             35.7     12-01    140  |  103  |  17  ----------------------------<  72  4.0   |  31  |  0.94    Ca    8.7      01 Dec 2021 07:42  Mg     1.9     12-01    TPro  6.1  /  Alb  1.5<L>  /  TBili  x   /  DBili  x   /  AST  x   /  ALT  x   /  AlkPhos  x   12-01          Culture - Body Fluid with Gram Stain (collected 12-01-21 @ 13:00)  Source: .Body Fluid Pleural Fluid  Gram Stain (12-01-21 @ 21:46):    polymorphonuclear leukocytes seen    No organisms seen per oil power field    by cytocentrifuge        Xray Chest 1 View- PORTABLE-Urgent (Xray Chest 1 View- PORTABLE-Urgent .) (12.01.21 @ 13:55) >  IMPRESSION:   RIGHT multi-sidehole pigtail catheter overlies RIGHT lower hemithorax..  Residual bibasilar airspace disease and/or effusions.

## 2021-12-03 NOTE — PROGRESS NOTE ADULT - SUBJECTIVE AND OBJECTIVE BOX
HPI: patient seen and examined with no family at bedside, patient appears comfortable but tearful this AM, unable to describe why.  [facility] meeting held with patients family     PAIN: ( )Yes   ( x)No  patient appears comfortable at this time     DYSPNEA: ( ) Yes  (x ) No  Level:    PHYSICAL EXAM:    Vital Signs Last 24 Hrs  T(C): 36.7 (03 Dec 2021 09:21), Max: 36.7 (03 Dec 2021 05:46)  T(F): 98.1 (03 Dec 2021 09:21), Max: 98.1 (03 Dec 2021 05:46)  HR: 60 (03 Dec 2021 14:35) (55 - 61)  BP: 136/57 (03 Dec 2021 14:35) (130/54 - 148/60)  RR: 18 (03 Dec 2021 09:21) (18 - 18)  SpO2: 100% (03 Dec 2021 09:21) (99% - 100%)      PPSV2: 30  %  FAST: unsure of baseline     General: elderly female in bed, NAD   Mental Status: alert, oriented to self and place   HEENT: dry oral mucosa, multiple scab areas   Lungs: diminished breath sounds   Cardiac: S1S2 +   GI: nontender, non distended + BS   : + voiding   Ext: edema +, bandages on leg   Neuro: dementia     LABS:                        11.1   14.54 )-----------( 504      ( 03 Dec 2021 07:29 )             35.5     12-03    143  |  100  |  24<H>  ----------------------------<  122<H>  3.5   |  38<H>  |  1.04    Ca    8.6      03 Dec 2021 07:29  Phos  2.6     12-03  Mg     1.9     12-03      Albumin: Albumin, Serum: 1.5 g/dL (12-01 @ 07:42)      Allergies    Darvocet-N 50 (Unknown)  sulfa drugs (Other)    Intolerances      MEDICATIONS  (STANDING):  apixaban 2.5 milliGRAM(s) Oral every 12 hours  aspirin enteric coated 81 milliGRAM(s) Oral daily  cyanocobalamin 1000 MICROGram(s) Oral daily  digoxin     Tablet 250 MICROGram(s) Oral daily  diltiazem    Tablet 60 milliGRAM(s) Oral every 8 hours  folic acid 1 milliGRAM(s) Oral daily  furosemide   Injectable 40 milliGRAM(s) IV Push every 12 hours  lidocaine   4% Patch 1 Patch Transdermal daily  metoprolol succinate  milliGRAM(s) Oral daily  multivitamin/minerals 1 Tablet(s) Oral daily  oxybutynin 5 milliGRAM(s) Oral daily  polyethylene glycol 3350 17 Gram(s) Oral daily  spironolactone 25 milliGRAM(s) Oral daily  zinc oxide 40% Ointment 1 Application(s) Topical two times a day    MEDICATIONS  (PRN):  acetaminophen     Tablet .. 650 milliGRAM(s) Oral every 6 hours PRN Temp greater or equal to 38C (100.4F), Mild Pain (1 - 3)  aluminum hydroxide/magnesium hydroxide/simethicone Suspension 30 milliLiter(s) Oral every 4 hours PRN Dyspepsia  bisacodyl 5 milliGRAM(s) Oral every 12 hours PRN Constipation  hydrOXYzine hydrochloride 25 milliGRAM(s) Oral daily PRN Anxiety  magnesium hydroxide Suspension 30 milliLiter(s) Oral at bedtime PRN Constipation  melatonin 3 milliGRAM(s) Oral at bedtime PRN Insomnia  ondansetron Injectable 4 milliGRAM(s) IV Push every 8 hours PRN Nausea and/or Vomiting

## 2021-12-03 NOTE — ADVANCED PRACTICE NURSE CONSULT - ASSESSMENT
This is a 80 year old female admitted to the hospital on 11/22/2021 PMH: HTN, Atrial Fib, CHF, Squamous cell carcinoma. Called to assess Left LE Wound. Assessed patient on 3 North, patient positioned to the left with pillow, right chest tube in place. Left LE wound partial thickness 8xm x 5 cm x 0.1cm 70% of wound is epithelialized with islands of epithelization with 30% of red granulation tissue. Periwound with +2 edema, hemosiderin staining and ecchymotic. Small serous drainage noted and Sween Lotion Dimethicone to BL LE to hydrate dry skin,  Aquacel AG applied to open areas wrapped with kerlix and ace bandage. Turn and position patient every two hours. Feet elevated off bed with pillows.

## 2021-12-03 NOTE — ADVANCED PRACTICE NURSE CONSULT - RECOMMEDATIONS
1)Continue to Elevate heels off of Mattress  2)Continue to Turn and position every 2 Hours  Dietician seeing patient for poor appetite  4) Left LE WOund apply AquaCel AG, wrap with Kelix and Ace Bandage   5)Apply Lotion Sween Dimethicone to BL Lower extremity dry skin

## 2021-12-03 NOTE — PROGRESS NOTE ADULT - ASSESSMENT
80 year old female patient  Dementia, HTN, Diastolic CHF, A. 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 Acute Hypoxemic respiratory failure  -secondary to  Sepsis Pneumonia, developed   R Large pleural effusion   - S/p IVF, lactate improved. Still leukocytosis  - no fevers   - S/p  R Thoracentesis CT placed, to water seal, management  as per CT Sx   - Fluid analysis d/w Dr Buck, transudative, F/u CX , gram stain no organisms   -on supplemental oxygen  - F/u BCX: NGTD,   UCX ;  Enterococcus and Ecoli .  - Completed course of Zosyn  - D/w DR Buck and Dr Brice, monitor off IV abxs, C/w lasix and Spironolactone     2. Metabolic Encephalopathy on baseline Dementia   - likely 2/2 sepsis/hypoxia   -CT head neg for acute findings no  SDH  - supportive care     3. R large pleural effusion- transudative  Acute on Chronic Systolic  CHF  Last echo: EF 40-45%, decreased LV FX, mod to severe TR, Severe Pulm HTN   C/w  IV Lasix  40mg IVP BID, Spironolactone    4. Elevated Troponin, demand ischemia in settings of Sepsis, suspect underline CAD   -no EKG changes noted  - troponin trended down   - ECHO: last  11/1/21: severe TR, Pulm HTN, EF 45-50%  - C/w ASA, statin, BB.   - CArdio f/u appreciated plan was  for stress test but  on hold now        5.  ADRIANA/ dehydration  - Improved s/p gentle IVF   - encourage oral intake   - Bladder  scan neg   - Monitor on Lasix and Spironolactone     6.  Chronic Afib  - C/w BB, Cardizem and Digoxin  - back on Eliquis 2.5 mg PO BID    # H/o Fall and SDH   Repeat CT head neg   Neuro Sx reeval appreciated     # DVT PPX: heparin SQ    #ACP: DNR/DNI, Palliative eval for GOC, poor prognosis

## 2021-12-03 NOTE — PROGRESS NOTE ADULT - SUBJECTIVE AND OBJECTIVE BOX
CC: Encephalopathy  Acute hypoxemic Respiratory Failure  Sepsis     HPI: 80 year old female patient  Dementia, HTN, Diastolic CHF, A. 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 Patient seen and examined and was found to be alert and oriented but unable to provide pertinent narrative. States she does not recall what happened but believes she may have had a fever. Narrative limited secondary to current mental status  In the ED patient found to be febrile, hypoxic and had a WBC of 20. She was given vanco, zosyn and IVF hydration     INTERVAL HPI/ OVERNIGHT EVENTS:   12/3/21- RT sided pigtail catheter. Appears comfortable    Vital Signs Last 24 Hrs  T(C): 37 (03 Dec 2021 16:38), Max: 37 (03 Dec 2021 16:38)  T(F): 98.6 (03 Dec 2021 16:38), Max: 98.6 (03 Dec 2021 16:38)  HR: 55 (03 Dec 2021 16:38) (55 - 61)  BP: 149/54 (03 Dec 2021 16:38) (130/54 - 149/54)  BP(mean): --  RR: 18 (03 Dec 2021 16:38) (18 - 18)  SpO2: 99% (03 Dec 2021 16:38) (99% - 100%)    REVIEW OF SYSTEMS:  Unable to obtain due to dementia     PHYSICAL EXAM:  General: Well developed; malnourished,  in no acute distress, on NC   Eyes: EOMI; conjunctiva and sclera clear  Head: Normocephalic; L frontal hematoma improving   ENMT: No nasal discharge; dry oral mucosa   Neck: Supple; no JVD   Respiratory: Improved air entry on R, rales at R base  Cardiovascular: Irregular rate and rhythm. S1 and S2 Normal;   Gastrointestinal: Soft non-tender non-distended; Normal bowel sounds  Genitourinary: No  suprapubic  tenderness  Extremities: + le, L LE wound with granulations, no edema or erythema    Neurological: Alert and oriented x1, non focal   Musculoskeletal: Normal muscle tone and strength   Psychiatric: Cooperative    LABS:                         11.1   14.54 )-----------( 504      ( 03 Dec 2021 07:29 )             35.5     03 Dec 2021 07:29    143    |  100    |  24     ----------------------------<  122    3.5     |  38     |  1.04     Ca    8.6        03 Dec 2021 07:29  Phos  2.6       03 Dec 2021 07:29  Mg     1.9       03 Dec 2021 07:29  Culture - Urine (11.23.21 @ 17:40)   Specimen Source: Clean Catch Clean Catch (Midstream)   Culture Results:   <10,000 CFU/mL Normal Urogenital Ivy Culture - Urine (11.22.21 @ 15:35)   - Amikacin: S <=16   - Amoxicillin/Clavulanic Acid: I 16/8   - Ampicillin: R >16 These ampicillin results predict results for amoxicillin   - Ampicillin: S <=2 Predicts results to ampicillin/sulbactam, amoxacillin-clavulanate and piperacillin-tazobactam.   - Ampicillin/Sulbactam: R >16/8 Enterobacter, Klebsiella aerogenes, Citrobacter, and Serratia may develop resistance during prolonged therapy (3-4 days)   - Aztreonam: S <=4   - Cefazolin: S 16 ([id number]_[id number]) For uncomplicated UTI with K. pneumoniae, E. coli, or P. mirablis: CECILY <=16 is sensitive and CECILY >=32 is resistant. This also predicts results for oral agents cefaclor, cefdinir, cefpodoxime, cefprozil, cefuroxime axetil, cephalexin and locarbef for uncomplicated UTI. Note that some isolates may be susceptible to these agents while testing resistant to cefazolin.   - Cefepime: S <=2   - Cefoxitin: S <=8   - Ceftriaxone: S <=1 Enterobacter, Klebsiella aerogenes, Citrobacter, and Serratia may develop resistance during prolonged therapy   - Ciprofloxacin: S <=0.25   - Ciprofloxacin: S <=1   - Ertapenem: S <=0.5   - Gentamicin: S <=2   - Imipenem: S <=1   - Levofloxacin: S <=0.5   - Levofloxacin: S <=1   - Meropenem: S <=1   - Nitrofurantoin: S <=32 Should not be used to treat pyelonephritis   - Nitrofurantoin: S <=32 Should not be used to treat pyelonephritis.   - Piperacillin/Tazobactam: S <=8   - Tetra/Doxy: R >8   - Tigecycline: S <=2   - Tobramycin: S <=2   - Trimethoprim/Sulfamethoxazole: R >2/38   - Vancomycin: S 2   Specimen Source: Clean Catch Clean Catch (Midstream)   Culture Results:   50,000 - 99,000 CFU/mL Escherichia coli   50,000 - 99,000 CFU/mL Enterococcus faecalis   Organism Identification: Escherichia coli   Enterococcus faecalis   Organism: Escherichia coli   Organism: Enterococcus faecalis     MEDICATIONS  (STANDING):  apixaban 2.5 milliGRAM(s) Oral every 12 hours  aspirin enteric coated 81 milliGRAM(s) Oral daily  cyanocobalamin 1000 MICROGram(s) Oral daily  digoxin     Tablet 250 MICROGram(s) Oral daily  diltiazem    Tablet 60 milliGRAM(s) Oral every 8 hours  folic acid 1 milliGRAM(s) Oral daily  furosemide   Injectable 40 milliGRAM(s) IV Push every 12 hours  lidocaine   4% Patch 1 Patch Transdermal daily  metoprolol succinate  milliGRAM(s) Oral daily  multivitamin/minerals 1 Tablet(s) Oral daily  oxybutynin 5 milliGRAM(s) Oral daily  polyethylene glycol 3350 17 Gram(s) Oral daily  spironolactone 25 milliGRAM(s) Oral daily  zinc oxide 40% Ointment 1 Application(s) Topical two times a day    MEDICATIONS  (PRN):  acetaminophen     Tablet .. 650 milliGRAM(s) Oral every 6 hours PRN Temp greater or equal to 38C (100.4F), Mild Pain (1 - 3)  aluminum hydroxide/magnesium hydroxide/simethicone Suspension 30 milliLiter(s) Oral every 4 hours PRN Dyspepsia  bisacodyl 5 milliGRAM(s) Oral every 12 hours PRN Constipation  hydrOXYzine hydrochloride 25 milliGRAM(s) Oral daily PRN Anxiety  magnesium hydroxide Suspension 30 milliLiter(s) Oral at bedtime PRN Constipation  melatonin 3 milliGRAM(s) Oral at bedtime PRN Insomnia  ondansetron Injectable 4 milliGRAM(s) IV Push every 8 hours PRN Nausea and/or Vomiting      RADIOLOGY & ADDITIONAL TESTS:      EXAM:  CT BRAIN                        PROCEDURE DATE:  11/22/2021      FINDINGS:  The ventricles and sulci are prominent, compatible with age-related generalized cerebral volume loss.   There is no CT evidence for acute cerebral cortical infarct. There is a tiny lacunar infarct in the left basal ganglia of indeterminate age. There is no evidence of hemorrhage. There is periventricular and subcortical white matter hypoattenuation,  most compatible with chronic microvascular ischemic changes.   No mass effect is found in the brain.  There is no midline shift or herniation pattern.      The visualized portions of the paranasal sinuses and mastoid air cells are clear.    IMPRESSION:    There is a tiny lacunar infarctin the left basal ganglia of indeterminate age.  No evidence of hemorrhage.    Chronic changes as above.        EXAM:  CT BRAIN                            PROCEDURE DATE:  11/02/2021          INTERPRETATION:  CT head without IV contrast    CLINICAL INFORMATION:  Fall, RIGHT   Intracranial hemorrhage.    TECHNIQUE: Contiguous axial 5 mm sections were obtainedthrough the head. Sagittal and coronal 2-D reformatted images were also obtained.   This scan was performed using automatic exposure control (radiation dose reduction software) to obtain a diagnostic image quality scan with patient dose as low as reasonably achievable.    FINDINGS:   CT dated 10/29/2021 available for review.    The brain demonstrates decrease in the degree of intracranial hemorrhage within the RIGHT sylvian fissure and RIGHT superior temporal lobe. Moderate periventricular white matter ischemia is noted. Tiny old lacunar infarction in the basal ganglia. No acute cerebral cortical infarct is seen. No mass effect is found in the brain.    The ventricles, sulci and basal cisterns appear unremarkable.    The orbits are unremarkable.  The paranasal sinuses are clear.  The nasal cavity appears intact.  The nasopharynx is symmetric.  The central skull base, petrous temporal bones and calvarium remain intact. 3.4 cm LEFT frontal scalp hematoma is noted.      IMPRESSION:   Interval decrease in the degree of intracranial hemorrhage within the RIGHT sylvian fissure and RIGHT superior temporal lobe. Moderate periventricular white matter ischemia is noted. Tiny old lacunar infarction in the basal ganglia.           EXAM:  ECHO TTE WO CON COMP W DOPP    PROCEDURE DATE:  11/01/2021     Summary     Technically limited study   The left ventricle cavity is mildly dilated.   Endocardium is not well visualized, however, overall left ventricular   systolic function appears diminished.   Estimated left ventricular ejection fraction is 40-45%.   Wall motion abnormalities can not be ruled out.   The left atrium is moderately dilated.   The right atrium appears moderately dilated.   The right ventricle is mildly dilated.   Fibrocalcific changes noted to the Aortic valve leaflets with preserved   leaflet excursion.   Trace aortic regurgitation is present.   Fibrocalcific changes noted to the mitral valve leaflets with preserved   leaflet excursion.   Mild mitral annular calcification is present.   Moderate mitral regurgitation is present.   The tricuspid valve leaflets appear mildly thickened and/or calcified, but   open well.   Severe (4+) tricuspid valve regurgitation is present.   Mild pulmonary hypertension.   No evidence of pericardial effusion.

## 2021-12-03 NOTE — PROGRESS NOTE ADULT - ASSESSMENT
Pt is a 80y old Female with hx of Dementia, HTN, Diastolic CHF, A. 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 Palliative medicine consulted to help establish GOC and advance care planning.      1) Acute Hypoxemic respiratory failure  - secondary to  Sepsis Pneumonia  - R Large pleural effusion   - plan to have thoracentesis today   - monitor labs   - c/w zosyn     2) Dementia/debility  - unsure baseline   - supportive care   - debility   - s/p fall   - PT consult when able      3) Acute on Chronic Systolic  CHF vs parapneumonic effusion/empyema  - Last echo: EF 40-45%, decreased LV FX, mod to severe TR, Severe Pulm HTN   - c/w Lasix 40mg   - c/wEliquis    4)  Chronic Afib  - C/w BB, Cardizem and Digoxin  - c/w Eliquis 2.5 mg PO BID    5) advance care planning   - Patient lacks capacity to medical decision making   - MOLST: DNR/DNI  - will reach out to family to schedule GOC meeting   - HCP on chart naming micky Mcdaniel -   - GOC scheduled for friday at 1PM - please see note

## 2021-12-03 NOTE — PROGRESS NOTE ADULT - SUBJECTIVE AND OBJECTIVE BOX
Subjective:  Pt in bed NAD no issues overnight     T(C): 36.7 (12-03-21 @ 09:21), Max: 36.7 (12-03-21 @ 05:46)  HR: 55 (12-03-21 @ 09:21) (55 - 61)  BP: 148/60 (12-03-21 @ 09:21) (130/54 - 148/60)  ABP: --  ABP(mean): --  RR: 18 (12-03-21 @ 09:21) (18 - 18)  SpO2: 100% (12-03-21 @ 09:21) (99% - 100%) RA   Wt(kg): --  CVP(mm Hg): --  CO: --  CI: --  PA: --                                              Tele: SR     CHEST TUBE: Rt pigtail      20cc  per 24 hours    AIR LEAKS:  [ ] YES [x ] NO          12-03    143  |  100  |  24<H>  ----------------------------<  122<H>  3.5   |  38<H>  |  1.04    Ca    8.6      03 Dec 2021 07:29  Phos  2.6     12-03  Mg     1.9     12-03                                 11.1   14.54 )-----------( 504      ( 03 Dec 2021 07:29 )             35.5                 CAPILLARY BLOOD GLUCOSE               CXR: < from: Xray Chest 1 View- PORTABLE-Urgent (Xray Chest 1 View- PORTABLE-Urgent .) (12.01.21 @ 13:55) >  FINDINGS:  RIGHT multi-sidehole pigtail catheter overlies RIGHT lower hemithorax.  Decreased RIGHT pleural effusion with residual LEFT RIGHT basilar airspace disease and/or loculated fluid.  RIGHT upper zone clear. No pneumothorax.  LEFT basilar airspace consolidation and/or effusion obscuring LEFT diaphragmatic contour..   No pneumothorax.    The  heart is enlarged in transverse diameter. No hilar mass.  Micra Transcatheter Pacing System cardiacdevice within RIGHT ventricle.     Visualized osseous structures are intact.    IMPRESSION:   RIGHT multi-sidehole pigtail catheter overlies RIGHT lower hemithorax..  Residual bibasilar airspace disease and/or effusions.    < end of copied text >          Exam   Neuro: confused and agitated   Pulm: decreased at bases b/l + Rt pigtail   CV: Irreg S1 S2   Abd: soft NT ND  Extremities: warm         Assessment:  80yFemale    with PAST MEDICAL & SURGICAL HISTORY:  CHF (congestive heart failure)    Atrial fibrillation, unspecified type    Hypertension    Squamous cell carcinoma    H/O total hysterectomy    History of appendectomy    S/P cholecystectomy

## 2021-12-03 NOTE — PROGRESS NOTE ADULT - ASSESSMENT
PROBLEMS;    Acute Hypoxemic respiratory failure  Possible Pneumonia/UTI   CXR worsening R effusion  pUL htn 48  Metabolic Encephalopathy on baseline Dementia  Elevated Troponin  ADRIANA/ dehydration, improving  Chronic Afib  H/o Fall and SDH WBC trending down  UCX ;  Enterococcus and Ecoli     PLAN;    pulmonary better  CT CHEST large pleural effusion-s/p pigtail drainage transudative  off abx- ID eval  ECHO: last  11/1/21: severe TR, Pulm HTN, EF 45-50%  C/w ASA, statin, BB.   ON Spironolactone   LAbs in am C/w tele  C/w BB, Cardizem and Digoxin  DVT PROPHYLASIX     PROBLEMS;    Acute Hypoxemic respiratory failure  R effusion  pUL htn 48  Metabolic Encephalopathy on baseline Dementia  Elevated Troponin  ADRIANA/ dehydration, improving  Chronic Afib  H/o Fall and SDH WBC trending down  UCX ;  Enterococcus and Ecoli     PLAN;    pulmonary better- CT on drainage-ct surgery fu  CT CHEST large pleural effusion-s/p pigtail drainage transudative  ECHO: last  11/1/21: severe TR, Pulm HTN, EF 45-50%  C/w ASA, statin, BB.   ON Spironolactone   LAbs in am C/w tele  C/w BB, Cardizem and Digoxin  DVT PROPHYLASIX

## 2021-12-03 NOTE — PROGRESS NOTE ADULT - ASSESSMENT
80 year old female patient pmhs Afib on AC, HTN, dementia, SCC, severe TR, CHF, recent fall 11/22 admitted 11/30 w fever and AMS. Upon workup patient noted to have Rt Pleural effusion.  12/1 Rt Pigtail placed     Plan   maintain Pigtail to WS   Pigtail flushed today with NS  Record pigtail output every shift   pleural fluid for analysis Culture neg, cyto P  CXR in am   cont care as per medical teams        Discussed with Cardiothoracic Team at AM rounds.

## 2021-12-03 NOTE — PROGRESS NOTE ADULT - CONVERSATION DETAILS
I met with the patient's son (HCP) over the phone Zach Gaines and the patient's daughter Sarahi [phone], and reviewed the role of palliative medicine. The patient's family shared that the patient was living at home with her son and was able to get around with a walker and had an aide 3-4 times a week for a few hours to help with bathing and ADLs.  The patient then had a fall and was in and out of the hospital and rehab. The patient always has a history of dementia, but the family states that since in the hospital has had a significant decrease in eating and functioning status.    They discussed the patient's overall goals and what is essential. Which family stated that they think being with her family or being home.  But identified that this might be very difficult as the patient requires more care and the family is working full time. They discussed DRU transitioning to [facility] or home with aides in place and palliative at home vs. hospice.  The family stated that when she was initially in [facility], she was very slowly improving but doesn't feel that she appears to have the ability to participate now.   The family would like some time to discuss and follow up on Monday.   Emotional Support was provided.

## 2021-12-03 NOTE — PROGRESS NOTE ADULT - SUBJECTIVE AND OBJECTIVE BOX
Subjective:    Home Medications:  acetaminophen 325 mg oral tablet: 2 tab(s) orally every 6 hours, As Needed (22 Nov 2021 17:35)  Aldactone 25 mg oral tablet: 0.5 tab(s) orally once a day  Hold for SBP&lt;100 (22 Nov 2021 17:29)  Aquaphor topical ointment: Apply topically to affected area once a day (22 Nov 2021 17:35)  aspirin 81 mg oral delayed release tablet: 1 tab(s) orally once a day (22 Nov 2021 17:29)  bisacodyl 5 mg oral delayed release tablet: 1 tab(s) orally every 12 hours, As needed, Constipation (22 Nov 2021 17:29)  Cardizem 60 mg oral tablet: 1 tab(s) orally every 8 hours 0600, 1400, and 2200 (22 Nov 2021 17:29)  digoxin 250 mcg (0.25 mg) oral tablet: 1 tab(s) orally once a day  Hold for apical pulse&lt;60 (22 Nov 2021 17:29)  folic acid 1 mg oral tablet: 1 tab(s) orally once a day (22 Nov 2021 17:29)  hydrOXYzine hydrochloride 25 mg oral tablet: 1 tab(s) orally once a day (22 Nov 2021 17:29)  Milk of Magnesia 8% oral suspension: 30 milliliter(s) orally once a day (at bedtime), As Needed (22 Nov 2021 17:35)  Multiple Vitamins with Minerals oral tablet: 1 tab(s) orally once a day (22 Nov 2021 17:35)  oxybutynin 5 mg oral tablet: 1 tab(s) orally once a day (22 Nov 2021 17:29)  polyethylene glycol 3350 oral powder for reconstitution: 17 gram(s) orally once a day (22 Nov 2021 17:29)  Vitamin B12 1000 mcg oral tablet: 1 tab(s) orally once a day (22 Nov 2021 17:29)    MEDICATIONS  (STANDING):  apixaban 2.5 milliGRAM(s) Oral every 12 hours  aspirin enteric coated 81 milliGRAM(s) Oral daily  cyanocobalamin 1000 MICROGram(s) Oral daily  digoxin     Tablet 250 MICROGram(s) Oral daily  diltiazem    Tablet 60 milliGRAM(s) Oral every 8 hours  folic acid 1 milliGRAM(s) Oral daily  furosemide   Injectable 40 milliGRAM(s) IV Push every 12 hours  lidocaine   4% Patch 1 Patch Transdermal daily  metoprolol succinate  milliGRAM(s) Oral daily  multivitamin/minerals 1 Tablet(s) Oral daily  oxybutynin 5 milliGRAM(s) Oral daily  polyethylene glycol 3350 17 Gram(s) Oral daily  spironolactone 25 milliGRAM(s) Oral daily  zinc oxide 40% Ointment 1 Application(s) Topical two times a day    MEDICATIONS  (PRN):  acetaminophen     Tablet .. 650 milliGRAM(s) Oral every 6 hours PRN Temp greater or equal to 38C (100.4F), Mild Pain (1 - 3)  aluminum hydroxide/magnesium hydroxide/simethicone Suspension 30 milliLiter(s) Oral every 4 hours PRN Dyspepsia  bisacodyl 5 milliGRAM(s) Oral every 12 hours PRN Constipation  hydrOXYzine hydrochloride 25 milliGRAM(s) Oral daily PRN Anxiety  magnesium hydroxide Suspension 30 milliLiter(s) Oral at bedtime PRN Constipation  melatonin 3 milliGRAM(s) Oral at bedtime PRN Insomnia  ondansetron Injectable 4 milliGRAM(s) IV Push every 8 hours PRN Nausea and/or Vomiting      Allergies    Darvocet-N 50 (Unknown)  sulfa drugs (Other)    Intolerances        Vital Signs Last 24 Hrs  T(C): 36.7 (03 Dec 2021 05:46), Max: 36.7 (03 Dec 2021 05:46)  T(F): 98.1 (03 Dec 2021 05:46), Max: 98.1 (03 Dec 2021 05:46)  HR: 61 (03 Dec 2021 05:46) (59 - 62)  BP: 147/62 (03 Dec 2021 05:46) (130/54 - 160/62)  BP(mean): --  RR: 18 (03 Dec 2021 05:46) (18 - 18)  SpO2: 99% (03 Dec 2021 05:46) (99% - 100%)      PHYSICAL EXAMINATION:    NECK:  Supple. No lymphadenopathy. Jugular venous pressure not elevated. Carotids equal.   HEART:   The cardiac impulse has a normal quality. Reg., Nl S1 and S2.  There are no murmurs, rubs or gallops noted  CHEST:  Chest is clear to auscultation. Normal respiratory effort.  ABDOMEN:  Soft and nontender.   EXTREMITIES:  There is no edema.       LABS:                        11.1   14.54 )-----------( 504      ( 03 Dec 2021 07:29 )             35.5     12-03    143  |  100  |  24<H>  ----------------------------<  122<H>  3.5   |  38<H>  |  1.04    Ca    8.6      03 Dec 2021 07:29  Phos  2.6     12-03  Mg     1.9     12-03                 Subjective:    pat better, s/p ct surgery flushed tube, no respiratory distress.    Home Medications:  acetaminophen 325 mg oral tablet: 2 tab(s) orally every 6 hours, As Needed (22 Nov 2021 17:35)  Aldactone 25 mg oral tablet: 0.5 tab(s) orally once a day  Hold for SBP&lt;100 (22 Nov 2021 17:29)  Aquaphor topical ointment: Apply topically to affected area once a day (22 Nov 2021 17:35)  aspirin 81 mg oral delayed release tablet: 1 tab(s) orally once a day (22 Nov 2021 17:29)  bisacodyl 5 mg oral delayed release tablet: 1 tab(s) orally every 12 hours, As needed, Constipation (22 Nov 2021 17:29)  Cardizem 60 mg oral tablet: 1 tab(s) orally every 8 hours 0600, 1400, and 2200 (22 Nov 2021 17:29)  digoxin 250 mcg (0.25 mg) oral tablet: 1 tab(s) orally once a day  Hold for apical pulse&lt;60 (22 Nov 2021 17:29)  folic acid 1 mg oral tablet: 1 tab(s) orally once a day (22 Nov 2021 17:29)  hydrOXYzine hydrochloride 25 mg oral tablet: 1 tab(s) orally once a day (22 Nov 2021 17:29)  Milk of Magnesia 8% oral suspension: 30 milliliter(s) orally once a day (at bedtime), As Needed (22 Nov 2021 17:35)  Multiple Vitamins with Minerals oral tablet: 1 tab(s) orally once a day (22 Nov 2021 17:35)  oxybutynin 5 mg oral tablet: 1 tab(s) orally once a day (22 Nov 2021 17:29)  polyethylene glycol 3350 oral powder for reconstitution: 17 gram(s) orally once a day (22 Nov 2021 17:29)  Vitamin B12 1000 mcg oral tablet: 1 tab(s) orally once a day (22 Nov 2021 17:29)    MEDICATIONS  (STANDING):  apixaban 2.5 milliGRAM(s) Oral every 12 hours  aspirin enteric coated 81 milliGRAM(s) Oral daily  cyanocobalamin 1000 MICROGram(s) Oral daily  digoxin     Tablet 250 MICROGram(s) Oral daily  diltiazem    Tablet 60 milliGRAM(s) Oral every 8 hours  folic acid 1 milliGRAM(s) Oral daily  furosemide   Injectable 40 milliGRAM(s) IV Push every 12 hours  lidocaine   4% Patch 1 Patch Transdermal daily  metoprolol succinate  milliGRAM(s) Oral daily  multivitamin/minerals 1 Tablet(s) Oral daily  oxybutynin 5 milliGRAM(s) Oral daily  polyethylene glycol 3350 17 Gram(s) Oral daily  spironolactone 25 milliGRAM(s) Oral daily  zinc oxide 40% Ointment 1 Application(s) Topical two times a day    MEDICATIONS  (PRN):  acetaminophen     Tablet .. 650 milliGRAM(s) Oral every 6 hours PRN Temp greater or equal to 38C (100.4F), Mild Pain (1 - 3)  aluminum hydroxide/magnesium hydroxide/simethicone Suspension 30 milliLiter(s) Oral every 4 hours PRN Dyspepsia  bisacodyl 5 milliGRAM(s) Oral every 12 hours PRN Constipation  hydrOXYzine hydrochloride 25 milliGRAM(s) Oral daily PRN Anxiety  magnesium hydroxide Suspension 30 milliLiter(s) Oral at bedtime PRN Constipation  melatonin 3 milliGRAM(s) Oral at bedtime PRN Insomnia  ondansetron Injectable 4 milliGRAM(s) IV Push every 8 hours PRN Nausea and/or Vomiting      Allergies    Darvocet-N 50 (Unknown)  sulfa drugs (Other)    Intolerances        Vital Signs Last 24 Hrs  T(C): 36.7 (03 Dec 2021 05:46), Max: 36.7 (03 Dec 2021 05:46)  T(F): 98.1 (03 Dec 2021 05:46), Max: 98.1 (03 Dec 2021 05:46)  HR: 61 (03 Dec 2021 05:46) (59 - 62)  BP: 147/62 (03 Dec 2021 05:46) (130/54 - 160/62)  BP(mean): --  RR: 18 (03 Dec 2021 05:46) (18 - 18)  SpO2: 99% (03 Dec 2021 05:46) (99% - 100%)      PHYSICAL EXAMINATION:    NECK:  Supple. No lymphadenopathy. Jugular venous pressure not elevated. Carotids equal.   HEART:   The cardiac impulse has a normal quality. Reg., Nl S1 and S2.  There are no murmurs, rubs or gallops noted  CHEST:  Chest crackles to auscultation. Normal respiratory effort.  ABDOMEN:  Soft and nontender.   EXTREMITIES:  There is no edema.       LABS:                        11.1   14.54 )-----------( 504      ( 03 Dec 2021 07:29 )             35.5     12-03    143  |  100  |  24<H>  ----------------------------<  122<H>  3.5   |  38<H>  |  1.04    Ca    8.6      03 Dec 2021 07:29  Phos  2.6     12-03  Mg     1.9     12-03

## 2021-12-04 NOTE — PROGRESS NOTE ADULT - SUBJECTIVE AND OBJECTIVE BOX
CC: Encephalopathy  Acute hypoxemic Respiratory Failure  Sepsis     HPI: 80 year old female patient  Dementia, HTN, Diastolic CHF, A. 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 Patient seen and examined and was found to be alert and oriented but unable to provide pertinent narrative. States she does not recall what happened but believes she may have had a fever. Narrative limited secondary to current mental status  In the ED patient found to be febrile, hypoxic and had a WBC of 20. She was given vanco, zosyn and IVF hydration     INTERVAL HPI/ OVERNIGHT EVENTS:  Pt was seen and examined,    being fed, looks better, reports no SOB.     Vital Signs Last 24 Hrs  T(C): 36.6 (04 Dec 2021 08:30), Max: 37 (03 Dec 2021 16:38)  T(F): 97.8 (04 Dec 2021 08:30), Max: 98.6 (03 Dec 2021 16:38)  HR: 60 (04 Dec 2021 08:30) (55 - 62)  BP: 140/70 (04 Dec 2021 08:30) (134/52 - 149/54)  RR: 16 (04 Dec 2021 08:30) (16 - 18)  SpO2: 100% (04 Dec 2021 08:30) (99% - 100%)      REVIEW OF SYSTEMS:  Unable to obtain due to dementia       PHYSICAL EXAM:  General: Well developed; malnourished,  in no acute distress, on NC   Eyes: EOMI; conjunctiva and sclera clear  Head: Normocephalic; L frontal hematoma improving   ENMT: No nasal discharge; dry oral mucosa   Neck: Supple; no JVD   Respiratory: Improved air entry on R, rales at R base  Cardiovascular: Irregular rate and rhythm. S1 and S2 Normal;   Gastrointestinal: Soft non-tender non-distended; Normal bowel sounds  Genitourinary: No  suprapubic  tenderness  Extremities: + le, L LE wound with granulations, no edema or erythema    Neurological: Alert and oriented x1, non focal   Musculoskeletal: Normal muscle tone and strength   Psychiatric: Cooperative        LABS:                           11.4   14.45 )-----------( 564      ( 02 Dec 2021 08:30 )             35.1     12    140  |  100  |  21  ----------------------------<  109<H>  3.7   |  32<H>  |  0.92    Ca    8.6      02 Dec 2021 08:30  Mg     1.9     12                          10.3   12.42 )-----------( 565      ( 2021 17:59 )             33.0         140  |  109<H>  |  18  ----------------------------<  111<H>  4.3   |  26  |  0.91    Ca    8.5      2021 17:59                          9.9    15.52 )-----------( 388      ( 2021 07:16 )             30.6         138  |  106  |  24<H>  ----------------------------<  97  4.2   |  28  |  1.02    Ca    8.2<L>      2021 07:16                          10.2   17.86 )-----------( 361      ( 2021 11:44 )             30.5         136  |  104  |  26<H>  ----------------------------<  155<H>  4.0   |  24  |  1.08    Ca    8.2<L>      2021 11:44  Mg     2.2             Urinalysis Basic - ( 2021 17:40 )  Color: Yellow / Appearance: Slightly Turbid / S.020 / pH: x  Gluc: x / Ketone: Negative  / Bili: Negative / Urobili: 1   Blood: x / Protein: unable to obtai mg/dL / Nitrite: Negative   Leuk Esterase: Small / RBC: 0-2 /HPF / WBC    Sq Epi: x / Non Sq Epi: Few / Bacteria: Moderate  Culture - Urine (21 @ 17:40)   Specimen Source: Clean Catch Clean Catch (Midstream)   Culture Results:   <10,000 CFU/mL Normal Urogenital Ivy       Culture - Urine (11..21 @ 15:35)   - Amikacin: S <=16   - Amoxicillin/Clavulanic Acid: I 16/8   - Ampicillin: R >16 These ampicillin results predict results for amoxicillin   - Ampicillin: S <=2 Predicts results to ampicillin/sulbactam, amoxacillin-clavulanate and piperacillin-tazobactam.   - Ampicillin/Sulbactam: R >16/8 Enterobacter, Klebsiella aerogenes, Citrobacter, and Serratia may develop resistance during prolonged therapy (3-4 days)   - Aztreonam: S <=4   - Cefazolin: S 16 ([email or web address]) For uncomplicated UTI with K. pneumoniae, E. coli, or P. mirablis: CECILY <=16 is sensitive and CECILY >=32 is resistant. This also predicts results for oral agents cefaclor, cefdinir, cefpodoxime, cefprozil, cefuroxime axetil, cephalexin and locarbef for uncomplicated UTI. Note that some isolates may be susceptible to these agents while testing resistant to cefazolin.   - Cefepime: S <=2   - Cefoxitin: S <=8   - Ceftriaxone: S <=1 Enterobacter, Klebsiella aerogenes, Citrobacter, and Serratia may develop resistance during prolonged therapy   - Ciprofloxacin: S <=0.25   - Ciprofloxacin: S <=1   - Ertapenem: S <=0.5   - Gentamicin: S <=2   - Imipenem: S <=1   - Levofloxacin: S <=0.5   - Levofloxacin: S <=1   - Meropenem: S <=1   - Nitrofurantoin: S <=32 Should not be used to treat pyelonephritis   - Nitrofurantoin: S <=32 Should not be used to treat pyelonephritis.   - Piperacillin/Tazobactam: S <=8   - Tetra/Doxy: R >8   - Tigecycline: S <=2   - Tobramycin: S <=2   - Trimethoprim/Sulfamethoxazole: R >2/38   - Vancomycin: S 2   Specimen Source: Clean Catch Clean Catch (Midstream)   Culture Results:   50,000 - 99,000 CFU/mL Escherichia coli   50,000 - 99,000 CFU/mL Enterococcus faecalis   Organism Identification: Escherichia coli   Enterococcus faecalis   Organism: Escherichia coli   Organism: Enterococcus faecalis     MEDICATIONS  (STANDING):  apixaban 2.5 milliGRAM(s) Oral every 12 hours  aspirin enteric coated 81 milliGRAM(s) Oral daily  cyanocobalamin 1000 MICROGram(s) Oral daily  digoxin     Tablet 250 MICROGram(s) Oral daily  diltiazem    Tablet 60 milliGRAM(s) Oral every 8 hours  folic acid 1 milliGRAM(s) Oral daily  furosemide   Injectable 40 milliGRAM(s) IV Push every 12 hours  lidocaine   4% Patch 1 Patch Transdermal daily  metoprolol succinate  milliGRAM(s) Oral daily  multivitamin/minerals 1 Tablet(s) Oral daily  oxybutynin 5 milliGRAM(s) Oral daily  polyethylene glycol 3350 17 Gram(s) Oral daily  spironolactone 25 milliGRAM(s) Oral daily  zinc oxide 40% Ointment 1 Application(s) Topical two times a day    MEDICATIONS  (PRN):  acetaminophen     Tablet .. 650 milliGRAM(s) Oral every 6 hours PRN Temp greater or equal to 38C (100.4F), Mild Pain (1 - 3)  aluminum hydroxide/magnesium hydroxide/simethicone Suspension 30 milliLiter(s) Oral every 4 hours PRN Dyspepsia  bisacodyl 5 milliGRAM(s) Oral every 12 hours PRN Constipation  hydrOXYzine hydrochloride 25 milliGRAM(s) Oral daily PRN Anxiety  magnesium hydroxide Suspension 30 milliLiter(s) Oral at bedtime PRN Constipation  melatonin 3 milliGRAM(s) Oral at bedtime PRN Insomnia  ondansetron Injectable 4 milliGRAM(s) IV Push every 8 hours PRN Nausea and/or Vomiting      RADIOLOGY & ADDITIONAL TESTS:      EXAM:  CT BRAIN                        PROCEDURE DATE:  2021      FINDINGS:  The ventricles and sulci are prominent, compatible with age-related generalized cerebral volume loss.   There is no CT evidence for acute cerebral cortical infarct. There is a tiny lacunar infarct in the left basal ganglia of indeterminate age. There is no evidence of hemorrhage. There is periventricular and subcortical white matter hypoattenuation,  most compatible with chronic microvascular ischemic changes.   No mass effect is found in the brain.  There is no midline shift or herniation pattern.      The visualized portions of the paranasal sinuses and mastoid air cells are clear.    IMPRESSION:    There is a tiny lacunar infarctin the left basal ganglia of indeterminate age.  No evidence of hemorrhage.    Chronic changes as above.        EXAM:  CT BRAIN                            PROCEDURE DATE:  2021          INTERPRETATION:  CT head without IV contrast    CLINICAL INFORMATION:  Fall, RIGHT   Intracranial hemorrhage.    TECHNIQUE: Contiguous axial 5 mm sections were obtainedthrough the head. Sagittal and coronal 2-D reformatted images were also obtained.   This scan was performed using automatic exposure control (radiation dose reduction software) to obtain a diagnostic image quality scan with patient dose as low as reasonably achievable.    FINDINGS:   CT dated 10/29/2021 available for review.    The brain demonstrates decrease in the degree of intracranial hemorrhage within the RIGHT sylvian fissure and RIGHT superior temporal lobe. Moderate periventricular white matter ischemia is noted. Tiny old lacunar infarction in the basal ganglia. No acute cerebral cortical infarct is seen. No mass effect is found in the brain.    The ventricles, sulci and basal cisterns appear unremarkable.    The orbits are unremarkable.  The paranasal sinuses are clear.  The nasal cavity appears intact.  The nasopharynx is symmetric.  The central skull base, petrous temporal bones and calvarium remain intact. 3.4 cm LEFT frontal scalp hematoma is noted.      IMPRESSION:   Interval decrease in the degree of intracranial hemorrhage within the RIGHT sylvian fissure and RIGHT superior temporal lobe. Moderate periventricular white matter ischemia is noted. Tiny old lacunar infarction in the basal ganglia.           EXAM:  ECHO TTE WO CON COMP W DOPP    PROCEDURE DATE:  2021     Summary     Technically limited study   The left ventricle cavity is mildly dilated.   Endocardium is not well visualized, however, overall left ventricular   systolic function appears diminished.   Estimated left ventricular ejection fraction is 40-45%.   Wall motion abnormalities can not be ruled out.   The left atrium is moderately dilated.   The right atrium appears moderately dilated.   The right ventricle is mildly dilated.   Fibrocalcific changes noted to the Aortic valve leaflets with preserved   leaflet excursion.   Trace aortic regurgitation is present.   Fibrocalcific changes noted to the mitral valve leaflets with preserved   leaflet excursion.   Mild mitral annular calcification is present.   Moderate mitral regurgitation is present.   The tricuspid valve leaflets appear mildly thickened and/or calcified, but   open well.   Severe (4+) tricuspid valve regurgitation is present.   Mild pulmonary hypertension.   No evidence of pericardial effusion.       CC: Encephalopathy  Acute hypoxemic Respiratory Failure  Sepsis     HPI: 80 year old female patient  Dementia, HTN, Diastolic CHF, A. 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 Patient seen and examined and was found to be alert and oriented but unable to provide pertinent narrative. States she does not recall what happened but believes she may have had a fever. Narrative limited secondary to current mental status  In the ED patient found to be febrile, hypoxic and had a WBC of 20. She was given vanco, zosyn and IVF hydration     INTERVAL HPI/ OVERNIGHT EVENTS:  Pt was seen and examined,  looks weak, asleep, but responds totouch, reports no SOB,  but not elaborating in further discussion. As per RN refused Po meds and food this am      Vital Signs Last 24 Hrs  T(C): 36.6 (04 Dec 2021 08:30), Max: 37 (03 Dec 2021 16:38)  T(F): 97.8 (04 Dec 2021 08:30), Max: 98.6 (03 Dec 2021 16:38)  HR: 60 (04 Dec 2021 08:30) (55 - 62)  BP: 140/70 (04 Dec 2021 08:30) (134/52 - 149/54)  RR: 16 (04 Dec 2021 08:30) (16 - 18)  SpO2: 100% (04 Dec 2021 08:30) (99% - 100%)      REVIEW OF SYSTEMS:  Unable to obtain due to dementia       PHYSICAL EXAM:  General: Well developed; malnourished,  in no acute distress, on NC   Eyes: EOMI; conjunctiva and sclera clear  Head: Normocephalic; L frontal hematoma improving   ENMT: No nasal discharge; dry oral mucosa   Neck: Supple; no JVD   Respiratory: Improved air entry on R, rales at R base  Cardiovascular: Irregular rate and rhythm. S1 and S2 Normal;   Gastrointestinal: Soft non-tender non-distended; Normal bowel sounds  Genitourinary: No  suprapubic  tenderness  Extremities: + le, L LE wound with granulations, no edema or erythema    Neurological: Alert and oriented x1, non focal   Musculoskeletal: Normal muscle tone and strength   Psychiatric: Cooperative        LABS:                           11.1   13.60 )-----------( 484      ( 04 Dec 2021 08:30 )             35.3     12-04    141  |  100  |  26<H>  ----------------------------<  112<H>  3.4<L>   |  37<H>  |  0.92    Ca    8.6      04 Dec 2021 08:30  Phos  2.6     12-03  Mg     1.9     12-03                            11.4   14.45 )-----------( 564      ( 02 Dec 2021 08:30 )             35.1     12-02    140  |  100  |  21  ----------------------------<  109<H>  3.7   |  32<H>  |  0.92    Ca    8.6      02 Dec 2021 08:30  Mg     1.9     12-01                          10.3   12.42 )-----------( 565      ( 2021 17:59 )             33.0     11    140  |  109<H>  |  18  ----------------------------<  111<H>  4.3   |  26  |  0.91    Ca    8.5      2021 17:59                          9.9    15.52 )-----------( 388      ( 2021 07:16 )             30.6         138  |  106  |  24<H>  ----------------------------<  97  4.2   |  28  |  1.02    Ca    8.2<L>      2021 07:16                          10.2   17.86 )-----------( 361      ( 2021 11:44 )             30.5     11    136  |  104  |  26<H>  ----------------------------<  155<H>  4.0   |  24  |  1.08    Ca    8.2<L>      2021 11:44  Mg     2.2     -        Urinalysis Basic - ( 2021 17:40 )  Color: Yellow / Appearance: Slightly Turbid / S.020 / pH: x  Gluc: x / Ketone: Negative  / Bili: Negative / Urobili: 1   Blood: x / Protein: unable to obtai mg/dL / Nitrite: Negative   Leuk Esterase: Small / RBC: 0-2 /HPF / WBC -   Sq Epi: x / Non Sq Epi: Few / Bacteria: Moderate  Culture - Urine (21 @ 17:40)   Specimen Source: Clean Catch Clean Catch (Midstream)   Culture Results:   <10,000 CFU/mL Normal Urogenital Ivy       Culture - Urine (21 @ 15:35)   - Amikacin: S <=16   - Amoxicillin/Clavulanic Acid: I 16/   - Ampicillin: R >16 These ampicillin results predict results for amoxicillin   - Ampicillin: S <=2 Predicts results to ampicillin/sulbactam, amoxacillin-clavulanate and piperacillin-tazobactam.   - Ampicillin/Sulbactam: R >16/8 Enterobacter, Klebsiella aerogenes, Citrobacter, and Serratia may develop resistance during prolonged therapy (3-4 days)   - Aztreonam: S <=4   - Cefazolin: S 16 (MIC_CL_COM_[email or web address]) For uncomplicated UTI with K. pneumoniae, E. coli, or P. mirablis: CECILY <=16 is sensitive and CECILY >=32 is resistant. This also predicts results for oral agents cefaclor, cefdinir, cefpodoxime, cefprozil, cefuroxime axetil, cephalexin and locarbef for uncomplicated UTI. Note that some isolates may be susceptible to these agents while testing resistant to cefazolin.   - Cefepime: S <=2   - Cefoxitin: S <=8   - Ceftriaxone: S <=1 Enterobacter, Klebsiella aerogenes, Citrobacter, and Serratia may develop resistance during prolonged therapy   - Ciprofloxacin: S <=0.25   - Ciprofloxacin: S <=1   - Ertapenem: S <=0.5   - Gentamicin: S <=2   - Imipenem: S <=1   - Levofloxacin: S <=0.5   - Levofloxacin: S <=1   - Meropenem: S <=1   - Nitrofurantoin: S <=32 Should not be used to treat pyelonephritis   - Nitrofurantoin: S <=32 Should not be used to treat pyelonephritis.   - Piperacillin/Tazobactam: S <=8   - Tetra/Doxy: R >8   - Tigecycline: S <=2   - Tobramycin: S <=2   - Trimethoprim/Sulfamethoxazole: R >2/38   - Vancomycin: S 2   Specimen Source: Clean Catch Clean Catch (Midstream)   Culture Results:   50,000 - 99,000 CFU/mL Escherichia coli   50,000 - 99,000 CFU/mL Enterococcus faecalis   Organism Identification: Escherichia coli   Enterococcus faecalis   Organism: Escherichia coli   Organism: Enterococcus faecalis   MEDICATIONS  (STANDING):  apixaban 2.5 milliGRAM(s) Oral every 12 hours  aspirin enteric coated 81 milliGRAM(s) Oral daily  cyanocobalamin 1000 MICROGram(s) Oral daily  digoxin     Tablet 250 MICROGram(s) Oral daily  diltiazem    Tablet 60 milliGRAM(s) Oral every 8 hours  folic acid 1 milliGRAM(s) Oral daily  furosemide    Tablet 40 milliGRAM(s) Oral daily  lidocaine   4% Patch 1 Patch Transdermal daily  metoprolol succinate  milliGRAM(s) Oral daily  multivitamin/minerals 1 Tablet(s) Oral daily  oxybutynin 5 milliGRAM(s) Oral daily  polyethylene glycol 3350 17 Gram(s) Oral daily  spironolactone 25 milliGRAM(s) Oral daily  zinc oxide 40% Ointment 1 Application(s) Topical two times a day    MEDICATIONS  (PRN):  acetaminophen     Tablet .. 650 milliGRAM(s) Oral every 6 hours PRN Temp greater or equal to 38C (100.4F), Mild Pain (1 - 3)  aluminum hydroxide/magnesium hydroxide/simethicone Suspension 30 milliLiter(s) Oral every 4 hours PRN Dyspepsia  bisacodyl 5 milliGRAM(s) Oral every 12 hours PRN Constipation  hydrOXYzine hydrochloride 25 milliGRAM(s) Oral daily PRN Anxiety  magnesium hydroxide Suspension 30 milliLiter(s) Oral at bedtime PRN Constipation  melatonin 3 milliGRAM(s) Oral at bedtime PRN Insomnia  ondansetron Injectable 4 milliGRAM(s) IV Push every 8 hours PRN Nausea and/or Vomiting        RADIOLOGY & ADDITIONAL TESTS:      EXAM:  CT BRAIN                        PROCEDURE DATE:  2021      FINDINGS:  The ventricles and sulci are prominent, compatible with age-related generalized cerebral volume loss.   There is no CT evidence for acute cerebral cortical infarct. There is a tiny lacunar infarct in the left basal ganglia of indeterminate age. There is no evidence of hemorrhage. There is periventricular and subcortical white matter hypoattenuation,  most compatible with chronic microvascular ischemic changes.   No mass effect is found in the brain.  There is no midline shift or herniation pattern.      The visualized portions of the paranasal sinuses and mastoid air cells are clear.    IMPRESSION:    There is a tiny lacunar infarctin the left basal ganglia of indeterminate age.  No evidence of hemorrhage.    Chronic changes as above.        EXAM:  CT BRAIN                            PROCEDURE DATE:  2021          INTERPRETATION:  CT head without IV contrast    CLINICAL INFORMATION:  Fall, RIGHT   Intracranial hemorrhage.    TECHNIQUE: Contiguous axial 5 mm sections were obtainedthrough the head. Sagittal and coronal 2-D reformatted images were also obtained.   This scan was performed using automatic exposure control (radiation dose reduction software) to obtain a diagnostic image quality scan with patient dose as low as reasonably achievable.    FINDINGS:   CT dated 10/29/2021 available for review.    The brain demonstrates decrease in the degree of intracranial hemorrhage within the RIGHT sylvian fissure and RIGHT superior temporal lobe. Moderate periventricular white matter ischemia is noted. Tiny old lacunar infarction in the basal ganglia. No acute cerebral cortical infarct is seen. No mass effect is found in the brain.    The ventricles, sulci and basal cisterns appear unremarkable.    The orbits are unremarkable.  The paranasal sinuses are clear.  The nasal cavity appears intact.  The nasopharynx is symmetric.  The central skull base, petrous temporal bones and calvarium remain intact. 3.4 cm LEFT frontal scalp hematoma is noted.      IMPRESSION:   Interval decrease in the degree of intracranial hemorrhage within the RIGHT sylvian fissure and RIGHT superior temporal lobe. Moderate periventricular white matter ischemia is noted. Tiny old lacunar infarction in the basal ganglia.           EXAM:  ECHO TTE WO CON COMP W DOPP    PROCEDURE DATE:  2021     Summary     Technically limited study   The left ventricle cavity is mildly dilated.   Endocardium is not well visualized, however, overall left ventricular   systolic function appears diminished.   Estimated left ventricular ejection fraction is 40-45%.   Wall motion abnormalities can not be ruled out.   The left atrium is moderately dilated.   The right atrium appears moderately dilated.   The right ventricle is mildly dilated.   Fibrocalcific changes noted to the Aortic valve leaflets with preserved   leaflet excursion.   Trace aortic regurgitation is present.   Fibrocalcific changes noted to the mitral valve leaflets with preserved   leaflet excursion.   Mild mitral annular calcification is present.   Moderate mitral regurgitation is present.   The tricuspid valve leaflets appear mildly thickened and/or calcified, but   open well.   Severe (4+) tricuspid valve regurgitation is present.   Mild pulmonary hypertension.   No evidence of pericardial effusion.

## 2021-12-04 NOTE — PROGRESS NOTE ADULT - SUBJECTIVE AND OBJECTIVE BOX
Subjective: Patient not answering questions this AM. No acute overnight events. Minimal output on chest tube.     Vital Signs:  Vital Signs Last 24 Hrs  T(C): 36.6 (12-04-21 @ 08:30), Max: 37 (12-03-21 @ 16:38)  T(F): 97.8 (12-04-21 @ 08:30), Max: 98.6 (12-03-21 @ 16:38)  HR: 60 (12-04-21 @ 08:30) (55 - 62)  BP: 140/70 (12-04-21 @ 08:30) (134/52 - 149/54)  RR: 16 (12-04-21 @ 08:30) (16 - 18)  SpO2: 100% (12-04-21 @ 08:30) (99% - 100%) on (O2)  I&O's Detail    03 Dec 2021 07:01  -  04 Dec 2021 07:00  --------------------------------------------------------  IN:  Total IN: 0 mL    OUT:    Chest Tube (mL): 20 mL    Voided (mL): 1150 mL  Total OUT: 1170 mL    Total NET: -1170 mL      General: NAD  Neurology: Awake  Eyes: Scleras clear  ENT: No stridor  Neck: Neck supple, trachea midline, No JVD  Respiratory: B/L BS  CV: S1S2, no murmurs, rubs or gallops  Abdominal: Soft  Extremities: No edema  Tubes: R PTC to WS, no air leak appreciated however poor expiratory effort, 20cc out in last 24H    Relevant labs, radiology and Medications reviewed                        11.1   13.60 )-----------( 484      ( 04 Dec 2021 08:30 )             35.3     12-04    141  |  100  |  26<H>  ----------------------------<  112<H>  3.4<L>   |  37<H>  |  0.92    Ca    8.6      04 Dec 2021 08:30  Phos  2.6     12-03  Mg     1.9     12-03        MEDICATIONS  (STANDING):  apixaban 2.5 milliGRAM(s) Oral every 12 hours  aspirin enteric coated 81 milliGRAM(s) Oral daily  cyanocobalamin 1000 MICROGram(s) Oral daily  digoxin     Tablet 250 MICROGram(s) Oral daily  diltiazem    Tablet 60 milliGRAM(s) Oral every 8 hours  folic acid 1 milliGRAM(s) Oral daily  furosemide   Injectable 40 milliGRAM(s) IV Push every 12 hours  lidocaine   4% Patch 1 Patch Transdermal daily  metoprolol succinate  milliGRAM(s) Oral daily  multivitamin/minerals 1 Tablet(s) Oral daily  oxybutynin 5 milliGRAM(s) Oral daily  polyethylene glycol 3350 17 Gram(s) Oral daily  potassium chloride  10 mEq/100 mL IVPB 10 milliEquivalent(s) IV Intermittent every 1 hour  spironolactone 25 milliGRAM(s) Oral daily  zinc oxide 40% Ointment 1 Application(s) Topical two times a day    MEDICATIONS  (PRN):  acetaminophen     Tablet .. 650 milliGRAM(s) Oral every 6 hours PRN Temp greater or equal to 38C (100.4F), Mild Pain (1 - 3)  aluminum hydroxide/magnesium hydroxide/simethicone Suspension 30 milliLiter(s) Oral every 4 hours PRN Dyspepsia  bisacodyl 5 milliGRAM(s) Oral every 12 hours PRN Constipation  hydrOXYzine hydrochloride 25 milliGRAM(s) Oral daily PRN Anxiety  magnesium hydroxide Suspension 30 milliLiter(s) Oral at bedtime PRN Constipation  melatonin 3 milliGRAM(s) Oral at bedtime PRN Insomnia  ondansetron Injectable 4 milliGRAM(s) IV Push every 8 hours PRN Nausea and/or Vomiting        Assessment  80y Female  w/ PAST MEDICAL & SURGICAL HISTORY:  CHF (congestive heart failure)    Atrial fibrillation, unspecified type    Hypertension    Squamous cell carcinoma    H/O total hysterectomy    History of appendectomy    S/P cholecystectomy    admitted with complaints of Patient is a 80y old  Female who presents with a chief complaint of Encephalopathy  Acute hypoxemic Respiratory Failure  Sepsis (04 Dec 2021 12:28)  patient underwent Ultrasound guidance for thoracentesis with insertion of chest tube

## 2021-12-04 NOTE — PROGRESS NOTE ADULT - ASSESSMENT
80 year old female patient pmh Afib on AC, HTN, dementia, SCC, severe TR, CHF, recent fall 11/22 admitted 11/30 w fever and AMS. Upon workup patient noted to have Rt Pleural effusion.  12/1 Rt Pigtail placed. CX NGTD. Cyto negative.     Plan   Minimal output on chest tube after flush on 12/3  Removed today  No obvious PTX on post-pull CXR  maintain dressing for 48 hours  D/W Dr. Robles   No further intervention, will sign off at this time    Shreya ARTHUR  Thoracic Surgery #[id number]

## 2021-12-04 NOTE — PROGRESS NOTE ADULT - ASSESSMENT
PROBLEMS;    Acute Hypoxemic respiratory failure  R effusion  pUL htn 48  Metabolic Encephalopathy on baseline Dementia  Elevated Troponin  ARDIANA/ dehydration, improving  Chronic Afib  H/o Fall and SDH WBC trending down  UCX ;  Enterococcus and Ecoli     PLAN;    pulmonary unchanged  poor prognosis  CT CHEST large pleural effusion-s/p pigtail drainage transudative  ECHO: last  11/1/21: severe TR, Pulm HTN, EF 45-50%  C/w ASA, statin, BB.   ON Spironolactone   DVT PROPHYLASIX

## 2021-12-04 NOTE — PROGRESS NOTE ADULT - SUBJECTIVE AND OBJECTIVE BOX
Subjective:    pat lying in bed, sleepy, s/p removal of chest tube,    Home Medications:  acetaminophen 325 mg oral tablet: 2 tab(s) orally every 6 hours, As Needed (22 Nov 2021 17:35)  Aldactone 25 mg oral tablet: 0.5 tab(s) orally once a day  Hold for SBP&lt;100 (22 Nov 2021 17:29)  Aquaphor topical ointment: Apply topically to affected area once a day (22 Nov 2021 17:35)  aspirin 81 mg oral delayed release tablet: 1 tab(s) orally once a day (22 Nov 2021 17:29)  bisacodyl 5 mg oral delayed release tablet: 1 tab(s) orally every 12 hours, As needed, Constipation (22 Nov 2021 17:29)  Cardizem 60 mg oral tablet: 1 tab(s) orally every 8 hours 0600, 1400, and 2200 (22 Nov 2021 17:29)  digoxin 250 mcg (0.25 mg) oral tablet: 1 tab(s) orally once a day  Hold for apical pulse&lt;60 (22 Nov 2021 17:29)  folic acid 1 mg oral tablet: 1 tab(s) orally once a day (22 Nov 2021 17:29)  hydrOXYzine hydrochloride 25 mg oral tablet: 1 tab(s) orally once a day (22 Nov 2021 17:29)  Milk of Magnesia 8% oral suspension: 30 milliliter(s) orally once a day (at bedtime), As Needed (22 Nov 2021 17:35)  Multiple Vitamins with Minerals oral tablet: 1 tab(s) orally once a day (22 Nov 2021 17:35)  oxybutynin 5 mg oral tablet: 1 tab(s) orally once a day (22 Nov 2021 17:29)  polyethylene glycol 3350 oral powder for reconstitution: 17 gram(s) orally once a day (22 Nov 2021 17:29)  Vitamin B12 1000 mcg oral tablet: 1 tab(s) orally once a day (22 Nov 2021 17:29)    MEDICATIONS  (STANDING):  apixaban 2.5 milliGRAM(s) Oral every 12 hours  aspirin enteric coated 81 milliGRAM(s) Oral daily  cyanocobalamin 1000 MICROGram(s) Oral daily  digoxin     Tablet 250 MICROGram(s) Oral daily  diltiazem    Tablet 60 milliGRAM(s) Oral every 8 hours  folic acid 1 milliGRAM(s) Oral daily  furosemide   Injectable 40 milliGRAM(s) IV Push every 12 hours  lidocaine   4% Patch 1 Patch Transdermal daily  metoprolol succinate  milliGRAM(s) Oral daily  multivitamin/minerals 1 Tablet(s) Oral daily  oxybutynin 5 milliGRAM(s) Oral daily  polyethylene glycol 3350 17 Gram(s) Oral daily  potassium chloride  10 mEq/100 mL IVPB 10 milliEquivalent(s) IV Intermittent every 1 hour  spironolactone 25 milliGRAM(s) Oral daily  zinc oxide 40% Ointment 1 Application(s) Topical two times a day    MEDICATIONS  (PRN):  acetaminophen     Tablet .. 650 milliGRAM(s) Oral every 6 hours PRN Temp greater or equal to 38C (100.4F), Mild Pain (1 - 3)  aluminum hydroxide/magnesium hydroxide/simethicone Suspension 30 milliLiter(s) Oral every 4 hours PRN Dyspepsia  bisacodyl 5 milliGRAM(s) Oral every 12 hours PRN Constipation  hydrOXYzine hydrochloride 25 milliGRAM(s) Oral daily PRN Anxiety  magnesium hydroxide Suspension 30 milliLiter(s) Oral at bedtime PRN Constipation  melatonin 3 milliGRAM(s) Oral at bedtime PRN Insomnia  ondansetron Injectable 4 milliGRAM(s) IV Push every 8 hours PRN Nausea and/or Vomiting      Allergies    Darvocet-N 50 (Unknown)  sulfa drugs (Other)    Intolerances        Vital Signs Last 24 Hrs  T(C): 36.6 (04 Dec 2021 08:30), Max: 37 (03 Dec 2021 16:38)  T(F): 97.8 (04 Dec 2021 08:30), Max: 98.6 (03 Dec 2021 16:38)  HR: 60 (04 Dec 2021 08:30) (55 - 62)  BP: 140/70 (04 Dec 2021 08:30) (134/52 - 149/54)  BP(mean): --  RR: 16 (04 Dec 2021 08:30) (16 - 18)  SpO2: 100% (04 Dec 2021 08:30) (99% - 100%)      PHYSICAL EXAMINATION:    NECK:  Supple. No lymphadenopathy. Jugular venous pressure not elevated. Carotids equal.   HEART:   The cardiac impulse has a normal quality. Reg., Nl S1 and S2.  There are no murmurs, rubs or gallops noted  CHEST:  Chest crackles to auscultation. Normal respiratory effort.  ABDOMEN:  Soft and nontender.   EXTREMITIES:  There is no edema.       LABS:                        11.1   13.60 )-----------( 484      ( 04 Dec 2021 08:30 )             35.3     12-04    141  |  100  |  26<H>  ----------------------------<  112<H>  3.4<L>   |  37<H>  |  0.92    Ca    8.6      04 Dec 2021 08:30  Phos  2.6     12-03  Mg     1.9     12-03

## 2021-12-04 NOTE — PROGRESS NOTE ADULT - ASSESSMENT
80 year old female patient  Dementia, HTN, Diastolic CHF, A. 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 Acute Hypoxemic respiratory failure  -secondary to  Sepsis Pneumonia, developed   R Large pleural effusion   - S/p IVF, lactate improved. Still leukocytosis  - no fevers   - S/p  R Thoracentesis CT placed, to water seal, management  as per CT Sx   - Fluid analysis d/w Dr Buck, transudative, F/u CX , gram stain no organisms   -on supplemental oxygen  - F/u BCX: NGTD,   UCX ;  Enterococcus and Ecoli .  - Completed course of Zosyn  - D/w DR Buck and Dr Brice, monitor off IV abxs, C/w lasix and Spironolactone     2. Metabolic Encephalopathy on baseline Dementia   - likely 2/2 sepsis/hypoxia   -CT head neg for acute findings no  SDH  - supportive care     3. R large pleural effusion   Acute on Chronic Systolic  CHF vs parapneumonic effusion/empyema?  Last echo: EF 40-45%, decreased LV FX, mod to severe TR, Severe Pulm HTN   C/w  IV Lasix  40mg IVP BID, Spironolactone  D/w CT Sx, will plan for Tx and DX thoracentesis in am , will send fluid  analysis     4. Elevated Troponin, demand ischemia in settings of Sepsis, suspect underline CAD   -no EKG changes noted  - troponin trended down   - ECHO: last  11/1/21: severe TR, Pulm HTN, EF 45-50%  - C/w ASA, statin, BB.   - CArdio f/u appreciated plan was  for stress test but  on hold now        5.  ADRIANA/ dehydration  - Improved s/p gentle IVF   - encourage oral intake   - Bladder  scan neg   - Monitor on Lasix and Spironolactone       6.  Chronic Afib  - C/w BB, Cardizem and Digoxin  - resume  Eliquis 2.5 mg PO BID    # H/o Fall and SDH   Repeat CT head neg   Neuro Sx reeval appreciated     # DVT PPX: heparin SQ    ACP: DNR/DNI, Palliative eval for GOC, poor prognosis  80 year old female patient  Dementia, HTN, Diastolic CHF, A. 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 Acute Hypoxemic respiratory failure  -secondary to  Sepsis Pneumonia, developed   R Large pleural effusion, S/p R thoracentesis, now CT removed   - no fevers, but leukocytosis  - Fluid analysis d/w Dr Buck, transudative, CX NGTD,  gram stain no organisms   -on supplemental oxygen  - F/u BCX: NGTD,   UCX ;  Enterococcus and Ecoli .  - Completed course of Zosyn  - Change lasix to Po  - D/w DR Buck and Dr Brice, monitor off IV abxs,    2. Metabolic Encephalopathy on baseline Dementia   - likely 2/2 sepsis/hypoxia   -CT head neg for acute findings no  SDH  - supportive care     3. R large pleural effusion   Likely 2/2 Acute on Chronic Systolic  CHF   Fluid analysis: transudate, cytology neg  for malignant cells, CX neg   Last echo: EF 40-45%, decreased LV FX, mod to severe TR, Severe Pulm HTN   On  IV Lasix  40mg IVP BID,  change to Lasix 40mg PO QD, c/w Spironolactone  Chest tube removed, d/w CT SX    4. Elevated Troponin, demand ischemia in settings of Sepsis, suspect underline CAD   -no EKG changes noted  - troponin trended down   - ECHO: last  11/1/21: severe TR, Pulm HTN, EF 45-50%  - C/w ASA, statin, BB.   - CArdio f/u appreciated       5.  ADRIANA/ dehydration  - Improved s/p gentle IVF   - encourage oral intake   - Bladder  scan neg   - Monitor on Lasix and Spironolactone       6.  Chronic Afib  - C/w BB, Cardizem and Digoxin  - resume  Eliquis 2.5 mg PO BID    # H/o Fall and SDH   Repeat CT head neg   Neuro Sx reeval appreciated     # DVT PPX: heparin SQ    ACP: DNR/DNI, Palliative eval for GOC, and d/c planning

## 2021-12-05 NOTE — PROGRESS NOTE ADULT - SUBJECTIVE AND OBJECTIVE BOX
CC: Encephalopathy  Acute hypoxemic Respiratory Failure  Sepsis     HPI: 80 year old female patient  Dementia, HTN, Diastolic CHF, A. 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 Patient seen and examined and was found to be alert and oriented but unable to provide pertinent narrative. States she does not recall what happened but believes she may have had a fever. Narrative limited secondary to current mental status  In the ED patient found to be febrile, hypoxic and had a WBC of 20. She was given vanco, zosyn and IVF hydration     INTERVAL HPI/ OVERNIGHT EVENTS:  Pt was seen and examined, awake and alert, no complains. No events overnight   Vital Signs Last 24 Hrs  T(C): 36.6 (04 Dec 2021 08:30), Max: 37 (03 Dec 2021 16:38)  T(F): 97.8 (04 Dec 2021 08:30), Max: 98.6 (03 Dec 2021 16:38)  HR: 60 (04 Dec 2021 08:30) (55 - 62)  BP: 140/70 (04 Dec 2021 08:30) (134/52 - 149/54)  RR: 16 (04 Dec 2021 08:30) (16 - 18)  SpO2: 100% (04 Dec 2021 08:30) (99% - 100%)      REVIEW OF SYSTEMS:  Unable to obtain due to dementia       PHYSICAL EXAM:  General: Well developed; malnourished,  in no acute distress, on NC   Eyes: EOMI; conjunctiva and sclera clear  Head: Normocephalic; L frontal hematoma improving   ENMT: No nasal discharge; dry oral mucosa   Neck: Supple; no JVD   Respiratory: Improved air entry on R, rales at R base  Cardiovascular: Irregular rate and rhythm. S1 and S2 Normal;   Gastrointestinal: Soft non-tender non-distended; Normal bowel sounds  Genitourinary: No  suprapubic  tenderness  Extremities: + le, L LE wound with granulations, no edema or erythema    Neurological: Alert and oriented x1, non focal   Musculoskeletal: Normal muscle tone and strength   Psychiatric: Cooperative        LABS:                           11.1   13.60 )-----------( 484      ( 04 Dec 2021 08:30 )             35.3     12-04    141  |  100  |  26<H>  ----------------------------<  112<H>  3.4<L>   |  37<H>  |  0.92    Ca    8.6      04 Dec 2021 08:30  Phos  2.6     12-03  Mg     1.9     12-03                            11.4   14.45 )-----------( 564      ( 02 Dec 2021 08:30 )             35.1     12-02    140  |  100  |  21  ----------------------------<  109<H>  3.7   |  32<H>  |  0.92    Ca    8.6      02 Dec 2021 08:30  Mg     1.9                               10.3   12.42 )-----------( 565      ( 2021 17:59 )             33.0         140  |  109<H>  |  18  ----------------------------<  111<H>  4.3   |  26  |  0.91    Ca    8.5      2021 17:59                          9.9    15.52 )-----------( 388      ( 2021 07:16 )             30.6     11    138  |  106  |  24<H>  ----------------------------<  97  4.2   |  28  |  1.02    Ca    8.2<L>      2021 07:16                          10.2   17.86 )-----------( 361      ( 2021 11:44 )             30.5     11    136  |  104  |  26<H>  ----------------------------<  155<H>  4.0   |  24  |  1.08    Ca    8.2<L>      2021 11:44  Mg     2.2     11        Urinalysis Basic - ( 2021 17:40 )  Color: Yellow / Appearance: Slightly Turbid / S.020 / pH: x  Gluc: x / Ketone: Negative  / Bili: Negative / Urobili: 1   Blood: x / Protein: unable to obtai mg/dL / Nitrite: Negative   Leuk Esterase: Small / RBC: 0-2 /HPF / WBC 11-25   Sq Epi: x / Non Sq Epi: Few / Bacteria: Moderate  Culture - Urine (21 @ 17:40)   Specimen Source: Clean Catch Clean Catch (Midstream)   Culture Results:   <10,000 CFU/mL Normal Urogenital Ivy       Culture - Urine (21 @ 15:35)   - Amikacin: S <=16   - Amoxicillin/Clavulanic Acid: I 16/8   - Ampicillin: R >16 These ampicillin results predict results for amoxicillin   - Ampicillin: S <=2 Predicts results to ampicillin/sulbactam, amoxacillin-clavulanate and piperacillin-tazobactam.   - Ampicillin/Sulbactam: R >16/8 Enterobacter, Klebsiella aerogenes, Citrobacter, and Serratia may develop resistance during prolonged therapy (3-4 days)   - Aztreonam: S <=4   - Cefazolin: S 16 ([id number]) For uncomplicated UTI with K. pneumoniae, E. coli, or P. mirablis: CECILY <=16 is sensitive and CECILY >=32 is resistant. This also predicts results for oral agents cefaclor, cefdinir, cefpodoxime, cefprozil, cefuroxime axetil, cephalexin and locarbef for uncomplicated UTI. Note that some isolates may be susceptible to these agents while testing resistant to cefazolin.   - Cefepime: S <=2   - Cefoxitin: S <=8   - Ceftriaxone: S <=1 Enterobacter, Klebsiella aerogenes, Citrobacter, and Serratia may develop resistance during prolonged therapy   - Ciprofloxacin: S <=0.25   - Ciprofloxacin: S <=1   - Ertapenem: S <=0.5   - Gentamicin: S <=2   - Imipenem: S <=1   - Levofloxacin: S <=0.5   - Levofloxacin: S <=1   - Meropenem: S <=1   - Nitrofurantoin: S <=32 Should not be used to treat pyelonephritis   - Nitrofurantoin: S <=32 Should not be used to treat pyelonephritis.   - Piperacillin/Tazobactam: S <=8   - Tetra/Doxy: R >8   - Tigecycline: S <=2   - Tobramycin: S <=2   - Trimethoprim/Sulfamethoxazole: R >/38   - Vancomycin: S 2   Specimen Source: Clean Catch Clean Catch (Midstream)   Culture Results:   50,000 - 99,000 CFU/mL Escherichia coli   50,000 - 99,000 CFU/mL Enterococcus faecalis   Organism Identification: Escherichia coli   Enterococcus faecalis   Organism: Escherichia coli   Organism: Enterococcus faecalis   MEDICATIONS  (STANDING):  apixaban 2.5 milliGRAM(s) Oral every 12 hours  aspirin enteric coated 81 milliGRAM(s) Oral daily  cyanocobalamin 1000 MICROGram(s) Oral daily  digoxin     Tablet 250 MICROGram(s) Oral daily  diltiazem    Tablet 60 milliGRAM(s) Oral every 8 hours  folic acid 1 milliGRAM(s) Oral daily  furosemide    Tablet 40 milliGRAM(s) Oral daily  lidocaine   4% Patch 1 Patch Transdermal daily  metoprolol succinate  milliGRAM(s) Oral daily  multivitamin/minerals 1 Tablet(s) Oral daily  oxybutynin 5 milliGRAM(s) Oral daily  polyethylene glycol 3350 17 Gram(s) Oral daily  spironolactone 25 milliGRAM(s) Oral daily  zinc oxide 40% Ointment 1 Application(s) Topical two times a day    MEDICATIONS  (PRN):  acetaminophen     Tablet .. 650 milliGRAM(s) Oral every 6 hours PRN Temp greater or equal to 38C (100.4F), Mild Pain (1 - 3)  aluminum hydroxide/magnesium hydroxide/simethicone Suspension 30 milliLiter(s) Oral every 4 hours PRN Dyspepsia  bisacodyl 5 milliGRAM(s) Oral every 12 hours PRN Constipation  hydrOXYzine hydrochloride 25 milliGRAM(s) Oral daily PRN Anxiety  magnesium hydroxide Suspension 30 milliLiter(s) Oral at bedtime PRN Constipation  melatonin 3 milliGRAM(s) Oral at bedtime PRN Insomnia  ondansetron Injectable 4 milliGRAM(s) IV Push every 8 hours PRN Nausea and/or Vomiting        RADIOLOGY & ADDITIONAL TESTS:      EXAM:  CT BRAIN                        PROCEDURE DATE:  2021      FINDINGS:  The ventricles and sulci are prominent, compatible with age-related generalized cerebral volume loss.   There is no CT evidence for acute cerebral cortical infarct. There is a tiny lacunar infarct in the left basal ganglia of indeterminate age. There is no evidence of hemorrhage. There is periventricular and subcortical white matter hypoattenuation,  most compatible with chronic microvascular ischemic changes.   No mass effect is found in the brain.  There is no midline shift or herniation pattern.      The visualized portions of the paranasal sinuses and mastoid air cells are clear.    IMPRESSION:    There is a tiny lacunar infarctin the left basal ganglia of indeterminate age.  No evidence of hemorrhage.    Chronic changes as above.        EXAM:  CT BRAIN                            PROCEDURE DATE:  2021          INTERPRETATION:  CT head without IV contrast    CLINICAL INFORMATION:  Fall, RIGHT   Intracranial hemorrhage.    TECHNIQUE: Contiguous axial 5 mm sections were obtainedthrough the head. Sagittal and coronal 2-D reformatted images were also obtained.   This scan was performed using automatic exposure control (radiation dose reduction software) to obtain a diagnostic image quality scan with patient dose as low as reasonably achievable.    FINDINGS:   CT dated 10/29/2021 available for review.    The brain demonstrates decrease in the degree of intracranial hemorrhage within the RIGHT sylvian fissure and RIGHT superior temporal lobe. Moderate periventricular white matter ischemia is noted. Tiny old lacunar infarction in the basal ganglia. No acute cerebral cortical infarct is seen. No mass effect is found in the brain.    The ventricles, sulci and basal cisterns appear unremarkable.    The orbits are unremarkable.  The paranasal sinuses are clear.  The nasal cavity appears intact.  The nasopharynx is symmetric.  The central skull base, petrous temporal bones and calvarium remain intact. 3.4 cm LEFT frontal scalp hematoma is noted.      IMPRESSION:   Interval decrease in the degree of intracranial hemorrhage within the RIGHT sylvian fissure and RIGHT superior temporal lobe. Moderate periventricular white matter ischemia is noted. Tiny old lacunar infarction in the basal ganglia.           EXAM:  ECHO TTE WO CON COMP W DOPP    PROCEDURE DATE:  2021     Summary     Technically limited study   The left ventricle cavity is mildly dilated.   Endocardium is not well visualized, however, overall left ventricular   systolic function appears diminished.   Estimated left ventricular ejection fraction is 40-45%.   Wall motion abnormalities can not be ruled out.   The left atrium is moderately dilated.   The right atrium appears moderately dilated.   The right ventricle is mildly dilated.   Fibrocalcific changes noted to the Aortic valve leaflets with preserved   leaflet excursion.   Trace aortic regurgitation is present.   Fibrocalcific changes noted to the mitral valve leaflets with preserved   leaflet excursion.   Mild mitral annular calcification is present.   Moderate mitral regurgitation is present.   The tricuspid valve leaflets appear mildly thickened and/or calcified, but   open well.   Severe (4+) tricuspid valve regurgitation is present.   Mild pulmonary hypertension.   No evidence of pericardial effusion.

## 2021-12-05 NOTE — PROGRESS NOTE ADULT - ASSESSMENT
80 year old female patient  Dementia, HTN, Diastolic CHF, A. 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 Acute Hypoxemic respiratory failure  -secondary to  Sepsis Pneumonia, developed   R Large pleural effusion, S/p R thoracentesis, now CT removed   - no fevers, but leukocytosis  - Fluid analysis d/w Dr Buck, transudative, CX NGTD,  gram stain no organisms   -on supplemental oxygen  - F/u BCX: NGTD,   UCX ;  Enterococcus and Ecoli .  - Completed course of Zosyn  - D/w DR Buck     2. Metabolic Encephalopathy on baseline Dementia   - likely 2/2 sepsis/hypoxia   -CT head neg for acute findings no  SDH  - supportive care     3. R large pleural effusion   Likely 2/2 Acute on Chronic Systolic  CHF   Fluid analysis: transudate, cytology neg  for malignant cells, CX neg   Last echo: EF 40-45%, decreased LV FX, mod to severe TR, Severe Pulm HTN   On  IV Lasix  40mg IVP BID,  changed  to Lasix 40mg PO QD, c/w Spironolactone  Chest tube removed, d/w CT SX    4. Elevated Troponin, demand ischemia in settings of Sepsis, suspect underline CAD   -no EKG changes noted  - troponin trended down   - ECHO: last  11/1/21: severe TR, Pulm HTN, EF 45-50%  - C/w ASA, statin, BB.   - CArdio f/u appreciated       5.  ADRIANA/ dehydration  - Improved s/p gentle IVF   - encourage oral intake, needs help with eedings   - Bladder  scan neg   - Monitor on Lasix and Spironolactone       6.  Chronic Afib  - C/w BB, Cardizem and Digoxin  - resume  Eliquis 2.5 mg PO BID    # H/o Fall and SDH   Repeat CT head neg   Neuro Sx reeval appreciated     # DVT PPX: heparin SQ    ACP: DNR/DNI, Palliative eval for GOC, and d/c planning pending

## 2021-12-05 NOTE — PROGRESS NOTE ADULT - ASSESSMENT
PROBLEMS;    Acute Hypoxemic respiratory failure  R effusion  pUL htn 48  Metabolic Encephalopathy on baseline Dementia  Elevated Troponin  ADRIANA/ dehydration, improving  Chronic Afib  H/o Fall and SDH WBC trending down  UCX ;  Enterococcus and Ecoli     PLAN;    pulmonary unchanged  poor prognosis  CT CHEST large pleural effusion-s/p pigtail drainage transudative  ECHO: last  11/1/21: severe TR, Pulm HTN, EF 45-50%  C/w ASA, statin, BB.   ON Spironolactone   DVT PROPHYLASIX

## 2021-12-05 NOTE — PROGRESS NOTE ADULT - SUBJECTIVE AND OBJECTIVE BOX
Subjective:    pat condition unchanged.    Home Medications:  acetaminophen 325 mg oral tablet: 2 tab(s) orally every 6 hours, As Needed (22 Nov 2021 17:35)  Aldactone 25 mg oral tablet: 0.5 tab(s) orally once a day  Hold for SBP&lt;100 (22 Nov 2021 17:29)  Aquaphor topical ointment: Apply topically to affected area once a day (22 Nov 2021 17:35)  aspirin 81 mg oral delayed release tablet: 1 tab(s) orally once a day (22 Nov 2021 17:29)  bisacodyl 5 mg oral delayed release tablet: 1 tab(s) orally every 12 hours, As needed, Constipation (22 Nov 2021 17:29)  Cardizem 60 mg oral tablet: 1 tab(s) orally every 8 hours 0600, 1400, and 2200 (22 Nov 2021 17:29)  digoxin 250 mcg (0.25 mg) oral tablet: 1 tab(s) orally once a day  Hold for apical pulse&lt;60 (22 Nov 2021 17:29)  folic acid 1 mg oral tablet: 1 tab(s) orally once a day (22 Nov 2021 17:29)  hydrOXYzine hydrochloride 25 mg oral tablet: 1 tab(s) orally once a day (22 Nov 2021 17:29)  Milk of Magnesia 8% oral suspension: 30 milliliter(s) orally once a day (at bedtime), As Needed (22 Nov 2021 17:35)  Multiple Vitamins with Minerals oral tablet: 1 tab(s) orally once a day (22 Nov 2021 17:35)  oxybutynin 5 mg oral tablet: 1 tab(s) orally once a day (22 Nov 2021 17:29)  polyethylene glycol 3350 oral powder for reconstitution: 17 gram(s) orally once a day (22 Nov 2021 17:29)  Vitamin B12 1000 mcg oral tablet: 1 tab(s) orally once a day (22 Nov 2021 17:29)    MEDICATIONS  (STANDING):  apixaban 2.5 milliGRAM(s) Oral every 12 hours  aspirin enteric coated 81 milliGRAM(s) Oral daily  cyanocobalamin 1000 MICROGram(s) Oral daily  digoxin     Tablet 250 MICROGram(s) Oral daily  diltiazem    Tablet 60 milliGRAM(s) Oral every 8 hours  folic acid 1 milliGRAM(s) Oral daily  furosemide    Tablet 40 milliGRAM(s) Oral daily  lidocaine   4% Patch 1 Patch Transdermal daily  metoprolol succinate  milliGRAM(s) Oral daily  multivitamin/minerals 1 Tablet(s) Oral daily  oxybutynin 5 milliGRAM(s) Oral daily  polyethylene glycol 3350 17 Gram(s) Oral daily  spironolactone 25 milliGRAM(s) Oral daily  zinc oxide 40% Ointment 1 Application(s) Topical two times a day    MEDICATIONS  (PRN):  acetaminophen     Tablet .. 650 milliGRAM(s) Oral every 6 hours PRN Temp greater or equal to 38C (100.4F), Mild Pain (1 - 3)  aluminum hydroxide/magnesium hydroxide/simethicone Suspension 30 milliLiter(s) Oral every 4 hours PRN Dyspepsia  bisacodyl 5 milliGRAM(s) Oral every 12 hours PRN Constipation  hydrOXYzine hydrochloride 25 milliGRAM(s) Oral daily PRN Anxiety  magnesium hydroxide Suspension 30 milliLiter(s) Oral at bedtime PRN Constipation  melatonin 3 milliGRAM(s) Oral at bedtime PRN Insomnia  ondansetron Injectable 4 milliGRAM(s) IV Push every 8 hours PRN Nausea and/or Vomiting      Allergies    Darvocet-N 50 (Unknown)  sulfa drugs (Other)    Intolerances        Vital Signs Last 24 Hrs  T(C): 37 (05 Dec 2021 08:00), Max: 37 (05 Dec 2021 08:00)  T(F): 98.6 (05 Dec 2021 08:00), Max: 98.6 (05 Dec 2021 08:00)  HR: 88 (05 Dec 2021 08:00) (58 - 88)  BP: 142/59 (05 Dec 2021 08:00) (137/72 - 155/62)  BP(mean): --  RR: 16 (05 Dec 2021 08:00) (16 - 18)  SpO2: 95% (05 Dec 2021 08:00) (93% - 100%)      PHYSICAL EXAMINATION:    NECK:  Supple. No lymphadenopathy. Jugular venous pressure not elevated. Carotids equal.   HEART:   The cardiac impulse has a normal quality. Reg., Nl S1 and S2.  There are no murmurs, rubs or gallops noted  CHEST:  Chest crackles to auscultation. Normal respiratory effort.  ABDOMEN:  Soft and nontender.   EXTREMITIES:  There is no edema.       LABS:                        11.1   13.07 )-----------( 502      ( 05 Dec 2021 07:36 )             34.3     12-05    142  |  99  |  28<H>  ----------------------------<  115<H>  3.5   |  38<H>  |  0.96    Ca    8.7      05 Dec 2021 07:36  Mg     1.9     12-05

## 2021-12-06 NOTE — SWALLOW BEDSIDE ASSESSMENT ADULT - SWALLOW EVAL: RECOMMENDED DIET
SUGGEST A SOFT AND BITE SIZED DIET WITH MILDLY THICK LIQUIDS AS THIS IS CURRENTLY THE LEAST RESTRICTIVE TOLERABLE DIET CONSISTENCIES FROM AN OROPHARYNGEAL SWALLOWING PERSPECTIVE ON CLINICAL EXAM AT THIS TIME.

## 2021-12-06 NOTE — SWALLOW BEDSIDE ASSESSMENT ADULT - COMMENTS
The pt was admitted to  from [facility]. Hospital course is notable for generalized deconditioning, sepsis, hypoxia, pneumonia with possible empyema, and CHF exacerbation with right pleural effusion. This profile is superimposed upon a history of a recent traumatic subarachnoid hemorrhage, CAD, CHF, A-Fib, HTN, presence of a heel ulcer, constipation, past choley, prior appy, past hysterectomy  and previous skin cancer removal.

## 2021-12-06 NOTE — PROGRESS NOTE ADULT - ASSESSMENT
80 year old female patient  Dementia, HTN, Diastolic CHF, A. 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 Acute Hypoxemic respiratory failure  -secondary to  Sepsis Pneumonia,  Acute in Chronic systolic CHF,   R Large pleural effusion, S/p R thoracentesis, now CT removed.   - Tolerated RA at rest   - no fevers, but leukocytosis  - Fluid analysis d/w Dr Buck, transudative, CX NGTD,  gram stain no organisms   -on supplemental oxygen  - F/u BCX: NGTD,   UCX ;  Enterococcus and Ecoli .  - Completed course of Zosyn      2. Metabolic Encephalopathy on baseline Dementia   - likely 2/2 sepsis/hypoxia   -CT head neg for acute findings no  SDH  - supportive care     3. R large pleural effusion   Likely 2/2 Acute on Chronic Systolic  CHF   Fluid analysis: transudate, cytology neg  for malignant cells, CX neg   Last echo: EF 40-45%, decreased LV FX, mod to severe TR, Severe Pulm HTN   On  IV Lasix  40mg IVP BID,  changed  to Lasix 40mg PO QD, c/w Spironolactone  Chest tube removed.Repeat CXR neg for PTX    4. Elevated Troponin, demand ischemia in settings of Sepsis, suspect underline CAD   -no EKG changes noted  - troponin trended down   - ECHO: last  11/1/21: severe TR, Pulm HTN, EF 45-50%  - C/w ASA, statin, BB.   - Pt was initially planned fro stress test, now postponed,  conservative management       5.  ADRIANA/ dehydration  - Improved s/p gentle IVF   - encourage oral intake, needs help with feedings   - Bladder  scan neg   - Monitor on Lasix and Spironolactone       6.  Chronic Afib  - C/w BB, Cardizem and Digoxin  - resumed on  Eliquis 2.5 mg PO BID    # H/o Fall and SDH   Repeat CT head neg   Neuro Sx reeval appreciated     # DVT PPX: heparin SQ    ACP: DNR/DNI  D/w  Palliative team, had family meeting, plan for DRU at d/c

## 2021-12-06 NOTE — SWALLOW BEDSIDE ASSESSMENT ADULT - SWALLOW EVAL: SECRETION MANAGEMENT
Limited probes revealed that pt's non nutritive cough is moist and produced with mildly to moderately decreased strength.

## 2021-12-06 NOTE — PROGRESS NOTE ADULT - SUBJECTIVE AND OBJECTIVE BOX
Subjective:    pat better, lying in bed, more awake, no new complaint.    Home Medications:  acetaminophen 325 mg oral tablet: 2 tab(s) orally every 6 hours, As Needed (22 Nov 2021 17:35)  Aldactone 25 mg oral tablet: 0.5 tab(s) orally once a day  Hold for SBP&lt;100 (22 Nov 2021 17:29)  Aquaphor topical ointment: Apply topically to affected area once a day (22 Nov 2021 17:35)  aspirin 81 mg oral delayed release tablet: 1 tab(s) orally once a day (22 Nov 2021 17:29)  bisacodyl 5 mg oral delayed release tablet: 1 tab(s) orally every 12 hours, As needed, Constipation (22 Nov 2021 17:29)  Cardizem 60 mg oral tablet: 1 tab(s) orally every 8 hours 0600, 1400, and 2200 (22 Nov 2021 17:29)  digoxin 250 mcg (0.25 mg) oral tablet: 1 tab(s) orally once a day  Hold for apical pulse&lt;60 (22 Nov 2021 17:29)  folic acid 1 mg oral tablet: 1 tab(s) orally once a day (22 Nov 2021 17:29)  hydrOXYzine hydrochloride 25 mg oral tablet: 1 tab(s) orally once a day (22 Nov 2021 17:29)  Milk of Magnesia 8% oral suspension: 30 milliliter(s) orally once a day (at bedtime), As Needed (22 Nov 2021 17:35)  Multiple Vitamins with Minerals oral tablet: 1 tab(s) orally once a day (22 Nov 2021 17:35)  oxybutynin 5 mg oral tablet: 1 tab(s) orally once a day (22 Nov 2021 17:29)  polyethylene glycol 3350 oral powder for reconstitution: 17 gram(s) orally once a day (22 Nov 2021 17:29)  Vitamin B12 1000 mcg oral tablet: 1 tab(s) orally once a day (22 Nov 2021 17:29)    MEDICATIONS  (STANDING):  apixaban 2.5 milliGRAM(s) Oral every 12 hours  aspirin enteric coated 81 milliGRAM(s) Oral daily  cyanocobalamin 1000 MICROGram(s) Oral daily  digoxin     Tablet 250 MICROGram(s) Oral daily  diltiazem    Tablet 60 milliGRAM(s) Oral every 8 hours  folic acid 1 milliGRAM(s) Oral daily  furosemide    Tablet 40 milliGRAM(s) Oral daily  lidocaine   4% Patch 1 Patch Transdermal daily  magnesium oxide 400 milliGRAM(s) Oral daily  metoprolol succinate  milliGRAM(s) Oral daily  multivitamin/minerals 1 Tablet(s) Oral daily  oxybutynin 5 milliGRAM(s) Oral daily  polyethylene glycol 3350 17 Gram(s) Oral daily  spironolactone 25 milliGRAM(s) Oral daily  zinc oxide 40% Ointment 1 Application(s) Topical two times a day    MEDICATIONS  (PRN):  acetaminophen     Tablet .. 650 milliGRAM(s) Oral every 6 hours PRN Temp greater or equal to 38C (100.4F), Mild Pain (1 - 3)  aluminum hydroxide/magnesium hydroxide/simethicone Suspension 30 milliLiter(s) Oral every 4 hours PRN Dyspepsia  bisacodyl 5 milliGRAM(s) Oral every 12 hours PRN Constipation  hydrOXYzine hydrochloride 25 milliGRAM(s) Oral daily PRN Anxiety  magnesium hydroxide Suspension 30 milliLiter(s) Oral at bedtime PRN Constipation  melatonin 3 milliGRAM(s) Oral at bedtime PRN Insomnia  ondansetron Injectable 4 milliGRAM(s) IV Push every 8 hours PRN Nausea and/or Vomiting      Allergies    Darvocet-N 50 (Unknown)  sulfa drugs (Other)    Intolerances        Vital Signs Last 24 Hrs  T(C): 36.2 (06 Dec 2021 08:07), Max: 36.7 (05 Dec 2021 21:03)  T(F): 97.1 (06 Dec 2021 08:07), Max: 98.1 (05 Dec 2021 21:03)  HR: 79 (06 Dec 2021 08:07) (70 - 79)  BP: 141/53 (06 Dec 2021 08:07) (131/77 - 141/53)  BP(mean): 81 (06 Dec 2021 08:07) (81 - 81)  RR: 19 (06 Dec 2021 08:07) (16 - 19)  SpO2: 96% (06 Dec 2021 08:07) (95% - 97%)      PHYSICAL EXAMINATION:    NECK:  Supple. No lymphadenopathy. Jugular venous pressure not elevated. Carotids equal.   HEART:   The cardiac impulse has a normal quality. Reg., Nl S1 and S2.  There are no murmurs, rubs or gallops noted  CHEST:  Chest crackles to auscultation. Normal respiratory effort.  ABDOMEN:  Soft and nontender.   EXTREMITIES:  There is no edema.       LABS:                        11.7   11.22 )-----------( 400      ( 06 Dec 2021 08:37 )             36.5     12-06    135  |  97  |  28<H>  ----------------------------<  95  3.6   |  33<H>  |  0.95    Ca    8.4<L>      06 Dec 2021 08:37  Mg     1.9     12-05

## 2021-12-06 NOTE — PROGRESS NOTE ADULT - ASSESSMENT
Pt is a 80y old Female with hx of Dementia, HTN, Diastolic CHF, A. 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 Palliative medicine consulted to help establish GOC and advance care planning.      1) Acute Hypoxemic respiratory failure  - secondary to  Sepsis Pneumonia  - R Large pleural effusion   - plan to have thoracentesis today   - monitor labs   - c/w zosyn     2) Dementia/debility  - unsure baseline   - supportive care   - debility   - s/p fall   - PT consult when able      3) Acute on Chronic Systolic  CHF vs parapneumonic effusion/empyema  - Last echo: EF 40-45%, decreased LV FX, mod to severe TR, Severe Pulm HTN   - c/w Lasix 40mg   - c/wEliquis    4)  Chronic Afib  - C/w BB, Cardizem and Digoxin  - c/w Eliquis 2.5 mg PO BID    5) advance care planning   - Patient lacks capacity to medical decision making   - MOLST: DNR/DNI  - HCP on chart naming son Jonas   - GOC scheduled for friday at 1PM - please see note   - will follow up with family    Pt is a 80y old Female with hx of Dementia, HTN, Diastolic CHF, A. 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 Palliative medicine consulted to help establish GOC and advance care planning.      1) Acute Hypoxemic respiratory failure  - secondary to  Sepsis Pneumonia  - R Large pleural effusion   - plan to have thoracentesis today   - monitor labs   - c/w zosyn     2) Dementia/debility  - unsure baseline   - supportive care   - debility   - s/p fall   - PT consult when able      3) Acute on Chronic Systolic  CHF vs parapneumonic effusion/empyema  - Last echo: EF 40-45%, decreased LV FX, mod to severe TR, Severe Pulm HTN   - c/w Lasix 40mg   - c/wEliquis    4)  Chronic Afib  - C/w BB, Cardizem and Digoxin  - c/w Eliquis 2.5 mg PO BID    5) advance care planning   - Patient lacks capacity to medical decision making   - MOLST: DNR/DNI  - HCP on chart naming son Jonas   - GOC scheduled for friday at 1PM - please see note   - spoke with family, at this time requesting rehab to see if patient is able to improve, hopeful that patient will continue to get better. Will follow up with family tomorrow

## 2021-12-06 NOTE — SWALLOW BEDSIDE ASSESSMENT ADULT - SWALLOW EVAL: DIAGNOSIS
1) Pt exhibits Oropharyngeal Dysphagia which subjectively appeared to be a grossly functional condition with a restricted inventory of modified food textures. Overt aspiration signs/coughing noted with thin liquids. Dysphagia in setting of acute medical deconditioning(i.e sepsis, hypoxia, pneumonia w/possible empyema, CHF exacerbation w/right pleural effusion, etc), overt muscle wasting & subacute subarachnoid hemorrhage.  2) Pt is frail in appearance. A loss of bulk was evident in strap muscle regions. Pt was arousable but fatigued, communicatively passive, & variably internally distractible. Pt periodically verbalized during communicative probes without evidence of a cindy primary speech-language pathology. However, phonatory effort was reduced when attempting to speak while in a fatigued state. Further, her verbalizations were often brief & variably tangential/contextually inappropriate consistent with Cognitive Dysfunction.

## 2021-12-06 NOTE — PROGRESS NOTE ADULT - SUBJECTIVE AND OBJECTIVE BOX
CC: Encephalopathy  Acute hypoxemic Respiratory Failure  Sepsis     HPI: 80 year old female patient  Dementia, HTN, Diastolic CHF, A. 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 Patient seen and examined and was found to be alert and oriented but unable to provide pertinent narrative. States she does not recall what happened but believes she may have had a fever. Narrative limited secondary to current mental status  In the ED patient found to be febrile, hypoxic and had a WBC of 20. She was given vanco, zosyn and IVF hydration     INTERVAL HPI/ OVERNIGHT EVENTS:  Pt was seen and examined, awake and alert, baseline confused,  no complains. No events overnight, stated  want to eat now will do later but took water. Denies Difficulty breathing or pain      Vital Signs Last 24 Hrs  T(C): 36.2 (06 Dec 2021 08:07), Max: 36.7 (05 Dec 2021 21:03)  T(F): 97.1 (06 Dec 2021 08:07), Max: 98.1 (05 Dec 2021 21:03)  HR: 79 (06 Dec 2021 08:07) (70 - 79)  BP: 141/53 (06 Dec 2021 08:07) (131/77 - 141/53)  BP(mean): 81 (06 Dec 2021 08:07) (81 - 81)  RR: 19 (06 Dec 2021 08:07) (16 - 19)  SpO2: 96% (06 Dec 2021 08:07) (95% - 97%)    REVIEW OF SYSTEMS:  Unable to obtain due to dementia       PHYSICAL EXAM:  General: Well developed; malnourished,  in no acute distress, on NC   Eyes: EOMI; conjunctiva and sclera clear  Head: Normocephalic; L frontal hematoma improving   ENMT: No nasal discharge; dry oral mucosa   Neck: Supple; no JVD   Respiratory: Improved air entry on R, rales at R base   Cardiovascular: Irregular rate and rhythm. S1 and S2 Normal;   Gastrointestinal: Soft non-tender non-distended; Normal bowel sounds  Genitourinary: No  suprapubic  tenderness  Extremities: + le, L LE wound with granulations, no edema or erythema    Neurological: Alert and oriented x1, non focal   Musculoskeletal: Normal muscle tone and strength   Psychiatric: Cooperative        LABS:                         11.7   11.22 )-----------( 400      ( 06 Dec 2021 08:37 )             36.5     12-06    135  |  97  |  28<H>  ----------------------------<  95  3.6   |  33<H>  |  0.95    Ca    8.4<L>      06 Dec 2021 08:37                          11.1   13.60 )-----------( 484      ( 04 Dec 2021 08:30 )             35.3     12-04    141  |  100  |  26<H>  ----------------------------<  112<H>  3.4<L>   |  37<H>  |  0.92    Ca    8.6      04 Dec 2021 08:30  Phos  2.6     12-03  Mg     1.9     12-03                            11.4   14.45 )-----------( 564      ( 02 Dec 2021 08:30 )             35.1     12-    140  |  100  |  21  ----------------------------<  109<H>  3.7   |  32<H>  |  0.92    Ca    8.6      02 Dec 2021 08:30  Mg     1.9     12-                          10.3   12.42 )-----------( 565      ( 2021 17:59 )             33.0         140  |  109<H>  |  18  ----------------------------<  111<H>  4.3   |  26  |  0.91    Ca    8.5      2021 17:59                          9.9    15.52 )-----------( 388      ( 2021 07:16 )             30.6     11    138  |  106  |  24<H>  ----------------------------<  97  4.2   |  28  |  1.02    Ca    8.2<L>      2021 07:16                          10.2   17.86 )-----------( 361      ( 2021 11:44 )             30.5         136  |  104  |  26<H>  ----------------------------<  155<H>  4.0   |  24  |  1.08    Ca    8.2<L>      2021 11:44  Mg     2.2             Urinalysis Basic - ( 2021 17:40 )  Color: Yellow / Appearance: Slightly Turbid / S.020 / pH: x  Gluc: x / Ketone: Negative  / Bili: Negative / Urobili: 1   Blood: x / Protein: unable to obtai mg/dL / Nitrite: Negative   Leuk Esterase: Small / RBC: 0-2 /HPF / WBC    Sq Epi: x / Non Sq Epi: Few / Bacteria: Moderate  Culture - Urine (21 @ 17:40)   Specimen Source: Clean Catch Clean Catch (Midstream)   Culture Results:   <10,000 CFU/mL Normal Urogenital Ivy       Culture - Urine (21 @ 15:35)   - Amikacin: S <=16   - Amoxicillin/Clavulanic Acid: I    - Ampicillin: R >16 These ampicillin results predict results for amoxicillin   - Ampicillin: S <=2 Predicts results to ampicillin/sulbactam, amoxacillin-clavulanate and piperacillin-tazobactam.   - Ampicillin/Sulbactam: R >16/8 Enterobacter, Klebsiella aerogenes, Citrobacter, and Serratia may develop resistance during prolonged therapy (3-4 days)   - Aztreonam: S <=4   - Cefazolin: S 16 ([id number]) For uncomplicated UTI with K. pneumoniae, E. coli, or P. mirablis: CECILY <=16 is sensitive and CECILY >=32 is resistant. This also predicts results for oral agents cefaclor, cefdinir, cefpodoxime, cefprozil, cefuroxime axetil, cephalexin and locarbef for uncomplicated UTI. Note that some isolates may be susceptible to these agents while testing resistant to cefazolin.   - Cefepime: S <=2   - Cefoxitin: S <=8   - Ceftriaxone: S <=1 Enterobacter, Klebsiella aerogenes, Citrobacter, and Serratia may develop resistance during prolonged therapy   - Ciprofloxacin: S <=0.25   - Ciprofloxacin: S <=1   - Ertapenem: S <=0.5   - Gentamicin: S <=2   - Imipenem: S <=1   - Levofloxacin: S <=0.5   - Levofloxacin: S <=1   - Meropenem: S <=1   - Nitrofurantoin: S <=32 Should not be used to treat pyelonephritis   - Nitrofurantoin: S <=32 Should not be used to treat pyelonephritis.   - Piperacillin/Tazobactam: S <=8   - Tetra/Doxy: R >8   - Tigecycline: S <=2   - Tobramycin: S <=2   - Trimethoprim/Sulfamethoxazole: R >2/38   - Vancomycin: S 2   Specimen Source: Clean Catch Clean Catch (Midstream)   Culture Results:   50,000 - 99,000 CFU/mL Escherichia coli   50,000 - 99,000 CFU/mL Enterococcus faecalis   Organism Identification: Escherichia coli   Enterococcus faecalis   Organism: Escherichia coli   Organism: Enterococcus faecalis   MEDICATIONS  (STANDING):  apixaban 2.5 milliGRAM(s) Oral every 12 hours  aspirin enteric coated 81 milliGRAM(s) Oral daily  cyanocobalamin 1000 MICROGram(s) Oral daily  digoxin     Tablet 250 MICROGram(s) Oral daily  diltiazem    Tablet 60 milliGRAM(s) Oral every 8 hours  folic acid 1 milliGRAM(s) Oral daily  furosemide    Tablet 40 milliGRAM(s) Oral daily  lidocaine   4% Patch 1 Patch Transdermal daily  magnesium oxide 400 milliGRAM(s) Oral daily  metoprolol succinate  milliGRAM(s) Oral daily  multivitamin/minerals 1 Tablet(s) Oral daily  oxybutynin 5 milliGRAM(s) Oral daily  polyethylene glycol 3350 17 Gram(s) Oral daily  spironolactone 25 milliGRAM(s) Oral daily  zinc oxide 40% Ointment 1 Application(s) Topical two times a day    MEDICATIONS  (PRN):  acetaminophen     Tablet .. 650 milliGRAM(s) Oral every 6 hours PRN Temp greater or equal to 38C (100.4F), Mild Pain (1 - 3)  aluminum hydroxide/magnesium hydroxide/simethicone Suspension 30 milliLiter(s) Oral every 4 hours PRN Dyspepsia  bisacodyl 5 milliGRAM(s) Oral every 12 hours PRN Constipation  hydrOXYzine hydrochloride 25 milliGRAM(s) Oral daily PRN Anxiety  magnesium hydroxide Suspension 30 milliLiter(s) Oral at bedtime PRN Constipation  melatonin 3 milliGRAM(s) Oral at bedtime PRN Insomnia  ondansetron Injectable 4 milliGRAM(s) IV Push every 8 hours PRN Nausea and/or Vomiting        RADIOLOGY & ADDITIONAL TESTS:      EXAM:  CT BRAIN                        PROCEDURE DATE:  2021      FINDINGS:  The ventricles and sulci are prominent, compatible with age-related generalized cerebral volume loss.   There is no CT evidence for acute cerebral cortical infarct. There is a tiny lacunar infarct in the left basal ganglia of indeterminate age. There is no evidence of hemorrhage. There is periventricular and subcortical white matter hypoattenuation,  most compatible with chronic microvascular ischemic changes.   No mass effect is found in the brain.  There is no midline shift or herniation pattern.      The visualized portions of the paranasal sinuses and mastoid air cells are clear.    IMPRESSION:    There is a tiny lacunar infarctin the left basal ganglia of indeterminate age.  No evidence of hemorrhage.    Chronic changes as above.        EXAM:  CT BRAIN                            PROCEDURE DATE:  2021          INTERPRETATION:  CT head without IV contrast    CLINICAL INFORMATION:  Fall, RIGHT   Intracranial hemorrhage.    TECHNIQUE: Contiguous axial 5 mm sections were obtainedthrough the head. Sagittal and coronal 2-D reformatted images were also obtained.   This scan was performed using automatic exposure control (radiation dose reduction software) to obtain a diagnostic image quality scan with patient dose as low as reasonably achievable.    FINDINGS:   CT dated 10/29/2021 available for review.    The brain demonstrates decrease in the degree of intracranial hemorrhage within the RIGHT sylvian fissure and RIGHT superior temporal lobe. Moderate periventricular white matter ischemia is noted. Tiny old lacunar infarction in the basal ganglia. No acute cerebral cortical infarct is seen. No mass effect is found in the brain.    The ventricles, sulci and basal cisterns appear unremarkable.    The orbits are unremarkable.  The paranasal sinuses are clear.  The nasal cavity appears intact.  The nasopharynx is symmetric.  The central skull base, petrous temporal bones and calvarium remain intact. 3.4 cm LEFT frontal scalp hematoma is noted.      IMPRESSION:   Interval decrease in the degree of intracranial hemorrhage within the RIGHT sylvian fissure and RIGHT superior temporal lobe. Moderate periventricular white matter ischemia is noted. Tiny old lacunar infarction in the basal ganglia.           EXAM:  ECHO TTE WO CON COMP W DOPP    PROCEDURE DATE:  2021     Summary     Technically limited study   The left ventricle cavity is mildly dilated.   Endocardium is not well visualized, however, overall left ventricular   systolic function appears diminished.   Estimated left ventricular ejection fraction is 40-45%.   Wall motion abnormalities can not be ruled out.   The left atrium is moderately dilated.   The right atrium appears moderately dilated.   The right ventricle is mildly dilated.   Fibrocalcific changes noted to the Aortic valve leaflets with preserved   leaflet excursion.   Trace aortic regurgitation is present.   Fibrocalcific changes noted to the mitral valve leaflets with preserved   leaflet excursion.   Mild mitral annular calcification is present.   Moderate mitral regurgitation is present.   The tricuspid valve leaflets appear mildly thickened and/or calcified, but   open well.   Severe (4+) tricuspid valve regurgitation is present.   Mild pulmonary hypertension.   No evidence of pericardial effusion.

## 2021-12-06 NOTE — SWALLOW BEDSIDE ASSESSMENT ADULT - PHARYNGEAL PHASE
Swallow trigger was timely to mildly latent. Laryngeal lift on palpation during swallowing trials was mildly to moderately decreased but felt to be grossly functional with some of the above modified food textures. Post prandial coughing exhibited with thin liquids, despite cues to employ compensatory swallow maneuvers. NO behavioral aspiration signs exhibited with mildly thick liquids, puree foods and soft/bite sized foods.

## 2021-12-06 NOTE — SWALLOW BEDSIDE ASSESSMENT ADULT - SLP GENERAL OBSERVATIONS
Pt is frail in appearance. A cervical kyphosis was noted. A loss of bulk was evident in strap muscle regions. Pt was arousable but fatigued, communicatively passive, & variably internally distractible. Pt periodically verbalized during communicative probes without evidence of a cindy primary speech-language pathology. However, phonatory effort was reduced when attempting to speak while in a fatigued state. Further, her verbalizations were often brief & variably tangential/contextually inappropriate consistent with Cognitive Dysfunction.

## 2021-12-06 NOTE — PROGRESS NOTE ADULT - SUBJECTIVE AND OBJECTIVE BOX
HPI: patient seen and examined with no family at bedside, patient less tearful today, appears comfortable at this time. Will follow up with patients family     PAIN: ( )Yes   ( x)No  patient appears comfortable at this time     DYSPNEA: ( ) Yes  (x ) No  Level:    Review of Systems:  Unable to obtain/Limited due to: Dementia     PHYSICAL EXAM:    Vital Signs Last 24 Hrs  T(C): 36.2 (06 Dec 2021 08:07), Max: 36.7 (05 Dec 2021 21:03)  T(F): 97.1 (06 Dec 2021 08:07), Max: 98.1 (05 Dec 2021 21:03)  HR: 79 (06 Dec 2021 08:07) (70 - 79)  BP: 141/53 (06 Dec 2021 08:07) (131/77 - 141/53)  BP(mean): 81 (06 Dec 2021 08:07) (81 - 81)  RR: 19 (06 Dec 2021 08:07) (16 - 19)  SpO2: 96% (06 Dec 2021 08:07) (95% - 97%)  Daily Weight in k.6 (06 Dec 2021 06:15)    PPSV2: 30  %  FAST: unsure of baseline     General: elderly female in bed, NAD   Mental Status: alert, oriented to self and place   HEENT: dry oral mucosa, multiple scab areas   Lungs: diminished breath sounds   Cardiac: S1S2 +   GI: nontender, non distended + BS   : + voiding   Ext: edema +, bandages on leg   Neuro: dementia       LABS:                        11.7   11.22 )-----------( 400      ( 06 Dec 2021 08:37 )             36.5     12    135  |  97  |  28<H>  ----------------------------<  95  3.6   |  33<H>  |  0.95    Ca    8.4<L>      06 Dec 2021 08:37  Mg     1.9     12-    Albumin: Albumin, Serum: 1.5 g/dL ( @ 07:42)    Allergies    Darvocet-N 50 (Unknown)  sulfa drugs (Other)    Intolerances      MEDICATIONS  (STANDING):  apixaban 2.5 milliGRAM(s) Oral every 12 hours  aspirin enteric coated 81 milliGRAM(s) Oral daily  cyanocobalamin 1000 MICROGram(s) Oral daily  digoxin     Tablet 250 MICROGram(s) Oral daily  diltiazem    Tablet 60 milliGRAM(s) Oral every 8 hours  folic acid 1 milliGRAM(s) Oral daily  furosemide    Tablet 40 milliGRAM(s) Oral daily  lidocaine   4% Patch 1 Patch Transdermal daily  magnesium oxide 400 milliGRAM(s) Oral daily  metoprolol succinate  milliGRAM(s) Oral daily  multivitamin/minerals 1 Tablet(s) Oral daily  oxybutynin 5 milliGRAM(s) Oral daily  polyethylene glycol 3350 17 Gram(s) Oral daily  spironolactone 25 milliGRAM(s) Oral daily  zinc oxide 40% Ointment 1 Application(s) Topical two times a day    MEDICATIONS  (PRN):  acetaminophen     Tablet .. 650 milliGRAM(s) Oral every 6 hours PRN Temp greater or equal to 38C (100.4F), Mild Pain (1 - 3)  aluminum hydroxide/magnesium hydroxide/simethicone Suspension 30 milliLiter(s) Oral every 4 hours PRN Dyspepsia  bisacodyl 5 milliGRAM(s) Oral every 12 hours PRN Constipation  hydrOXYzine hydrochloride 25 milliGRAM(s) Oral daily PRN Anxiety  magnesium hydroxide Suspension 30 milliLiter(s) Oral at bedtime PRN Constipation  melatonin 3 milliGRAM(s) Oral at bedtime PRN Insomnia  ondansetron Injectable 4 milliGRAM(s) IV Push every 8 hours PRN Nausea and/or Vomiting      RADIOLOGY:    EXAM:  XR CHEST PORTABLE URGENT 1V                        PROCEDURE DATE:  2021    INTERPRETATION:  History: Chest tube removal  Chest:  one view.  Comparison: 2021  AP radiograph of the chest demonstrates unchanged small BILATERAL pleural effusions with underlying atelectasis. Interval removal of RIGHT chest tube. The cardiac silhouette is normal in size. Osseous structures are intact.    Impression: unchanged small BILATERAL pleural effusions with underlying atelectasis. Interval removal of RIGHT chest tube. No pneumothorax.

## 2021-12-06 NOTE — SWALLOW BEDSIDE ASSESSMENT ADULT - ORAL PREPARATORY PHASE
Pt was passive and distractible when PO was offered. Oral aperture was decreased when accepting PO but labial grading on utensils was functional.

## 2021-12-06 NOTE — PROGRESS NOTE ADULT - ASSESSMENT
PROBLEMS;    Acute Hypoxemic respiratory failure  R effusion  pUL htn 48  Metabolic Encephalopathy on baseline Dementia  Elevated Troponin  ADRIANA/ dehydration, improving  Chronic Afib  H/o Fall and SDH WBC trending down  UCX ;  Enterococcus and Ecoli     PLAN;    pulmonary better  poor prognosis  CT CHEST large pleural effusion-s/p pigtail drainage transudative  ECHO: last  11/1/21: severe TR, Pulm HTN, EF 45-50%  C/w ASA, statin, BB.   ON Spironolactone   DVT PROPHYLASIX

## 2021-12-06 NOTE — SWALLOW BEDSIDE ASSESSMENT ADULT - ORAL PHASE
Bolus formation/transfer were mildly to moderately prolonged and discontinuous but felt to be grossly mechanically functional with some of the above modified food textures. Piecemeal deglutition was evident. Mild tongue debris noted with soft/bite sized foods.

## 2021-12-07 NOTE — PROGRESS NOTE ADULT - SUBJECTIVE AND OBJECTIVE BOX
CC: Encephalopathy  Acute hypoxemic Respiratory Failure  Sepsis     HPI: 80 year old female patient  Dementia, HTN, Diastolic CHF, A. 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 Patient seen and examined and was found to be alert and oriented but unable to provide pertinent narrative. States she does not recall what happened but believes she may have had a fever. Narrative limited secondary to current mental status  In the ED patient found to be febrile, hypoxic and had a WBC of 20. She was given vanco, zosyn and IVF hydration     INTERVAL HPI/ OVERNIGHT EVENTS:  Pt was seen and examined, awake and alert, baseline confused,  no complains. No events overnight, stated  want to eat now will do later but took water. Denies Difficulty breathing or pain      Vital Signs Last 24 Hrs  T(C): 36.9 (07 Dec 2021 08:10), Max: 36.9 (07 Dec 2021 08:10)  T(F): 98.4 (07 Dec 2021 08:10), Max: 98.4 (07 Dec 2021 08:10)  HR: 53 (07 Dec 2021 15:25) (53 - 89)  BP: 132/61 (07 Dec 2021 15:25) (132/61 - 153/62)  BP(mean): 77 (07 Dec 2021 08:10) (77 - 77)  RR: 19 (07 Dec 2021 08:10) (19 - 19)  SpO2: 94% (07 Dec 2021 08:10) (94% - 95%)    REVIEW OF SYSTEMS:  Unable to obtain due to dementia       PHYSICAL EXAM:  General: Well developed; malnourished,  in no acute distress, on NC   Eyes: EOMI; conjunctiva and sclera clear  Head: Normocephalic; L frontal hematoma improving   ENMT: No nasal discharge; dry oral mucosa   Neck: Supple; no JVD   Respiratory: Improved air entry on R, rales at R base   Cardiovascular: Irregular rate and rhythm. S1 and S2 Normal;   Gastrointestinal: Soft non-tender non-distended; Normal bowel sounds  Genitourinary: No  suprapubic  tenderness  Extremities: + le, L LE wound with granulations, no edema or erythema    Neurological: Alert and oriented x1, non focal   Musculoskeletal: Normal muscle tone and strength   Psychiatric: Cooperative        LABS:                         11.7   11.22 )-----------( 400      ( 06 Dec 2021 08:37 )             36.5     12-06    135  |  97  |  28<H>  ----------------------------<  95  3.6   |  33<H>  |  0.95    Ca    8.4<L>      06 Dec 2021 08:37                          11.1   13.60 )-----------( 484      ( 04 Dec 2021 08:30 )             35.3     12-04    141  |  100  |  26<H>  ----------------------------<  112<H>  3.4<L>   |  37<H>  |  0.92    Ca    8.6      04 Dec 2021 08:30  Phos  2.6     12-03  Mg     1.9     12-03                            11.4   14.45 )-----------( 564      ( 02 Dec 2021 08:30 )             35.1     12-    140  |  100  |  21  ----------------------------<  109<H>  3.7   |  32<H>  |  0.92    Ca    8.6      02 Dec 2021 08:30  Mg     1.9     12-                          10.3   12.42 )-----------( 565      ( 2021 17:59 )             33.0         140  |  109<H>  |  18  ----------------------------<  111<H>  4.3   |  26  |  0.91    Ca    8.5      2021 17:59                          9.9    15.52 )-----------( 388      ( 2021 07:16 )             30.6     11    138  |  106  |  24<H>  ----------------------------<  97  4.2   |  28  |  1.02    Ca    8.2<L>      2021 07:16                          10.2   17.86 )-----------( 361      ( 2021 11:44 )             30.5         136  |  104  |  26<H>  ----------------------------<  155<H>  4.0   |  24  |  1.08    Ca    8.2<L>      2021 11:44  Mg     2.2             Urinalysis Basic - ( 2021 17:40 )  Color: Yellow / Appearance: Slightly Turbid / S.020 / pH: x  Gluc: x / Ketone: Negative  / Bili: Negative / Urobili: 1   Blood: x / Protein: unable to obtai mg/dL / Nitrite: Negative   Leuk Esterase: Small / RBC: 0-2 /HPF / WBC    Sq Epi: x / Non Sq Epi: Few / Bacteria: Moderate  Culture - Urine (21 @ 17:40)   Specimen Source: Clean Catch Clean Catch (Midstream)   Culture Results:   <10,000 CFU/mL Normal Urogenital Ivy       Culture - Urine (21 @ 15:35)   - Amikacin: S <=16   - Amoxicillin/Clavulanic Acid: I    - Ampicillin: R >16 These ampicillin results predict results for amoxicillin   - Ampicillin: S <=2 Predicts results to ampicillin/sulbactam, amoxacillin-clavulanate and piperacillin-tazobactam.   - Ampicillin/Sulbactam: R >16/8 Enterobacter, Klebsiella aerogenes, Citrobacter, and Serratia may develop resistance during prolonged therapy (3-4 days)   - Aztreonam: S <=4   - Cefazolin: S 16 (MIC_CL_COM_ENTERIC_CEFAZU) For uncomplicated UTI with K. pneumoniae, E. coli, or P. mirablis: CECILY <=16 is sensitive and CECILY >=32 is resistant. This also predicts results for oral agents cefaclor, cefdinir, cefpodoxime, cefprozil, cefuroxime axetil, cephalexin and locarbef for uncomplicated UTI. Note that some isolates may be susceptible to these agents while testing resistant to cefazolin.   - Cefepime: S <=2   - Cefoxitin: S <=8   - Ceftriaxone: S <=1 Enterobacter, Klebsiella aerogenes, Citrobacter, and Serratia may develop resistance during prolonged therapy   - Ciprofloxacin: S <=0.25   - Ciprofloxacin: S <=1   - Ertapenem: S <=0.5   - Gentamicin: S <=2   - Imipenem: S <=1   - Levofloxacin: S <=0.5   - Levofloxacin: S <=1   - Meropenem: S <=1   - Nitrofurantoin: S <=32 Should not be used to treat pyelonephritis   - Nitrofurantoin: S <=32 Should not be used to treat pyelonephritis.   - Piperacillin/Tazobactam: S <=8   - Tetra/Doxy: R >8   - Tigecycline: S <=2   - Tobramycin: S <=2   - Trimethoprim/Sulfamethoxazole: R >2/38   - Vancomycin: S 2   Specimen Source: Clean Catch Clean Catch (Midstream)   Culture Results:   50,000 - 99,000 CFU/mL Escherichia coli   50,000 - 99,000 CFU/mL Enterococcus faecalis   Organism Identification: Escherichia coli   Enterococcus faecalis   Organism: Escherichia coli   Organism: Enterococcus faecalis   MEDICATIONS  (STANDING):  apixaban 2.5 milliGRAM(s) Oral every 12 hours  aspirin enteric coated 81 milliGRAM(s) Oral daily  cyanocobalamin 1000 MICROGram(s) Oral daily  digoxin     Tablet 250 MICROGram(s) Oral daily  diltiazem    Tablet 60 milliGRAM(s) Oral every 8 hours  folic acid 1 milliGRAM(s) Oral daily  furosemide    Tablet 40 milliGRAM(s) Oral daily  lidocaine   4% Patch 1 Patch Transdermal daily  magnesium oxide 400 milliGRAM(s) Oral daily  metoprolol succinate  milliGRAM(s) Oral daily  multivitamin/minerals 1 Tablet(s) Oral daily  oxybutynin 5 milliGRAM(s) Oral daily  polyethylene glycol 3350 17 Gram(s) Oral daily  spironolactone 25 milliGRAM(s) Oral daily  zinc oxide 40% Ointment 1 Application(s) Topical two times a day    MEDICATIONS  (PRN):  acetaminophen     Tablet .. 650 milliGRAM(s) Oral every 6 hours PRN Temp greater or equal to 38C (100.4F), Mild Pain (1 - 3)  aluminum hydroxide/magnesium hydroxide/simethicone Suspension 30 milliLiter(s) Oral every 4 hours PRN Dyspepsia  bisacodyl 5 milliGRAM(s) Oral every 12 hours PRN Constipation  hydrOXYzine hydrochloride 25 milliGRAM(s) Oral daily PRN Anxiety  magnesium hydroxide Suspension 30 milliLiter(s) Oral at bedtime PRN Constipation  melatonin 3 milliGRAM(s) Oral at bedtime PRN Insomnia  ondansetron Injectable 4 milliGRAM(s) IV Push every 8 hours PRN Nausea and/or Vomiting        RADIOLOGY & ADDITIONAL TESTS:      EXAM:  CT BRAIN                        PROCEDURE DATE:  2021      FINDINGS:  The ventricles and sulci are prominent, compatible with age-related generalized cerebral volume loss.   There is no CT evidence for acute cerebral cortical infarct. There is a tiny lacunar infarct in the left basal ganglia of indeterminate age. There is no evidence of hemorrhage. There is periventricular and subcortical white matter hypoattenuation,  most compatible with chronic microvascular ischemic changes.   No mass effect is found in the brain.  There is no midline shift or herniation pattern.      The visualized portions of the paranasal sinuses and mastoid air cells are clear.    IMPRESSION:    There is a tiny lacunar infarctin the left basal ganglia of indeterminate age.  No evidence of hemorrhage.    Chronic changes as above.        EXAM:  CT BRAIN                            PROCEDURE DATE:  2021          INTERPRETATION:  CT head without IV contrast    CLINICAL INFORMATION:  Fall, RIGHT   Intracranial hemorrhage.    TECHNIQUE: Contiguous axial 5 mm sections were obtainedthrough the head. Sagittal and coronal 2-D reformatted images were also obtained.   This scan was performed using automatic exposure control (radiation dose reduction software) to obtain a diagnostic image quality scan with patient dose as low as reasonably achievable.    FINDINGS:   CT dated 10/29/2021 available for review.    The brain demonstrates decrease in the degree of intracranial hemorrhage within the RIGHT sylvian fissure and RIGHT superior temporal lobe. Moderate periventricular white matter ischemia is noted. Tiny old lacunar infarction in the basal ganglia. No acute cerebral cortical infarct is seen. No mass effect is found in the brain.    The ventricles, sulci and basal cisterns appear unremarkable.    The orbits are unremarkable.  The paranasal sinuses are clear.  The nasal cavity appears intact.  The nasopharynx is symmetric.  The central skull base, petrous temporal bones and calvarium remain intact. 3.4 cm LEFT frontal scalp hematoma is noted.      IMPRESSION:   Interval decrease in the degree of intracranial hemorrhage within the RIGHT sylvian fissure and RIGHT superior temporal lobe. Moderate periventricular white matter ischemia is noted. Tiny old lacunar infarction in the basal ganglia.           EXAM:  ECHO TTE WO CON COMP W DOPP    PROCEDURE DATE:  2021     Summary     Technically limited study   The left ventricle cavity is mildly dilated.   Endocardium is not well visualized, however, overall left ventricular   systolic function appears diminished.   Estimated left ventricular ejection fraction is 40-45%.   Wall motion abnormalities can not be ruled out.   The left atrium is moderately dilated.   The right atrium appears moderately dilated.   The right ventricle is mildly dilated.   Fibrocalcific changes noted to the Aortic valve leaflets with preserved   leaflet excursion.   Trace aortic regurgitation is present.   Fibrocalcific changes noted to the mitral valve leaflets with preserved   leaflet excursion.   Mild mitral annular calcification is present.   Moderate mitral regurgitation is present.   The tricuspid valve leaflets appear mildly thickened and/or calcified, but   open well.   Severe (4+) tricuspid valve regurgitation is present.   Mild pulmonary hypertension.   No evidence of pericardial effusion.

## 2021-12-07 NOTE — PROGRESS NOTE ADULT - ASSESSMENT
80 year old female patient  Dementia, HTN, Diastolic CHF, A. 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 Acute Hypoxemic respiratory failure  -secondary to  Sepsis Pneumonia,  Acute in Chronic systolic CHF,   R Large pleural effusion, S/p R thoracentesis, now CT removed.   - Tolerated RA at rest   - no fevers, but leukocytosis  - Fluid analysis d/w Dr Buck, transudative, CX NGTD,  gram stain no organisms   -on supplemental oxygen  - F/u BCX: NGTD,   UCX ;  Enterococcus and Ecoli .  - Completed course of Zosyn      2. Metabolic Encephalopathy on baseline Dementia   - likely 2/2 sepsis/hypoxia   -CT head neg for acute findings no  SDH  - supportive care     3. R large pleural effusion   Likely 2/2 Acute on Chronic Systolic  CHF   Fluid analysis: transudate, cytology neg  for malignant cells, CX neg   Last echo: EF 40-45%, decreased LV FX, mod to severe TR, Severe Pulm HTN   On  IV Lasix  40mg IVP BID,  changed  to Lasix 40mg PO QD, c/w Spironolactone  Chest tube removed.Repeat CXR neg for PTX    4. Elevated Troponin, demand ischemia in settings of Sepsis, suspect underline CAD   -no EKG changes noted  - troponin trended down   - ECHO: last  11/1/21: severe TR, Pulm HTN, EF 45-50%  - C/w ASA, statin, BB.   - Pt was initially planned fro stress test, now postponed,  conservative management       5.  ADRIANA/ dehydration  - Improved s/p gentle IVF   - encourage oral intake, needs help with feedings   - Bladder  scan neg   - Monitor on Lasix and Spironolactone     6.  Chronic Afib  - C/w BB, Cardizem and Digoxin  - resumed on  Eliquis 2.5 mg PO BID  - PPM - interrogated by EP     7. Hypokalemia   repleted  - BMP in AM    # H/o Fall and SDH   Repeat CT head neg   Neuro Sx reeval appreciated     # DVT PPX: heparin SQ    ACP: DNR/DNI  D/w  Palliative team, had family meeting, plan for DRU at d/c

## 2021-12-07 NOTE — PROGRESS NOTE ADULT - ASSESSMENT
PROBLEMS;    Acute Hypoxemic respiratory failure  R effusion  pUL htn 48  Metabolic Encephalopathy on baseline Dementia  Elevated Troponin  ADRIANA/ dehydration, improving  Chronic Afib  H/o Fall and SDH WBC trending down  UCX ;  Enterococcus and Ecoli     PLAN;    pulmonary better  poor prognosis  CT CHEST large pleural effusion-s/p pigtail drainage transudative  ECHO: last  11/1/21: severe TR, Pulm HTN, EF 45-50%  C/w ASA, statin, BB.   ON Spironolactone   DVT PROPHYLASIX   PROBLEMS;    Acute Hypoxemic respiratory failure  R effusion  pUL htn 48  Metabolic Encephalopathy on baseline Dementia  Elevated Troponin  ADRIANA/ dehydration, improving  Chronic Afib  H/o Fall and SDH WBC trending down  UCX ;  Enterococcus and Ecoli     PLAN;    pulmonary better- supportive care  poor prognosis  CT CHEST large pleural effusion-s/p pigtail drainage transudative  ECHO: last  11/1/21: severe TR, Pulm HTN, EF 45-50%  C/w ASA, statin, BB.   ON Spironolactone   DVT PROPHYLASIX

## 2021-12-07 NOTE — PROGRESS NOTE ADULT - NUTRITIONAL ASSESSMENT
This patient has been assessed with a concern for Malnutrition and has been determined to have a diagnosis/diagnoses of Severe protein-calorie malnutrition.    This patient is being managed with:   Diet Regular-  Entered: Nov 22 2021  6:43PM    

## 2021-12-08 NOTE — PROGRESS NOTE ADULT - PROVIDER SPECIALTY LIST ADULT
Hospitalist
Infectious Disease
Palliative Care
Pulmonology
Thoracic Surgery
Thoracic Surgery
Cardiology
Hospitalist
Pulmonology
Thoracic Surgery
Heme/Onc
Hospitalist
Pulmonology
Thoracic Surgery
Hospitalist
Palliative Care
Pulmonology
Hospitalist
Hospitalist
Palliative Care

## 2021-12-08 NOTE — DISCHARGE NOTE PROVIDER - CARE PROVIDER_API CALL
Mau Hameed)  Cardiovascular Disease; Internal Medicine  [address], [address]  [place] [address]  Phone: [phone]  Fax: [phone]  Follow Up Time:     Jin Buck)  Critical Care Medicine; Internal Medicine; Pulmonary Disease; Sleep Medicine  [address] [place] [address]  Phone: [phone]  Fax: [phone]  Follow Up Time:

## 2021-12-08 NOTE — PROGRESS NOTE ADULT - REASON FOR ADMISSION
Encephalopathy  Acute hypoxemic Respiratory Failure  Sepsis

## 2021-12-08 NOTE — PROGRESS NOTE ADULT - ASSESSMENT
PROBLEMS;    Acute Hypoxemic respiratory failure  R effusion  pUL htn 48  Metabolic Encephalopathy on baseline Dementia  Elevated Troponin  ADRIANA/ dehydration, improving  Chronic Afib  H/o Fall and SDH WBC trending down  UCX ;  Enterococcus and Ecoli     PLAN;    pulmonary better- supportive care  poor prognosis  CT CHEST large pleural effusion-s/p pigtail drainage transudative  ECHO: last  11/1/21: severe TR, Pulm HTN, EF 45-50%  C/w ASA, statin, BB.   ON Spironolactone   DVT PROPHYLASIX   PROBLEMS;    Acute Hypoxemic respiratory failure  R effusion  pUL htn 48  Metabolic Encephalopathy on baseline Dementia  Elevated Troponin  ADRIANA/ dehydration, improving  Chronic Afib  H/o Fall and SDH WBC trending down  UCX ;  Enterococcus and Ecoli     PLAN;    pulmonary better- supportive care- decd planning  poor prognosis  CT CHEST large pleural effusion-s/p pigtail drainage transudative  ECHO: last  11/1/21: severe TR, Pulm HTN, EF 45-50%  C/w ASA, statin, BB.   ON Spironolactone   DVT PROPHYLASIX

## 2021-12-08 NOTE — DISCHARGE NOTE PROVIDER - NSDCMRMEDTOKEN_GEN_ALL_CORE_FT
acetaminophen 325 mg oral tablet: 2 tab(s) orally every 6 hours, As Needed  Aldactone 25 mg oral tablet: 0.5 tab(s) orally once a day  Hold for SBP&lt;100  apixaban 2.5 mg oral tablet: 1 tab(s) orally every 12 hours  Aquaphor topical ointment: Apply topically to affected area once a day  aspirin 81 mg oral delayed release tablet: 1 tab(s) orally once a day  bisacodyl 5 mg oral delayed release tablet: 1 tab(s) orally every 12 hours, As needed, Constipation  Cardizem 60 mg oral tablet: 1 tab(s) orally every 8 hours 0600, 1400, and 2200  digoxin 250 mcg (0.25 mg) oral tablet: 1 tab(s) orally once a day  Hold for apical pulse&lt;60  folic acid 1 mg oral tablet: 1 tab(s) orally once a day  furosemide 40 mg oral tablet: 1 tab(s) orally once a day  hydrOXYzine hydrochloride 25 mg oral tablet: 1 tab(s) orally once a day  lidocaine 4% topical film: Apply topically to affected area once a day  magnesium oxide 400 mg oral tablet: 1 tab(s) orally once a day  metoprolol succinate 100 mg oral tablet, extended release: 1 tab(s) orally once a day  Hold for SBP&lt;100 or HR&lt;60  Milk of Magnesia 8% oral suspension: 30 milliliter(s) orally once a day (at bedtime), As Needed  Multiple Vitamins with Minerals oral tablet: 1 tab(s) orally once a day  oxybutynin 5 mg oral tablet: 1 tab(s) orally once a day  polyethylene glycol 3350 oral powder for reconstitution: 17 gram(s) orally once a day  Vitamin B12 1000 mcg oral tablet: 1 tab(s) orally once a day

## 2021-12-08 NOTE — PROGRESS NOTE ADULT - SUBJECTIVE AND OBJECTIVE BOX
Subjective:    Home Medications:  acetaminophen 325 mg oral tablet: 2 tab(s) orally every 6 hours, As Needed (22 Nov 2021 17:35)  Aldactone 25 mg oral tablet: 0.5 tab(s) orally once a day  Hold for SBP&lt;100 (22 Nov 2021 17:29)  Aquaphor topical ointment: Apply topically to affected area once a day (22 Nov 2021 17:35)  aspirin 81 mg oral delayed release tablet: 1 tab(s) orally once a day (22 Nov 2021 17:29)  bisacodyl 5 mg oral delayed release tablet: 1 tab(s) orally every 12 hours, As needed, Constipation (22 Nov 2021 17:29)  Cardizem 60 mg oral tablet: 1 tab(s) orally every 8 hours 0600, 1400, and 2200 (22 Nov 2021 17:29)  digoxin 250 mcg (0.25 mg) oral tablet: 1 tab(s) orally once a day  Hold for apical pulse&lt;60 (22 Nov 2021 17:29)  folic acid 1 mg oral tablet: 1 tab(s) orally once a day (22 Nov 2021 17:29)  hydrOXYzine hydrochloride 25 mg oral tablet: 1 tab(s) orally once a day (22 Nov 2021 17:29)  Milk of Magnesia 8% oral suspension: 30 milliliter(s) orally once a day (at bedtime), As Needed (22 Nov 2021 17:35)  Multiple Vitamins with Minerals oral tablet: 1 tab(s) orally once a day (22 Nov 2021 17:35)  oxybutynin 5 mg oral tablet: 1 tab(s) orally once a day (22 Nov 2021 17:29)  polyethylene glycol 3350 oral powder for reconstitution: 17 gram(s) orally once a day (22 Nov 2021 17:29)  Vitamin B12 1000 mcg oral tablet: 1 tab(s) orally once a day (22 Nov 2021 17:29)    MEDICATIONS  (STANDING):  apixaban 2.5 milliGRAM(s) Oral every 12 hours  aspirin enteric coated 81 milliGRAM(s) Oral daily  cyanocobalamin 1000 MICROGram(s) Oral daily  digoxin     Tablet 250 MICROGram(s) Oral daily  diltiazem    Tablet 60 milliGRAM(s) Oral every 8 hours  folic acid 1 milliGRAM(s) Oral daily  furosemide    Tablet 40 milliGRAM(s) Oral daily  lidocaine   4% Patch 1 Patch Transdermal daily  magnesium oxide 400 milliGRAM(s) Oral daily  metoprolol succinate  milliGRAM(s) Oral daily  multivitamin/minerals 1 Tablet(s) Oral daily  oxybutynin 5 milliGRAM(s) Oral daily  polyethylene glycol 3350 17 Gram(s) Oral daily  spironolactone 25 milliGRAM(s) Oral daily  zinc oxide 40% Ointment 1 Application(s) Topical two times a day    MEDICATIONS  (PRN):  acetaminophen     Tablet .. 650 milliGRAM(s) Oral every 6 hours PRN Temp greater or equal to 38C (100.4F), Mild Pain (1 - 3)  aluminum hydroxide/magnesium hydroxide/simethicone Suspension 30 milliLiter(s) Oral every 4 hours PRN Dyspepsia  bisacodyl 5 milliGRAM(s) Oral every 12 hours PRN Constipation  hydrOXYzine hydrochloride 25 milliGRAM(s) Oral daily PRN Anxiety  magnesium hydroxide Suspension 30 milliLiter(s) Oral at bedtime PRN Constipation  melatonin 3 milliGRAM(s) Oral at bedtime PRN Insomnia  ondansetron Injectable 4 milliGRAM(s) IV Push every 8 hours PRN Nausea and/or Vomiting      Allergies    Darvocet-N 50 (Unknown)  sulfa drugs (Other)    Intolerances        Vital Signs Last 24 Hrs  T(C): 36.8 (08 Dec 2021 05:19), Max: 37 (07 Dec 2021 21:45)  T(F): 98.3 (08 Dec 2021 05:19), Max: 98.6 (07 Dec 2021 21:45)  HR: 54 (08 Dec 2021 05:19) (53 - 79)  BP: 124/60 (08 Dec 2021 05:19) (124/60 - 135/63)  BP(mean): --  RR: 18 (08 Dec 2021 05:19) (18 - 19)  SpO2: 97% (08 Dec 2021 05:19) (97% - 98%)      PHYSICAL EXAMINATION:    NECK:  Supple. No lymphadenopathy. Jugular venous pressure not elevated. Carotids equal.   HEART:   The cardiac impulse has a normal quality. Reg., Nl S1 and S2.  There are no murmurs, rubs or gallops noted  CHEST:  Chest is clear to auscultation. Normal respiratory effort.  ABDOMEN:  Soft and nontender.   EXTREMITIES:  There is no edema.       LABS:                        11.1   11.76 )-----------( 425      ( 07 Dec 2021 07:34 )             34.6     12-07    137  |  96  |  28<H>  ----------------------------<  102<H>  3.3<L>   |  38<H>  |  0.96    Ca    8.5      07 Dec 2021 07:34  Phos  2.4     12-07  Mg     2.0     12-07                 Subjective:    pat better, no new complaint, lying in the bed.    Home Medications:  acetaminophen 325 mg oral tablet: 2 tab(s) orally every 6 hours, As Needed (22 Nov 2021 17:35)  Aldactone 25 mg oral tablet: 0.5 tab(s) orally once a day  Hold for SBP&lt;100 (22 Nov 2021 17:29)  Aquaphor topical ointment: Apply topically to affected area once a day (22 Nov 2021 17:35)  aspirin 81 mg oral delayed release tablet: 1 tab(s) orally once a day (22 Nov 2021 17:29)  bisacodyl 5 mg oral delayed release tablet: 1 tab(s) orally every 12 hours, As needed, Constipation (22 Nov 2021 17:29)  Cardizem 60 mg oral tablet: 1 tab(s) orally every 8 hours 0600, 1400, and 2200 (22 Nov 2021 17:29)  digoxin 250 mcg (0.25 mg) oral tablet: 1 tab(s) orally once a day  Hold for apical pulse&lt;60 (22 Nov 2021 17:29)  folic acid 1 mg oral tablet: 1 tab(s) orally once a day (22 Nov 2021 17:29)  hydrOXYzine hydrochloride 25 mg oral tablet: 1 tab(s) orally once a day (22 Nov 2021 17:29)  Milk of Magnesia 8% oral suspension: 30 milliliter(s) orally once a day (at bedtime), As Needed (22 Nov 2021 17:35)  Multiple Vitamins with Minerals oral tablet: 1 tab(s) orally once a day (22 Nov 2021 17:35)  oxybutynin 5 mg oral tablet: 1 tab(s) orally once a day (22 Nov 2021 17:29)  polyethylene glycol 3350 oral powder for reconstitution: 17 gram(s) orally once a day (22 Nov 2021 17:29)  Vitamin B12 1000 mcg oral tablet: 1 tab(s) orally once a day (22 Nov 2021 17:29)    MEDICATIONS  (STANDING):  apixaban 2.5 milliGRAM(s) Oral every 12 hours  aspirin enteric coated 81 milliGRAM(s) Oral daily  cyanocobalamin 1000 MICROGram(s) Oral daily  digoxin     Tablet 250 MICROGram(s) Oral daily  diltiazem    Tablet 60 milliGRAM(s) Oral every 8 hours  folic acid 1 milliGRAM(s) Oral daily  furosemide    Tablet 40 milliGRAM(s) Oral daily  lidocaine   4% Patch 1 Patch Transdermal daily  magnesium oxide 400 milliGRAM(s) Oral daily  metoprolol succinate  milliGRAM(s) Oral daily  multivitamin/minerals 1 Tablet(s) Oral daily  oxybutynin 5 milliGRAM(s) Oral daily  polyethylene glycol 3350 17 Gram(s) Oral daily  spironolactone 25 milliGRAM(s) Oral daily  zinc oxide 40% Ointment 1 Application(s) Topical two times a day    MEDICATIONS  (PRN):  acetaminophen     Tablet .. 650 milliGRAM(s) Oral every 6 hours PRN Temp greater or equal to 38C (100.4F), Mild Pain (1 - 3)  aluminum hydroxide/magnesium hydroxide/simethicone Suspension 30 milliLiter(s) Oral every 4 hours PRN Dyspepsia  bisacodyl 5 milliGRAM(s) Oral every 12 hours PRN Constipation  hydrOXYzine hydrochloride 25 milliGRAM(s) Oral daily PRN Anxiety  magnesium hydroxide Suspension 30 milliLiter(s) Oral at bedtime PRN Constipation  melatonin 3 milliGRAM(s) Oral at bedtime PRN Insomnia  ondansetron Injectable 4 milliGRAM(s) IV Push every 8 hours PRN Nausea and/or Vomiting      Allergies    Darvocet-N 50 (Unknown)  sulfa drugs (Other)    Intolerances        Vital Signs Last 24 Hrs  T(C): 36.8 (08 Dec 2021 05:19), Max: 37 (07 Dec 2021 21:45)  T(F): 98.3 (08 Dec 2021 05:19), Max: 98.6 (07 Dec 2021 21:45)  HR: 54 (08 Dec 2021 05:19) (53 - 79)  BP: 124/60 (08 Dec 2021 05:19) (124/60 - 135/63)  BP(mean): --  RR: 18 (08 Dec 2021 05:19) (18 - 19)  SpO2: 97% (08 Dec 2021 05:19) (97% - 98%)      PHYSICAL EXAMINATION:    NECK:  Supple. No lymphadenopathy. Jugular venous pressure not elevated. Carotids equal.   HEART:   The cardiac impulse has a normal quality. Reg., Nl S1 and S2.  There are no murmurs, rubs or gallops noted  CHEST:  Chest crackles to auscultation. Normal respiratory effort.  ABDOMEN:  Soft and nontender.   EXTREMITIES:  There is no edema.       LABS:                        11.1   11.76 )-----------( 425      ( 07 Dec 2021 07:34 )             34.6     12-07    137  |  96  |  28<H>  ----------------------------<  102<H>  3.3<L>   |  38<H>  |  0.96    Ca    8.5      07 Dec 2021 07:34  Phos  2.4     12-07  Mg     2.0     12-07

## 2021-12-08 NOTE — DISCHARGE NOTE PROVIDER - NSDCCPCAREPLAN_GEN_ALL_CORE_FT
PRINCIPAL DISCHARGE DIAGNOSIS  Diagnosis: Altered mental status  Assessment and Plan of Treatment: Metabolic Encephalopathy on baseline Dementia   - likely 2/2 sepsis/hypoxia   -CT head neg for acute findings no  SDH  - supportive care      SECONDARY DISCHARGE DIAGNOSES  Diagnosis: Acute hypoxemic respiratory failure  Assessment and Plan of Treatment: Acute Hypoxemic respiratory failure  -secondary to  Sepsis Pneumonia,  Acute in Chronic systolic CHF,   R Large pleural effusion, S/p R thoracentesis, now CT removed.   - Tolerated RA at rest   - no fevers, but leukocytosis  - Fluid analysis d/w Dr Buck, transudative, CX NGTD,  gram stain no organisms   -on supplemental oxygen  - F/u BCX: NGTD,   UCX ;  Enterococcus and Ecoli .  - Completed course of Zosyn    Diagnosis: Chronic CHF  Assessment and Plan of Treatment: R large pleural effusion   Likely 2/2 Acute on Chronic Systolic  CHF   Fluid analysis: transudate, cytology neg  for malignant cells, CX neg   Last echo: EF 40-45%, decreased LV FX, mod to severe TR, Severe Pulm HTN   On  IV Lasix  40mg IVP BID,  changed  to Lasix 40mg PO QD, c/w Spironolactone  Chest tube removed.Repeat CXR neg for PTX  - resumed on  Eliquis 2.5 mg PO BID  - PPM - interrogated by EP    Diagnosis: Elevated troponin  Assessment and Plan of Treatment: Elevated Troponin, demand ischemia in settings of Sepsis, suspect underline CAD   -no EKG changes noted  - troponin trended down   - ECHO: last  11/1/21: severe TR, Pulm HTN, EF 45-50%  - C/w ASA, statin, BB.  - conservative management       Diagnosis: Chronic atrial fibrillation  Assessment and Plan of Treatment: Chronic Afib  - C/w BB, Cardizem and Digoxin  - resumed on  Eliquis 2.5 mg PO BID  - PPM - interrogated by EP

## 2021-12-08 NOTE — DISCHARGE NOTE PROVIDER - HOSPITAL COURSE
80 year old female patient  Dementia, HTN, Diastolic CHF, A. 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 Acute Hypoxemic respiratory failure  -secondary to  Sepsis Pneumonia,  Acute in Chronic systolic CHF,   R Large pleural effusion, S/p R thoracentesis, now CT removed.   - Tolerated RA at rest   - no fevers, but leukocytosis  - Fluid analysis d/w Dr Buck, transudative, CX NGTD,  gram stain no organisms   -on supplemental oxygen  - F/u BCX: NGTD,   UCX ;  Enterococcus and Ecoli .  - Completed course of Zosyn    2. Metabolic Encephalopathy on baseline Dementia   - likely 2/2 sepsis/hypoxia   -CT head neg for acute findings no  SDH  - supportive care     3. R large pleural effusion   Likely 2/2 Acute on Chronic Systolic  CHF   Fluid analysis: transudate, cytology neg  for malignant cells, CX neg   Last echo: EF 40-45%, decreased LV FX, mod to severe TR, Severe Pulm HTN   On  IV Lasix  40mg IVP BID,  changed  to Lasix 40mg PO QD, c/w Spironolactone  Chest tube removed.Repeat CXR neg for PTX    4. Elevated Troponin, demand ischemia in settings of Sepsis, suspect underline CAD   -no EKG changes noted  - troponin trended down   - ECHO: last  11/1/21: severe TR, Pulm HTN, EF 45-50%  - C/w ASA, statin, BB.  - conservative management     5.  ADRIANA/ dehydration  - Improved s/p gentle IVF   - encourage oral intake, needs help with feedings   - Bladder  scan neg   - Monitor on Lasix and Spironolactone     6.  Chronic Afib  - C/w BB, Cardizem and Digoxin  - resumed on  Eliquis 2.5 mg PO BID  - PPM - interrogated by EP     7. Hypokalemia   repleted, resolved     # H/o Fall and SDH   Repeat CT head neg   Neuro Sx reeval appreciated     Pt/ is stable for discharge, will follow up with cardiology/PCP and pulmonology in 7 days.     PHYSICAL EXAM:  Vital Signs Last 24 Hrs  T(C): 36.8 (08 Dec 2021 05:19), Max: 37 (07 Dec 2021 21:45)  T(F): 98.3 (08 Dec 2021 05:19), Max: 98.6 (07 Dec 2021 21:45)  HR: 54 (08 Dec 2021 05:19) (53 - 79)  BP: 124/60 (08 Dec 2021 05:19) (124/60 - 135/63)  BP(mean): --  RR: 18 (08 Dec 2021 05:19) (18 - 19)  SpO2: 97% (08 Dec 2021 05:19) (97% - 98%)  General: Well developed; malnourished,  in no acute distress, on NC   Eyes: EOMI; conjunctiva and sclera clear  Head: Normocephalic; L frontal hematoma improving   ENMT: No nasal discharge; dry oral mucosa   Neck: Supple; no JVD   Respiratory: Improved air entry on R, rales at R base   Cardiovascular: Irregular rate and rhythm. S1 and S2 Normal;   Gastrointestinal: Soft non-tender non-distended; Normal bowel sounds  Genitourinary: No  suprapubic  tenderness  Extremities: + le, L LE wound with granulations, no edema or erythema    Neurological: Alert and oriented x1, non focal   Musculoskeletal: Normal muscle tone and strength   Psychiatric: Cooperative

## 2021-12-08 NOTE — DISCHARGE NOTE PROVIDER - DETAILS OF MALNUTRITION DIAGNOSIS/DIAGNOSES
This patient has been assessed with a concern for Malnutrition and was treated during this hospitalization for the following Nutrition diagnosis/diagnoses:     -  12/01/2021: Severe protein-calorie malnutrition

## 2021-12-14 NOTE — CONSULT NOTE ADULT - ASSESSMENT
A/P: 82 y/o female with PMHx of Afib on Eliquis, chronic pedal edema 2/2 venous statis and SCC, diastolic CHF, dementia, depression, hemorrhagic stroke, HTN, SCC s/p recent admission for fall & SDH (10/28/21-11/04/2021) was brought in by EMS on 12/14/21 for evaluation of unresponsiveness. Per [facility], patient was found in her bed with blood in her month. Pt was last known well during breakfast.     In ED, patient is unable to give a history of what occurred today. It is unclear how the patient is at baseline. Exam was non focal. CTH done in ED was negative for acute infarct or hemorrhage.     #?Synope/seizures- due to multiple bruises, patient may fall frequently- will order EEG to assess    #Acute metabolic encephalopathy, from infectious etiology- elevated labs are noted, workup recommended as per medicine.    -Continue ASA (suppository given in ED)  -Dysphagia screen, speech and swallow eval thereafter  -Can consider MRI Head if no clinical change  -EEG   -DVT prophylaxis  -PT eval   A/P: 82 y/o female with PMHx of Afib on Eliquis, chronic pedal edema 2/2 venous statis and SCC, diastolic CHF, dementia, depression, hemorrhagic stroke, HTN, SCC s/p recent admission for fall & SDH (10/28/21-11/04/2021) was brought in by EMS on 12/14/21 for evaluation of unresponsiveness. Per [facility], patient was found in her bed with blood in her month. Pt was last known well during breakfast.     In ED, patient is unable to give a history of what occurred today. It is unclear how the patient is at baseline. Exam was non focal. CTH done in ED was negative for acute infarct or hemorrhage.     #?Synope/seizures- due to multiple bruises, patient may fall frequently- will order EEG to assess    #Acute metabolic encephalopathy, from infectious etiology- elevated labs are noted, workup recommended as per medicine.    -Continue ASA (suppository given in ED)  -Dysphagia screen, speech and swallow eval thereafter  -Can consider MRI Head if no clinical change  -EEG   -DVT prophylaxis  -PT hodan  -consider palliative consult

## 2021-12-14 NOTE — CONSULT NOTE ADULT - SUBJECTIVE AND OBJECTIVE BOX
CC: Syncope, seizure    HPI:  82 y/o female with PMHx of Afib on Eliquis, chronic pedal edema 2/2 venous statis and SCC, diastolic CHF, dementia, depression, hemorrhagic stroke, HTN, SCC s/p recent admission for fall & SDH (10/28/21-11/04/2021) was brought in by EMS on 12/14/21 for evaluation of unresponsiveness. Per [facility], patient was found in her bed with blood in her month. Pt was last known well during breakfast. She is unable to give a history of what occurred today. It is unclear how the patient is at baseline. CTH done in ED was negative for acute infarct or hemorrhage.    Patient was seen in ED at bedside. She is oriented to herself- she is able to say her name, cannot say where she is right now. She denies any pain.    PAST MEDICAL & SURGICAL HISTORY:  CHF (congestive heart failure)  Atrial fibrillation, unspecified type  Hypertension  Squamous cell carcinoma  Pedal edema  Venous stasis dermatitis  Dementia  Depression, major  Hemorrhagic stroke  H/O subarachnoid hemorrhage  H/O total hysterectomy  History of appendectomy  S/P cholecystectomy    FAMILY HISTORY:  Family history of lung cancer (Mother)    Social Hx:  Nonsmoker, no drug or alcohol use    MEDICATIONS  (STANDING):  apixaban 2.5 milliGRAM(s) Oral every 12 hours  aspirin Suppository 300 milliGRAM(s) Rectal once  dextrose 5% + sodium chloride 0.9%. 1000 milliLiter(s) (75 mL/Hr) IV Continuous <Continuous>  digoxin     Tablet 250 MICROGram(s) Oral daily  diltiazem    Tablet 60 milliGRAM(s) Oral every 8 hours  escitalopram 10 milliGRAM(s) Oral at bedtime  metoprolol succinate  milliGRAM(s) Oral daily  oxybutynin 5 milliGRAM(s) Oral daily  piperacillin/tazobactam IVPB.. 3.375 Gram(s) IV Intermittent every 12 hours  polyethylene glycol 3350 17 Gram(s) Oral daily     Allergies: Darvocet-N 50 (Unknown), sulfa drugs (Other)      ROS: Denies any pain, unable to obtain further     Vital Signs Last 24 Hrs  T(C): 36.6 (14 Dec 2021 14:22), Max: 38.3 (14 Dec 2021 11:05)  T(F): 97.9 (14 Dec 2021 14:22), Max: 100.9 (14 Dec 2021 11:05)  HR: 60 (14 Dec 2021 14:22) (60 - 63)  BP: 117/88 (14 Dec 2021 14:22) (89/47 - 117/88)  BP(mean): 58 (14 Dec 2021 12:20) (58 - 72)  RR: 21 (14 Dec 2021 14:22) (21 - 33)  SpO2: 96% (14 Dec 2021 14:22) (92% - 98%)    Physical Exam:    General: Normocephalic  HEENT: PERRLA  Neck: Supple.  Respiratory: Breath sounds are clear bilaterally  Cardiovascular: S1 and S2, regular rhythm  Gastrointestinal: soft, nontender  Extremities:  2+ pedal edema, venous stasis  Vascular: Carotid Bruit - no  Musculoskeletal: no abnormal movements  Skin: Venous stasis B/L LE, multiple bruises arms and hands, pressure ulcers noted on buttocks    Neurological exam:  HF: Oriented to herself, not oriented to place or time. Patient is minimally verbal.  CN: Slowly reactive pupils, dried blood around lips/mouth, tongue midline  Motor: AE/AF strength 4+/5, moves all 4 ext but cannot assess LE strength, +rigidity throughout  Sens: Cannot assess  Reflexes: Symmetric, downgoing toes b/l  Coord:  Cannot assess    Labs:   12-14    139  |  101  |  49<H>  ----------------------------<  156<H>  5.1   |  23  |  3.18<H>    Ca    8.8      14 Dec 2021 11:12    TPro  6.8  /  Alb  1.9<L>  /  TBili  0.9  /  DBili  x   /  AST  124<H>  /  ALT  60  /  AlkPhos  215<H>  12-14                      13.7   16.88 )-----------( 390      ( 14 Dec 2021 11:12 )             44.2       Radiology:  CT Head No Cont (12.14.21 @ 11:49)     IMPRESSION: No significant change when allowing for differences in   technique. If symptoms continue MRI can be done for further evaluation if   there are no contraindications.

## 2021-12-14 NOTE — H&P ADULT - HISTORY OF PRESENT ILLNESS
Pt does not give any history. Hx obtained from chart. 80 y/o female with a PMHx of Afib on Eliquis, chronic pedal edema 2/2 venous statis and SCC, diastolic CHF, dementia, depression, hemorrhagic stroke, HTN, SCC s/p recent admission for fall & SDH (10/28/21-11/04/2021) brought in by EMS for evaluation of unresponsiveness. Per [facility], pt was found in her bed with blood in her month. Pt was last known well during breakfast. Pt is DNR.    Cr 3.18    Lactate 11    CT head neg

## 2021-12-14 NOTE — SEPSIS NOTE - ASSESSMENT
81/F with elevated lactate, ? sepsis vs from ? seizures. cont iv vanco + zosyn; serial lactate; serila trops; iv fluids for sepsis + ARF. Poor prognosis.

## 2021-12-14 NOTE — H&P ADULT - NSHPLABSRESULTS_GEN_ALL_CORE
All Labs/EKG/Radiology/Meds reviewed by me.     Lab Results:  CBC  CBC Full  -  ( 14 Dec 2021 11:12 )  WBC Count : 16.88 K/uL  RBC Count : 4.55 M/uL  Hemoglobin : 13.7 g/dL  Hematocrit : 44.2 %  Platelet Count - Automated : 390 K/uL  Mean Cell Volume : 97.1 fl  Mean Cell Hemoglobin : 30.1 pg  Mean Cell Hemoglobin Concentration : 31.0 gm/dL  Auto Neutrophil # : 13.84 K/uL  Auto Lymphocyte # : 1.35 K/uL  Auto Monocyte # : 1.35 K/uL  Auto Eosinophil # : 0.00 K/uL  Auto Basophil # : 0.00 K/uL  Auto Neutrophil % : 81.0 %  Auto Lymphocyte % : 8.0 %  Auto Monocyte % : 8.0 %  Auto Eosinophil % : 0.0 %  Auto Basophil % : 0.0 %    .		Differential:	[] Automated		[] Manual  Chemistry                        13.7   16.88 )-----------( 390      ( 14 Dec 2021 11:12 )             44.2     12-14    139  |  101  |  49<H>  ----------------------------<  156<H>  5.1   |  23  |  3.18<H>    Ca    8.8      14 Dec 2021 11:12    TPro  6.8  /  Alb  1.9<L>  /  TBili  0.9  /  DBili  x   /  AST  124<H>  /  ALT  60  /  AlkPhos  215<H>  12-14    LIVER FUNCTIONS - ( 14 Dec 2021 11:12 )  Alb: 1.9 g/dL / Pro: 6.8 gm/dL / ALK PHOS: 215 U/L / ALT: 60 U/L / AST: 124 U/L / GGT: x           PT/INR - ( 14 Dec 2021 11:12 )   PT: 21.0 sec;   INR: 1.86 ratio         PTT - ( 14 Dec 2021 11:12 )  PTT:39.5 sec          MICROBIOLOGY/CULTURES:      RADIOLOGY RESULTS:    < from: CT Head No Cont (12.14.21 @ 11:49) >    IMPRESSION: No significant change when allowing for differences in   technique. If symptoms continue MRI can be done for further evaluation if   there are no contraindications.    < end of copied text >        MEDICATIONS  (STANDING):  apixaban 2.5 milliGRAM(s) Oral every 12 hours  aspirin Suppository 300 milliGRAM(s) Rectal once  dextrose 5% + sodium chloride 0.9%. 1000 milliLiter(s) (75 mL/Hr) IV Continuous <Continuous>  digoxin     Tablet 250 MICROGram(s) Oral daily  diltiazem    Tablet 60 milliGRAM(s) Oral every 8 hours  escitalopram 10 milliGRAM(s) Oral at bedtime  metoprolol succinate  milliGRAM(s) Oral daily  oxybutynin 5 milliGRAM(s) Oral daily  piperacillin/tazobactam IVPB.. 3.375 Gram(s) IV Intermittent every 12 hours  polyethylene glycol 3350 17 Gram(s) Oral daily    MEDICATIONS  (PRN):  acetaminophen     Tablet .. 650 milliGRAM(s) Oral every 6 hours PRN Temp greater or equal to 38C (100.4F), Mild Pain (1 - 3)  bisacodyl 5 milliGRAM(s) Oral every 12 hours PRN Constipation

## 2021-12-14 NOTE — ED PROVIDER NOTE - PHYSICAL EXAMINATION
GEN - ill appearing   HEAD - NC/AT     EYES - EOMI, no conjunctival pallor, no scleral icterus  ENT -  blood in the mouth, jaw clenched   NECK - Neck supple  PULM - CTA b/l,  symmetric breath sounds  COR -  RRR, S1 S2, no murmurs  ABD - , ND, NT, soft, no guarding, no rebound, no masses    BACK - no CVA tenderness, nontender spine     EXTREMS - no edema, no deformity, warm and well perfused    SKIN - no rash or bruising      NEUROLOGIC -minimally responsive

## 2021-12-14 NOTE — INPATIENT CERTIFICATION FOR MEDICARE PATIENTS - CURRENT MEDICAL NEEDS AND CARE PLANS
----- Message from Douglas A Milligan, MD sent at 4/24/2017  7:33 AM EDT -----  Please infor patient of results and fax to referring provider.  
Informed patient of normal AF-AFP and fetal karyotype (46,XX) results.   
Possible Skilled Nursing Facilty (SNF)

## 2021-12-14 NOTE — PHARMACOTHERAPY INTERVENTION NOTE - COMMENTS
Medication history complete, patient is from [facility], reviewed and confirmed medications from list provided by facility.

## 2021-12-14 NOTE — ED PROVIDER NOTE - CARE PLAN
Principal Discharge DX:	Sepsis  Secondary Diagnosis:	ADRIANA (acute kidney injury)  Secondary Diagnosis:	Elevated troponin   1

## 2021-12-14 NOTE — ED ADULT NURSE REASSESSMENT NOTE - NS ED NURSE REASSESS COMMENT FT1
MD Goetz made aware about multiple attempts with Perez catheter, no urine output, d/t ambiguous anatomy unsuccessful in placing perez.

## 2021-12-14 NOTE — ED ADULT NURSE REASSESSMENT NOTE - NS ED NURSE REASSESS COMMENT FT1
report given to GL[facility] cardiac step down unit, awaiting for pt to be transported. Arava Counseling:  Patient counseled regarding adverse effects of Arava including but not limited to nausea, vomiting, abnormalities in liver function tests. Patients may develop mouth sores, rash, diarrhea, and abnormalities in blood counts. The patient understands that monitoring is required including LFTs and blood counts.  There is a rare possibility of scarring of the liver and lung problems that can occur when taking methotrexate. Persistent nausea, loss of appetite, pale stools, dark urine, cough, and shortness of breath should be reported immediately. Patient advised to discontinue Arava treatment and consult with a physician prior to attempting conception. The patient will have to undergo a treatment to eliminate Arava from the body prior to conception.

## 2021-12-14 NOTE — ADVANCED PRACTICE NURSE CONSULT - ASSESSMENT
This is a 80 year old female admitted to the hospital on 11/22/2021 PMH: HTN, Atrial Fib, CHF, Squamous cell carcinoma. Called to assess Left LE Wound. Assessed patient in the ER , patient positioned on back. Left LE wound partial thickness 5cm x 3 cm x 0.1cm 70% of wound is epithelialized with islands of epithelization with 80% of red granulation tissue. Periwound with +2 edema, hemosiderin staining and ecchymotic. Small serous drainage noted and Sween Lotion Dimethicone to BL LE to hydrate dry skin, Xeroform applied to wound base wrapped with Kerlix and ace bandage. Turn and position patient every two hours. Feet elevated off bed with pillows. Bilateral Lower extremities with multiple ecchymotic areas and Bilateral Feet Cyanotic unable to palpate pedal pulses.   Left first metatarsal heal with 0.5 cm x 0.6cm non blanchable purplish skin. this can be classified as a deep tissue injury present on admission. Place foam dressing and elevate heels. \  Left Anterior Wrist with Stage 3 skin tear. Only about half of the skin tear was placed over wound. Adaptic, Xeroform Nonstick Telfa and wrapped with Kerlix.   On patient mid thoracic bilateral noted three grade 2 skin tears. Left lateral posterior thoracic region, Right lateral thoracic inferior skin tear and superior skin tear. Both covered with Adaptic xeroform, telfa and pink foam. Skin flap placed over all three Thoracic skin tears.     This is a 80 year old female admitted to the hospital on 12/14/2021 PMH: HTN, Atrial Fib, CHF, Squamous cell carcinoma. Called to assess Left LE Wound. Assessed patient in the ER , patient positioned on back. Left LE wound partial thickness 5cm x 3 cm x 0.1cm 70% of wound is epithelialized with islands of epithelization with 80% of red granulation tissue. Periwound with +2 edema, hemosiderin staining and ecchymotic. Small serous drainage noted and Sween Lotion Dimethicone to BL LE to hydrate dry skin, Xeroform applied to wound base wrapped with Kerlix and ace bandage. Turn and position patient every two hours. Feet elevated off bed with pillows. Bilateral Lower extremities with multiple ecchymotic areas and Bilateral Feet Cyanotic unable to palpate pedal pulses.   Left first metatarsal heal with 0.5 cm x 0.6cm non blanchable purplish skin. this can be classified as a deep tissue injury present on admission. Place foam dressing and elevate heels. \  Left Anterior Wrist with Stage 3 skin tear. Only about half of the skin tear was placed over wound. Adaptic, Xeroform Nonstick Telfa and wrapped with Kerlix.   On patient mid thoracic bilateral noted three grade 2 skin tears. Left lateral posterior thoracic region, Right lateral thoracic inferior skin tear and superior skin tear. Both covered with Adaptic xeroform, telfa and pink foam. Skin flap placed over all three Thoracic skin tears.

## 2021-12-14 NOTE — PROVIDER CONTACT NOTE (OTHER) - SITUATION
notified service; spoke to Navdeep

## 2021-12-14 NOTE — PATIENT PROFILE ADULT - FALL HARM RISK - RISK INTERVENTIONS

## 2021-12-14 NOTE — ED ADULT TRIAGE NOTE - CHIEF COMPLAINT QUOTE
pt. brought in by EMS for eval of unresponsiveness, pt. found in bed after last known well at breakfast at [facility], pt. small amount of blood in mouth r/o seizure, pt. brought to trauma room, r/o seizure. pt. is DNR. No trauma.

## 2021-12-14 NOTE — H&P ADULT - NSICDXPASTMEDICALHX_GEN_ALL_CORE_FT
PAST MEDICAL HISTORY:  Atrial fibrillation, unspecified type     CHF (congestive heart failure)     Dementia     Depression, major     H/O subarachnoid hemorrhage     Hemorrhagic stroke     Hypertension     Pedal edema     Squamous cell carcinoma     Venous stasis dermatitis

## 2021-12-14 NOTE — ADVANCED PRACTICE NURSE CONSULT - RECOMMEDATIONS
1)Continue to Elevate heels off of Mattress  2)Continue to Turn and position every 2 Hours  3)Consult Dietitian for Albumin  4)Left Lower Extremity wound: Adaptic, Xeroform, Telfa Kerlix  5)Posterior Mid Thoracic Bilateral Skin tears: Apply Adaptic, Xeroform, Telfa and Foam.   6)Left First Metatarsal Head: Apply foam for protection.   7)Please use Hover Mat for postioning, transferring and boosting patient   8)Air mattress will benefit patient.

## 2021-12-14 NOTE — H&P ADULT - NSHPPHYSICALEXAM_GEN_ALL_CORE
PHYSICAL EXAM:    Daily Height in cm: 167.64 (14 Dec 2021 11:05)    Daily     Vital Signs Last 24 Hrs  T(C): 36.6 (14 Dec 2021 14:22), Max: 38.3 (14 Dec 2021 11:05)  T(F): 97.9 (14 Dec 2021 14:22), Max: 100.9 (14 Dec 2021 11:05)  HR: 60 (14 Dec 2021 14:22) (60 - 63)  BP: 117/88 (14 Dec 2021 14:22) (89/47 - 117/88)  BP(mean): 58 (14 Dec 2021 12:20) (58 - 72)  RR: 21 (14 Dec 2021 14:22) (21 - 33)  SpO2: 96% (14 Dec 2021 14:22) (92% - 98%)    Constitutional: AMS appearing  HEENT: Atraumatic, GEORGINA, Normal, No congestion  Respiratory: Breath Sounds normal, no rhonchi/wheeze  Cardiovascular: N S1S2;   Gastrointestinal: Abdomen soft, non tender, Bowel Sounds present  Extremities: 1+ edema, peripheral pulses diminished; cyanotic toes  Neurological: barely arousable   Skin: multiple skin tears  Lymph Nodes: No lymphadenopathy noted  Back: No CVA tenderness   Musculoskeletal: non tender  Breasts: Deferred  Genitourinary: deferred  Rectal: Deferred

## 2021-12-14 NOTE — H&P ADULT - ASSESSMENT
Pt does not give any history. Hx obtained from chart. 82 y/o female with a PMHx of Afib on Eliquis, chronic pedal edema 2/2 venous statis and SCC, diastolic CHF, dementia, depression, hemorrhagic stroke, HTN, SCC s/p recent admission for fall & SDH (10/28/21-11/04/2021) brought in by EMS for evaluation of unresponsiveness. Per [facility], pt was found in her bed with blood in her month. Pt was last known well during breakfast. Pt is DNR.    Cr 3.18    Lactate 11    CT head neg    Pt with multiple complex medical problems at this time as listed below. Pt admitted with     AMS/Metabolic encephalopathy  Syncope  ? Seizure  Lactic acidosis from sepsis vs seizure  ARF  Elevated troponin; MARGA  Hx of A fib  Hx of CHF      PLAN:   admit to CICU  fall/aspiration/seizure precautions  echo  f/u blood cx; urine cx  echo  EEG  iv zosyn + vanco  ID / cardio / neuro/ renal consults  asa 300 rectally and then 81 md daily  cont eliquis  recheck lactate    DVT PPX: on eliquis    poc discussed with team, Dr. Connor, Dr. Goodman. Message left for Dr. Hameed for a call back.     Prognosis: Extremely poor    Very prolonged visit 110 min         Pt does not give any history. Hx obtained from chart. 80 y/o female with a PMHx of Afib on Eliquis, chronic pedal edema 2/2 venous statis and SCC, diastolic CHF, dementia, depression, hemorrhagic stroke, HTN, SCC s/p recent admission for fall & SDH (10/28/21-11/04/2021) brought in by EMS for evaluation of unresponsiveness. Per [facility], pt was found in her bed with blood in her month. Pt was last known well during breakfast. Pt is DNR.    Cr 3.18    Lactate 11    CT head neg    Pt with multiple complex medical problems at this time as listed below. Pt admitted with     AMS/Metabolic encephalopathy  Syncope  ? Seizure  Lactic acidosis from sepsis vs seizure  ARF  Elevated troponin; MARGA  Hx of A fib  Hx of CHF      PLAN:   admit to CICU  fall/aspiration/seizure precautions  echo  f/u blood cx; urine cx  echo  EEG  iv zosyn + vanco  ID / cardio / neuro/ renal consults  asa 300 rectally and then 81 md daily  cont eliquis  recheck lactate  cbc, cmp in am  serial trops  f/u renal sono    DVT PPX: on eliquis    poc discussed with team, Dr. Connor, Dr. Goodman. Message left for Dr. Hameed for a call back.     Prognosis: Extremely poor    Very prolonged visit 110 min         Pt does not give any history. Hx obtained from chart. 82 y/o female with a PMHx of Afib on Eliquis, chronic pedal edema 2/2 venous statis and SCC, diastolic CHF, dementia, depression, hemorrhagic stroke, HTN, SCC s/p recent admission for fall & SDH (10/28/21-11/04/2021) brought in by EMS for evaluation of unresponsiveness. Per [facility], pt was found in her bed with blood in her month. Pt was last known well during breakfast. Pt is DNR.    Cr 3.18    Lactate 11    CT head neg    Pt with multiple complex medical problems at this time as listed below. Pt admitted with     AMS/Metabolic encephalopathy  Syncope  ? Seizure  Lactic acidosis from sepsis vs seizure  ARF  Elevated troponin; MARGA  Hx of A fib  Hx of CHF      PLAN:   admit to CICU  fall/aspiration/seizure precautions  echo  f/u blood cx; urine cx  echo  EEG  iv zosyn + vanco  iv fluids  perez catheter  hold lasix and aldactone  ID / cardio / neuro/ renal consults  asa 300 rectally and then 81 md daily  cont eliquis  recheck lactate  cbc, cmp in am  serial trops  f/u renal sono    DVT PPX: on eliquis    poc discussed with team, Dr. Connor, Dr. Goodman. Message left for Dr. Hameed for a call back.     Prognosis: Extremely poor    Very prolonged visit 110 min         Pt does not give any history. Hx obtained from chart. 80 y/o female with a PMHx of Afib on Eliquis, chronic pedal edema 2/2 venous statis and SCC, diastolic CHF, dementia, depression, hemorrhagic stroke, HTN, SCC s/p recent admission for fall & SDH (10/28/21-11/04/2021) brought in by EMS for evaluation of unresponsiveness. Per [facility], pt was found in her bed with blood in her month. Pt was last known well during breakfast. Pt is DNR.    Cr 3.18    Lactate 11    CT head neg    Pt with multiple complex medical problems at this time as listed below. Pt admitted with     AMS/Metabolic encephalopathy  Syncope  ? Seizure  Lactic acidosis from sepsis vs seizure  ARF  Elevated troponin; MARGA  Hx of A fib  Hx of CHF  pedal pulses present on arterial doppler.       PLAN:   admit to CICU  fall/aspiration/seizure precautions  echo  f/u blood cx; urine cx  echo  EEG  iv zosyn + vanco  iv fluids  perez catheter  hold lasix and aldactone  ID / cardio / neuro/ renal consults  asa 300 rectally and then 81 md daily  cont eliquis  recheck lactate  cbc, cmp in am  serial trops  f/u renal sono    DVT PPX: on eliquis    poc discussed with team, Dr. Connor, Dr. Goodman. Message left for Dr. Hameed for a call back.     Prognosis: Extremely poor    Very prolonged visit 110 min         Pt does not give any history. Hx obtained from chart. 82 y/o female with a PMHx of Afib on Eliquis, chronic pedal edema 2/2 venous statis and SCC, diastolic CHF, dementia, depression, hemorrhagic stroke, HTN, SCC s/p recent admission for fall & SDH (10/28/21-11/04/2021) brought in by EMS for evaluation of unresponsiveness. Per [facility], pt was found in her bed with blood in her month. Pt was last known well during breakfast. Pt is DNR.    Cr 3.18    Lactate 11    CT head neg    Pt with multiple complex medical problems at this time as listed below. Pt admitted with     AMS/Metabolic encephalopathy  Syncope  ? Seizure  Lactic acidosis from sepsis vs seizure  ARF  Elevated troponin; MARGA  right sided pleural effusion/PNA  Hx of A fib  Hx of CHF  pedal pulses present on arterial doppler.       PLAN:   admit to CICU  fall/aspiration/seizure precautions  echo  f/u blood cx; urine cx  echo  EEG  iv zosyn + vanco  iv fluids  perez catheter  hold lasix and aldactone  ID / cardio / neuro/ renal consults  asa 300 rectally and then 81 md daily  cont eliquis  recheck lactate  cbc, cmp in am  serial trops  f/u renal sono    DVT PPX: on eliquis    poc discussed with team, Dr. Connor, Dr. Goodman. Message left for Dr. Hameed for a call back.     Prognosis: Extremely poor    Very prolonged visit 110 min

## 2021-12-14 NOTE — ED PROVIDER NOTE - OBJECTIVE STATEMENT
80 y/o female with a PMHx of Afib on Eliquis, chronic pedal edema 2/2 venous statis and SCC, diastolic CHF, dementia, depression, hemorrhagic stroke, HTN, SCC s/p recent admission for fall & SDH (10/28/21-11/04/2021) brought in by EMS for evaluation of unresponsiveness. Per [facility], pt was found in her bed with blood in her month. Pt was last known well during breakfast. Pt is DNR.

## 2021-12-14 NOTE — ED ADULT NURSE NOTE - CHIEF COMPLAINT QUOTE
pt. brought in by EMS for eval of unresponsiveness, pt. found in bed after last known well at breakfast at [facility], pt. small amount of blood in mouth r/o seizure, pt. brought to trauma room, r/o seizure. pt. is DNR. No trauma.

## 2021-12-14 NOTE — ED ADULT NURSE NOTE - OBJECTIVE STATEMENT
81 year old female sent from [facility], ate breakfast went to room found unconscious in bed no trauma . pt responsive to painful stimuli. achymosis on b/l upper and lower extremities. multiple skin tear and wound, wound care nurse consulted.

## 2021-12-15 NOTE — PROGRESS NOTE ADULT - ASSESSMENT
82 y/o female with a PMHx of Afib on Eliquis, chronic pedal edema 2/2 venous statis and SCC, diastolic CHF, dementia, depression, hemorrhagic stroke, HTN, SCC s/p recent admission for fall & SDH (10/28/21-11/04/2021) brought in by EMS for evaluation of unresponsiveness. Per [facility], pt was found in her bed with blood in her mouth. Found to have renal failure, metabolic acidosis, elevated lactate suspect sepsis. Now with worsening hypotension.     Problem List:  1) acute renal failure  2) Septic shock   3) Metabolic acidosis  4) AMS  5) Lobar PNA vs. intra abdominal process  6: r.o acute abdomen     POCUS: A line predominant lung pattern with focal b lines and air bronchograms right lower lung, LV systolic function appears mildly reduced, RV does not appear dilated, unable to visualize IVC.     Patient given 1L IVF bolus 500cc in so far and BP improved to 106/78 MAP of 90  She does appear dry on exam and would bolus another liter after the first making a total of 2L  Obtain stat labs now CBC, CMP, mag, phos, lactate, and procal  Her abdominal exam is concerning given her distention and tenderness diffuse to palpation. Would obtain CT scan abdomen/pelvis, however first make sure she is resuscitated and BP is holding would finish the 2L IVF prior to sending her down.   R lower b lines and consolidation on U/S may be a PNA  She is covered with renally dosed zosyn and got 1g vancomycin about 12 hours ago  She has a MOLST form filled out which states DNR  Attempted to call son Jonas multiple times to discuss further goals of care however he did not    She appears chronically ill   Will continue to follow up BP during IVF resuscitation  Hold cardizem and metoprolol    
82 y/o woman with a PMHx of Afib on Eliquis, chronic pedal edema 2/2 venous statis and SCC, diastolic CHF, dementia, depression, hemorrhagic stroke, HTN, SCC s/p recent admission for fall & SDH (10/28/21-11/04/2021) brought in by EMS for evaluation of unresponsiveness. patient appears ill, with acute renal failure, metabolic acidosis, possibly septic, PNA. Likely metabolic encephalopathy.? seizure, not witnessed. CT head shows no acute changes, ICH.  suggest:  EEG.  can consider MR head, if no improvement.  continue present management as per medicine.  
Acute septic shock with end organ failure: hepatic/renal/cardiac and acute metabolic encephalopathy   - transition to comfort care   - IV morphine + ativan   - pt is likely to pass shortly - family at bedside    Discussed with son at bedside  Time spent 85 min

## 2021-12-15 NOTE — CONSULT NOTE ADULT - ASSESSMENT
patient with fever, elevated WBC with bands and neutrophil shift, elevated lactate, renal and hepatic insufficiency with elevated cardiac enzymes and lethargy/unarousability -suggestive of sepsis with poor global organ perfusion  1-CAD-type II MI (due to poor perfusion without a localized cardiac thrombotic event)-demand ischemia-  2-sepsis-can get repeat ECHO to see if LV function has collapsed as can happen in sepsis, BNP had quadrupled to 29,000 but this is in the face of renal failure-antibiotics, fluids and O2 (paitent DNR and pressors should not be considered)  3-Afib-on digoxin-get dig level  Will follow, thank you for this consult

## 2021-12-15 NOTE — PROGRESS NOTE ADULT - SUBJECTIVE AND OBJECTIVE BOX
CC: syncope; seizure, ADRIANA, sepsis, abn trops (15 Dec 2021 10:36)    HPI: 82 y/o female with a PMHx of Afib on Eliquis, chronic pedal edema 2/2 venous statis and SCC, diastolic CHF, dementia, depression, hemorrhagic stroke, HTN, SCC s/p recent admission for fall & SDH (10/28/21-11/04/2021) brought in by EMS for evaluation of unresponsiveness. Per [facility], pt was found in her bed with blood in her month. Pt was last known well during breakfast. Pt is DNR.    INTERVAL HPI/OVERNIGHT EVENTS: pt is hypotensive, responds to noxious stimuli only, lethargic, moaning in pain with distended abdomen  ROS UTO due to above    Vital Signs Last 24 Hrs  T(C): 35.8 (15 Dec 2021 05:30), Max: 36.6 (14 Dec 2021 14:22)  T(F): 96.5 (15 Dec 2021 05:30), Max: 97.9 (14 Dec 2021 14:22)  HR: 63 (15 Dec 2021 11:23) (60 - 77)  BP: 89/36 (15 Dec 2021 11:23) (66/52 - 124/99)  BP(mean): 49 (15 Dec 2021 11:23) (49 - 105)  RR: 23 (15 Dec 2021 11:23) (20 - 30)  SpO2: 100% (15 Dec 2021 10:00) (96% - 100%)                       12.9   12.33 )-----------( 272      ( 15 Dec 2021 05:58 )             42.8     15 Dec 2021 05:58    141    |  105    |  62     ----------------------------<  106    5.5     |  22     |  3.33     Ca    8.2        15 Dec 2021 05:58  Phos  5.1       15 Dec 2021 05:58  Mg     4.0       15 Dec 2021 05:58    TPro  5.3    /  Alb  1.3    /  TBili  1.0    /  DBili  x      /  AST  444    /  ALT  235    /  AlkPhos  179    15 Dec 2021 05:58    PT/INR - ( 14 Dec 2021 11:12 )   PT: 21.0 sec;   INR: 1.86 ratio       PTT - ( 14 Dec 2021 11:12 )  PTT:39.5 sec  LIVER FUNCTIONS - ( 15 Dec 2021 05:58 )  Alb: 1.3 g/dL / Pro: 5.3 gm/dL / ALK PHOS: 179 U/L / ALT: 235 U/L / AST: 444 U/L / GGT: x           MEDICATIONS  (STANDING):  apixaban 2.5 milliGRAM(s) Oral every 12 hours  aspirin  chewable 81 milliGRAM(s) Oral daily  digoxin     Tablet 250 MICROGram(s) Oral daily  escitalopram 10 milliGRAM(s) Oral at bedtime  morphine  Infusion 1 mG/Hr (1 mL/Hr) IV Continuous <Continuous>  oxybutynin 5 milliGRAM(s) Oral daily  piperacillin/tazobactam IVPB.. 3.375 Gram(s) IV Intermittent every 12 hours  polyethylene glycol 3350 17 Gram(s) Oral daily    MEDICATIONS  (PRN):  acetaminophen     Tablet .. 650 milliGRAM(s) Oral every 6 hours PRN Temp greater or equal to 38C (100.4F), Mild Pain (1 - 3)  bisacodyl 5 milliGRAM(s) Oral every 12 hours PRN Constipation  LORazepam   Injectable 0.5 milliGRAM(s) IV Push every 4 hours PRN Anxiety  morphine  - Injectable 2 milliGRAM(s) IV Push every 2 hours PRN Severe Pain (7 - 10)    RADIOLOGY & ADDITIONAL TESTS: personally visualized    All available medical records personally reviewed    PHYSICAL EXAM:    General: frail ill-lloking female in moderate acute distress with pain  Eyes: conjunctiva and sclera clear  Head: bitemporal wasting  ENMT: dried up blood in mouth  Neck: Supple  Respiratory: diminished BS BL  Cardiovascular: S1, S2 reg  Gastrointestinal: distended abdomen tender to light palpation with +bowel sounds  Genitourinary: UTO  Extremities: + acrocyanosis, mottled, cold  Vascular: Peripheral pulses diminished BL   Neurological: lethargic  Skin: cold and mottled  Musculoskeletal: without deformities  Psychiatric: UTO
HPI:  80 y/o female with a PMHx of Afib on Eliquis, chronic pedal edema 2/2 venous statis and SCC, diastolic CHF, dementia, depression, hemorrhagic stroke, HTN, SCC s/p recent admission for fall & SDH (10/28/21-11/04/2021) brought in by EMS for evaluation of unresponsiveness. Per [facility], pt was found in her bed with blood in her month. Pt was last known well during breakfast. Pt is DNR. Does not give a history        MEDICATIONS  (STANDING):  apixaban 2.5 milliGRAM(s) Oral every 12 hours  aspirin  chewable 81 milliGRAM(s) Oral daily  digoxin     Tablet 250 MICROGram(s) Oral daily  escitalopram 10 milliGRAM(s) Oral at bedtime  oxybutynin 5 milliGRAM(s) Oral daily  piperacillin/tazobactam IVPB.. 3.375 Gram(s) IV Intermittent every 12 hours  polyethylene glycol 3350 17 Gram(s) Oral daily    MEDICATIONS  (PRN):  acetaminophen     Tablet .. 650 milliGRAM(s) Oral every 6 hours PRN Temp greater or equal to 38C (100.4F), Mild Pain (1 - 3)  bisacodyl 5 milliGRAM(s) Oral every 12 hours PRN Constipation  LORazepam   Injectable 0.5 milliGRAM(s) IV Push every 4 hours PRN Anxiety  morphine  - Injectable 1 milliGRAM(s) IV Push every 2 hours PRN Severe Pain (7 - 10)      Vital Signs Last 24 Hrs  T(C): 35.8 (15 Dec 2021 05:30), Max: 38.3 (14 Dec 2021 11:05)  T(F): 96.5 (15 Dec 2021 05:30), Max: 100.9 (14 Dec 2021 11:05)  HR: 60 (15 Dec 2021 08:10) (60 - 77)  BP: 91/73 (15 Dec 2021 08:10) (89/47 - 124/99)  BP(mean): 77 (15 Dec 2021 08:10) (55 - 105)  RR: 26 (15 Dec 2021 08:10) (20 - 33)  SpO2: 100% (15 Dec 2021 08:10) (92% - 100%)  Neurological Exam:  HF: Patient is alert, moaning softly, doesnt follow or answer questions.   CN: Blinks to threat bilaterally. Pupils are equal and reactive. Extra ocular muscles are grossly  intact. There is no gross facial asymmetry. Sensation is intact in the face. Other CN II-XII are intact.   Motor: withdraws to PP bilaterally.   Sensory: limited  DTR: 0-1/4 all 4 extremities. Babinski is negative bilateral.  Co-ord:  unable to test    Comprehensive Metabolic Panel (12.15.21 @ 05:58)   Sodium, Serum: 141 mmol/L   Potassium, Serum: 5.5 mmol/L   Chloride, Serum: 105 mmol/L   Carbon Dioxide, Serum: 22 mmol/L   Anion Gap, Serum: 14 mmol/L   Blood Urea Nitrogen, Serum: 62 mg/dL   Creatinine, Serum: 3.33 mg/dL   Glucose, Serum: 106 mg/dL   Calcium, Total Serum: 8.2 mg/dL   Protein Total, Serum: 5.3 gm/dL   Albumin, Serum: 1.3 g/dL   Bilirubin Total, Serum: 1.0 mg/dL   Alkaline Phosphatase, Serum: 179 U/L   Aspartate Aminotransferase (AST/SGOT): 444 U/L   Alanine Aminotransferase (ALT/SGPT): 235 U/L   eGFR if Non : 12    Complete Blood Count in AM (12.15.21 @ 05:58)   WBC Count: 12.33 K/uL   RBC Count: 4.35 M/uL   Hemoglobin: 12.9 g/dL   Hematocrit: 42.8 %   Mean Cell Volume: 98.4 fl   Mean Cell Hemoglobin: 29.7 pg   Mean Cell Hemoglobin Conc: 30.1 gm/dL   Red Cell Distrib Width: 16.2 %   Platelet Count - Automated: 272 K/uL       < from: CT Head No Cont (12.14.21 @ 11:49) >  INTERPRETATION:  Clinical indication: Seizures.    Multiple axial sections were performed from base skull to vertex without   contrast enhancement. Coronal and sagittal destructions were performed as   well    This exam is compared prior head CT performed on November 22, 2021.    Parenchymal volume loss and chronic microvascular ischemic changes are   again seen.    Abnormal low-attenuation involving thehigh right posterior frontal   subcortical region is again seen which is likely compatible with an area   gliosis secondary to an old infarct    There is no evidence acute hemorrhage mass or mass effect seen    Evaluation of the osseous structures with the appropriate window appears   normal    The visualized paranasal sinuses mastoid and and middle ear regions   appear clear.    IMPRESSION: No significant change when allowing for differences in   technique. If symptoms continue MRI can be done for further evaluation if   there are no contraindications.    < end of copied text >  
Patient is a 81y old  Female who presents with a chief complaint of syncope; seizure, ADRIANA, sepsis, abn trops (14 Dec 2021 16:02)      BRIEF HOSPITAL COURSE: 80 y/o female with a PMHx of Afib on Eliquis, chronic pedal edema 2/2 venous statis and SCC, diastolic CHF, dementia, depression, hemorrhagic stroke, HTN, SCC s/p recent admission for fall & SDH (10/28/21-11/04/2021) brought in by EMS for evaluation of unresponsiveness. Per [facility], pt was found in her bed with blood in her mouth.    Events last 24 hours: called by RN as patient is now hypotensive 67/38. Patient seen and examined at the bedside and chart reviewed. She is unable to give history. Moaning in bed. Chronically ill appearing. Not making urine, no BM. Admitted for unresponsiveness hypotension and tachycardia, lactate 11 that decreased to 9. Cr 3.     PAST MEDICAL & SURGICAL HISTORY:  CHF (congestive heart failure)    Atrial fibrillation, unspecified type    Hypertension    Squamous cell carcinoma    Pedal edema    Venous stasis dermatitis    Dementia    Depression, major    Hemorrhagic stroke    H/O subarachnoid hemorrhage    H/O total hysterectomy    History of appendectomy    S/P cholecystectomy        Review of Systems:  unable to obtain due to patient's mental status     Medications:  piperacillin/tazobactam IVPB.. 3.375 Gram(s) IV Intermittent every 12 hours    digoxin     Tablet 250 MICROGram(s) Oral daily      acetaminophen     Tablet .. 650 milliGRAM(s) Oral every 6 hours PRN  escitalopram 10 milliGRAM(s) Oral at bedtime      apixaban 2.5 milliGRAM(s) Oral every 12 hours  aspirin  chewable 81 milliGRAM(s) Oral daily    bisacodyl 5 milliGRAM(s) Oral every 12 hours PRN  polyethylene glycol 3350 17 Gram(s) Oral daily    oxybutynin 5 milliGRAM(s) Oral daily      lactated ringers Bolus 1000 milliLiter(s) IV Bolus once                ICU Vital Signs Last 24 Hrs  T(C): 35.8 (15 Dec 2021 05:30), Max: 38.3 (14 Dec 2021 11:05)  T(F): 96.5 (15 Dec 2021 05:30), Max: 100.9 (14 Dec 2021 11:05)  HR: 69 (15 Dec 2021 04:00) (60 - 77)  BP: 93/69 (15 Dec 2021 05:30) (89/47 - 123/48)  BP(mean): 75 (15 Dec 2021 05:30) (55 - 77)  ABP: --  ABP(mean): --  RR: 30 (15 Dec 2021 04:00) (20 - 33)  SpO2: 100% (15 Dec 2021 04:00) (92% - 100%)          I&O's Detail        LABS:                        13.7   16.88 )-----------( 390      ( 14 Dec 2021 11:12 )             44.2     12-14    139  |  101  |  49<H>  ----------------------------<  156<H>  5.1   |  23  |  3.18<H>    Ca    8.8      14 Dec 2021 11:12    TPro  6.8  /  Alb  1.9<L>  /  TBili  0.9  /  DBili  x   /  AST  124<H>  /  ALT  60  /  AlkPhos  215<H>  12-14          CAPILLARY BLOOD GLUCOSE        PT/INR - ( 14 Dec 2021 11:12 )   PT: 21.0 sec;   INR: 1.86 ratio         PTT - ( 14 Dec 2021 11:12 )  PTT:39.5 sec    CULTURES:      Physical Examination:    General: elderly ill appearing    HEENT: Pupils equal, reactive to light.  Symmetric.    PULM: Clear to auscultation bilaterally, no significant sputum production    CVS: Regular rate and rhythm, no murmurs, rubs, or gallops    ABD: firm, distended, tender to palpation, guarding, diffusely     EXT: pedal edema present    SKIN: Warm      RADIOLOGY: ACC: [id number] EXAM:  CT BRAIN                          PROCEDURE DATE:  12/14/2021          INTERPRETATION:  Clinical indication: Seizures.    Multiple axial sections were performed from base skull to vertex without   contrast enhancement. Coronal and sagittal destructions were performed as   well    This exam is compared prior head CT performed on November 22, 2021.    Parenchymal volume loss and chronic microvascular ischemic changes are   again seen.    Abnormal low-attenuation involving thehigh right posterior frontal   subcortical region is again seen which is likely compatible with an area   gliosis secondary to an old infarct    There is no evidence acute hemorrhage mass or mass effect seen    Evaluation of the osseous structures with the appropriate window appears   normal    The visualized paranasal sinuses mastoid and and middle ear regions   appear clear.    IMPRESSION: No significant change when allowing for differences in   technique. If symptoms continue MRI can be done for further evaluation if   there are no contraindications.    --- End of Report ---      BURKE BRAXTON MD; Attending Radiologist  This document has been electronically signed. Dec 14 2021 12:03PM      CRITICAL CARE TIME SPENT: 60 minutes assessing presenting problems of acute illness, which pose high probability of life threatening deterioration or end organ damage/dysfunction, as well as medical decision making including initiating plan of care, reviewing data, reviewing radiologic exams, discussing with multidisciplinary team,  discussing goals of care with patient/family, and writing this note.  Non-inclusive of procedures performed,

## 2021-12-15 NOTE — PROGRESS NOTE ADULT - REASON FOR ADMISSION
syncope; seizuree, ADRIANA, sepsis, abn trops
syncope; seizure, ADRIANA, sepsis, abn trops
syncope; seizuree, ADRIANA, sepsis, abn trops

## 2021-12-15 NOTE — DISCHARGE NOTE FOR THE EXPIRED PATIENT - HOSPITAL COURSE
80 y/o female with a PMHx of Afib on Eliquis, chronic pedal edema 2/2 venous statis and SCC, diastolic CHF, dementia, depression, hemorrhagic stroke, HTN, SCC s/p recent admission for fall & SDH (10/28/21-11/04/2021) biba from [facility] after being found with blood in her mouth admitted with ADRIANA, MA, lactemia, septic shock 2/2 pneumonia.  Today patient went with worsening MSOF, GOC held by medicine team, patient transitioned to full comfort measures.

## 2021-12-15 NOTE — CONSULT NOTE ADULT - SUBJECTIVE AND OBJECTIVE BOX
CHIEF COMPLAINT: Patient is a 81y old  Female who presents with a chief complaint of syncope; seizuree, ADRIANA, sepsis, abn trops (15 Dec 2021 06:04)      HPI: Pt does not give any history. Hx obtained from chart. 80 y/o female with a PMHx of Afib on Eliquis, chronic pedal edema 2/2 venous statis and SCC, diastolic CHF, dementia, depression, hemorrhagic stroke, HTN, SCC s/p recent admission for fall & SDH (10/28/21-11/04/2021) brought in by EMS for evaluation of unresponsiveness. Per [facility], pt was found in her bed with blood in her month. Pt was last known well during breakfast. Pt is DNR..  Of note, Cr 3.18, Lactate 11    CT head neg (14 Dec 2021 15:24)    12/15-patient came in because she was found in bed unresponsive with blood in her mouth.  She was brought in by EMS for evaluation of unresponsiveness as she was found in bed after being seen at breakfast at [facility],  She had a small amount of blood in mouth.  She had eaten breakfast and went to her room; she was later found unconscious in bed; no trauma noted, she was only responsive to painful stimuli. Ecchymosis on b/l upper and lower extremities.  Temperature ws 100.9 and patient with elevated WBC with neutrophil shift, bandemia  and lactate was 11 with new onset renal insufficiency and significant acidosis.   Of note, patient with mildly elevated, flat troponin levels in face of renal insufficiency and V pacing at 60bpm-almost certainly demand ischemia       Patient most recently hospitalized in november for altered mental status and pneumonia.  Patient is on AC for afib and has history of CHF-on spironolactone/lasix.  Heart rate controlled with cartia 120mg in am and toprol 100mg pm.  On her prior admission in october, she was found on floor near her bead by son.   Confused with large hematoma of forehead.  Has been having intractable back pains-with unsteady gait and falls.  Her PCP is Dr. Brown.            PMHx: PAST MEDICAL & SURGICAL HISTORY:  CHF (congestive heart failure)    Atrial fibrillation, unspecified type    Hypertension    Squamous cell carcinoma    Pedal edema    Venous stasis dermatitis    Dementia    Depression, major    Hemorrhagic stroke    H/O subarachnoid hemorrhage    H/O total hysterectomy    History of appendectomy    S/P cholecystectomy          Soc Hx:       Allergies: Allergies    Darvocet-N 50 (Unknown)  sulfa drugs (Other)    Intolerances          REVIEW OF SYSTEMS:    CONSTITUTIONAL: weakness, fevers   EYES/ENT: No visual changes;  No vertigo or throat pain   NECK: No pain or stiffness  RESPIRATORY: shortness of breath  CARDIOVASCULAR: No chest pain or palpitations  GASTROINTESTINAL: No abdominal or epigastric pain. No nausea, vomiting, or hematemesis; No diarrhea or constipation. No melena or hematochezia.  GENITOURINARY: No dysuria, frequency or hematuria      Vital Signs Last 24 Hrs  T(C): 35.8 (15 Dec 2021 05:30), Max: 38.3 (14 Dec 2021 11:05)  T(F): 96.5 (15 Dec 2021 05:30), Max: 100.9 (14 Dec 2021 11:05)  HR: 60 (15 Dec 2021 06:00) (60 - 77)  BP: 107/43 (15 Dec 2021 06:00) (89/47 - 123/48)  BP(mean): 57 (15 Dec 2021 06:00) (55 - 77)  RR: 25 (15 Dec 2021 06:00) (20 - 33)  SpO2: 100% (15 Dec 2021 06:00) (92% - 100%)    I&O's Summary      CAPILLARY BLOOD GLUCOSE          PHYSICAL EXAM:   Constitutional: unresponsive, lethargic  HEENT: PERR, EOMI, Normal Hearing, MMM  Neck: JVD  Respiratory: Breath sounds rales or rhonchi  Cardiovascular: S1 and S2, regular rate and rhythm,  Murmurs,  Gastrointestinal: Bowel Sounds present, soft, nontender, nondistended, no guarding, no rebound  Extremities:  peripheral edema  Vascular: 2+ peripheral pulses      MEDICATIONS:  MEDICATIONS  (STANDING):  apixaban 2.5 milliGRAM(s) Oral every 12 hours  aspirin  chewable 81 milliGRAM(s) Oral daily  digoxin     Tablet 250 MICROGram(s) Oral daily  escitalopram 10 milliGRAM(s) Oral at bedtime  lactated ringers Bolus 1000 milliLiter(s) IV Bolus once  oxybutynin 5 milliGRAM(s) Oral daily  piperacillin/tazobactam IVPB.. 3.375 Gram(s) IV Intermittent every 12 hours  polyethylene glycol 3350 17 Gram(s) Oral daily      LABS: All Labs Reviewed:                        12.9   12.33 )-----------( 272      ( 15 Dec 2021 05:58 )             42.8     12-14    139  |  101  |  49<H>  ----------------------------<  156<H>  5.1   |  23  |  3.18<H>    Ca    8.8      14 Dec 2021 11:12    TPro  6.8  /  Alb  1.9<L>  /  TBili  0.9  /  DBili  x   /  AST  124<H>  /  ALT  60  /  AlkPhos  215<H>  12-14    PT/INR - ( 14 Dec 2021 11:12 )   PT: 21.0 sec;   INR: 1.86 ratio         PTT - ( 14 Dec 2021 11:12 )  PTT:39.5 sec      Serum Pro-Brain Natriuretic Peptide: 32717 pg/mL (12-14 @ 11:12)    Blood Culture:   lipid profile       RADIOLOGY:  EXAM:  XR CHEST PORTABLE URGENT 1V                        PROCEDURE DATE:  12/04/2021    INTERPRETATION:  History: Chest tube removal  Chest:  one view.  Comparison: 12/04/2021  AP radiograph of the chest demonstrates unchanged small BILATERAL pleural effusions with underlying atelectasis. Interval removal of RIGHT chest tube. The cardiac silhouette is normal in size. Osseous structures are intact.    Impression: unchanged small BILATERAL pleural effusions with underlying atelectasis. Interval removal of RIGHT chest tube. No pneumothorax.  --- End of Report ---    EKG:    Telemetry:  V pacing    ECHO:   This is a limited study with limitedviews.   Poor endocardial definition is present.     Left ventricular systolic function appears preserved in the presence of a   cardiac arrythmia.   Estimated left ventricular ejection fraction is 55-60 %.   The left atrium is severely dilated.   The right atrium appears severely dilated.   The right ventricle is mildly dilated.   RV function appears normal on limited views.     Moderate (2+) mitral regurgitation is present.   Severe (4+) tricuspid valve regurgitation is present.   Moderate pulmonary hypertension.     A small pericardial effusion is present.   Pleural effusion - is present.

## 2021-12-16 ENCOUNTER — APPOINTMENT (OUTPATIENT)
Dept: ELECTROPHYSIOLOGY | Facility: CLINIC | Age: 81
End: 2021-12-16

## 2021-12-16 LAB
C DIFF BY PCR RESULT: SIGNIFICANT CHANGE UP
C DIFF TOX GENS STL QL NAA+PROBE: SIGNIFICANT CHANGE UP

## 2021-12-19 LAB
CULTURE RESULTS: SIGNIFICANT CHANGE UP
SPECIMEN SOURCE: SIGNIFICANT CHANGE UP

## 2021-12-23 DIAGNOSIS — F03.90 UNSPECIFIED DEMENTIA WITHOUT BEHAVIORAL DISTURBANCE: ICD-10-CM

## 2021-12-23 DIAGNOSIS — K72.90 HEPATIC FAILURE, UNSPECIFIED WITHOUT COMA: ICD-10-CM

## 2021-12-23 DIAGNOSIS — I48.91 UNSPECIFIED ATRIAL FIBRILLATION: ICD-10-CM

## 2021-12-23 DIAGNOSIS — J90 PLEURAL EFFUSION, NOT ELSEWHERE CLASSIFIED: ICD-10-CM

## 2021-12-23 DIAGNOSIS — Z79.01 LONG TERM (CURRENT) USE OF ANTICOAGULANTS: ICD-10-CM

## 2021-12-23 DIAGNOSIS — A41.9 SEPSIS, UNSPECIFIED ORGANISM: ICD-10-CM

## 2021-12-23 DIAGNOSIS — I87.8 OTHER SPECIFIED DISORDERS OF VEINS: ICD-10-CM

## 2021-12-23 DIAGNOSIS — S00.83XA CONTUSION OF OTHER PART OF HEAD, INITIAL ENCOUNTER: ICD-10-CM
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
## 2021-12-23 DIAGNOSIS — R65.21 SEVERE SEPSIS WITH SEPTIC SHOCK: ICD-10-CM

## 2021-12-23 DIAGNOSIS — Z51.5 ENCOUNTER FOR PALLIATIVE CARE: ICD-10-CM

## 2021-12-23 DIAGNOSIS — Z88.2 ALLERGY STATUS TO SULFONAMIDES: ICD-10-CM

## 2021-12-23 DIAGNOSIS — I21.A1 MYOCARDIAL INFARCTION TYPE 2: ICD-10-CM

## 2021-12-23 DIAGNOSIS — Z79.82 LONG TERM (CURRENT) USE OF ASPIRIN: ICD-10-CM

## 2021-12-23 DIAGNOSIS — S40.022A CONTUSION OF LEFT UPPER ARM, INITIAL ENCOUNTER: ICD-10-CM

## 2021-12-23 DIAGNOSIS — L89.319 PRESSURE ULCER OF RIGHT BUTTOCK, UNSPECIFIED STAGE: ICD-10-CM

## 2021-12-23 DIAGNOSIS — E87.2 ACIDOSIS: ICD-10-CM

## 2021-12-23 DIAGNOSIS — S60.221A CONTUSION OF RIGHT HAND, INITIAL ENCOUNTER: ICD-10-CM

## 2021-12-23 DIAGNOSIS — Z88.8 ALLERGY STATUS TO OTHER DRUGS, MEDICAMENTS AND BIOLOGICAL SUBSTANCES STATUS: ICD-10-CM
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
## 2021-12-23 DIAGNOSIS — I25.10 ATHEROSCLEROTIC HEART DISEASE OF NATIVE CORONARY ARTERY WITHOUT ANGINA PECTORIS: ICD-10-CM

## 2021-12-23 DIAGNOSIS — J18.9 PNEUMONIA, UNSPECIFIED ORGANISM: ICD-10-CM

## 2021-12-23 DIAGNOSIS — R26.81 UNSTEADINESS ON FEET: ICD-10-CM

## 2021-12-23 DIAGNOSIS — I11.0 HYPERTENSIVE HEART DISEASE WITH HEART FAILURE: ICD-10-CM

## 2021-12-23 DIAGNOSIS — Z86.73 PERSONAL HISTORY OF TRANSIENT ISCHEMIC ATTACK (TIA), AND CEREBRAL INFARCTION WITHOUT RESIDUAL DEFICITS: ICD-10-CM

## 2021-12-23 DIAGNOSIS — N17.9 ACUTE KIDNEY FAILURE, UNSPECIFIED: ICD-10-CM

## 2021-12-23 DIAGNOSIS — R55 SYNCOPE AND COLLAPSE: ICD-10-CM

## 2021-12-23 DIAGNOSIS — L89.329 PRESSURE ULCER OF LEFT BUTTOCK, UNSPECIFIED STAGE: ICD-10-CM

## 2021-12-23 DIAGNOSIS — R56.9 UNSPECIFIED CONVULSIONS: ICD-10-CM

## 2021-12-23 DIAGNOSIS — G93.41 METABOLIC ENCEPHALOPATHY: ICD-10-CM

## 2021-12-23 DIAGNOSIS — F32.9 MAJOR DEPRESSIVE DISORDER, SINGLE EPISODE, UNSPECIFIED: ICD-10-CM

## 2021-12-23 DIAGNOSIS — S60.222A CONTUSION OF LEFT HAND, INITIAL ENCOUNTER: ICD-10-CM

## 2021-12-23 DIAGNOSIS — I50.32 CHRONIC DIASTOLIC (CONGESTIVE) HEART FAILURE: ICD-10-CM

## 2021-12-23 DIAGNOSIS — Z66 DO NOT RESUSCITATE: ICD-10-CM

## 2021-12-23 DIAGNOSIS — Z91.81 HISTORY OF FALLING: ICD-10-CM

## 2021-12-23 DIAGNOSIS — Z85.828 PERSONAL HISTORY OF OTHER MALIGNANT NEOPLASM OF SKIN: ICD-10-CM

## 2021-12-23 DIAGNOSIS — S40.021A CONTUSION OF RIGHT UPPER ARM, INITIAL ENCOUNTER: ICD-10-CM

## 2022-01-01 LAB
CULTURE RESULTS: SIGNIFICANT CHANGE UP
SPECIMEN SOURCE: SIGNIFICANT CHANGE UP

## 2022-05-23 NOTE — ED ADULT NURSE NOTE - NS PRO AD ANY ON CHART
Follow up with PCP in 5-7 days for additional evaluation.  Warm compresses to affected areas and soak in Epsom Salt for comfort.  Take pain medication as directed for up to 7 days.   It is important that you follow up with your primary care provider or specialist if indicated for further evaluation, workup, and treatment as necessary. The exam and treatment you received in Emergency Department was for an urgent problem and NOT INTENDED AS COMPLETE CARE. It is important that you FOLLOW UP with a doctor for ongoing care. If your symptoms become WORSE or you DO NOT IMPROVE and you are unable to reach your health care provider, you should RETURN to the Emergency Department. The Emergency Department provider has provided a PRELIMINARY INTERPRETATION of all your studies. A final interpretation may be done after you are discharged. If a change in your diagnosis or treatment is needed WE WILL CONTACT YOU. It is critical that we have a CURRENT PHONE NUMBER FOR YOU.    
No

## 2022-12-29 NOTE — H&P ADULT - CLICK TO LAUNCH ORM
"[facility] - Neurosurgery ([facility])  Vascular Neurology  Comprehensive Stroke Center  Progress Note    Assessment/Plan:     * New infarction of cerebellum  37 y/o female with 3 week history of headache and cracked her neck tonight and headache got worse plus she started with dysarthri, had Ptosis nina. Seen in [facility] ED and no thrombolytics due to concern for discestion so transferred to [facility] ED fopr further evaluation and CTA head and neck reveals Abnormal segment of focal caliber change with luminal narrowing and irregularity involving the V2 segment of the left vertebral artery.  Additionally, there is focal luminal narrowing and irregularity involving the V3 segment of the right vertebral artery. MRI Brain reveals Several apparent small subtle foci of restricted diffusion within the bilateral cerebellar hemispheres as well as additional punctate focus within the paramedian posterior right occipital lobe suspicious for punctate acute infarcts.  There is subtle asymmetric diffusion hyperintensity within the left lara lynn and additional region of acute ischemia not excluded.     Patient is pending authorization for IPR. Neuro exam stable.    Antithrombotics: ASA 81 mg daily    Statins: Lipitor 40 mg daily    Aggressive risk factor modification: Diet, Exercise, Obesity, cracking her neck almost daily     Rehab efforts: The patient has been evaluated by a stroke team provider and the therapy needs have been fully considered based off the presenting complaints and exam findings. The following therapy evaluations are needed: PT evaluate and treat, OT evaluate and treat, SLP evaluate and treat    Diagnostics ordered/pending: Other: MBBS    VTE prophylaxis: Enoxaparin 40 mg SQ every 24 hours  Mechanical prophylaxis: Place SCDs    BP parameters: Infarct: No intervention, SBP <180      Vertebral artery dissection  Bilateral  Please see "New infarction of cerebellum"    Diplopia  Binocular horizontal " diplopia  Persistent since stroke, mild improvement. Will be going to rehab but L. ophthalmoplegia   Outpatient referral for Neuro-ophthalmology   Please see New infarction of cerebellum    Ptosis, bilateral  Due to stroke  Resolving partially    Class 2 obesity due to excess calories with body mass index (BMI) of 37.0 to 37.9 in adult  Stroke risk factor  Counseled on diet and exercise         12/26/2022- VSS. Neuroexam stable. NPO for concern of aspiration  12/27/2022- VSS, Neuroexam stable. MBBS today. Diet advanced to regular diet and nectar thick.  12/28/2022 - VSS, Neuroexam stable. IPR pending. C-collar being used.  12/29/2022- VSS, Neuro exam with minor improvement. Pending auth for IPR, possible d/c today.      STROKE DOCUMENTATION   Acute Stroke Times   Last Known Normal Date: 12/24/22  Last Known Normal Time: 2200  Symptom Onset Date: 12/24/20  Symptom Onset Time: 2200  Stroke Team Called Date: 12/25/22  Stroke Team Called Time: 0124  Stroke Team Arrival Date: 12/25/20  Stroke Team Arrival Time: 0130  CT Interpretation Time: 0000  Thrombolytic Therapy Recommended: No  CTA Interpretation Time: 0152  Thrombectomy Recommended: No    NIH Scale:  1a. Level of Consciousness: 0-->Alert, keenly responsive  1b. LOC Questions: 0-->Answers both questions correctly  1c. LOC Commands: 0-->Performs both tasks correctly  2. Best Gaze: 1-->Partial gaze palsy, gaze is abnormal in one or both eyes, but forced deviation or total gaze paresis is not present  3. Visual: 1-->Partial hemianopia  4. Facial Palsy: 0-->Normal symmetrical movements  5a. Motor Arm, Left: 0-->No drift, limb holds 90 (or 45) degrees for full 10 secs  5b. Motor Arm, Right: 0-->No drift, limb holds 90 (or 45) degrees for full 10 secs  6a. Motor Leg, Left: 0-->No drift, leg holds 30 degree position for full 5 secs  6b. Motor Leg, Right: 0-->No drift, leg holds 30 degree position for full 5 secs  7. Limb Ataxia: 0-->Absent  8. Sensory:  1-->Mild-to-moderate sensory loss, patient feels pinprick is less sharp or is dull on the affected side, or there is a loss of superficial pain with pinprick, but patient is aware of being touched  9. Best Language: 0-->No aphasia, normal  10. Dysarthria: 1-->Mild-to-moderate dysarthria, patient slurs at least some words and, at worst, can be understood with some difficulty  11. Extinction and Inattention (formerly Neglect): 0-->No abnormality  Total (NIH Stroke Scale): 4       Modified [place] Score: 0  Lucas Coma Scale:    ABCD2 Score:    [id number] Score:   HAS -BLED Score:   ICH Score:   Hunt & Cali Classification:      Hemorrhagic change of an Ischemic Stroke: Does this patient have an ischemic stroke with hemorrhagic changes? No      Neurologic Chief Complaint: Bilateral Vertebral Artery dissections     Subjective:      HPI, Past Medical, Family, and Social History remains the same as documented in the initial encounter.     Review of Systems   Constitutional:  Negative for chills and fever.   HENT:  Negative for drooling and ear discharge.    Eyes:  Positive for visual disturbance. Negative for pain.   Respiratory:  Negative for cough and shortness of breath.    Cardiovascular:  Negative for chest pain and leg swelling.   Gastrointestinal:  Negative for abdominal distention, abdominal pain, constipation, diarrhea, nausea and vomiting.   Endocrine: Negative for polydipsia and polyphagia.   Genitourinary:  Negative for dysuria and enuresis.   Musculoskeletal:  Negative for arthralgias and back pain.   Skin:  Negative for rash and wound.   Allergic/Immunologic: Negative for food allergies and immunocompromised state.   Neurological:  Positive for speech difficulty, numbness and headaches.   Hematological:  Does not bruise/bleed easily.   Psychiatric/Behavioral:  Positive for dysphoric mood. Negative for agitation, confusion and sleep disturbance. The patient is nervous/anxious.    Scheduled Meds:   aspirin   81 mg Oral Daily    atorvastatin  40 mg Oral Daily    enoxaparin  40 mg Subcutaneous Daily    mupirocin   Nasal BID      Continuous Infusions:   sodium chloride 0.9%        PRN Meds:acetaminophen, ALPRAZolam, labetaloL, methocarbamoL, ondansetron, sodium chloride 0.9%, sodium chloride 0.9%     Objective:      Vital Signs (Most Recent):  Temp: 98.1 °F (36.7 °C) (12/29/22 0733)  Pulse: 85 (12/29/22 0733)  Resp: 18 (12/29/22 0733)  BP: 131/88 (12/29/22 0733)  SpO2: 96 % (12/29/22 0733)  BP Location: Right arm     Vital Signs Range (Last 24H):  Temp:  [97.2 °F (36.2 °C)-98.8 °F (37.1 °C)]   Pulse:  [63-92]   Resp:  [16-18]   BP: (124-143)/(63-88)   SpO2:  [94 %-97 %]   BP Location: Right arm     Physical Exam  Vitals and nursing note reviewed. Exam conducted with a chaperone present.   Constitutional:       Appearance: Normal appearance. She is obese.   HENT:      Head: Normocephalic and atraumatic.   Eyes:      Extraocular Movements:      Left eye: Abnormal extraocular motion present.      Conjunctiva/sclera: Conjunctivae normal.      Pupils: Pupils are equal, round, and reactive to light.      Comments: Improving mobility and conjugate gaze   Cardiovascular:      Rate and Rhythm: Normal rate and regular rhythm.   Pulmonary:      Effort: Pulmonary effort is normal.      Breath sounds: Normal breath sounds.   Abdominal:      General: Abdomen is flat. Bowel sounds are normal.      Palpations: Abdomen is soft.   Musculoskeletal:         General: Normal range of motion.      Cervical back: Normal range of motion.   Skin:     General: Skin is warm and dry.   Neurological:      Mental Status: She is alert and oriented to person, place, and time.      Cranial Nerves: Cranial nerve deficit present.      Sensory: Sensory deficit present.      Coordination: Coordination abnormal.      Comments: Dysarthria, ptosis     Psychiatric:         Mood and Affect: Mood is depressed. Affect is labile and tearful.         Speech: Speech is  slurred.         Behavior: Behavior normal.         Neurological Exam:   LOC: alert  Attention Span: Good   Language: No aphasia  Articulation: Dysarthria  Orientation: Person, Place, Time   Visual Fields: Hemianopsia left  EOM (CN III, IV, VI): Ptosis bilaterally, disconjugate gaze, L. Eye ophthalmoplegia   Pupils (CN II, III): PERRL  Facial Sensation (CN V): Normal  Facial Movement (CN VII): Symmetric facial expression    Gag Reflex: present  Reflexes: flexor plantar responses bilaterally  Motor: Arm left  Normal 5/5  Leg left  Normal 5/5  Arm right  Normal 5/5  Leg right Normal 5/5  Cerebellum: Upper Extremity Appendicular Ataxia (Finger Nose Finger)  Left only when both eyes open  Sensation: Govind-hypoesthesia left  Tone: Normal tone throughout        Laboratory:  CMP:   No results for input(s): GLUCOSE, CALCIUM, ALBUMIN, PROT, NA, K, CO2, CL, BUN, CREATININE, ALKPHOS, ALT, AST, BILITOT in the last 24 hours.     BMP:       Recent Labs   Lab 12/27/22  0536      K 4.2   *   CO2 18*   BUN 11   CREATININE 0.7   CALCIUM 8.2*         CBC:       Recent Labs   Lab 12/27/22  0535   WBC 6.94   RBC 4.52   HGB 13.1   HCT 41.5      MCV 92   MCH 29.0   MCHC 31.6*         Lipid Panel:       Recent Labs   Lab 12/25/22  0346   CHOL 179   LDLCALC 109.4   HDL 56   TRIG 68         Coagulation:       Recent Labs   Lab 12/25/22  0346   INR 1.0   APTT 25.0         Hgb A1C:       Recent Labs   Lab 12/25/22  0346   HGBA1C 4.8         TSH:       Recent Labs   Lab 12/25/22  0346   TSH 1.318            Diagnostic Results      Brain imaging:  CT Head w/o contrast 12-24-22 results:  No acute intracranial process identified     MRI Brain w/o contrast 12-25-22 results:  1.  Several apparent small subtle foci of restricted diffusion within the bilateral cerebellar hemispheres as well as additional punctate focus within the paramedian posterior right occipital lobe suspicious for punctate acute infarcts.  There is subtle  asymmetric diffusion hyperintensity within the left lara lynn and additional region of acute ischemia not excluded.  Further evaluation and follow-up as warranted.     2.  Apparent small region of encephalomalacia within the left cerebellar hemisphere which may represent a remote infarct.  Additional possible punctate lacunar type infarct within the right superior cerebellar hemisphere.     3.  Abnormal T2 flow void involving the V3 segment of the right vertebral artery in this patient with suspected vertebral artery dissection.     4.  Several scattered foci of periventricular T2/FLAIR hyperintensity.  These lesions are nonspecific in appearance and may be seen with accelerated small vessel ischemic disease, vasculopathies, migraine headaches, and as the residua from inflammatory and traumatic insults to the brain.     Preliminary findings were discussed with Ede GAINES by myself at 03:31 on 12/25/2022.     Vessel Imaging:  CTA Head and neck 12-25-22 results:  1.  Abnormal segment of focal caliber change with luminal narrowing and irregularity involving the V2 segment of the left vertebral artery.  Additionally, there is focal luminal narrowing and irregularity involving the V3 segment of the right vertebral artery.  Findings are concerning for possible bilateral vertebral artery dissection.  Vascular neurology consultation advised.  Further assessment could be performed with conventional angiography as warranted.     2.  Persistent asymmetric wedge-shaped region of parenchymal hypoattenuation within the left cerebellar hemisphere.  There is apparent contrast enhancement on the delayed series.  This is nonspecific although may relate to subacute infarct.  More sensitive assessment for ischemic could be performed with MRI if there are no patient contraindications.     3.  No evidence of cervical spine fracture.  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     Cardiac Evaluation:   EKG 12-24-22  . results:  Normal sinus rhythm  Normal ECG  No previous ECGs available     TTE 12/25/22  The left ventricle is normal in size with normal systolic function.  The estimated ejection fraction is 65%.  Normal left ventricular diastolic function.  Normal right ventricular size with normal right ventricular systolic function.  Normal central venous pressure (3 mmHg).  The estimated PA systolic pressure is 27 mmHg.             Sundar Norwood MD  Comprehensive Stroke Center  Department of Vascular Neurology   [facility] Neurosurgery [facility])

## 2023-08-28 NOTE — PROCEDURE NOTE - NSINTBATTERYSTAT_CARD_ALL_CORE
Patient is now 6 weeks postpartum.  Her exam is benign.  I did not hear any ectopy on examination today.  I cannot elicit any ectopy with provocative maneuvers such as sitting to standing.  Therefore I am going to recommend that she stop metoprolol.  If she continues to manifest symptoms of ectopy then I will have her start back up on the metoprolol and we will revisit the situation in about 3 months time.  I did put her on metoprolol initially, as when I checked it was felt to be safe in pregnancy as well as in lactation.  
Good

## 2023-09-06 NOTE — ED PROVIDER NOTE - PRINCIPAL DIAGNOSIS
NEW PRIMARY CARE VISIT    23  Name: Aristides Graf   : 1955   Age: 79 y.o.   Sex: female        Assessment & Plan:     Problem List Items Addressed This Visit          Circulatory    Primary hypertension     Chronic, controlled  Continue amlodipine 5 mg daily, furosemide 20 mg daily  2023 labs requested            Respiratory    COPD (chronic obstructive pulmonary disease) (HCC)     Chronic, uncontrolled  Change Wixela to Trelegy  Continue albuterol as needed         Relevant Medications    fluticasone-umeclidin-vilant (TRELEGY ELLIPTA) 100-62.5-25 MCG/ACT AEPB inhaler       Digestive    Gastroesophageal reflux disease     Continue sucralfate, IBGard  Referral to new GI         Relevant Orders    External Referral To Gastroenterology       Endocrine    Acquired hypothyroidism     Continue levothyroxine 88 mcg daily  2023 labs requested            Nervous and Auditory    Generalized convulsive epilepsy ([address])     Chronic, stable  Follows with neurology  Continue Briviact         Mild cognitive impairment     Continue Namzaric            Musculoskeletal and Integument    Primary osteoarthritis of left hip     Planning MARISOL when able  Follow up with ortho  Start tizanidine and topical diclofenac gel         Relevant Medications    diclofenac sodium (VOLTAREN) 1 % GEL    tiZANidine (ZANAFLEX) 2 MG tablet    Primary osteoarthritis of right knee     Follow up with ortho  Start tizanidine and topical diclofenac gel         Relevant Medications    diclofenac sodium (VOLTAREN) 1 % GEL    tiZANidine (ZANAFLEX) 2 MG tablet       Other    Class 3 severe obesity with serious comorbidity and body mass index (BMI) of 40.0 to 44.9 in adult [facility])     Chronic, stable  Unable to exercise at this time due to OA pain and poor COPD control   healthy diet next visit         Hypomagnesemia     Continue supplement  2023 labs requested         Hypokalemia     Continue supplement  2023 labs requested         Lump Bleeding

## 2023-11-06 NOTE — DISCHARGE NOTE PROVIDER - NSDCQMERRANDS_GEN_ALL_CORE
"A.S,  FYI.  Patient got call from [place] addiction services.  Wanted to let you know she appreciates it, but could not get an appointment, not going to be able to get an appointment.  Since April, 2 months before 65th birthday, Medicare is screwed up.  \"Doesn't work now at all\"  Plan B or whatever- does not have any insurance cards, not able to get cards.  States she has called everyone, makes appointments with people to get cards, not able to get this addressed.  Can wait for ever 30 minutes with the county and it just disconnects.  Having a worse day- tried to give the  85 different numbers and none of them worked.  \"I have so much debt now, over 4 million dollars.  Is going to go to the free clinic from here forward.  Thanked you for all your efforts.  Samantha BRUNSON RN     " Yes

## 2025-01-04 NOTE — PROGRESS NOTE ADULT - REASON FOR ADMISSION
[facility]
Tylenol/Ibuprofen  Warm salt water gargles  OTC throat sprays/lozenges    
[facility]

## 2025-06-09 NOTE — ED PROVIDER NOTE - ADMIT DISPOSITION PRESENT ON ADMISSION SEPSIS Q1 - RE-EVALUATED PATIENT FLUID AND VITAL SIGNS
Pt calls to confirm she will not need to pay out of pocket. For her MRI she was informed everything was good to go. She would like to confirm with providers office   I have re-evaluated the patient's fluid status and reviewed vital signs. Clinical perfusion assessment was performed.